# Patient Record
Sex: FEMALE | Race: WHITE | Employment: OTHER | URBAN - METROPOLITAN AREA
[De-identification: names, ages, dates, MRNs, and addresses within clinical notes are randomized per-mention and may not be internally consistent; named-entity substitution may affect disease eponyms.]

---

## 2018-10-15 ENCOUNTER — HOSPITAL ENCOUNTER (OUTPATIENT)
Age: 68
Setting detail: OBSERVATION
Discharge: HOME | End: 2018-10-17
Attending: INTERNAL MEDICINE
Payer: MEDICARE

## 2018-10-15 DIAGNOSIS — E11.65 TYPE 2 DIABETES MELLITUS WITH HYPERGLYCEMIA, WITHOUT LONG-TERM CURRENT USE OF INSULIN (HCC): ICD-10-CM

## 2018-10-15 DIAGNOSIS — R07.89 CHEST TIGHTNESS: Primary | ICD-10-CM

## 2018-10-15 DIAGNOSIS — I16.0 HYPERTENSIVE URGENCY: ICD-10-CM

## 2018-10-15 DIAGNOSIS — F31.81 BIPOLAR 2 DISORDER (HCC): ICD-10-CM

## 2018-10-15 DIAGNOSIS — R42 DIZZINESS: ICD-10-CM

## 2018-10-15 DIAGNOSIS — F31.9 BIPOLAR AFFECTIVE DISORDER, REMISSION STATUS UNSPECIFIED (HCC): ICD-10-CM

## 2018-10-15 DIAGNOSIS — H53.9 VISUAL DISTURBANCE: ICD-10-CM

## 2018-10-15 PROCEDURE — 99285 EMERGENCY DEPT VISIT HI MDM: CPT

## 2018-10-15 PROCEDURE — 96374 THER/PROPH/DIAG INJ IV PUSH: CPT

## 2018-10-15 RX ORDER — QUETIAPINE 150 MG/1
150 TABLET, FILM COATED, EXTENDED RELEASE ORAL NIGHTLY
COMMUNITY

## 2018-10-15 RX ORDER — LAMOTRIGINE 100 MG/1
100 TABLET ORAL 2 TIMES DAILY
COMMUNITY

## 2018-10-15 RX ORDER — METOPROLOL SUCCINATE 100 MG/1
200 TABLET, EXTENDED RELEASE ORAL DAILY
Status: ON HOLD | COMMUNITY
End: 2019-04-25

## 2018-10-15 RX ORDER — HYDRALAZINE HYDROCHLORIDE 20 MG/ML
10 INJECTION INTRAMUSCULAR; INTRAVENOUS ONCE
Status: COMPLETED | OUTPATIENT
Start: 2018-10-15 | End: 2018-10-16

## 2018-10-15 RX ORDER — CLONAZEPAM 2 MG/1
2 TABLET ORAL 2 TIMES DAILY
COMMUNITY

## 2018-10-15 RX ORDER — SODIUM CHLORIDE 0.9 % (FLUSH) 0.9 %
3 SYRINGE (ML) INJECTION EVERY 8 HOURS
Status: DISCONTINUED | OUTPATIENT
Start: 2018-10-15 | End: 2018-10-16 | Stop reason: ALTCHOICE

## 2018-10-15 ASSESSMENT — PAIN DESCRIPTION - LOCATION: LOCATION: HEAD

## 2018-10-15 ASSESSMENT — PAIN DESCRIPTION - PAIN TYPE: TYPE: ACUTE PAIN

## 2018-10-15 ASSESSMENT — PAIN SCALES - GENERAL: PAINLEVEL_OUTOF10: 2

## 2018-10-15 ASSESSMENT — PAIN DESCRIPTION - DESCRIPTORS: DESCRIPTORS: ACHING

## 2018-10-16 ENCOUNTER — APPOINTMENT (OUTPATIENT)
Dept: CT IMAGING | Age: 68
End: 2018-10-16
Payer: MEDICARE

## 2018-10-16 ENCOUNTER — APPOINTMENT (OUTPATIENT)
Dept: ULTRASOUND IMAGING | Age: 68
End: 2018-10-16
Payer: MEDICARE

## 2018-10-16 PROBLEM — R07.89 CHEST TIGHTNESS OR PRESSURE: Status: ACTIVE | Noted: 2018-10-16

## 2018-10-16 PROBLEM — R73.9 HYPERGLYCEMIA: Status: ACTIVE | Noted: 2018-10-16

## 2018-10-16 PROBLEM — H53.10 VISUAL DISTURBANCE, SUBJECTIVE: Status: ACTIVE | Noted: 2018-10-16

## 2018-10-16 PROBLEM — J02.9 ACUTE SORE THROAT: Status: ACTIVE | Noted: 2018-10-16

## 2018-10-16 PROBLEM — I16.0 HYPERTENSIVE URGENCY: Status: ACTIVE | Noted: 2018-10-16

## 2018-10-16 LAB
ALBUMIN SERPL-MCNC: 3.9 G/DL (ref 3.9–4.9)
ALP BLD-CCNC: 128 U/L (ref 40–130)
ALT SERPL-CCNC: 8 U/L (ref 0–33)
ANION GAP SERPL CALCULATED.3IONS-SCNC: 12 MEQ/L (ref 7–13)
ANION GAP SERPL CALCULATED.3IONS-SCNC: 15 MEQ/L (ref 7–13)
AST SERPL-CCNC: 10 U/L (ref 0–35)
BASOPHILS ABSOLUTE: 0 K/UL (ref 0–0.2)
BASOPHILS RELATIVE PERCENT: 0.6 %
BILIRUB SERPL-MCNC: 0.3 MG/DL (ref 0–1.2)
BILIRUBIN URINE: NEGATIVE
BLOOD, URINE: NEGATIVE
BUN BLDV-MCNC: 13 MG/DL (ref 8–23)
BUN BLDV-MCNC: 15 MG/DL (ref 8–23)
CALCIUM SERPL-MCNC: 8.7 MG/DL (ref 8.6–10.2)
CALCIUM SERPL-MCNC: 8.8 MG/DL (ref 8.6–10.2)
CHLORIDE BLD-SCNC: 101 MEQ/L (ref 98–107)
CHLORIDE BLD-SCNC: 104 MEQ/L (ref 98–107)
CLARITY: CLEAR
CO2: 25 MEQ/L (ref 22–29)
CO2: 26 MEQ/L (ref 22–29)
COLOR: YELLOW
CREAT SERPL-MCNC: 0.6 MG/DL (ref 0.5–0.9)
CREAT SERPL-MCNC: 0.67 MG/DL (ref 0.5–0.9)
EKG ATRIAL RATE: 65 BPM
EKG P AXIS: 53 DEGREES
EKG P-R INTERVAL: 170 MS
EKG Q-T INTERVAL: 466 MS
EKG QRS DURATION: 106 MS
EKG QTC CALCULATION (BAZETT): 484 MS
EKG R AXIS: 16 DEGREES
EKG T AXIS: 11 DEGREES
EKG VENTRICULAR RATE: 65 BPM
EOSINOPHILS ABSOLUTE: 0.1 K/UL (ref 0–0.7)
EOSINOPHILS RELATIVE PERCENT: 1.7 %
GFR AFRICAN AMERICAN: >60
GFR AFRICAN AMERICAN: >60
GFR NON-AFRICAN AMERICAN: >60
GFR NON-AFRICAN AMERICAN: >60
GLOBULIN: 3.7 G/DL (ref 2.3–3.5)
GLUCOSE BLD-MCNC: 137 MG/DL (ref 60–115)
GLUCOSE BLD-MCNC: 171 MG/DL (ref 74–109)
GLUCOSE BLD-MCNC: 216 MG/DL (ref 60–115)
GLUCOSE BLD-MCNC: 242 MG/DL (ref 74–109)
GLUCOSE BLD-MCNC: 270 MG/DL (ref 60–115)
GLUCOSE BLD-MCNC: 280 MG/DL (ref 60–115)
GLUCOSE URINE: NEGATIVE MG/DL
HBA1C MFR BLD: 7 % (ref 4.8–5.9)
HCT VFR BLD CALC: 34 % (ref 37–47)
HCT VFR BLD CALC: 34.2 % (ref 37–47)
HEMOGLOBIN: 11.3 G/DL (ref 12–16)
HEMOGLOBIN: 11.3 G/DL (ref 12–16)
KETONES, URINE: NEGATIVE MG/DL
LEUKOCYTE ESTERASE, URINE: NEGATIVE
LV EF: 60 %
LVEF MODALITY: NORMAL
LYMPHOCYTES ABSOLUTE: 1.6 K/UL (ref 1–4.8)
LYMPHOCYTES RELATIVE PERCENT: 27.4 %
MAGNESIUM: 1.9 MG/DL (ref 1.7–2.3)
MCH RBC QN AUTO: 30.2 PG (ref 27–31.3)
MCH RBC QN AUTO: 30.3 PG (ref 27–31.3)
MCHC RBC AUTO-ENTMCNC: 33 % (ref 33–37)
MCHC RBC AUTO-ENTMCNC: 33.1 % (ref 33–37)
MCV RBC AUTO: 91.1 FL (ref 82–100)
MCV RBC AUTO: 91.9 FL (ref 82–100)
MONOCYTES ABSOLUTE: 0.3 K/UL (ref 0.2–0.8)
MONOCYTES RELATIVE PERCENT: 5.4 %
NEUTROPHILS ABSOLUTE: 3.7 K/UL (ref 1.4–6.5)
NEUTROPHILS RELATIVE PERCENT: 64.9 %
NITRITE, URINE: NEGATIVE
PDW BLD-RTO: 17.5 % (ref 11.5–14.5)
PDW BLD-RTO: 17.6 % (ref 11.5–14.5)
PERFORMED ON: ABNORMAL
PH UA: 6 (ref 5–9)
PLATELET # BLD: 188 K/UL (ref 130–400)
PLATELET # BLD: 191 K/UL (ref 130–400)
POTASSIUM REFLEX MAGNESIUM: 3.6 MEQ/L (ref 3.5–5.1)
POTASSIUM SERPL-SCNC: 3.5 MEQ/L (ref 3.5–5.1)
PROTEIN UA: NEGATIVE MG/DL
RBC # BLD: 3.72 M/UL (ref 4.2–5.4)
RBC # BLD: 3.73 M/UL (ref 4.2–5.4)
S PYO AG THROAT QL: NEGATIVE
SODIUM BLD-SCNC: 141 MEQ/L (ref 132–144)
SODIUM BLD-SCNC: 142 MEQ/L (ref 132–144)
SPECIFIC GRAVITY UA: 1.01 (ref 1–1.03)
TOTAL PROTEIN: 7.6 G/DL (ref 6.4–8.1)
TROPONIN: <0.01 NG/ML (ref 0–0.01)
TSH SERPL DL<=0.05 MIU/L-ACNC: 5.29 UIU/ML (ref 0.27–4.2)
URINE REFLEX TO CULTURE: NORMAL
UROBILINOGEN, URINE: 0.2 E.U./DL
WBC # BLD: 5.8 K/UL (ref 4.8–10.8)
WBC # BLD: 6.1 K/UL (ref 4.8–10.8)

## 2018-10-16 PROCEDURE — 84443 ASSAY THYROID STIM HORMONE: CPT

## 2018-10-16 PROCEDURE — G0378 HOSPITAL OBSERVATION PER HR: HCPCS

## 2018-10-16 PROCEDURE — 2580000003 HC RX 258: Performed by: HOSPITALIST

## 2018-10-16 PROCEDURE — 6370000000 HC RX 637 (ALT 250 FOR IP): Performed by: INTERNAL MEDICINE

## 2018-10-16 PROCEDURE — 6370000000 HC RX 637 (ALT 250 FOR IP): Performed by: PHYSICIAN ASSISTANT

## 2018-10-16 PROCEDURE — 93880 EXTRACRANIAL BILAT STUDY: CPT | Performed by: INTERNAL MEDICINE

## 2018-10-16 PROCEDURE — 96375 TX/PRO/DX INJ NEW DRUG ADDON: CPT

## 2018-10-16 PROCEDURE — 93306 TTE W/DOPPLER COMPLETE: CPT

## 2018-10-16 PROCEDURE — 84484 ASSAY OF TROPONIN QUANT: CPT

## 2018-10-16 PROCEDURE — 6360000002 HC RX W HCPCS: Performed by: INTERNAL MEDICINE

## 2018-10-16 PROCEDURE — 85027 COMPLETE CBC AUTOMATED: CPT

## 2018-10-16 PROCEDURE — 6370000000 HC RX 637 (ALT 250 FOR IP): Performed by: HOSPITALIST

## 2018-10-16 PROCEDURE — 36415 COLL VENOUS BLD VENIPUNCTURE: CPT

## 2018-10-16 PROCEDURE — 85025 COMPLETE CBC W/AUTO DIFF WBC: CPT

## 2018-10-16 PROCEDURE — 70450 CT HEAD/BRAIN W/O DYE: CPT

## 2018-10-16 PROCEDURE — 2580000003 HC RX 258: Performed by: PHYSICIAN ASSISTANT

## 2018-10-16 PROCEDURE — 96374 THER/PROPH/DIAG INJ IV PUSH: CPT

## 2018-10-16 PROCEDURE — 6360000002 HC RX W HCPCS: Performed by: PHYSICIAN ASSISTANT

## 2018-10-16 PROCEDURE — 87880 STREP A ASSAY W/OPTIC: CPT

## 2018-10-16 PROCEDURE — 80053 COMPREHEN METABOLIC PANEL: CPT

## 2018-10-16 PROCEDURE — 99220 PR INITIAL OBSERVATION CARE/DAY 70 MINUTES: CPT | Performed by: INTERNAL MEDICINE

## 2018-10-16 PROCEDURE — 81003 URINALYSIS AUTO W/O SCOPE: CPT

## 2018-10-16 PROCEDURE — 96372 THER/PROPH/DIAG INJ SC/IM: CPT

## 2018-10-16 PROCEDURE — 6360000002 HC RX W HCPCS: Performed by: HOSPITALIST

## 2018-10-16 PROCEDURE — 93005 ELECTROCARDIOGRAM TRACING: CPT

## 2018-10-16 PROCEDURE — 83735 ASSAY OF MAGNESIUM: CPT

## 2018-10-16 PROCEDURE — 83036 HEMOGLOBIN GLYCOSYLATED A1C: CPT

## 2018-10-16 PROCEDURE — 93880 EXTRACRANIAL BILAT STUDY: CPT

## 2018-10-16 PROCEDURE — 96376 TX/PRO/DX INJ SAME DRUG ADON: CPT

## 2018-10-16 PROCEDURE — 87081 CULTURE SCREEN ONLY: CPT

## 2018-10-16 PROCEDURE — 2700000000 HC OXYGEN THERAPY PER DAY

## 2018-10-16 RX ORDER — FAMOTIDINE 20 MG/1
20 TABLET, FILM COATED ORAL 2 TIMES DAILY
Status: DISCONTINUED | OUTPATIENT
Start: 2018-10-16 | End: 2018-10-17 | Stop reason: HOSPADM

## 2018-10-16 RX ORDER — NITROGLYCERIN 0.4 MG/1
0.4 TABLET SUBLINGUAL EVERY 5 MIN PRN
Status: DISCONTINUED | OUTPATIENT
Start: 2018-10-16 | End: 2018-10-17 | Stop reason: HOSPADM

## 2018-10-16 RX ORDER — LISINOPRIL AND HYDROCHLOROTHIAZIDE 12.5; 1 MG/1; MG/1
2 TABLET ORAL DAILY
Status: DISCONTINUED | OUTPATIENT
Start: 2018-10-16 | End: 2018-10-17

## 2018-10-16 RX ORDER — LAMOTRIGINE 100 MG/1
100 TABLET ORAL 2 TIMES DAILY
Status: DISCONTINUED | OUTPATIENT
Start: 2018-10-16 | End: 2018-10-17 | Stop reason: HOSPADM

## 2018-10-16 RX ORDER — DEXTROSE MONOHYDRATE 25 G/50ML
12.5 INJECTION, SOLUTION INTRAVENOUS PRN
Status: DISCONTINUED | OUTPATIENT
Start: 2018-10-16 | End: 2018-10-17 | Stop reason: HOSPADM

## 2018-10-16 RX ORDER — ACETAMINOPHEN 325 MG/1
650 TABLET ORAL EVERY 4 HOURS PRN
Status: DISCONTINUED | OUTPATIENT
Start: 2018-10-16 | End: 2018-10-17 | Stop reason: HOSPADM

## 2018-10-16 RX ORDER — SODIUM CHLORIDE 0.9 % (FLUSH) 0.9 %
10 SYRINGE (ML) INJECTION EVERY 12 HOURS SCHEDULED
Status: DISCONTINUED | OUTPATIENT
Start: 2018-10-16 | End: 2018-10-17 | Stop reason: HOSPADM

## 2018-10-16 RX ORDER — QUETIAPINE FUMARATE 50 MG/1
150 TABLET, EXTENDED RELEASE ORAL NIGHTLY
Status: DISCONTINUED | OUTPATIENT
Start: 2018-10-16 | End: 2018-10-17 | Stop reason: HOSPADM

## 2018-10-16 RX ORDER — DEXTROSE MONOHYDRATE 50 MG/ML
100 INJECTION, SOLUTION INTRAVENOUS PRN
Status: DISCONTINUED | OUTPATIENT
Start: 2018-10-16 | End: 2018-10-17 | Stop reason: HOSPADM

## 2018-10-16 RX ORDER — OXYCODONE HYDROCHLORIDE AND ACETAMINOPHEN 5; 325 MG/1; MG/1
1 TABLET ORAL EVERY 6 HOURS PRN
Status: DISCONTINUED | OUTPATIENT
Start: 2018-10-16 | End: 2018-10-17 | Stop reason: HOSPADM

## 2018-10-16 RX ORDER — AMLODIPINE BESYLATE 10 MG/1
10 TABLET ORAL DAILY
Status: DISCONTINUED | OUTPATIENT
Start: 2018-10-16 | End: 2018-10-17

## 2018-10-16 RX ORDER — SODIUM CHLORIDE 0.9 % (FLUSH) 0.9 %
10 SYRINGE (ML) INJECTION PRN
Status: DISCONTINUED | OUTPATIENT
Start: 2018-10-16 | End: 2018-10-17 | Stop reason: HOSPADM

## 2018-10-16 RX ORDER — ONDANSETRON 2 MG/ML
4 INJECTION INTRAMUSCULAR; INTRAVENOUS EVERY 6 HOURS PRN
Status: DISCONTINUED | OUTPATIENT
Start: 2018-10-16 | End: 2018-10-17 | Stop reason: HOSPADM

## 2018-10-16 RX ORDER — METOPROLOL SUCCINATE 100 MG/1
200 TABLET, EXTENDED RELEASE ORAL DAILY
Status: DISCONTINUED | OUTPATIENT
Start: 2018-10-16 | End: 2018-10-17 | Stop reason: HOSPADM

## 2018-10-16 RX ORDER — ASPIRIN 81 MG/1
81 TABLET ORAL DAILY
Status: DISCONTINUED | OUTPATIENT
Start: 2018-10-16 | End: 2018-10-17 | Stop reason: HOSPADM

## 2018-10-16 RX ORDER — MORPHINE SULFATE 2 MG/ML
2 INJECTION, SOLUTION INTRAMUSCULAR; INTRAVENOUS EVERY 4 HOURS PRN
Status: DISCONTINUED | OUTPATIENT
Start: 2018-10-16 | End: 2018-10-17 | Stop reason: HOSPADM

## 2018-10-16 RX ORDER — NITROGLYCERIN 0.4 MG/1
0.4 TABLET SUBLINGUAL EVERY 5 MIN PRN
Status: DISCONTINUED | OUTPATIENT
Start: 2018-10-16 | End: 2018-10-16 | Stop reason: SDUPTHER

## 2018-10-16 RX ORDER — HYDRALAZINE HYDROCHLORIDE 20 MG/ML
10 INJECTION INTRAMUSCULAR; INTRAVENOUS EVERY 6 HOURS PRN
Status: DISCONTINUED | OUTPATIENT
Start: 2018-10-16 | End: 2018-10-17 | Stop reason: HOSPADM

## 2018-10-16 RX ORDER — CLONAZEPAM 1 MG/1
2 TABLET ORAL 3 TIMES DAILY PRN
Status: DISCONTINUED | OUTPATIENT
Start: 2018-10-16 | End: 2018-10-17 | Stop reason: HOSPADM

## 2018-10-16 RX ORDER — ASPIRIN 81 MG/1
324 TABLET, CHEWABLE ORAL ONCE
Status: COMPLETED | OUTPATIENT
Start: 2018-10-16 | End: 2018-10-16

## 2018-10-16 RX ORDER — NICOTINE POLACRILEX 4 MG
15 LOZENGE BUCCAL PRN
Status: DISCONTINUED | OUTPATIENT
Start: 2018-10-16 | End: 2018-10-17 | Stop reason: HOSPADM

## 2018-10-16 RX ORDER — ATORVASTATIN CALCIUM 40 MG/1
40 TABLET, FILM COATED ORAL NIGHTLY
Status: DISCONTINUED | OUTPATIENT
Start: 2018-10-16 | End: 2018-10-17 | Stop reason: HOSPADM

## 2018-10-16 RX ADMIN — ENOXAPARIN SODIUM 40 MG: 40 INJECTION SUBCUTANEOUS at 08:08

## 2018-10-16 RX ADMIN — METOPROLOL SUCCINATE 200 MG: 100 TABLET, EXTENDED RELEASE ORAL at 08:08

## 2018-10-16 RX ADMIN — Medication 10 ML: at 08:08

## 2018-10-16 RX ADMIN — QUETIAPINE FUMARATE 150 MG: 50 TABLET, EXTENDED RELEASE ORAL at 22:19

## 2018-10-16 RX ADMIN — INSULIN LISPRO 4 UNITS: 100 INJECTION, SOLUTION INTRAVENOUS; SUBCUTANEOUS at 08:09

## 2018-10-16 RX ADMIN — LISINOPRIL AND HYDROCHLOROTHIAZIDE 2 TABLET: 12.5; 1 TABLET ORAL at 08:08

## 2018-10-16 RX ADMIN — MORPHINE SULFATE 2 MG: 2 INJECTION, SOLUTION INTRAMUSCULAR; INTRAVENOUS at 08:08

## 2018-10-16 RX ADMIN — FAMOTIDINE 20 MG: 20 TABLET ORAL at 08:08

## 2018-10-16 RX ADMIN — INSULIN LISPRO 4 UNITS: 100 INJECTION, SOLUTION INTRAVENOUS; SUBCUTANEOUS at 17:48

## 2018-10-16 RX ADMIN — AMLODIPINE BESYLATE 10 MG: 10 TABLET ORAL at 08:08

## 2018-10-16 RX ADMIN — ASPIRIN 324 MG: 81 TABLET, CHEWABLE ORAL at 01:44

## 2018-10-16 RX ADMIN — HYDRALAZINE HYDROCHLORIDE 10 MG: 20 INJECTION INTRAMUSCULAR; INTRAVENOUS at 01:00

## 2018-10-16 RX ADMIN — Medication 10 ML: at 22:20

## 2018-10-16 RX ADMIN — ONDANSETRON HYDROCHLORIDE 4 MG: 2 SOLUTION INTRAMUSCULAR; INTRAVENOUS at 10:41

## 2018-10-16 RX ADMIN — FAMOTIDINE 20 MG: 20 TABLET ORAL at 22:20

## 2018-10-16 RX ADMIN — ATORVASTATIN CALCIUM 40 MG: 40 TABLET, FILM COATED ORAL at 22:20

## 2018-10-16 RX ADMIN — Medication 3 ML: at 01:02

## 2018-10-16 RX ADMIN — ASPIRIN 81 MG: 81 TABLET, COATED ORAL at 08:08

## 2018-10-16 RX ADMIN — HYDRALAZINE HYDROCHLORIDE 0.5 MG: 20 INJECTION INTRAMUSCULAR; INTRAVENOUS at 06:23

## 2018-10-16 RX ADMIN — LAMOTRIGINE 100 MG: 100 TABLET ORAL at 22:20

## 2018-10-16 RX ADMIN — ACETAMINOPHEN 650 MG: 325 TABLET ORAL at 05:15

## 2018-10-16 RX ADMIN — LAMOTRIGINE 100 MG: 100 TABLET ORAL at 08:08

## 2018-10-16 ASSESSMENT — PAIN DESCRIPTION - LOCATION
LOCATION: GENERALIZED
LOCATION: HEAD

## 2018-10-16 ASSESSMENT — ENCOUNTER SYMPTOMS
WHEEZING: 0
ANAL BLEEDING: 0
VOICE CHANGE: 0
COUGH: 0
ABDOMINAL DISTENTION: 0
GASTROINTESTINAL NEGATIVE: 1
VOMITING: 0
SHORTNESS OF BREATH: 0
EYE DISCHARGE: 0
NAUSEA: 0
ABDOMINAL PAIN: 0
CHEST TIGHTNESS: 1
PHOTOPHOBIA: 0
BLOOD IN STOOL: 0
STRIDOR: 0
APNEA: 0

## 2018-10-16 ASSESSMENT — PAIN SCALES - GENERAL
PAINLEVEL_OUTOF10: 3
PAINLEVEL_OUTOF10: 1
PAINLEVEL_OUTOF10: 8
PAINLEVEL_OUTOF10: 2
PAINLEVEL_OUTOF10: 5
PAINLEVEL_OUTOF10: 4

## 2018-10-16 ASSESSMENT — PAIN DESCRIPTION - PAIN TYPE
TYPE: ACUTE PAIN
TYPE: ACUTE PAIN

## 2018-10-16 ASSESSMENT — HEART SCORE: ECG: 0

## 2018-10-16 ASSESSMENT — PAIN DESCRIPTION - FREQUENCY: FREQUENCY: CONTINUOUS

## 2018-10-16 ASSESSMENT — PAIN DESCRIPTION - DESCRIPTORS: DESCRIPTORS: ACHING;PRESSURE

## 2018-10-16 NOTE — H&P
increased work of breathing, good air exchange, clear to auscultation bilaterally, no crackles or wheezing  CARDIOVASCULAR:  Normal apical impulse, regular rate and rhythm, normal S1 and S2, no S3 or S4, and no murmur noted  ABDOMEN: normal bowel sounds, soft, non-distended, non-tender, no masses palpated, no hepatosplenomegally  MUSCULOSKELETAL:  Right BKA  there is no redness, warmth, or swelling of the joints  tone is normal  NEUROLOGIC:  Awake, alert, oriented to name, place and time. Cranial nerves II-XII are grossly intact. SKIN:  no rashes and no lesions    DATA:     Diagnostic tests reviewed for today's visit:    Most recent labs and imaging results reviewed.      LABS:    Recent Results (from the past 24 hour(s))   Comprehensive Metabolic Panel    Collection Time: 10/16/18 12:00 AM   Result Value Ref Range    Sodium 141 132 - 144 mEq/L    Potassium 3.5 3.5 - 5.1 mEq/L    Chloride 101 98 - 107 mEq/L    CO2 25 22 - 29 mEq/L    Anion Gap 15 (H) 7 - 13 mEq/L    Glucose 171 (H) 74 - 109 mg/dL    BUN 15 8 - 23 mg/dL    CREATININE 0.60 0.50 - 0.90 mg/dL    GFR Non-African American >60.0 >60    GFR  >60.0 >60    Calcium 8.8 8.6 - 10.2 mg/dL    Total Protein 7.6 6.4 - 8.1 g/dL    Alb 3.9 3.9 - 4.9 g/dL    Total Bilirubin 0.3 0.0 - 1.2 mg/dL    Alkaline Phosphatase 128 40 - 130 U/L    ALT 8 0 - 33 U/L    AST 10 0 - 35 U/L    Globulin 3.7 (H) 2.3 - 3.5 g/dL   CBC Auto Differential    Collection Time: 10/16/18 12:00 AM   Result Value Ref Range    WBC 5.8 4.8 - 10.8 K/uL    RBC 3.73 (L) 4.20 - 5.40 M/uL    Hemoglobin 11.3 (L) 12.0 - 16.0 g/dL    Hematocrit 34.0 (L) 37.0 - 47.0 %    MCV 91.1 82.0 - 100.0 fL    MCH 30.2 27.0 - 31.3 pg    MCHC 33.1 33.0 - 37.0 %    RDW 17.5 (H) 11.5 - 14.5 %    Platelets 855 854 - 731 K/uL    Neutrophils % 64.9 %    Lymphocytes % 27.4 %    Monocytes % 5.4 %    Eosinophils % 1.7 %    Basophils % 0.6 %    Neutrophils # 3.7 1.4 - 6.5 K/uL    Lymphocytes # 1.6 1.0 - 4.8 K/uL

## 2018-10-16 NOTE — PROGRESS NOTES
Pt arrived from the ER via cart. Oriented to room call light tv and phone. Admission and assessment completed. Call light in reach. Fall precautions in place.

## 2018-10-17 ENCOUNTER — APPOINTMENT (OUTPATIENT)
Dept: ULTRASOUND IMAGING | Age: 68
End: 2018-10-17
Payer: MEDICARE

## 2018-10-17 VITALS
TEMPERATURE: 98.8 F | HEIGHT: 70 IN | OXYGEN SATURATION: 97 % | WEIGHT: 212 LBS | SYSTOLIC BLOOD PRESSURE: 120 MMHG | BODY MASS INDEX: 30.35 KG/M2 | RESPIRATION RATE: 18 BRPM | HEART RATE: 70 BPM | DIASTOLIC BLOOD PRESSURE: 67 MMHG

## 2018-10-17 LAB
ANION GAP SERPL CALCULATED.3IONS-SCNC: 14 MEQ/L (ref 7–13)
BUN BLDV-MCNC: 16 MG/DL (ref 8–23)
CALCIUM SERPL-MCNC: 8.6 MG/DL (ref 8.6–10.2)
CHLORIDE BLD-SCNC: 99 MEQ/L (ref 98–107)
CO2: 27 MEQ/L (ref 22–29)
CREAT SERPL-MCNC: 0.79 MG/DL (ref 0.5–0.9)
GFR AFRICAN AMERICAN: >60
GFR NON-AFRICAN AMERICAN: >60
GLUCOSE BLD-MCNC: 156 MG/DL (ref 60–115)
GLUCOSE BLD-MCNC: 193 MG/DL (ref 60–115)
GLUCOSE BLD-MCNC: 195 MG/DL (ref 74–109)
GLUCOSE BLD-MCNC: 345 MG/DL (ref 60–115)
HCT VFR BLD CALC: 34.8 % (ref 37–47)
HEMOGLOBIN: 11.4 G/DL (ref 12–16)
MAGNESIUM: 2.1 MG/DL (ref 1.7–2.3)
MCH RBC QN AUTO: 30 PG (ref 27–31.3)
MCHC RBC AUTO-ENTMCNC: 32.8 % (ref 33–37)
MCV RBC AUTO: 91.6 FL (ref 82–100)
PDW BLD-RTO: 17.5 % (ref 11.5–14.5)
PERFORMED ON: ABNORMAL
PLATELET # BLD: 202 K/UL (ref 130–400)
POTASSIUM REFLEX MAGNESIUM: 3.4 MEQ/L (ref 3.5–5.1)
RBC # BLD: 3.79 M/UL (ref 4.2–5.4)
S PYO THROAT QL CULT: NORMAL
SODIUM BLD-SCNC: 140 MEQ/L (ref 132–144)
WBC # BLD: 5.8 K/UL (ref 4.8–10.8)

## 2018-10-17 PROCEDURE — 6370000000 HC RX 637 (ALT 250 FOR IP): Performed by: PSYCHIATRY & NEUROLOGY

## 2018-10-17 PROCEDURE — 99225 PR SBSQ OBSERVATION CARE/DAY 25 MINUTES: CPT | Performed by: INTERNAL MEDICINE

## 2018-10-17 PROCEDURE — 83735 ASSAY OF MAGNESIUM: CPT

## 2018-10-17 PROCEDURE — 93975 VASCULAR STUDY: CPT | Performed by: INTERNAL MEDICINE

## 2018-10-17 PROCEDURE — 6370000000 HC RX 637 (ALT 250 FOR IP): Performed by: INTERNAL MEDICINE

## 2018-10-17 PROCEDURE — 36415 COLL VENOUS BLD VENIPUNCTURE: CPT

## 2018-10-17 PROCEDURE — 2580000003 HC RX 258: Performed by: HOSPITALIST

## 2018-10-17 PROCEDURE — 6360000002 HC RX W HCPCS: Performed by: HOSPITALIST

## 2018-10-17 PROCEDURE — 6370000000 HC RX 637 (ALT 250 FOR IP): Performed by: HOSPITALIST

## 2018-10-17 PROCEDURE — G0378 HOSPITAL OBSERVATION PER HR: HCPCS

## 2018-10-17 PROCEDURE — 85027 COMPLETE CBC AUTOMATED: CPT

## 2018-10-17 PROCEDURE — 96372 THER/PROPH/DIAG INJ SC/IM: CPT

## 2018-10-17 PROCEDURE — 93975 VASCULAR STUDY: CPT

## 2018-10-17 PROCEDURE — 80048 BASIC METABOLIC PNL TOTAL CA: CPT

## 2018-10-17 RX ORDER — BUTALBITAL, ASPIRIN, AND CAFFEINE 325; 50; 40 MG/1; MG/1; MG/1
1 CAPSULE ORAL EVERY 6 HOURS PRN
Status: DISCONTINUED | OUTPATIENT
Start: 2018-10-17 | End: 2018-10-17 | Stop reason: HOSPADM

## 2018-10-17 RX ORDER — LISINOPRIL 10 MG/1
10 TABLET ORAL DAILY
Status: DISCONTINUED | OUTPATIENT
Start: 2018-10-17 | End: 2018-10-17 | Stop reason: HOSPADM

## 2018-10-17 RX ORDER — LISINOPRIL 10 MG/1
10 TABLET ORAL DAILY
Qty: 30 TABLET | Refills: 3 | Status: ON HOLD | OUTPATIENT
Start: 2018-10-17 | End: 2022-07-15 | Stop reason: HOSPADM

## 2018-10-17 RX ORDER — ATORVASTATIN CALCIUM 40 MG/1
40 TABLET, FILM COATED ORAL NIGHTLY
Qty: 30 TABLET | Refills: 3 | Status: ON HOLD | OUTPATIENT
Start: 2018-10-17 | End: 2019-04-25

## 2018-10-17 RX ORDER — BUTALBITAL, ASPIRIN, AND CAFFEINE 325; 50; 40 MG/1; MG/1; MG/1
1 CAPSULE ORAL EVERY 6 HOURS PRN
Qty: 42 CAPSULE | Refills: 0 | Status: ON HOLD | OUTPATIENT
Start: 2018-10-17 | End: 2019-04-24

## 2018-10-17 RX ORDER — LISINOPRIL AND HYDROCHLOROTHIAZIDE 12.5; 1 MG/1; MG/1
1 TABLET ORAL DAILY
Status: DISCONTINUED | OUTPATIENT
Start: 2018-10-18 | End: 2018-10-17

## 2018-10-17 RX ORDER — TOPIRAMATE 25 MG/1
25 TABLET ORAL 2 TIMES DAILY
Qty: 60 TABLET | Refills: 1 | Status: ON HOLD | OUTPATIENT
Start: 2018-10-17 | End: 2022-07-15 | Stop reason: HOSPADM

## 2018-10-17 RX ORDER — TOPIRAMATE 25 MG/1
25 TABLET ORAL 2 TIMES DAILY
Status: DISCONTINUED | OUTPATIENT
Start: 2018-10-17 | End: 2018-10-17 | Stop reason: HOSPADM

## 2018-10-17 RX ORDER — ASPIRIN 81 MG/1
81 TABLET ORAL DAILY
Qty: 30 TABLET | Refills: 3 | Status: ON HOLD | OUTPATIENT
Start: 2018-10-18 | End: 2019-04-25

## 2018-10-17 RX ADMIN — FAMOTIDINE 20 MG: 20 TABLET ORAL at 10:25

## 2018-10-17 RX ADMIN — ENOXAPARIN SODIUM 40 MG: 40 INJECTION SUBCUTANEOUS at 10:25

## 2018-10-17 RX ADMIN — Medication 10 ML: at 10:25

## 2018-10-17 RX ADMIN — LAMOTRIGINE 100 MG: 100 TABLET ORAL at 10:25

## 2018-10-17 RX ADMIN — LISINOPRIL 10 MG: 10 TABLET ORAL at 17:14

## 2018-10-17 RX ADMIN — INSULIN LISPRO 8 UNITS: 100 INJECTION, SOLUTION INTRAVENOUS; SUBCUTANEOUS at 11:50

## 2018-10-17 RX ADMIN — TOPIRAMATE 25 MG: 25 TABLET, FILM COATED ORAL at 10:25

## 2018-10-17 RX ADMIN — INSULIN LISPRO 2 UNITS: 100 INJECTION, SOLUTION INTRAVENOUS; SUBCUTANEOUS at 17:15

## 2018-10-17 RX ADMIN — BUTALBITAL, ASPIRIN, AND CAFFEINE 1 CAPSULE: 50; 325; 40 CAPSULE ORAL at 10:25

## 2018-10-17 RX ADMIN — TOPIRAMATE 25 MG: 25 TABLET, FILM COATED ORAL at 18:02

## 2018-10-17 RX ADMIN — ASPIRIN 81 MG: 81 TABLET, COATED ORAL at 10:25

## 2018-10-17 ASSESSMENT — ENCOUNTER SYMPTOMS
GASTROINTESTINAL NEGATIVE: 1
STRIDOR: 0
CHEST TIGHTNESS: 0
WHEEZING: 0
SHORTNESS OF BREATH: 0
COUGH: 0
BLOOD IN STOOL: 0
EYES NEGATIVE: 1
NAUSEA: 0
RESPIRATORY NEGATIVE: 1

## 2018-10-17 ASSESSMENT — PAIN DESCRIPTION - LOCATION: LOCATION: HEAD

## 2018-10-17 ASSESSMENT — PAIN SCALES - GENERAL
PAINLEVEL_OUTOF10: 0
PAINLEVEL_OUTOF10: 3
PAINLEVEL_OUTOF10: 0
PAINLEVEL_OUTOF10: 0

## 2018-10-17 ASSESSMENT — PAIN DESCRIPTION - DESCRIPTORS: DESCRIPTORS: ACHING

## 2018-10-17 ASSESSMENT — PAIN DESCRIPTION - PAIN TYPE: TYPE: CHRONIC PAIN

## 2018-10-17 NOTE — CONSULTS
Inpatient consult to Neurology  Consult performed by: Dann Perera  Consult ordered by: Genevieve REED      Chronic daily migraine headaches,  Need to do as outpt  Fiorinal and topamax  botox as outpt    Len R.  Jenny Fletcher MD, Floyd Macias, American Board of Psychiatry & Neurology  Board Certified in Vascular Neurology  Board Certified in Neuromuscular Medicine  Certified in . [place] [address]

## 2018-10-17 NOTE — PROGRESS NOTES
Physician Progress Note    10/17/2018   1:21 PM    Name:  Kasandra Ontiveros  MRN:    [id number]      Day: 0     Admit Date: 10/15/2018 11:16 PM  PCP: Holli Escobar DO    Code Status:  Full Code    Subjective:      no new events. Continues to have visual floaters. No weakness or numbness. No chest pain. No palpitations. Physical Examination:      Vitals:  BP (!) 104/50   Pulse 78   Temp 98.6 °F (37 °C) (Oral)   Resp 18   Ht 5' 10\" (1.778 m)   Wt 212 lb (96.2 kg)   SpO2 97%   BMI 30.42 kg/m²   Temp (24hrs), Av.3 °F (36.8 °C), Min:98.2 °F (36.8 °C), Max:98.6 °F (37 °C)      General appearance: alert, cooperative and no distress  Mental Status: oriented to person, place and time and normal affect  Lungs: clear to auscultation bilaterally, normal effort  Heart: regular rate and rhythm, no murmur  Abdomen: soft, nontender, nondistended, bowel sounds present, no masses  Extremities: no edema, redness, tenderness in the calves  Skin: no gross lesions, rashes  Neuro: intact and symmetric.  AOX3    Data:     Labs:  Recent Labs      10/16/18   0627  10/17/18   0611   WBC  6.1  5.8   HGB  11.3*  11.4*   PLT  191  202     Recent Labs      10/16/18   0627  10/17/18   0611   NA  142  140   K  3.6  3.4*   CL  104  99   CO2  26  27   BUN  13  16   CREATININE  0.67  0.79   GLUCOSE  242*  195*     Recent Labs      10/16/18   0000   AST  10   ALT  8   BILITOT  0.3   ALKPHOS  128       Current Facility-Administered Medications   Medication Dose Route Frequency Provider Last Rate Last Dose    topiramate (TOPAMAX) tablet 25 mg  25 mg Oral BID Michi Estrella MD   25 mg at 10/17/18 1025    butalbital-aspirin-caffeine (FIORINAL) capsule 1 capsule  1 capsule Oral Q6H PRN Michi Estrella MD   1 capsule at 10/17/18 1025    lisinopril (PRINIVIL;ZESTRIL) tablet 10 mg  10 mg Oral Daily Efrain Shandon, DO        sodium chloride flush 0.9 % injection 10 mL  10 mL Intravenous 2 times per day PUNEET Barth CNP   10 mL at

## 2019-04-24 ENCOUNTER — APPOINTMENT (OUTPATIENT)
Dept: GENERAL RADIOLOGY | Age: 69
DRG: 897 | End: 2019-04-24
Payer: MEDICARE

## 2019-04-24 ENCOUNTER — APPOINTMENT (OUTPATIENT)
Dept: CT IMAGING | Age: 69
DRG: 897 | End: 2019-04-24
Payer: MEDICARE

## 2019-04-24 ENCOUNTER — HOSPITAL ENCOUNTER (INPATIENT)
Age: 69
LOS: 3 days | Discharge: HOME | DRG: 897 | End: 2019-04-27
Attending: EMERGENCY MEDICINE | Admitting: INTERNAL MEDICINE
Payer: MEDICARE

## 2019-04-24 DIAGNOSIS — S02.2XXA CLOSED FRACTURE OF NASAL BONE, INITIAL ENCOUNTER: ICD-10-CM

## 2019-04-24 DIAGNOSIS — S09.90XA INJURY OF HEAD, INITIAL ENCOUNTER: Primary | ICD-10-CM

## 2019-04-24 DIAGNOSIS — R55 SYNCOPE AND COLLAPSE: ICD-10-CM

## 2019-04-24 DIAGNOSIS — R94.31 LONG QT INTERVAL: ICD-10-CM

## 2019-04-24 LAB
ALBUMIN SERPL-MCNC: 4.8 G/DL (ref 3.5–4.6)
ALP BLD-CCNC: 131 U/L (ref 40–130)
ALT SERPL-CCNC: 15 U/L (ref 0–33)
ANION GAP SERPL CALCULATED.3IONS-SCNC: 21 MEQ/L (ref 9–15)
APTT: 29.3 SEC (ref 21.6–35.4)
AST SERPL-CCNC: 18 U/L (ref 0–35)
BASOPHILS ABSOLUTE: 0 K/UL (ref 0–0.2)
BASOPHILS RELATIVE PERCENT: 0.7 %
BILIRUB SERPL-MCNC: <0.2 MG/DL (ref 0.2–0.7)
BUN BLDV-MCNC: 11 MG/DL (ref 8–23)
CALCIUM SERPL-MCNC: 8.7 MG/DL (ref 8.5–9.9)
CHLORIDE BLD-SCNC: 106 MEQ/L (ref 95–107)
CO2: 17 MEQ/L (ref 20–31)
CREAT SERPL-MCNC: 0.66 MG/DL (ref 0.5–0.9)
EKG ATRIAL RATE: 69 BPM
EKG P AXIS: 66 DEGREES
EKG P-R INTERVAL: 164 MS
EKG Q-T INTERVAL: 458 MS
EKG QRS DURATION: 108 MS
EKG QTC CALCULATION (BAZETT): 490 MS
EKG R AXIS: 46 DEGREES
EKG T AXIS: 22 DEGREES
EKG VENTRICULAR RATE: 69 BPM
EOSINOPHILS ABSOLUTE: 0.1 K/UL (ref 0–0.7)
EOSINOPHILS RELATIVE PERCENT: 1.5 %
ETHANOL PERCENT: 0.17 G/DL
ETHANOL: 188 MG/DL (ref 0–0.08)
GFR AFRICAN AMERICAN: >60
GFR NON-AFRICAN AMERICAN: >60
GLOBULIN: 4.1 G/DL (ref 2.3–3.5)
GLUCOSE BLD-MCNC: 143 MG/DL (ref 70–99)
HCT VFR BLD CALC: 41.5 % (ref 37–47)
HEMOGLOBIN: 13.7 G/DL (ref 12–16)
INR BLD: 1
LACTIC ACID: 3.6 MMOL/L (ref 0.5–2.2)
LACTIC ACID: 4 MMOL/L (ref 0.5–2.2)
LYMPHOCYTES ABSOLUTE: 1.7 K/UL (ref 1–4.8)
LYMPHOCYTES RELATIVE PERCENT: 33 %
MCH RBC QN AUTO: 32.8 PG (ref 27–31.3)
MCHC RBC AUTO-ENTMCNC: 33.1 % (ref 33–37)
MCV RBC AUTO: 99 FL (ref 82–100)
MONOCYTES ABSOLUTE: 0.3 K/UL (ref 0.2–0.8)
MONOCYTES RELATIVE PERCENT: 6.5 %
NEUTROPHILS ABSOLUTE: 3.1 K/UL (ref 1.4–6.5)
NEUTROPHILS RELATIVE PERCENT: 58.3 %
PDW BLD-RTO: 17.5 % (ref 11.5–14.5)
PLATELET # BLD: 210 K/UL (ref 130–400)
POTASSIUM SERPL-SCNC: 3.5 MEQ/L (ref 3.4–4.9)
PROTHROMBIN TIME: 10.2 SEC (ref 9–11.5)
RBC # BLD: 4.2 M/UL (ref 4.2–5.4)
SODIUM BLD-SCNC: 144 MEQ/L (ref 135–144)
TOTAL CK: 162 U/L (ref 0–170)
TOTAL PROTEIN: 8.9 G/DL (ref 6.3–8)
TROPONIN: <0.01 NG/ML (ref 0–0.01)
WBC # BLD: 5.2 K/UL (ref 4.8–10.8)

## 2019-04-24 PROCEDURE — 0NSBXZZ REPOSITION NASAL BONE, EXTERNAL APPROACH: ICD-10-PCS | Performed by: OTOLARYNGOLOGY

## 2019-04-24 PROCEDURE — 2060000000 HC ICU INTERMEDIATE R&B

## 2019-04-24 PROCEDURE — 93005 ELECTROCARDIOGRAM TRACING: CPT

## 2019-04-24 PROCEDURE — 6360000002 HC RX W HCPCS: Performed by: NURSE PRACTITIONER

## 2019-04-24 PROCEDURE — 85610 PROTHROMBIN TIME: CPT

## 2019-04-24 PROCEDURE — 2580000003 HC RX 258: Performed by: NURSE PRACTITIONER

## 2019-04-24 PROCEDURE — 82550 ASSAY OF CK (CPK): CPT

## 2019-04-24 PROCEDURE — 6360000002 HC RX W HCPCS: Performed by: INTERNAL MEDICINE

## 2019-04-24 PROCEDURE — 80053 COMPREHEN METABOLIC PANEL: CPT

## 2019-04-24 PROCEDURE — 90471 IMMUNIZATION ADMIN: CPT | Performed by: EMERGENCY MEDICINE

## 2019-04-24 PROCEDURE — G0480 DRUG TEST DEF 1-7 CLASSES: HCPCS

## 2019-04-24 PROCEDURE — 90715 TDAP VACCINE 7 YRS/> IM: CPT | Performed by: EMERGENCY MEDICINE

## 2019-04-24 PROCEDURE — 71045 X-RAY EXAM CHEST 1 VIEW: CPT

## 2019-04-24 PROCEDURE — 36415 COLL VENOUS BLD VENIPUNCTURE: CPT

## 2019-04-24 PROCEDURE — 85730 THROMBOPLASTIN TIME PARTIAL: CPT

## 2019-04-24 PROCEDURE — 6360000002 HC RX W HCPCS: Performed by: EMERGENCY MEDICINE

## 2019-04-24 PROCEDURE — 83605 ASSAY OF LACTIC ACID: CPT

## 2019-04-24 PROCEDURE — 70486 CT MAXILLOFACIAL W/O DYE: CPT

## 2019-04-24 PROCEDURE — 84484 ASSAY OF TROPONIN QUANT: CPT

## 2019-04-24 PROCEDURE — 99285 EMERGENCY DEPT VISIT HI MDM: CPT

## 2019-04-24 PROCEDURE — 73060 X-RAY EXAM OF HUMERUS: CPT

## 2019-04-24 PROCEDURE — 96365 THER/PROPH/DIAG IV INF INIT: CPT

## 2019-04-24 PROCEDURE — 70450 CT HEAD/BRAIN W/O DYE: CPT

## 2019-04-24 PROCEDURE — 73030 X-RAY EXAM OF SHOULDER: CPT

## 2019-04-24 PROCEDURE — 6370000000 HC RX 637 (ALT 250 FOR IP): Performed by: EMERGENCY MEDICINE

## 2019-04-24 PROCEDURE — 72125 CT NECK SPINE W/O DYE: CPT

## 2019-04-24 PROCEDURE — 85025 COMPLETE CBC W/AUTO DIFF WBC: CPT

## 2019-04-24 PROCEDURE — 2580000003 HC RX 258: Performed by: EMERGENCY MEDICINE

## 2019-04-24 PROCEDURE — 96375 TX/PRO/DX INJ NEW DRUG ADDON: CPT

## 2019-04-24 RX ORDER — POTASSIUM CHLORIDE 1.5 G/1.77G
40 POWDER, FOR SOLUTION ORAL PRN
Status: DISCONTINUED | OUTPATIENT
Start: 2019-04-24 | End: 2019-04-27 | Stop reason: HOSPADM

## 2019-04-24 RX ORDER — ACETAMINOPHEN 325 MG/1
650 TABLET ORAL EVERY 4 HOURS PRN
Status: DISCONTINUED | OUTPATIENT
Start: 2019-04-24 | End: 2019-04-27 | Stop reason: HOSPADM

## 2019-04-24 RX ORDER — DEXTROSE MONOHYDRATE 25 G/50ML
12.5 INJECTION, SOLUTION INTRAVENOUS PRN
Status: DISCONTINUED | OUTPATIENT
Start: 2019-04-24 | End: 2019-04-27 | Stop reason: HOSPADM

## 2019-04-24 RX ORDER — FOLIC ACID 1 MG/1
1 TABLET ORAL DAILY
Status: DISCONTINUED | OUTPATIENT
Start: 2019-04-24 | End: 2019-04-27 | Stop reason: HOSPADM

## 2019-04-24 RX ORDER — NICOTINE POLACRILEX 4 MG
15 LOZENGE BUCCAL PRN
Status: DISCONTINUED | OUTPATIENT
Start: 2019-04-24 | End: 2019-04-27 | Stop reason: HOSPADM

## 2019-04-24 RX ORDER — LORAZEPAM 1 MG/1
1 TABLET ORAL
Status: DISCONTINUED | OUTPATIENT
Start: 2019-04-24 | End: 2019-04-27 | Stop reason: HOSPADM

## 2019-04-24 RX ORDER — MULTIVITAMIN WITH FOLIC ACID 400 MCG
1 TABLET ORAL DAILY
Status: DISCONTINUED | OUTPATIENT
Start: 2019-04-24 | End: 2019-04-27 | Stop reason: HOSPADM

## 2019-04-24 RX ORDER — MAGNESIUM SULFATE IN WATER 40 MG/ML
2 INJECTION, SOLUTION INTRAVENOUS ONCE
Status: COMPLETED | OUTPATIENT
Start: 2019-04-24 | End: 2019-04-24

## 2019-04-24 RX ORDER — LORAZEPAM 2 MG/ML
4 INJECTION INTRAMUSCULAR
Status: DISCONTINUED | OUTPATIENT
Start: 2019-04-24 | End: 2019-04-27 | Stop reason: HOSPADM

## 2019-04-24 RX ORDER — 0.9 % SODIUM CHLORIDE 0.9 %
1000 INTRAVENOUS SOLUTION INTRAVENOUS ONCE
Status: COMPLETED | OUTPATIENT
Start: 2019-04-24 | End: 2019-04-24

## 2019-04-24 RX ORDER — MORPHINE SULFATE 2 MG/ML
4 INJECTION, SOLUTION INTRAMUSCULAR; INTRAVENOUS
Status: DISCONTINUED | OUTPATIENT
Start: 2019-04-24 | End: 2019-04-24

## 2019-04-24 RX ORDER — SODIUM CHLORIDE 0.9 % (FLUSH) 0.9 %
10 SYRINGE (ML) INJECTION PRN
Status: DISCONTINUED | OUTPATIENT
Start: 2019-04-24 | End: 2019-04-27 | Stop reason: HOSPADM

## 2019-04-24 RX ORDER — LORAZEPAM 1 MG/1
4 TABLET ORAL
Status: DISCONTINUED | OUTPATIENT
Start: 2019-04-24 | End: 2019-04-27 | Stop reason: HOSPADM

## 2019-04-24 RX ORDER — LORAZEPAM 2 MG/ML
3 INJECTION INTRAMUSCULAR
Status: DISCONTINUED | OUTPATIENT
Start: 2019-04-24 | End: 2019-04-27 | Stop reason: HOSPADM

## 2019-04-24 RX ORDER — MORPHINE SULFATE 2 MG/ML
2 INJECTION, SOLUTION INTRAMUSCULAR; INTRAVENOUS EVERY 4 HOURS PRN
Status: DISCONTINUED | OUTPATIENT
Start: 2019-04-24 | End: 2019-04-27 | Stop reason: HOSPADM

## 2019-04-24 RX ORDER — MAGNESIUM SULFATE 1 G/100ML
1 INJECTION INTRAVENOUS PRN
Status: DISCONTINUED | OUTPATIENT
Start: 2019-04-24 | End: 2019-04-27 | Stop reason: HOSPADM

## 2019-04-24 RX ORDER — THIAMINE HYDROCHLORIDE 100 MG/ML
100 INJECTION, SOLUTION INTRAMUSCULAR; INTRAVENOUS DAILY
Status: DISCONTINUED | OUTPATIENT
Start: 2019-04-24 | End: 2019-04-27 | Stop reason: HOSPADM

## 2019-04-24 RX ORDER — LORAZEPAM 1 MG/1
2 TABLET ORAL
Status: DISCONTINUED | OUTPATIENT
Start: 2019-04-24 | End: 2019-04-27 | Stop reason: HOSPADM

## 2019-04-24 RX ORDER — LORAZEPAM 2 MG/ML
1 INJECTION INTRAMUSCULAR
Status: DISCONTINUED | OUTPATIENT
Start: 2019-04-24 | End: 2019-04-27 | Stop reason: HOSPADM

## 2019-04-24 RX ORDER — SODIUM CHLORIDE 0.9 % (FLUSH) 0.9 %
10 SYRINGE (ML) INJECTION EVERY 12 HOURS SCHEDULED
Status: DISCONTINUED | OUTPATIENT
Start: 2019-04-24 | End: 2019-04-27 | Stop reason: HOSPADM

## 2019-04-24 RX ORDER — SODIUM CHLORIDE 9 MG/ML
INJECTION, SOLUTION INTRAVENOUS CONTINUOUS
Status: DISCONTINUED | OUTPATIENT
Start: 2019-04-24 | End: 2019-04-27 | Stop reason: HOSPADM

## 2019-04-24 RX ORDER — ONDANSETRON 2 MG/ML
4 INJECTION INTRAMUSCULAR; INTRAVENOUS ONCE
Status: COMPLETED | OUTPATIENT
Start: 2019-04-24 | End: 2019-04-24

## 2019-04-24 RX ORDER — LORAZEPAM 2 MG/ML
2 INJECTION INTRAMUSCULAR
Status: DISCONTINUED | OUTPATIENT
Start: 2019-04-24 | End: 2019-04-27 | Stop reason: HOSPADM

## 2019-04-24 RX ORDER — POTASSIUM CHLORIDE 20 MEQ/1
40 TABLET, EXTENDED RELEASE ORAL PRN
Status: DISCONTINUED | OUTPATIENT
Start: 2019-04-24 | End: 2019-04-27 | Stop reason: HOSPADM

## 2019-04-24 RX ORDER — ONDANSETRON 2 MG/ML
4 INJECTION INTRAMUSCULAR; INTRAVENOUS EVERY 6 HOURS PRN
Status: DISCONTINUED | OUTPATIENT
Start: 2019-04-24 | End: 2019-04-27 | Stop reason: HOSPADM

## 2019-04-24 RX ORDER — PROMETHAZINE HYDROCHLORIDE 25 MG/ML
25 INJECTION, SOLUTION INTRAMUSCULAR; INTRAVENOUS EVERY 6 HOURS PRN
Status: DISCONTINUED | OUTPATIENT
Start: 2019-04-24 | End: 2019-04-27 | Stop reason: HOSPADM

## 2019-04-24 RX ORDER — DEXTROSE MONOHYDRATE 50 MG/ML
100 INJECTION, SOLUTION INTRAVENOUS PRN
Status: DISCONTINUED | OUTPATIENT
Start: 2019-04-24 | End: 2019-04-27 | Stop reason: HOSPADM

## 2019-04-24 RX ORDER — LORAZEPAM 1 MG/1
3 TABLET ORAL
Status: DISCONTINUED | OUTPATIENT
Start: 2019-04-24 | End: 2019-04-27 | Stop reason: HOSPADM

## 2019-04-24 RX ORDER — POTASSIUM CHLORIDE 7.45 MG/ML
10 INJECTION INTRAVENOUS PRN
Status: DISCONTINUED | OUTPATIENT
Start: 2019-04-24 | End: 2019-04-27 | Stop reason: HOSPADM

## 2019-04-24 RX ORDER — DIAPER,BRIEF,INFANT-TODD,DISP
EACH MISCELLANEOUS ONCE
Status: COMPLETED | OUTPATIENT
Start: 2019-04-24 | End: 2019-04-24

## 2019-04-24 RX ADMIN — ONDANSETRON 4 MG: 2 INJECTION INTRAMUSCULAR; INTRAVENOUS at 20:44

## 2019-04-24 RX ADMIN — TETANUS TOXOID, REDUCED DIPHTHERIA TOXOID AND ACELLULAR PERTUSSIS VACCINE, ADSORBED 0.5 ML: 5; 2.5; 8; 8; 2.5 SUSPENSION INTRAMUSCULAR at 16:22

## 2019-04-24 RX ADMIN — SODIUM CHLORIDE 1000 ML: 9 INJECTION, SOLUTION INTRAVENOUS at 16:19

## 2019-04-24 RX ADMIN — BACITRACIN ZINC: 500 OINTMENT TOPICAL at 20:34

## 2019-04-24 RX ADMIN — MORPHINE SULFATE 4 MG: 2 INJECTION, SOLUTION INTRAMUSCULAR; INTRAVENOUS at 16:19

## 2019-04-24 RX ADMIN — MAGNESIUM SULFATE HEPTAHYDRATE 2 G: 40 INJECTION, SOLUTION INTRAVENOUS at 16:20

## 2019-04-24 RX ADMIN — ONDANSETRON 4 MG: 2 INJECTION INTRAMUSCULAR; INTRAVENOUS at 16:20

## 2019-04-24 RX ADMIN — SODIUM CHLORIDE: 9 INJECTION, SOLUTION INTRAVENOUS at 20:50

## 2019-04-24 ASSESSMENT — ENCOUNTER SYMPTOMS
TROUBLE SWALLOWING: 0
WHEEZING: 0
EYES NEGATIVE: 1
NAUSEA: 0
ALLERGIC/IMMUNOLOGIC NEGATIVE: 1
VOMITING: 0
ABDOMINAL PAIN: 0
RHINORRHEA: 0
SHORTNESS OF BREATH: 0

## 2019-04-24 ASSESSMENT — PAIN DESCRIPTION - PAIN TYPE: TYPE: ACUTE PAIN

## 2019-04-24 ASSESSMENT — PAIN SCALES - GENERAL
PAINLEVEL_OUTOF10: 7

## 2019-04-24 ASSESSMENT — PAIN DESCRIPTION - LOCATION: LOCATION: GENERALIZED

## 2019-04-24 ASSESSMENT — PAIN DESCRIPTION - FREQUENCY: FREQUENCY: CONTINUOUS

## 2019-04-24 ASSESSMENT — PAIN DESCRIPTION - DESCRIPTORS: DESCRIPTORS: PATIENT UNABLE TO DESCRIBE

## 2019-04-24 ASSESSMENT — PAIN DESCRIPTION - ONSET: ONSET: SUDDEN

## 2019-04-24 NOTE — ED PROVIDER NOTES
[address] [facility] ED  eMERGENCY dEPARTMENT eNCOUnter      Pt Name: Holly Contreras  MRN: [id number]  [place] 1950  Date of evaluation: 4/24/2019  Provider: Nicole Quinn MD    [address] [place] [address]       Chief Complaint   Patient presents with    Fall         HISTORY OF PRESENT ILLNESS   (Location/Symptom, Timing/Onset,Context/Setting, Quality, Duration, Modifying Factors, Severity)  Note limiting factors. Holly Contreras is a 70 y.o. female who presents to the emergency department with complaint of fall or syncope. Patient describes that the last thing she remembers was approximately noon. Patient as she was about her apartment without difficulty. Patient admits and extension of his waking up on the ground. Patient is uncertain of time labs I's emergency department approximately 3:00 Noon. Patient complains of significant facial injury. Patient complains of extremity pain. Patient is uncertain why she collapsed or passed out. Patient denies chest pain, nausea vomiting. Patient admits again facial pain and neck pain. Patient denies history of syncope. Family at bedside notes the patient does have significant history of alcohol consumption. Patient downtime may have been for as long as 3 hours. HPI    NursingNotes were reviewed. REVIEW OF SYSTEMS    (2-9 systems for level 4, 10 or more for level 5)     Review of Systems   Constitutional: Negative for activity change, chills and fever. Review of systems as noted prior to syncopal episode. HENT: Negative for congestion, ear pain, rhinorrhea and trouble swallowing. Eyes: Negative. Respiratory: Negative for shortness of breath and wheezing. Cardiovascular: Negative for chest pain and leg swelling. Gastrointestinal: Negative for abdominal pain, nausea and vomiting. Endocrine: Negative. Genitourinary: Negative for dysuria, frequency and hematuria. Musculoskeletal: Negative for gait problem and neck pain.         She currently complains of significant neck pain. Patient admits history of right below-knee amputation status post trauma. Skin: Negative. Allergic/Immunologic: Negative. Neurological: Negative for seizures, syncope, speech difficulty and light-headedness. Patient admits syncope with prolonged downtime. She admits to feeling significantly weak at this time. Hematological: Negative. Psychiatric/Behavioral: Negative. Except as noted above the remainder of the review of systems was reviewed and negative. PAST MEDICAL HISTORY     Past Medical History:   Diagnosis Date    Anxiety     Bipolar affective disorder ([facility]     DM (diabetes mellitus) ([facility]     Hypertension     Migraine     PTSD (post-traumatic stress disorder)     TIA (transient ischemic attack)     x 3         SURGICALHISTORY       Past Surgical History:   Procedure Laterality Date    APPENDECTOMY      CHOLECYSTECTOMY      LEG AMPUTATION BELOW KNEE Bilateral          CURRENT MEDICATIONS       Previous Medications    ASPIRIN 81 MG EC TABLET    Take 1 tablet by mouth daily    ATORVASTATIN (LIPITOR) 40 MG TABLET    Take 1 tablet by mouth nightly    BUTALBITAL-ASPIRIN-CAFFEINE (FIORINAL) -40 MG CAPSULE    Take 1 capsule by mouth every 6 hours as needed for Headaches for up to 30 days. Alireza Pérez CLONAZEPAM (KLONOPIN) 2 MG TABLET    Take 2 mg by mouth 3 times daily as needed. Alireza Pérez     LAMOTRIGINE (LAMICTAL) 100 MG TABLET    Take 100 mg by mouth 2 times daily    LISINOPRIL (PRINIVIL;ZESTRIL) 10 MG TABLET    Take 1 tablet by mouth daily    METOPROLOL SUCCINATE (TOPROL XL) 100 MG EXTENDED RELEASE TABLET    Take 200 mg by mouth daily    QUETIAPINE (SEROQUEL XR) 150 MG TB24 EXTENDED RELEASE TABLET    Take 150 mg by mouth nightly    TOPIRAMATE (TOPAMAX) 25 MG TABLET    Take 1 tablet by mouth 2 times daily       ALLERGIES     Vancomycin    FAMILY HISTORY       Family History   Problem Relation Age of Onset    Cancer Mother the face. HENT:   Head: Normocephalic. Right Ear: External ear normal.   Left Ear: External ear normal.   Eyes: Pupils are equal, round, and reactive to light. Conjunctivae and EOM are normal. Right eye exhibits no discharge. Left eye exhibits no discharge. Neck: Trachea normal, normal range of motion and full passive range of motion without pain. Neck supple. No thyromegaly present. Cardiovascular: Normal rate, regular rhythm, normal heart sounds, intact distal pulses and normal pulses. Exam reveals no gallop and no friction rub. Pulmonary/Chest: Effort normal and breath sounds normal. No respiratory distress. She has no wheezes. She exhibits no tenderness. Abdominal: Soft. Normal appearance and bowel sounds are normal. She exhibits no distension and no mass. There is no tenderness. Musculoskeletal: Normal range of motion. She exhibits tenderness. She exhibits no edema. Arms:  Neurological: She is alert and oriented to person, place, and time. No cranial nerve deficit or sensory deficit. She exhibits normal muscle tone. Skin: Skin is warm, dry and intact. No rash noted. She is not diaphoretic. No erythema. No pallor. Psychiatric: She has a normal mood and affect. Her speech is normal and behavior is normal. Judgment and thought content normal. Cognition and memory are normal.   Nursing note and vitals reviewed. DIAGNOSTIC RESULTS     EKG: All EKG's are interpreted by the Emergency Department Physician who either signs or Co-signsthis chart in the absence of a cardiologist.    EKG demonstrate sinus rhythm. There is PVC. There is evidence of prolonged QT interval.  Many worrisome for other arrhythmia. This is an abnormal overall EKG.     RADIOLOGY:   Non-plain filmimages such as CT, Ultrasound and MRI are read by the radiologist. Plain radiographic images are visualized and preliminarily interpreted by the emergency physician with the below findings:    Imaging of the head, facial bones, neck is noted by radiologist demonstrates evidence of nasal fracture. X-rays of the chest, shoulder, humerus are read as unremarkable rate by radiologist.    Interpretation per the Radiologist below, if available at the time ofthis note:    XR CHEST PORTABLE   Final Result      No acute intrathoracic process. Cardiomegaly. XR SHOULDER LEFT (MIN 2 VIEWS)   Final Result      XR HUMERUS LEFT (MIN 2 VIEWS)   Final Result      CT Head WO Contrast   Final Result   NO ACUTE TRAUMATIC BRAIN INJURY. All CT scans at this facility use dose modulation, iterative reconstruction, and/or weight based dosing when appropriate to reduce radiation dose to as low as reasonably achievable. CT CERVICAL SPINE WO CONTRAST   Final Result   NO ACUTE FRACTURE OR DISLOCATION. MODERATE DEGENERATIVE CHANGES IN THE LOWER CERVICAL SPINE. All CT scans at this facility use dose modulation, iterative reconstruction, and/or weight based dosing when appropriate to reduce radiation dose to as low as reasonably achievable. CT FACIAL BONES WO CONTRAST   Final Result   NASAL FRACTURE. NO ADDITIONAL FRACTURES. INTACT ORBITS. All CT scans at this facility use dose modulation, iterative reconstruction, and/or weight based dosing when appropriate to reduce radiation dose to as low as reasonably achievable.             ED BEDSIDE ULTRASOUND:   Performed by ED Physician - none    LABS:  Labs Reviewed   COMPREHENSIVE METABOLIC PANEL - Abnormal; Notable for the following components:       Result Value    CO2 17 (*)     Anion Gap 21 (*)     Glucose 143 (*)     Total Protein 8.9 (*)     Alb 4.8 (*)     Alkaline Phosphatase 131 (*)     Globulin 4.1 (*)     All other components within normal limits   CBC WITH AUTO DIFFERENTIAL - Abnormal; Notable for the following components:    MCH 32.8 (*)     RDW 17.5 (*)     All other components within normal limits   LACTIC ACID, PLASMA - Abnormal; Notable for the following

## 2019-04-24 NOTE — H&P
Hospital Medicine History & Physical      PCP: Rubén White DO    Date of Admission: 4/24/2019    Date of Service: Pt seen/examined on 4/24/19 and Admitted to Inpatient with expected LOS greater than two midnights due to medical therapy. Chief Complaint:  Syncope       History Of Present Illness:      70 y.o. female who presented to [facility] ED with complaint of syncope. Patient has a history of anxiety, bipolar, DM2, TIA. Patient was found by family on the ground. Patient states she cannot recall falling or passing out. She remembers calling her PCP this morning due to continued pain in neck and shoulder that is chronic. SHe recalls having \"2 glasses of cheap wine from a box\" she states she drinks instead of taking pain medication because she cannot tolerate them. It is believes that she was down from approximately 3 hours. She has significant facial pain with obvious bleeding and bruising. She has facial and left shoulder pain that is reproducible with palpation. She denies feeling dizzy or lightheaded prior to fall and denies cp/sob/n/v/d/fever/chills/rash. Past Medical History:          Diagnosis Date    Anxiety     [facility] ([facility]     DM (diabetes mellitus) ([facility]     Hypertension     Migraine     PTSD (post-traumatic stress disorder)     TIA (transient ischemic attack)     x 3       Past Surgical History:          Procedure Laterality Date    APPENDECTOMY      CHOLECYSTECTOMY      LEG AMPUTATION BELOW KNEE Bilateral        Medications Prior to Admission:      Prior to Admission medications    Medication Sig Start Date End Date Taking? Authorizing Provider   butalbital-aspirin-caffeine [facility]) -40 MG capsule Take 1 capsule by mouth every 6 hours as needed for Headaches for up to 30 days. . 10/17/18 11/16/18  Kailash Luo MD   topiramate (TOPAMAX) 25 MG tablet Take 1 tablet by mouth 2 times daily 10/17/18   Kailash Luo MD   aspirin 81 MG EC tablet Take 1 Right BKA  Skin: multiple areas of bruising and bleeding to face, left upper arm, and bilateral hands. Neurologic:  Neurovascularly intact without any focal sensory/motor deficits. Cranial nerves: II-XII intact, grossly non-focal.  Psychiatric:  Alert and oriented, thought content appropriate, normal insight  Capillary Refill: Brisk,< 3 seconds   Peripheral Pulses: +2 palpable, equal bilaterally       Labs:     Recent Labs     04/24/19  1545   WBC 5.2   HGB 13.7   HCT 41.5        Recent Labs     04/24/19  1545      K 3.5      CO2 17*   BUN 11   CREATININE 0.66   CALCIUM 8.7     Recent Labs     04/24/19  1545   AST 18   ALT 15   BILITOT <0.2   ALKPHOS 131*     Recent Labs     04/24/19  1545   INR 1.0     Recent Labs     04/24/19  1545   CKTOTAL 162   TROPONINI <0.010       Urinalysis:      Lab Results   Component Value Date    NITRU Negative 10/16/2018    BLOODU Negative 10/16/2018    SPECGRAV 1.009 10/16/2018    GLUCOSEU Negative 10/16/2018       Radiology:     CXR: I have reviewed the CXR with the following interpretation: pending  EKG:  I have reviewed the EKG with the following interpretation: pending     XR CHEST PORTABLE   Final Result      No acute intrathoracic process. Cardiomegaly. XR SHOULDER LEFT (MIN 2 VIEWS)   Final Result      XR HUMERUS LEFT (MIN 2 VIEWS)   Final Result      CT Head WO Contrast   Final Result   NO ACUTE TRAUMATIC BRAIN INJURY. All CT scans at this facility use dose modulation, iterative reconstruction, and/or weight based dosing when appropriate to reduce radiation dose to as low as reasonably achievable. CT CERVICAL SPINE WO CONTRAST   Final Result   NO ACUTE FRACTURE OR DISLOCATION. MODERATE DEGENERATIVE CHANGES IN THE LOWER CERVICAL SPINE. All CT scans at this facility use dose modulation, iterative reconstruction, and/or weight based dosing when appropriate to reduce radiation dose to as low as reasonably achievable.       CT FACIAL BONES WO CONTRAST   Final Result   NASAL FRACTURE. NO ADDITIONAL FRACTURES. INTACT ORBITS. All CT scans at this facility use dose modulation, iterative reconstruction, and/or weight based dosing when appropriate to reduce radiation dose to as low as reasonably achievable. ASSESSMENT:    Active Hospital Problems    Diagnosis Date Noted    Syncope and collapse [R55] 04/24/2019       PLAN:    1. Syncope and collapse- cardiology and neurology consulted, telemetry, 2D echo,cycle troponin, monitor labs, repeat EKG in am, received magnesium in ED for any possible arrhyhtmia with prolonged QT on EKG  2. Nasal fracture s/p fall- ENT consulted,pain medication as needed  3. Alcohol abuse- daily drinker, last drink was this morning prior to fall, place on CIWA and medicate s needed per protocol   4. HTN- monitor on telemetry and restart home cardiac medications     Diet: regular  Code Status: full    Dispo - inpatient        PUNEET Spangler - CNP    Thank you Gaston Costa DO for the opportunity to be involved in this patient's care. If you have any questions or concerns please feel free to contact me.

## 2019-04-24 NOTE — ED NOTES
Patient's family member called me outside of the room to advise me that patient has been drinking a lot at home. States she gets her groceries delivered but recently had 3 boxes of wine delivered and there was only half of a box left. Dr Lashaun Best advised and lab contacted to draw ethanol.        Prince Tse RN  04/24/19 [id number]

## 2019-04-24 NOTE — ED NOTES
Attempt to call report to [place] on [phone] and she will call me back.       Prince Tse RN  04/24/19 [id number]

## 2019-04-24 NOTE — ED NOTES
Pt is alert and oriented x3. Joking around with this rn and friend at bedside. States the morphine is helping with her pain. Pt has 0 complaints at this time.       Curtis Yost RN  04/24/19 [id number]

## 2019-04-24 NOTE — ED NOTES
Patient's abrasions cleaned up with peroxide and warm water. Pt tolerated well. Patient's lips also cleansed with peroxide and a sponge swab. Pt tolerated well. Pt laughing and joking. Calm and cooperative. 0 complaints at this time states the morphine helped her pain.        Mimi Celestin RN  04/24/19 [id number]

## 2019-04-25 ENCOUNTER — ANESTHESIA EVENT (OUTPATIENT)
Dept: OPERATING ROOM | Age: 69
DRG: 897 | End: 2019-04-25
Payer: MEDICARE

## 2019-04-25 ENCOUNTER — ANESTHESIA (OUTPATIENT)
Dept: OPERATING ROOM | Age: 69
DRG: 897 | End: 2019-04-25
Payer: MEDICARE

## 2019-04-25 VITALS — OXYGEN SATURATION: 96 % | SYSTOLIC BLOOD PRESSURE: 159 MMHG | DIASTOLIC BLOOD PRESSURE: 75 MMHG

## 2019-04-25 LAB
ANION GAP SERPL CALCULATED.3IONS-SCNC: 16 MEQ/L (ref 9–15)
BUN BLDV-MCNC: 10 MG/DL (ref 8–23)
CALCIUM SERPL-MCNC: 7.7 MG/DL (ref 8.5–9.9)
CHLORIDE BLD-SCNC: 110 MEQ/L (ref 95–107)
CO2: 17 MEQ/L (ref 20–31)
CREAT SERPL-MCNC: 0.37 MG/DL (ref 0.5–0.9)
GFR AFRICAN AMERICAN: >60
GFR NON-AFRICAN AMERICAN: >60
GLUCOSE BLD-MCNC: 128 MG/DL (ref 70–99)
GLUCOSE BLD-MCNC: 139 MG/DL (ref 60–115)
GLUCOSE BLD-MCNC: 165 MG/DL (ref 60–115)
GLUCOSE BLD-MCNC: 206 MG/DL (ref 60–115)
HBA1C MFR BLD: 5.7 % (ref 4.8–5.9)
HCT VFR BLD CALC: 33.9 % (ref 37–47)
HEMOGLOBIN: 11.4 G/DL (ref 12–16)
LACTIC ACID: 1.5 MMOL/L (ref 0.5–2.2)
MCH RBC QN AUTO: 32.9 PG (ref 27–31.3)
MCHC RBC AUTO-ENTMCNC: 33.7 % (ref 33–37)
MCV RBC AUTO: 97.8 FL (ref 82–100)
PDW BLD-RTO: 17.8 % (ref 11.5–14.5)
PERFORMED ON: ABNORMAL
PLATELET # BLD: 171 K/UL (ref 130–400)
POTASSIUM REFLEX MAGNESIUM: 3.9 MEQ/L (ref 3.4–4.9)
RBC # BLD: 3.46 M/UL (ref 4.2–5.4)
SODIUM BLD-SCNC: 143 MEQ/L (ref 135–144)
WBC # BLD: 6.4 K/UL (ref 4.8–10.8)

## 2019-04-25 PROCEDURE — 6370000000 HC RX 637 (ALT 250 FOR IP): Performed by: OTOLARYNGOLOGY

## 2019-04-25 PROCEDURE — 6370000000 HC RX 637 (ALT 250 FOR IP): Performed by: NURSE PRACTITIONER

## 2019-04-25 PROCEDURE — 6360000002 HC RX W HCPCS: Performed by: FAMILY MEDICINE

## 2019-04-25 PROCEDURE — APPNB45 APP NON BILLABLE 31-45 MINUTES: Performed by: NURSE PRACTITIONER

## 2019-04-25 PROCEDURE — 99222 1ST HOSP IP/OBS MODERATE 55: CPT | Performed by: INTERNAL MEDICINE

## 2019-04-25 PROCEDURE — 7100000001 HC PACU RECOVERY - ADDTL 15 MIN: Performed by: OTOLARYNGOLOGY

## 2019-04-25 PROCEDURE — 6360000002 HC RX W HCPCS: Performed by: NURSE PRACTITIONER

## 2019-04-25 PROCEDURE — 2580000003 HC RX 258: Performed by: OTOLARYNGOLOGY

## 2019-04-25 PROCEDURE — 2580000003 HC RX 258: Performed by: NURSE ANESTHETIST, CERTIFIED REGISTERED

## 2019-04-25 PROCEDURE — 3700000000 HC ANESTHESIA ATTENDED CARE: Performed by: OTOLARYNGOLOGY

## 2019-04-25 PROCEDURE — 6370000000 HC RX 637 (ALT 250 FOR IP): Performed by: INTERNAL MEDICINE

## 2019-04-25 PROCEDURE — 36415 COLL VENOUS BLD VENIPUNCTURE: CPT

## 2019-04-25 PROCEDURE — 80048 BASIC METABOLIC PNL TOTAL CA: CPT

## 2019-04-25 PROCEDURE — 6360000002 HC RX W HCPCS: Performed by: OTOLARYNGOLOGY

## 2019-04-25 PROCEDURE — 83605 ASSAY OF LACTIC ACID: CPT

## 2019-04-25 PROCEDURE — 83036 HEMOGLOBIN GLYCOSYLATED A1C: CPT

## 2019-04-25 PROCEDURE — 2060000000 HC ICU INTERMEDIATE R&B

## 2019-04-25 PROCEDURE — 7100000000 HC PACU RECOVERY - FIRST 15 MIN: Performed by: OTOLARYNGOLOGY

## 2019-04-25 PROCEDURE — 2580000003 HC RX 258: Performed by: NURSE PRACTITIONER

## 2019-04-25 PROCEDURE — 6360000002 HC RX W HCPCS: Performed by: ANESTHESIOLOGY

## 2019-04-25 PROCEDURE — 6370000000 HC RX 637 (ALT 250 FOR IP): Performed by: NURSE ANESTHETIST, CERTIFIED REGISTERED

## 2019-04-25 PROCEDURE — 6360000002 HC RX W HCPCS: Performed by: NURSE ANESTHETIST, CERTIFIED REGISTERED

## 2019-04-25 PROCEDURE — 2709999900 HC NON-CHARGEABLE SUPPLY: Performed by: OTOLARYNGOLOGY

## 2019-04-25 PROCEDURE — 2500000003 HC RX 250 WO HCPCS: Performed by: ANESTHESIOLOGY

## 2019-04-25 PROCEDURE — 3600000002 HC SURGERY LEVEL 2 BASE: Performed by: OTOLARYNGOLOGY

## 2019-04-25 PROCEDURE — 85027 COMPLETE CBC AUTOMATED: CPT

## 2019-04-25 PROCEDURE — 6360000002 HC RX W HCPCS: Performed by: INTERNAL MEDICINE

## 2019-04-25 PROCEDURE — 3600000012 HC SURGERY LEVEL 2 ADDTL 15MIN: Performed by: OTOLARYNGOLOGY

## 2019-04-25 PROCEDURE — 3700000001 HC ADD 15 MINUTES (ANESTHESIA): Performed by: OTOLARYNGOLOGY

## 2019-04-25 RX ORDER — ONDANSETRON 2 MG/ML
INJECTION INTRAMUSCULAR; INTRAVENOUS PRN
Status: DISCONTINUED | OUTPATIENT
Start: 2019-04-25 | End: 2019-04-25 | Stop reason: SDUPTHER

## 2019-04-25 RX ORDER — BUPROPION HYDROCHLORIDE 75 MG/1
100 TABLET ORAL 2 TIMES DAILY
COMMUNITY
Start: 2013-08-29

## 2019-04-25 RX ORDER — CHLORHEXIDINE GLUCONATE 4 G/100ML
SOLUTION TOPICAL PRN
Status: DISCONTINUED | OUTPATIENT
Start: 2019-04-25 | End: 2019-04-25 | Stop reason: ALTCHOICE

## 2019-04-25 RX ORDER — DIAPER,BRIEF,INFANT-TODD,DISP
EACH MISCELLANEOUS ONCE
Status: DISCONTINUED | OUTPATIENT
Start: 2019-04-25 | End: 2019-04-27 | Stop reason: HOSPADM

## 2019-04-25 RX ORDER — FENTANYL CITRATE 50 UG/ML
25 INJECTION, SOLUTION INTRAMUSCULAR; INTRAVENOUS EVERY 5 MIN PRN
Status: DISCONTINUED | OUTPATIENT
Start: 2019-04-25 | End: 2019-04-25 | Stop reason: HOSPADM

## 2019-04-25 RX ORDER — QUETIAPINE FUMARATE 50 MG/1
150 TABLET, EXTENDED RELEASE ORAL NIGHTLY
Status: DISCONTINUED | OUTPATIENT
Start: 2019-04-25 | End: 2019-04-27 | Stop reason: HOSPADM

## 2019-04-25 RX ORDER — OXYMETAZOLINE HYDROCHLORIDE 0.05 G/100ML
SPRAY NASAL PRN
Status: DISCONTINUED | OUTPATIENT
Start: 2019-04-25 | End: 2019-04-25

## 2019-04-25 RX ORDER — SUCCINYLCHOLINE/SOD CL,ISO/PF 100 MG/5ML
SYRINGE (ML) INTRAVENOUS PRN
Status: DISCONTINUED | OUTPATIENT
Start: 2019-04-25 | End: 2019-04-25 | Stop reason: SDUPTHER

## 2019-04-25 RX ORDER — QUETIAPINE 150 MG/1
150 TABLET, FILM COATED, EXTENDED RELEASE ORAL NIGHTLY
Status: ON HOLD | COMMUNITY
Start: 2018-10-22 | End: 2019-04-25

## 2019-04-25 RX ORDER — WOUND DRESSING ADHESIVE - LIQUID
LIQUID MISCELLANEOUS PRN
Status: DISCONTINUED | OUTPATIENT
Start: 2019-04-25 | End: 2019-04-25 | Stop reason: ALTCHOICE

## 2019-04-25 RX ORDER — SODIUM CHLORIDE 9 MG/ML
INJECTION, SOLUTION INTRAVENOUS CONTINUOUS PRN
Status: DISCONTINUED | OUTPATIENT
Start: 2019-04-25 | End: 2019-04-25 | Stop reason: SDUPTHER

## 2019-04-25 RX ORDER — CLONAZEPAM 2 MG/1
2 TABLET ORAL 2 TIMES DAILY
Status: ON HOLD | COMMUNITY
Start: 2015-03-07 | End: 2019-04-25

## 2019-04-25 RX ORDER — FLUTICASONE PROPIONATE 50 MCG
50 SPRAY, SUSPENSION (ML) NASAL PRN
Status: ON HOLD | COMMUNITY
Start: 2018-02-09 | End: 2022-07-15 | Stop reason: HOSPADM

## 2019-04-25 RX ORDER — METOPROLOL SUCCINATE 50 MG/1
50 TABLET, EXTENDED RELEASE ORAL DAILY
Status: DISCONTINUED | OUTPATIENT
Start: 2019-04-25 | End: 2019-04-27 | Stop reason: HOSPADM

## 2019-04-25 RX ORDER — TRISODIUM CITRATE DIHYDRATE AND CITRIC ACID MONOHYDRATE 500; 334 MG/5ML; MG/5ML
30 SOLUTION ORAL ONCE
Status: DISCONTINUED | OUTPATIENT
Start: 2019-04-25 | End: 2019-04-25

## 2019-04-25 RX ORDER — LIDOCAINE HYDROCHLORIDE 20 MG/ML
INJECTION, SOLUTION INTRAVENOUS PRN
Status: DISCONTINUED | OUTPATIENT
Start: 2019-04-25 | End: 2019-04-25 | Stop reason: SDUPTHER

## 2019-04-25 RX ORDER — LAMOTRIGINE 200 MG/1
100 TABLET ORAL 2 TIMES DAILY
Status: ON HOLD | COMMUNITY
Start: 2013-08-29 | End: 2019-04-25

## 2019-04-25 RX ORDER — ONDANSETRON 2 MG/ML
4 INJECTION INTRAMUSCULAR; INTRAVENOUS ONCE
Status: DISCONTINUED | OUTPATIENT
Start: 2019-04-25 | End: 2019-04-27 | Stop reason: HOSPADM

## 2019-04-25 RX ORDER — LISINOPRIL 10 MG/1
10 TABLET ORAL DAILY
Status: DISCONTINUED | OUTPATIENT
Start: 2019-04-25 | End: 2019-04-25

## 2019-04-25 RX ORDER — CEFAZOLIN SODIUM 1 G/3ML
2 INJECTION, POWDER, FOR SOLUTION INTRAMUSCULAR; INTRAVENOUS
Status: COMPLETED | OUTPATIENT
Start: 2019-04-25 | End: 2019-04-25

## 2019-04-25 RX ORDER — LISINOPRIL 2.5 MG/1
2.5 TABLET ORAL DAILY
Status: DISCONTINUED | OUTPATIENT
Start: 2019-04-25 | End: 2019-04-27 | Stop reason: HOSPADM

## 2019-04-25 RX ORDER — LAMOTRIGINE 100 MG/1
100 TABLET ORAL 2 TIMES DAILY
Status: DISCONTINUED | OUTPATIENT
Start: 2019-04-25 | End: 2019-04-27 | Stop reason: HOSPADM

## 2019-04-25 RX ORDER — DIPHENHYDRAMINE HYDROCHLORIDE 50 MG/ML
12.5 INJECTION INTRAMUSCULAR; INTRAVENOUS
Status: DISCONTINUED | OUTPATIENT
Start: 2019-04-25 | End: 2019-04-25 | Stop reason: HOSPADM

## 2019-04-25 RX ORDER — CLONAZEPAM 1 MG/1
2 TABLET ORAL 2 TIMES DAILY
Status: DISCONTINUED | OUTPATIENT
Start: 2019-04-25 | End: 2019-04-27 | Stop reason: HOSPADM

## 2019-04-25 RX ORDER — METOPROLOL SUCCINATE 100 MG/1
200 TABLET, EXTENDED RELEASE ORAL DAILY
Status: DISCONTINUED | OUTPATIENT
Start: 2019-04-25 | End: 2019-04-25

## 2019-04-25 RX ORDER — PROPOFOL 10 MG/ML
INJECTION, EMULSION INTRAVENOUS PRN
Status: DISCONTINUED | OUTPATIENT
Start: 2019-04-25 | End: 2019-04-25 | Stop reason: SDUPTHER

## 2019-04-25 RX ORDER — ONDANSETRON 2 MG/ML
4 INJECTION INTRAMUSCULAR; INTRAVENOUS
Status: DISCONTINUED | OUTPATIENT
Start: 2019-04-25 | End: 2019-04-25 | Stop reason: HOSPADM

## 2019-04-25 RX ORDER — TOPIRAMATE 25 MG/1
25 TABLET ORAL 2 TIMES DAILY
Status: DISCONTINUED | OUTPATIENT
Start: 2019-04-25 | End: 2019-04-27 | Stop reason: HOSPADM

## 2019-04-25 RX ORDER — RANITIDINE 150 MG/1
150 TABLET ORAL PRN
Status: ON HOLD | COMMUNITY
Start: 2019-04-09 | End: 2022-07-09

## 2019-04-25 RX ORDER — MAGNESIUM SULFATE IN WATER 40 MG/ML
2 INJECTION, SOLUTION INTRAVENOUS ONCE
Status: DISCONTINUED | OUTPATIENT
Start: 2019-04-25 | End: 2019-04-27 | Stop reason: HOSPADM

## 2019-04-25 RX ORDER — SERTRALINE HYDROCHLORIDE 100 MG/1
100 TABLET, FILM COATED ORAL 2 TIMES DAILY
COMMUNITY
Start: 2018-10-22

## 2019-04-25 RX ORDER — OXYMETAZOLINE HYDROCHLORIDE 0.05 G/100ML
SPRAY NASAL PRN
Status: DISCONTINUED | OUTPATIENT
Start: 2019-04-25 | End: 2019-04-25 | Stop reason: SDUPTHER

## 2019-04-25 RX ORDER — ALENDRONATE SODIUM 70 MG/1
70 TABLET ORAL WEEKLY
COMMUNITY
Start: 2018-11-20

## 2019-04-25 RX ORDER — MAGNESIUM HYDROXIDE 1200 MG/15ML
LIQUID ORAL CONTINUOUS PRN
Status: COMPLETED | OUTPATIENT
Start: 2019-04-25 | End: 2019-04-25

## 2019-04-25 RX ORDER — TOPIRAMATE 25 MG/1
25 TABLET ORAL 2 TIMES DAILY
Status: ON HOLD | COMMUNITY
Start: 2018-12-10 | End: 2019-04-25

## 2019-04-25 RX ORDER — LISINOPRIL 20 MG/1
10 TABLET ORAL NIGHTLY
Status: ON HOLD | COMMUNITY
Start: 2013-10-16 | End: 2019-04-25

## 2019-04-25 RX ORDER — FENTANYL CITRATE 50 UG/ML
INJECTION, SOLUTION INTRAMUSCULAR; INTRAVENOUS PRN
Status: DISCONTINUED | OUTPATIENT
Start: 2019-04-25 | End: 2019-04-25 | Stop reason: SDUPTHER

## 2019-04-25 RX ORDER — METOPROLOL SUCCINATE 200 MG/1
200 TABLET, EXTENDED RELEASE ORAL DAILY
Status: ON HOLD | COMMUNITY
Start: 2013-12-12 | End: 2019-04-26 | Stop reason: HOSPADM

## 2019-04-25 RX ADMIN — LAMOTRIGINE 100 MG: 100 TABLET ORAL at 22:06

## 2019-04-25 RX ADMIN — ONDANSETRON 4 MG: 2 INJECTION INTRAMUSCULAR; INTRAVENOUS at 14:41

## 2019-04-25 RX ADMIN — ONDANSETRON 4 MG: 2 INJECTION INTRAMUSCULAR; INTRAVENOUS at 22:49

## 2019-04-25 RX ADMIN — FAMOTIDINE 20 MG: 10 INJECTION INTRAVENOUS at 14:00

## 2019-04-25 RX ADMIN — QUETIAPINE FUMARATE 150 MG: 50 TABLET, EXTENDED RELEASE ORAL at 22:05

## 2019-04-25 RX ADMIN — SODIUM CHLORIDE 1000 ML: 9 INJECTION, SOLUTION INTRAVENOUS at 15:34

## 2019-04-25 RX ADMIN — Medication 10 ML: at 22:10

## 2019-04-25 RX ADMIN — MORPHINE SULFATE 2 MG: 2 INJECTION, SOLUTION INTRAMUSCULAR; INTRAVENOUS at 01:19

## 2019-04-25 RX ADMIN — SODIUM CHLORIDE: 9 INJECTION, SOLUTION INTRAVENOUS at 14:03

## 2019-04-25 RX ADMIN — FENTANYL CITRATE 25 MCG: 50 INJECTION, SOLUTION INTRAMUSCULAR; INTRAVENOUS at 15:02

## 2019-04-25 RX ADMIN — TOPIRAMATE 25 MG: 25 TABLET, FILM COATED ORAL at 22:05

## 2019-04-25 RX ADMIN — HYDROMORPHONE HYDROCHLORIDE 0.5 MG: 1 INJECTION, SOLUTION INTRAMUSCULAR; INTRAVENOUS; SUBCUTANEOUS at 22:16

## 2019-04-25 RX ADMIN — Medication 1 SPRAY: at 14:33

## 2019-04-25 RX ADMIN — TOPIRAMATE 25 MG: 25 TABLET, FILM COATED ORAL at 11:42

## 2019-04-25 RX ADMIN — PROPOFOL 100 MG: 10 INJECTION, EMULSION INTRAVENOUS at 14:22

## 2019-04-25 RX ADMIN — FENTANYL CITRATE 50 MCG: 50 INJECTION, SOLUTION INTRAMUSCULAR; INTRAVENOUS at 14:22

## 2019-04-25 RX ADMIN — ONDANSETRON 4 MG: 2 INJECTION INTRAMUSCULAR; INTRAVENOUS at 06:45

## 2019-04-25 RX ADMIN — LAMOTRIGINE 100 MG: 100 TABLET ORAL at 11:42

## 2019-04-25 RX ADMIN — CLONAZEPAM 2 MG: 1 TABLET ORAL at 22:05

## 2019-04-25 RX ADMIN — Medication 100 MG: at 14:22

## 2019-04-25 RX ADMIN — FENTANYL CITRATE 50 MCG: 50 INJECTION, SOLUTION INTRAMUSCULAR; INTRAVENOUS at 14:39

## 2019-04-25 RX ADMIN — METOPROLOL SUCCINATE 50 MG: 50 TABLET, EXTENDED RELEASE ORAL at 11:42

## 2019-04-25 RX ADMIN — FENTANYL CITRATE 25 MCG: 50 INJECTION, SOLUTION INTRAMUSCULAR; INTRAVENOUS at 15:10

## 2019-04-25 RX ADMIN — LISINOPRIL 2.5 MG: 2.5 TABLET ORAL at 22:06

## 2019-04-25 RX ADMIN — CLONAZEPAM 2 MG: 1 TABLET ORAL at 11:52

## 2019-04-25 RX ADMIN — Medication 10 ML: at 22:09

## 2019-04-25 RX ADMIN — CEFAZOLIN 2 G: 330 INJECTION, POWDER, FOR SOLUTION INTRAMUSCULAR; INTRAVENOUS at 14:16

## 2019-04-25 RX ADMIN — HYDROMORPHONE HYDROCHLORIDE 0.5 MG: 1 INJECTION, SOLUTION INTRAMUSCULAR; INTRAVENOUS; SUBCUTANEOUS at 15:30

## 2019-04-25 RX ADMIN — LIDOCAINE HYDROCHLORIDE 60 MG: 20 INJECTION, SOLUTION INTRAVENOUS at 14:22

## 2019-04-25 ASSESSMENT — ENCOUNTER SYMPTOMS
CHOKING: 0
SINUS PAIN: 1
SHORTNESS OF BREATH: 0
EYES NEGATIVE: 1
COUGH: 0
STRIDOR: 0
COLOR CHANGE: 0
NAUSEA: 1
FACIAL SWELLING: 1
GASTROINTESTINAL NEGATIVE: 1
CHEST TIGHTNESS: 0
APNEA: 0
BACK PAIN: 0
ABDOMINAL PAIN: 0
ALLERGIC/IMMUNOLOGIC NEGATIVE: 1
WHEEZING: 0
ABDOMINAL DISTENTION: 0
SINUS PRESSURE: 1

## 2019-04-25 ASSESSMENT — PAIN SCALES - GENERAL
PAINLEVEL_OUTOF10: 7
PAINLEVEL_OUTOF10: 8
PAINLEVEL_OUTOF10: 9
PAINLEVEL_OUTOF10: 7
PAINLEVEL_OUTOF10: 9
PAINLEVEL_OUTOF10: 7

## 2019-04-25 ASSESSMENT — PULMONARY FUNCTION TESTS
PIF_VALUE: 15
PIF_VALUE: 3
PIF_VALUE: 17
PIF_VALUE: 2
PIF_VALUE: 4
PIF_VALUE: 19
PIF_VALUE: 4
PIF_VALUE: 1
PIF_VALUE: 2
PIF_VALUE: 19
PIF_VALUE: 19
PIF_VALUE: 1
PIF_VALUE: 2
PIF_VALUE: 20
PIF_VALUE: 2
PIF_VALUE: 3
PIF_VALUE: 2
PIF_VALUE: 15
PIF_VALUE: 2
PIF_VALUE: 15
PIF_VALUE: 20
PIF_VALUE: 1
PIF_VALUE: 2
PIF_VALUE: 17
PIF_VALUE: 2
PIF_VALUE: 14
PIF_VALUE: 20
PIF_VALUE: 15
PIF_VALUE: 3
PIF_VALUE: 3
PIF_VALUE: 1
PIF_VALUE: 17

## 2019-04-25 ASSESSMENT — PAIN DESCRIPTION - PROGRESSION: CLINICAL_PROGRESSION: GRADUALLY IMPROVING

## 2019-04-25 ASSESSMENT — PAIN DESCRIPTION - PAIN TYPE: TYPE: ACUTE PAIN;SURGICAL PAIN

## 2019-04-25 NOTE — CONSULTS
CNP        Or    potassium chloride (KLOR-CON) packet 40 mEq  40 mEq Oral PRN Jesus Yong, APRN - CNP        Or    potassium chloride 10 mEq/100 mL IVPB (Peripheral Line)  10 mEq Intravenous PRN Jesus Yong, APRN - CNP        magnesium sulfate 1 g in dextrose 5% 100 mL IVPB  1 g Intravenous PRN Jesus Yong, APRN - CNP        insulin lispro (HUMALOG) injection vial 0-12 Units  0-12 Units Subcutaneous TID WC Jesus Yong, APRN - CNP        insulin lispro (HUMALOG) injection vial 0-6 Units  0-6 Units Subcutaneous Nightly Jesus Yong, APRN - CNP        glucose (GLUTOSE) 40 % oral gel 15 g  15 g Oral PRN Jesus Yong, APRN - CNP        dextrose 50 % solution 12.5 g  12.5 g Intravenous PRN Jesus Yong, APRN - CNP        glucagon (rDNA) injection 1 mg  1 mg Intramuscular PRN Jesus Yong, APRN - CNP        dextrose 5 % solution  100 mL/hr Intravenous PRN Jesus Yong, APRN - CNP        sodium chloride flush 0.9 % injection 10 mL  10 mL Intravenous 2 times per day Jesus Yong, APRN - CNP        sodium chloride flush 0.9 % injection 10 mL  10 mL Intravenous PRN Jesus Yong, APRN - CNP        thiamine (B-1) injection 100 mg  100 mg Intramuscular Daily Jesus Yong, APRN - CNP        folic acid (FOLVITE) tablet 1 mg  1 mg Oral Daily Jesus Yong, APRN - CNP        multivitamin 1 tablet  1 tablet Oral Daily Jesus Yong, APRN - CNP        LORazepam (ATIVAN) tablet 1 mg  1 mg Oral Q1H PRN Jesus Yong, APRN - CNP        Or    LORazepam (ATIVAN) injection 1 mg  1 mg Intravenous Q1H PRN Jesus Yong, APRN - CNP        Or    LORazepam (ATIVAN) tablet 2 mg  2 mg Oral Q1H PRN Jesus Yong, APRN - CNP        Or    LORazepam (ATIVAN) injection 2 mg  2 mg Intravenous Q1H PRN Jesus Yong, APRN - CNP        Or    LORazepam (ATIVAN) tablet 3 mg  3 mg Oral Q1H PRN Jesus Yong, APRN - CNP        Or    LORazepam (ATIVAN) injection 3 mg  3 mg Intravenous Q1H PRN Florentino Ramesh, APRN - CNP        Or    LORazepam (ATIVAN) tablet 4 mg  4 mg Oral Q1H PRN Florentino Morrisy, APRN - CNP        Or    LORazepam (ATIVAN) injection 4 mg  4 mg Intravenous Q1H PRN Florentino Ramesh, APRN - CNP        morphine (PF) injection 2 mg  2 mg Intravenous Q4H PRN Royal Ness MD   2 mg at 04/25/19 0119    promethazine (PHENERGAN) injection 25 mg  25 mg Intramuscular Q6H PRN Tamiko Ramirez APRN - CNP        acetaminophen (TYLENOL) tablet 650 mg  650 mg Oral Q4H PRN Tamiko Ramirez, APRN - CNP           PMHx:  Past Medical History:   Diagnosis Date    Anxiety     Bipolar affective disorder ([facility]     DM (diabetes mellitus) ([facility]     Hypertension     Migraine     PTSD (post-traumatic stress disorder)     TIA (transient ischemic attack)     x 3       PSHx:  Past Surgical History:   Procedure Laterality Date    APPENDECTOMY      CHOLECYSTECTOMY      LEG AMPUTATION BELOW KNEE Bilateral        Social Hx:  Social History     Socioeconomic History    Marital status: Single     Spouse name: None    Number of children: None    Years of education: None    Highest education level: None   Occupational History    None   Social Needs    Financial resource strain: None    Food insecurity:     Worry: None     Inability: None    Transportation needs:     Medical: None     Non-medical: None   Tobacco Use    Smoking status: Never Smoker    Smokeless tobacco: Never Used   Substance and Sexual Activity    Alcohol use: No    Drug use: No    Sexual activity: None   Lifestyle    Physical activity:     Days per week: None     Minutes per session: None    Stress: None   Relationships    Social connections:     Talks on phone: None     Gets together: None     Attends Sikh service: None     Active member of club or organization: None     Attends meetings of clubs or organizations: None     Relationship status: None    Intimate partner violence:     Fear of current or ex partner: None Emotionally abused: None     Physically abused: None     Forced sexual activity: None   Other Topics Concern    None   Social History Narrative    None       Family Hx:  Family History   Problem Relation Age of Onset    Cancer Mother         breast    Heart Attack Father        Review of Systems:   Review of Systems   Constitutional: Negative for appetite change, chills, diaphoresis, fatigue and fever. HENT: Positive for facial swelling, sinus pressure and sinus pain. Eyes: Negative. Respiratory: Negative for apnea, cough, chest tightness, shortness of breath, wheezing and stridor. Cardiovascular: Negative for chest pain, palpitations and leg swelling. Gastrointestinal: Negative. Endocrine: Negative. Genitourinary: Negative. Musculoskeletal: Negative. Allergic/Immunologic: Negative. Neurological: Positive for syncope. Hematological: Negative. Psychiatric/Behavioral: Negative. Physical Examination:    BP (!) 154/63   Pulse 73   Temp 99.4 °F (37.4 °C) (Oral)   Resp 16   Ht 5' 8\" (1.727 m)   Wt 193 lb 3.2 oz (87.6 kg)   SpO2 97%   BMI 29.38 kg/m²    Physical Exam   Constitutional: She is oriented to person, place, and time. She appears well-developed and well-nourished. HENT:   Head: Normocephalic. Eyes:   Chin eccymotic and nose   Neck: No JVD present. No tracheal deviation present. No thyromegaly present. Cardiovascular: Normal rate, regular rhythm, normal heart sounds and intact distal pulses. Exam reveals no gallop and no friction rub. No murmur heard. Pulmonary/Chest: Effort normal and breath sounds normal. No respiratory distress. She has no wheezes. She has no rales. She exhibits no tenderness. Abdominal: Soft. Bowel sounds are normal.   Musculoskeletal: Normal range of motion. She exhibits no edema or tenderness. Lymphadenopathy:     She has no cervical adenopathy. Neurological: She is alert and oriented to person, place, and time.    Skin: Skin is warm and dry. Psychiatric: She has a normal mood and affect. LABS:  CBC:  Lab Results   Component Value Date    WBC 6.4 04/25/2019    RBC 3.46 04/25/2019    HGB 11.4 04/25/2019    HCT 33.9 04/25/2019    MCV 97.8 04/25/2019    MCH 32.9 04/25/2019    MCHC 33.7 04/25/2019    RDW 17.8 04/25/2019     04/25/2019     CBC with Differential:   Lab Results   Component Value Date    WBC 6.4 04/25/2019    RBC 3.46 04/25/2019    HGB 11.4 04/25/2019    HCT 33.9 04/25/2019     04/25/2019    MCV 97.8 04/25/2019    MCH 32.9 04/25/2019    MCHC 33.7 04/25/2019    RDW 17.8 04/25/2019    LYMPHOPCT 33.0 04/24/2019    MONOPCT 6.5 04/24/2019    BASOPCT 0.7 04/24/2019    MONOSABS 0.3 04/24/2019    LYMPHSABS 1.7 04/24/2019    EOSABS 0.1 04/24/2019    BASOSABS 0.0 04/24/2019     CMP:    Lab Results   Component Value Date     04/25/2019    K 3.9 04/25/2019     04/25/2019    CO2 17 04/25/2019    BUN 10 04/25/2019    CREATININE 0.37 04/25/2019    GFRAA >60.0 04/25/2019    LABGLOM >60.0 04/25/2019    GLUCOSE 128 04/25/2019    PROT 8.9 04/24/2019    LABALBU 4.8 04/24/2019    CALCIUM 7.7 04/25/2019    BILITOT <0.2 04/24/2019    ALKPHOS 131 04/24/2019    AST 18 04/24/2019    ALT 15 04/24/2019     BMP:    Lab Results   Component Value Date     04/25/2019    K 3.9 04/25/2019     04/25/2019    CO2 17 04/25/2019    BUN 10 04/25/2019    LABALBU 4.8 04/24/2019    CREATININE 0.37 04/25/2019    CALCIUM 7.7 04/25/2019    GFRAA >60.0 04/25/2019    LABGLOM >60.0 04/25/2019    GLUCOSE 128 04/25/2019     Magnesium:    Lab Results   Component Value Date    MG 2.1 10/17/2018     Troponin:    Lab Results   Component Value Date    TROPONINI <0.010 04/24/2019       EKG: normal sinus rhythm, frequent PVC's noted      Assessment:    Active Hospital Problems    Diagnosis Date Noted    Syncope and collapse [R55] 04/24/2019      syncope  Alcohol intoxication  Hypertension-stable  EF normal    Plan:  1.  Intermediate risk for developing perioperative cardiovascular event. Okay to proceed with procedure  2. Patient had an echo done in October ejection fraction 60%  3.  Discharge home planned to decrease Toprol-XL to 50 mg daily            Electronically signed by PUNEET Ross CNP on 4/25/2019 at 8:05 AM

## 2019-04-25 NOTE — PROGRESS NOTES
mL Oral Daily PRN Ryan Rhymes, APRN - CNP        [facility] 4 mg  4 mg Intravenous Q6H PRN Ryan Rhymes, APRN - CNP   4 mg at 04/25/19 0645    enoxaparin (LOVENOX) injection 40 mg  40 mg Subcutaneous Daily Ryan Rhymes, APRN - CNP   Stopped at 04/24/19 2028    0.9 % sodium chloride infusion   Intravenous Continuous Ryan Rhymes, APRN -  mL/hr at 04/24/19 2050      potassium chloride (KLOR-CON M) extended release tablet 40 mEq  40 mEq Oral PRN Ryan Rhymes, APRN - CNP        Or    potassium chloride (KLOR-CON) packet 40 mEq  40 mEq Oral PRN Ryan Rhymes, APRN - CNP        Or    potassium chloride 10 mEq/100 mL IVPB (Peripheral Line)  10 mEq Intravenous PRN Ryan Rhymes, APRN - CNP        magnesium sulfate 1 g in dextrose 5% 100 mL IVPB  1 g Intravenous PRN Ryan Rhymes, APRN - CNP        insulin lispro (HUMALOG) injection vial 0-12 Units  0-12 Units Subcutaneous TID WC Ryan Rhymes, APRN - CNP        insulin lispro (HUMALOG) injection vial 0-6 Units  0-6 Units Subcutaneous Nightly Ryan Rhymes, APRN - CNP        glucose (GLUTOSE) 40 % oral gel 15 g  15 g Oral PRN Ryan Rhymes, APRN - CNP        dextrose 50 % solution 12.5 g  12.5 g Intravenous PRN Ryan Rhymes, APRN - CNP        glucagon (rDNA) injection 1 mg  1 mg Intramuscular PRN Ryan Rhymes, APRN - CNP        dextrose 5 % solution  100 mL/hr Intravenous PRN Ryan Rhymes, APRN - CNP        sodium chloride flush 0.9 % injection 10 mL  10 mL Intravenous 2 times per day Ryan Rhymes, APRN - CNP        sodium chloride flush 0.9 % injection 10 mL  10 mL Intravenous PRN Ryan Rhymes, APRN - CNP        thiamine (B-1) injection 100 mg  100 mg Intramuscular Daily Ryan Rhymes, APRN - CNP        folic acid (FOLVITE) tablet 1 mg  1 mg Oral Daily Ryan Rhymes, APRN - CNP   Stopped at 04/25/19 1044    multivitamin 1 tablet  1 tablet Oral Daily Ryan Castellanos, APRN - CNP Stopped at 04/25/19 1048    LORazepam (ATIVAN) tablet 1 mg  1 mg Oral Q1H PRN Villa Beards, APRN - CNP        Or    LORazepam (ATIVAN) injection 1 mg  1 mg Intravenous Q1H PRN Villa Beards, APRN - CNP        Or    LORazepam (ATIVAN) tablet 2 mg  2 mg Oral Q1H PRN Villa Beards, APRN - CNP        Or    LORazepam (ATIVAN) injection 2 mg  2 mg Intravenous Q1H PRN Villa Beards, APRN - CNP        Or    LORazepam (ATIVAN) tablet 3 mg  3 mg Oral Q1H PRN Villa Beards, APRN - CNP        Or    LORazepam (ATIVAN) injection 3 mg  3 mg Intravenous Q1H PRN Villa Beards, APRN - CNP        Or    LORazepam (ATIVAN) tablet 4 mg  4 mg Oral Q1H PRN Villa Beards, APRN - CNP        Or    LORazepam (ATIVAN) injection 4 mg  4 mg Intravenous Q1H PRN Villa Beards, APRN - CNP        morphine (PF) injection 2 mg  2 mg Intravenous Q4H PRN Marget Gosselin, MD   2 mg at 04/25/19 0119    promethazine (PHENERGAN) injection 25 mg  25 mg Intramuscular Q6H PRN Hildred Rolls, APRN - CNP        acetaminophen (TYLENOL) tablet 650 mg  650 mg Oral Q4H PRN Hildred Rolls, APRN - CNP         Allergies   Allergen Reactions    Iv Dye [Iodides]     Vancomycin     Morphine Rash     Active Problems:    Syncope and collapse  Resolved Problems:    * No resolved hospital problems. *    Blood pressure (!) 192/82, pulse 75, temperature 98.1 °F (36.7 °C), temperature source Temporal, resp. rate 18, height 5' 8\" (1.727 m), weight 193 lb 3.2 oz (87.6 kg), SpO2 96 %. Subjective:  Symptoms:  She reports anxiety. (Facial pain, no cp, sob.). Objective:  General Appearance:  Comfortable and in no acute distress. Vital signs: (most recent): Blood pressure (!) 192/82, pulse 75, temperature 98.1 °F (36.7 °C), temperature source Temporal, resp. rate 18, height 5' 8\" (1.727 m), weight 193 lb 3.2 oz (87.6 kg), SpO2 96 %.   Vital signs are normal.    HEENT: (Facial bruising on chin/nasal bridge)    Lungs:  Normal effort and normal

## 2019-04-25 NOTE — CONSULTS
Consults     This 70year old white female had a LOC early today sustaining a fall in her bed room She was taken to the ER subsequent radiologic evaluation of the facial bone showed nasal bone fracture with severe diclocation of the septum to the Left.  Concomitantly also sustained abrasio in her chin extending to the lower lip with minimal ecchymosis and swelling  Radiologic film reviewed and showed the deviation of the nasal septum to the left with displaced nasal bone fractures      Diagnosis: Nasal bone fractures with severe DNS with abrasions of the mentum extending to the lower lip    Plan Will try to schedule for CREF of nasal bone fracture with severe DNS tomorrow    Kriss Reaves MD    ENT/ [facility]

## 2019-04-25 NOTE — PROGRESS NOTES
[facility] 70 [facility]     Current Facility-Administered Medications   Medication Dose Route Frequency Provider Last Rate Last Dose    sodium chloride flush 0.9 % injection 10 mL  10 mL Intravenous 2 times per day Geo Rim, APRN - CNP        sodium chloride flush 0.9 % injection 10 mL  10 mL Intravenous PRN [facility] Rim, APRN - CNP        magnesium hydroxide (MILK OF MAGNESIA) 400 MG/5ML suspension 30 mL  30 mL Oral Daily PRN [facility] Rim, APRN - CNP        ondansetron [facility] injection 4 mg  4 mg Intravenous Q6H PRN Geo Rim, APRN - CNP   4 mg at 04/24/19 2044    enoxaparin (LOVENOX) injection 40 mg  40 mg Subcutaneous Daily Geo Rim, APRN - CNP   Stopped at 04/24/19 2028    0.9 % sodium chloride infusion   Intravenous Continuous Geo Rim, APRN -  mL/hr at 04/24/19 2050      potassium chloride (KLOR-CON M) extended release tablet 40 mEq  40 mEq Oral PRN [facility] Rim, APRN - CNP        Or    potassium chloride (KLOR-CON) packet 40 mEq  40 mEq Oral PRN [facility] Rim, APRN - CNP        Or    potassium chloride 10 mEq/100 mL IVPB (Peripheral Line)  10 mEq Intravenous PRN [facility] Rim, APRN - CNP        magnesium sulfate 1 g in dextrose 5% 100 mL IVPB  1 g Intravenous PRN [facility] Rim, APRN - CNP        insulin lispro (HUMALOG) injection vial 0-12 Units  0-12 Units Subcutaneous TID WC [facility] Rim, APRN - CNP        insulin lispro (HUMALOG) injection vial 0-6 Units  0-6 Units Subcutaneous Nightly [facility] Rim, APRN - CNP        glucose (GLUTOSE) 40 % oral gel 15 g  15 g Oral PRN [facility] Rim, APRN - CNP        dextrose 50 % solution 12.5 g  12.5 g Intravenous PRN [facility] Rim, APRN - CNP        glucagon (rDNA) injection 1 mg  1 mg Intramuscular PRN [facility] Rim, APRN - CNP        dextrose 5 % solution  100 mL/hr Intravenous PRN [facility] Rim, APRN - CNP        sodium chloride flush 0.9 % injection 10 mL  10 mL Intravenous 2 times per day Mile Navaisrrael APRETHAN - CNP        sodium chloride flush 0.9 % injection 10 mL  10 mL Intravenous PRN Mile Navaisrrael APRN - CNP        thiamine (B-1) injection 100 mg  100 mg Intramuscular Daily Mile Navaad, APRN - CNP        folic acid (FOLVITE) tablet 1 mg  1 mg Oral Daily Ashantinell Brandyad, APRN - CNP        multivitamin 1 tablet  1 tablet Oral Daily Mile Navaad, APRN - CNP        LORazepam (ATIVAN) tablet 1 mg  1 mg Oral Q1H PRN Mile Distad, APRN - CNP        Or    LORazepam (ATIVAN) injection 1 mg  1 mg Intravenous Q1H PRN Mile Distad, APRN - CNP        Or    LORazepam (ATIVAN) tablet 2 mg  2 mg Oral Q1H PRN Mile Distad, APRN - CNP        Or    LORazepam (ATIVAN) injection 2 mg  2 mg Intravenous Q1H PRN Ashantimarguerite Navaad, APRN - CNP        Or    LORazepam (ATIVAN) tablet 3 mg  3 mg Oral Q1H PRN Ashantimarguerite Brandyad, APRN - CNP        Or    LORazepam (ATIVAN) injection 3 mg  3 mg Intravenous Q1H PRN Mile Navaad, APRN - CNP        Or    LORazepam (ATIVAN) tablet 4 mg  4 mg Oral Q1H PRN Mile Navaad, APRN - CNP        Or    LORazepam (ATIVAN) injection 4 mg  4 mg Intravenous Q1H PRN Ashantimarguerite Delisa, APRN - CNP        morphine (PF) injection 2 mg  2 mg Intravenous Q4H PRN Boo Ramirez MD   2 mg at 04/25/19 0119    promethazine (PHENERGAN) injection 25 mg  25 mg Intramuscular Q6H PRN Francine Posadas APRN - CNP        acetaminophen (TYLENOL) tablet 650 mg  650 mg Oral Q4H PRN Francine Posadas APRN - CNP         Allergies   Allergen Reactions    Vancomycin      Active Problems:    Syncope and collapse  Resolved Problems:    * No resolved hospital problems. *    Blood pressure (!) 148/60, pulse 75, temperature 98.5 °F (36.9 °C), temperature source Oral, resp. rate 16, height 5' 8\" (1.727 m), weight 193 lb 3.2 oz (87.6 kg), SpO2 97 %. Subjective:  Symptoms:  Stable. No shortness of breath, cough or chest pain. Diet:  She reports  nausea.     Activity

## 2019-04-25 NOTE — BRIEF OP NOTE
Brief Postoperative Note  ______________________________________________________________    Patient: Cristi Garcia  YOB: 1950  MRN: [id number]  Date of Procedure: 4/25/2019    Pre-Op Diagnosis: INPATIENT    Post-Op Diagnosis: Same       Procedure(s):  CLOSED REDUCTON EXTERNAL FIXATION OF NASAL BONE FRACTURE WITH SEVERE DNS (DEVIATED NASAL SEPTUM)  ROOM 190    Anesthesia: General    Surgeon(s):  Isiah Blanc MD    Assistant: Anjelica Lewis    Estimated Blood Loss (mL): less than 50     Complications: Bleeding 20 cc    Specimens:   * No specimens in log *    Implants:  * No implants in log *      Drains: * No LDAs found *    Findings: severe DNS; external nasal deformity with nasal bone fractures    Isiah Blanc MD  Date: 4/25/2019  Time: 2:54 PM

## 2019-04-25 NOTE — ANESTHESIA PRE PROCEDURE
Department of Anesthesiology  Preprocedure Note       Name:  Kobe Bacon   Age:  70 y.o.  :  1950                                          MRN:  [id number]         Date:  2019      Surgeon: Terry Orellana):  Charity Maloney MD    Procedure: CLOSED REDUCTON EXTERNAL FIXATION OF NASAL BONE FRACTURE WITH SEVERE DNS (DEVIATED NASAL SEPTUM)  ROOM 190 (N/A )    Medications prior to admission:   Prior to Admission medications    Medication Sig Start Date End Date Taking? Authorizing Provider   topiramate (TOPAMAX) 25 MG tablet Take 1 tablet by mouth 2 times daily 10/17/18  Yes Maryam Goss MD   lisinopril (PRINIVIL;ZESTRIL) 10 MG tablet Take 1 tablet by mouth daily 10/17/18  Yes Brody Etienne DO   metoprolol succinate (TOPROL XL) 100 MG extended release tablet Take 200 mg by mouth daily   Yes Historical Provider, MD   clonazePAM (KLONOPIN) 2 MG tablet Take 2 mg by mouth 2 times daily.     Yes Historical Provider, MD   lamoTRIgine (LAMICTAL) 100 MG tablet Take 100 mg by mouth 2 times daily   Yes Historical Provider, MD   QUEtiapine (SEROQUEL XR) 150 MG TB24 extended release tablet Take 150 mg by mouth nightly   Yes Historical Provider, MD   aspirin 81 MG EC tablet Take 1 tablet by mouth daily 10/18/18   Brody Etienne DO   atorvastatin (LIPITOR) 40 MG tablet Take 1 tablet by mouth nightly 10/17/18   Brody Etienne DO       Current medications:    Current Facility-Administered Medications   Medication Dose Route Frequency Provider Last Rate Last Dose    clonazePAM (KLONOPIN) tablet 2 mg  2 mg Oral BID Chary Beal MD   2 mg at 19 1152    lamoTRIgine (LAMICTAL) tablet 100 mg  100 mg Oral BID Chary Beal MD   100 mg at 19 1142    QUEtiapine (SEROQUEL XR) extended release tablet 150 mg  150 mg Oral Nightly Chary Beal MD        topiramate (TOPAMAX) tablet 25 mg  25 mg Oral BID Chary Beal MD   25 mg at 19 1142    lisinopril (PRINIVIL;ZESTRIL) tablet 2.5 mg  2.5 mg Oral Daily Lucio Pallas, APRN - CNP        metoprolol succinate (TOPROL XL) extended release tablet 50 mg  50 mg Oral Daily Roman Pallas, APRN - CNP   50 mg at 04/25/19 1142    sodium chloride flush 0.9 % injection 10 mL  10 mL Intravenous 2 times per day Gwendloyn Penta, APRN - CNP        sodium chloride flush 0.9 % injection 10 mL  10 mL Intravenous PRN Gwendloyn Penta, APRN - CNP        magnesium hydroxide (MILK OF MAGNESIA) 400 MG/5ML suspension 30 mL  30 mL Oral Daily PRN Gwendloyn Penta, APRN - CNP        ondansetron (ZOFRAN) injection 4 mg  4 mg Intravenous Q6H PRN Gwendloyn Penta, APRN - CNP   4 mg at 04/25/19 0645    enoxaparin (LOVENOX) injection 40 mg  40 mg Subcutaneous Daily Gwendloyn Penta, APRN - CNP   Stopped at 04/24/19 2028    0.9 % sodium chloride infusion   Intravenous Continuous Gwendloyn Penta, APRN -  mL/hr at 04/24/19 2050      potassium chloride (KLOR-CON M) extended release tablet 40 mEq  40 mEq Oral PRN Gwendloyn Penta, APRN - CNP        Or    potassium chloride (KLOR-CON) packet 40 mEq  40 mEq Oral PRN Gwendloyn Penta, APRN - CNP        Or    potassium chloride 10 mEq/100 mL IVPB (Peripheral Line)  10 mEq Intravenous PRN Gwendloyn Penta, APRN - CNP        magnesium sulfate 1 g in dextrose 5% 100 mL IVPB  1 g Intravenous PRN Gwendloyn Penta, APRN - CNP        insulin lispro (HUMALOG) injection vial 0-12 Units  0-12 Units Subcutaneous TID WC Gwendloyn Penta, APRN - CNP        insulin lispro (HUMALOG) injection vial 0-6 Units  0-6 Units Subcutaneous Nightly Gwendloyn Penta, APRN - CNP        glucose (GLUTOSE) 40 % oral gel 15 g  15 g Oral PRN Gwendloyn Penta, APRN - CNP        dextrose 50 % solution 12.5 g  12.5 g Intravenous PRN Gwendloyn Penta, APRN - CNP        glucagon (rDNA) injection 1 mg  1 mg Intramuscular PRN Gwendloyn Penta, APRN - CNP        dextrose 5 % solution  100 mL/hr Intravenous PRN Gwendloyn Penta, APRN - CNP        sodium chloride flush 0.9 % injection 10 mL  10 mL Intravenous 2 times per day PUNEET Joseph CNP        sodium chloride flush 0.9 % injection 10 mL  10 mL Intravenous PRN PUNEET Joseph CNP        thiamine (B-1) injection 100 mg  100 mg Intramuscular Daily PUNEET Joseph CNP        folic acid (FOLVITE) tablet 1 mg  1 mg Oral Daily PUNEET Joseph CNP   Stopped at 04/25/19 1044    multivitamin 1 tablet  1 tablet Oral Daily PUNEET Joseph CNP   Stopped at 04/25/19 1048    LORazepam (ATIVAN) tablet 1 mg  1 mg Oral Q1H PRN PUNEET Joseph CNP        Or    LORazepam (ATIVAN) injection 1 mg  1 mg Intravenous Q1H PRN PUNEET Joseph CNP        Or    LORazepam (ATIVAN) tablet 2 mg  2 mg Oral Q1H PRN PUNEET Joseph CNP        Or    LORazepam (ATIVAN) injection 2 mg  2 mg Intravenous Q1H PRN PUNEET Joseph CNP        Or    LORazepam (ATIVAN) tablet 3 mg  3 mg Oral Q1H PRN PUNEET Joseph CNP        Or    LORazepam (ATIVAN) injection 3 mg  3 mg Intravenous Q1H PRN PUNEET Joseph CNP        Or    LORazepam (ATIVAN) tablet 4 mg  4 mg Oral Q1H PRN PUNEET Joseph CNP        Or    LORazepam (ATIVAN) injection 4 mg  4 mg Intravenous Q1H PRN PUNEET Joseph CNP        morphine (PF) injection 2 mg  2 mg Intravenous Q4H PRN Da Damon MD   2 mg at 04/25/19 0119    promethazine (PHENERGAN) injection 25 mg  25 mg Intramuscular Q6H PRN PUNEET Aquino CNP        acetaminophen (TYLENOL) tablet 650 mg  650 mg Oral Q4H PRN PUNEET Aquino CNP           Allergies:     Allergies   Allergen Reactions    Iv Dye [Iodides]     Vancomycin        Problem List:    Patient Active Problem List   Diagnosis Code    Hypertensive urgency I16.0    Hyperglycemia R73.9    Chest tightness or pressure R07.89    Visual disturbance, subjective H53.10    DM (diabetes mellitus) ([facility] E11.9    Bipolar affective disorder ([facility] F31.9    Acute sore throat J02.9    Syncope and collapse R55       Past Medical History:        Diagnosis Date    Anxiety     Bipolar affective disorder ([facility]     [facility]     Hypertension     Migraine     PTSD (post-traumatic stress disorder)     TIA (transient ischemic attack)     x 3       Past Surgical History:        Procedure Laterality Date    APPENDECTOMY      CHOLECYSTECTOMY      LEG AMPUTATION BELOW KNEE Bilateral        Social History:    Social History     Tobacco Use    Smoking status: Never Smoker    Smokeless tobacco: Never Used   Substance Use Topics    Alcohol use: No                                Counseling given: Not Answered      Vital Signs (Current):   Vitals:    04/24/19 2015 04/24/19 2345 04/25/19 0739 04/25/19 1252   BP: (!) 134/53 (!) 148/60 (!) 154/63 (!) 192/82   Pulse: 70 75 73 75   Resp: 16 16 16 18   Temp: 97.7 °F (36.5 °C) 98.5 °F (36.9 °C) 99.4 °F (37.4 °C) 98.1 °F (36.7 °C)   TempSrc: Oral Oral Oral Temporal   SpO2: 95% 97% 97% 96%   Weight: 193 lb 3.2 oz (87.6 kg)      Height: 5' 8\" (1.727 m)                                                 BP Readings from Last 3 Encounters:   04/25/19 (!) 192/82   10/17/18 120/67       NPO Status:                                                                                 BMI:   Wt Readings from Last 3 Encounters:   04/24/19 193 lb 3.2 oz (87.6 kg)   10/17/18 212 lb (96.2 kg)     Body mass index is 29.38 kg/m².     CBC:   Lab Results   Component Value Date    WBC 6.4 04/25/2019    RBC 3.46 04/25/2019    HGB 11.4 04/25/2019    HCT 33.9 04/25/2019    MCV 97.8 04/25/2019    RDW 17.8 04/25/2019     04/25/2019       CMP:   Lab Results   Component Value Date     04/25/2019    K 3.9 04/25/2019     04/25/2019    CO2 17 04/25/2019    BUN 10 04/25/2019    CREATININE 0.37 04/25/2019    GFRAA >60.0 04/25/2019    LABGLOM >60.0 04/25/2019    GLUCOSE 128 04/25/2019    PROT 8.9 04/24/2019    CALCIUM 7.7 04/25/2019 BILITOT <0.2 04/24/2019    ALKPHOS 131 04/24/2019    AST 18 04/24/2019    ALT 15 04/24/2019       POC Tests: No results for input(s): POCGLU, POCNA, POCK, POCCL, POCBUN, POCHEMO, POCHCT in the last 72 hours. Coags:   Lab Results   Component Value Date    PROTIME 10.2 04/24/2019    INR 1.0 04/24/2019    APTT 29.3 04/24/2019       HCG (If Applicable): No results found for: PREGTESTUR, PREGSERUM, HCG, HCGQUANT     ABGs: No results found for: PHART, PO2ART, LPL3HEV, SOC4YYU, BEART, I8BMYVGL     Type & Screen (If Applicable):  No results found for: LABABO, LABRH    Anesthesia Evaluation    Airway: Mallampati: II  TM distance: >3 FB   Neck ROM: full  Mouth opening: > = 3 FB Dental:      Comment: Bruised lower face. mulitple upper teeth and upper front teeth are chipped and have very poor dentition with caries. None loose per pt. Pulmonary:normal exam                               Cardiovascular:  Exercise tolerance: good (>4 METS),   (+) hypertension: moderate,                ROS comment: Hld. Chest tightness     Neuro/Psych:   (+) TIA, headaches:, psychiatric history: stable with treatmentdepression/anxiety              ROS comment: Syncope. tia x3  Pt fell unwitnessed with elevated ETOH level. Denies overt alcoholism GI/Hepatic/Renal:             Endo/Other:    (+) DiabetesType II DM, well controlled, , .                 Abdominal:           Vascular:                                        Anesthesia Plan      general     ASA 2     (GETA. RSI. Pt currently c/o acid reflux. Will give pepcid and Bicitra. )  Induction: intravenous. Anesthetic plan and risks discussed with patient. Use of blood products discussed with patient whom. Plan discussed with CRNA.                   Mian Nicole MD   4/25/2019

## 2019-04-26 LAB
ANION GAP SERPL CALCULATED.3IONS-SCNC: 11 MEQ/L (ref 9–15)
BUN BLDV-MCNC: 9 MG/DL (ref 8–23)
CALCIUM SERPL-MCNC: 7.9 MG/DL (ref 8.5–9.9)
CHLORIDE BLD-SCNC: 107 MEQ/L (ref 95–107)
CO2: 23 MEQ/L (ref 20–31)
CREAT SERPL-MCNC: 0.57 MG/DL (ref 0.5–0.9)
FOLATE: 16.7 NG/ML (ref 7.3–26.1)
GFR AFRICAN AMERICAN: >60
GFR NON-AFRICAN AMERICAN: >60
GLUCOSE BLD-MCNC: 122 MG/DL (ref 60–115)
GLUCOSE BLD-MCNC: 123 MG/DL (ref 70–99)
GLUCOSE BLD-MCNC: 125 MG/DL (ref 60–115)
GLUCOSE BLD-MCNC: 169 MG/DL (ref 60–115)
HCT VFR BLD CALC: 32.8 % (ref 37–47)
HEMOGLOBIN: 10.8 G/DL (ref 12–16)
MCH RBC QN AUTO: 32.6 PG (ref 27–31.3)
MCHC RBC AUTO-ENTMCNC: 33 % (ref 33–37)
MCV RBC AUTO: 98.6 FL (ref 82–100)
PDW BLD-RTO: 17.1 % (ref 11.5–14.5)
PERFORMED ON: ABNORMAL
PLATELET # BLD: 137 K/UL (ref 130–400)
POTASSIUM REFLEX MAGNESIUM: 3.8 MEQ/L (ref 3.4–4.9)
RBC # BLD: 3.33 M/UL (ref 4.2–5.4)
SODIUM BLD-SCNC: 141 MEQ/L (ref 135–144)
TSH SERPL DL<=0.05 MIU/L-ACNC: 0.98 UIU/ML (ref 0.44–3.86)
VITAMIN B-12: 268 PG/ML (ref 232–1245)
VITAMIN D 25-HYDROXY: <5 NG/ML (ref 30–100)
WBC # BLD: 4.3 K/UL (ref 4.8–10.8)

## 2019-04-26 PROCEDURE — 6360000002 HC RX W HCPCS: Performed by: FAMILY MEDICINE

## 2019-04-26 PROCEDURE — 36415 COLL VENOUS BLD VENIPUNCTURE: CPT

## 2019-04-26 PROCEDURE — 6370000000 HC RX 637 (ALT 250 FOR IP): Performed by: OTOLARYNGOLOGY

## 2019-04-26 PROCEDURE — 93010 ELECTROCARDIOGRAM REPORT: CPT | Performed by: INTERNAL MEDICINE

## 2019-04-26 PROCEDURE — 2060000000 HC ICU INTERMEDIATE R&B

## 2019-04-26 PROCEDURE — 6360000002 HC RX W HCPCS: Performed by: NURSE PRACTITIONER

## 2019-04-26 PROCEDURE — 6370000000 HC RX 637 (ALT 250 FOR IP): Performed by: INTERNAL MEDICINE

## 2019-04-26 PROCEDURE — 84443 ASSAY THYROID STIM HORMONE: CPT

## 2019-04-26 PROCEDURE — 2580000003 HC RX 258: Performed by: NURSE PRACTITIONER

## 2019-04-26 PROCEDURE — 82746 ASSAY OF FOLIC ACID SERUM: CPT

## 2019-04-26 PROCEDURE — 6370000000 HC RX 637 (ALT 250 FOR IP): Performed by: NURSE PRACTITIONER

## 2019-04-26 PROCEDURE — 85027 COMPLETE CBC AUTOMATED: CPT

## 2019-04-26 PROCEDURE — 82306 VITAMIN D 25 HYDROXY: CPT

## 2019-04-26 PROCEDURE — 82607 VITAMIN B-12: CPT

## 2019-04-26 PROCEDURE — 80048 BASIC METABOLIC PNL TOTAL CA: CPT

## 2019-04-26 PROCEDURE — APPNB15 APP NON BILLABLE TIME 0-15 MINS: Performed by: NURSE PRACTITIONER

## 2019-04-26 PROCEDURE — G0108 DIAB MANAGE TRN  PER INDIV: HCPCS

## 2019-04-26 RX ORDER — METOPROLOL SUCCINATE 50 MG/1
50 TABLET, EXTENDED RELEASE ORAL DAILY
Qty: 30 TABLET | Refills: 3 | Status: ON HOLD | OUTPATIENT
Start: 2019-04-26 | End: 2022-07-15 | Stop reason: HOSPADM

## 2019-04-26 RX ADMIN — Medication 10 ML: at 22:27

## 2019-04-26 RX ADMIN — ONDANSETRON 4 MG: 2 INJECTION INTRAMUSCULAR; INTRAVENOUS at 21:30

## 2019-04-26 RX ADMIN — CLONAZEPAM 2 MG: 1 TABLET ORAL at 10:04

## 2019-04-26 RX ADMIN — LAMOTRIGINE 100 MG: 100 TABLET ORAL at 10:05

## 2019-04-26 RX ADMIN — LISINOPRIL 2.5 MG: 2.5 TABLET ORAL at 10:04

## 2019-04-26 RX ADMIN — THERA TABS 1 TABLET: TAB at 10:04

## 2019-04-26 RX ADMIN — Medication 10 ML: at 22:26

## 2019-04-26 RX ADMIN — QUETIAPINE FUMARATE 150 MG: 50 TABLET, EXTENDED RELEASE ORAL at 21:35

## 2019-04-26 RX ADMIN — ONDANSETRON 4 MG: 2 INJECTION INTRAMUSCULAR; INTRAVENOUS at 10:14

## 2019-04-26 RX ADMIN — Medication 10 ML: at 21:33

## 2019-04-26 RX ADMIN — Medication 10 ML: at 10:06

## 2019-04-26 RX ADMIN — FOLIC ACID 1 MG: 1 TABLET ORAL at 10:04

## 2019-04-26 RX ADMIN — METOPROLOL SUCCINATE 50 MG: 50 TABLET, EXTENDED RELEASE ORAL at 10:07

## 2019-04-26 RX ADMIN — HYDROMORPHONE HYDROCHLORIDE 0.5 MG: 1 INJECTION, SOLUTION INTRAMUSCULAR; INTRAVENOUS; SUBCUTANEOUS at 10:05

## 2019-04-26 RX ADMIN — TOPIRAMATE 25 MG: 25 TABLET, FILM COATED ORAL at 21:34

## 2019-04-26 RX ADMIN — THIAMINE HYDROCHLORIDE 100 MG: 100 INJECTION, SOLUTION INTRAMUSCULAR; INTRAVENOUS at 10:04

## 2019-04-26 RX ADMIN — ENOXAPARIN SODIUM 40 MG: 40 INJECTION SUBCUTANEOUS at 10:05

## 2019-04-26 RX ADMIN — HYDROMORPHONE HYDROCHLORIDE 0.5 MG: 1 INJECTION, SOLUTION INTRAMUSCULAR; INTRAVENOUS; SUBCUTANEOUS at 22:25

## 2019-04-26 RX ADMIN — TOPIRAMATE 25 MG: 25 TABLET, FILM COATED ORAL at 10:04

## 2019-04-26 RX ADMIN — CLONAZEPAM 2 MG: 1 TABLET ORAL at 21:34

## 2019-04-26 RX ADMIN — LAMOTRIGINE 100 MG: 100 TABLET ORAL at 21:33

## 2019-04-26 ASSESSMENT — PAIN SCALES - GENERAL
PAINLEVEL_OUTOF10: 6
PAINLEVEL_OUTOF10: 3
PAINLEVEL_OUTOF10: 2
PAINLEVEL_OUTOF10: 7

## 2019-04-26 NOTE — PROGRESS NOTES
Progress Note  Patient: Esvin Overton  Unit/Bed:  L454/Q585-67  YOB: 1950  MRN: [id number]  Acct: [de-identified]   Admitting Diagnosis: Syncope and collapse [R55]  Admit Date:  4/24/2019  Hospital Day: 2    Chief Complaint:  syncope    Subjective  326/19  70-year-old female was admitted yesterday for syncope after alcohol intoxication. She did have a nasal surgery she feels much better now I did discuss with her were plan to discharge her from the cardiac standpoint we'll follow closely as an outpatient at that time we'll get a Lexiscan nuclear stress test.  She had an echo done last year her ejection fractions normal  Telemetry is normal sinus rhythm occasional PVC  Vital signs stable    70-year-old female came into the emergency department after having a syncopal event. She was at home she had a box of wine delivered to her house she drinks several glasses had passed out she states that it had been at least 3 hours her alcohol level was 0.16 and she thinks that was 4 hours after she was drinking she had a facial fracture/nasal fracture that needs to be repaired to the kindly asked cardiology to get involved with her case. She does have some chest tenderness but is very atypical to reproducible with palpation. She denies having any heart history in the past.  Past medical history does include hypertension. No fever, chills, nausea, vomiting no PND, orthopnea, claudications      Review of Systems:   Review of Systems   Constitutional: Positive for fatigue. HENT: Positive for nosebleeds. Neurological: Positive for dizziness and light-headedness. Physical Examination:    /61   Pulse 69   Temp 98.4 °F (36.9 °C) (Oral)   Resp 16   Ht 5' 8\" (1.727 m)   Wt 193 lb 3.2 oz (87.6 kg)   SpO2 97%   BMI 29.38 kg/m²    Physical Exam   Constitutional: She is oriented to person, place, and time. She appears well-developed and well-nourished.    HENT:   Nasal surgery done   Eyes: Pupils are equal, round, and reactive to light. Neck: No JVD present. No tracheal deviation present. No thyromegaly present. Cardiovascular: Normal rate, regular rhythm, normal heart sounds and intact distal pulses. Exam reveals no gallop and no friction rub. No murmur heard. Pulmonary/Chest: Effort normal and breath sounds normal. No stridor. No respiratory distress. She has no wheezes. She has no rales. She exhibits no tenderness. Abdominal: Soft. Bowel sounds are normal.   Musculoskeletal: Normal range of motion. She exhibits no edema or tenderness. Lymphadenopathy:     She has no cervical adenopathy. Neurological: She is alert and oriented to person, place, and time. Skin: Skin is warm and dry. Psychiatric: She has a normal mood and affect.        LABS:  CBC:   Lab Results   Component Value Date    WBC 4.3 04/26/2019    RBC 3.33 04/26/2019    HGB 10.8 04/26/2019    HCT 32.8 04/26/2019    MCV 98.6 04/26/2019    MCH 32.6 04/26/2019    MCHC 33.0 04/26/2019    RDW 17.1 04/26/2019     04/26/2019     CBC with Differential:   Lab Results   Component Value Date    WBC 4.3 04/26/2019    RBC 3.33 04/26/2019    HGB 10.8 04/26/2019    HCT 32.8 04/26/2019     04/26/2019    MCV 98.6 04/26/2019    MCH 32.6 04/26/2019    MCHC 33.0 04/26/2019    RDW 17.1 04/26/2019    LYMPHOPCT 33.0 04/24/2019    MONOPCT 6.5 04/24/2019    BASOPCT 0.7 04/24/2019    MONOSABS 0.3 04/24/2019    LYMPHSABS 1.7 04/24/2019    EOSABS 0.1 04/24/2019    BASOSABS 0.0 04/24/2019     CMP:    Lab Results   Component Value Date     04/26/2019    K 3.8 04/26/2019     04/26/2019    CO2 23 04/26/2019    BUN 9 04/26/2019    CREATININE 0.57 04/26/2019    GFRAA >60.0 04/26/2019    LABGLOM >60.0 04/26/2019    GLUCOSE 123 04/26/2019    PROT 8.9 04/24/2019    LABALBU 4.8 04/24/2019    CALCIUM 7.9 04/26/2019    BILITOT <0.2 04/24/2019    ALKPHOS 131 04/24/2019    AST 18 04/24/2019    ALT 15 04/24/2019     BMP:    Lab Results   Component Value Date     04/26/2019    K 3.8 04/26/2019     04/26/2019    CO2 23 04/26/2019    BUN 9 04/26/2019    LABALBU 4.8 04/24/2019    CREATININE 0.57 04/26/2019    CALCIUM 7.9 04/26/2019    GFRAA >60.0 04/26/2019    LABGLOM >60.0 04/26/2019    GLUCOSE 123 04/26/2019     Magnesium:    Lab Results   Component Value Date    MG 2.1 10/17/2018     Troponin:    Lab Results   Component Value Date    TROPONINI <0.010 04/24/2019       Tele NSR occ PVC      Assessment:    Active Hospital Problems    Diagnosis Date Noted    Head injury [S09.90XA]     Syncope and collapse [R55] 04/24/2019   syncope  Alcohol intoxication  Per tension/stable  Ejection fraction normal        Plan:  1. Okay to discharge from cardiology standpoint  2.  Will follow as outpatient  Electronically signed by PUNEET Burrell CNP on 4/26/2019 at 9:16 AM

## 2019-04-26 NOTE — OP NOTE
[address]                      [address], [address] [facility]                                OPERATIVE REPORT    PATIENT NAME: Freddie Bullock                      :        1950  MED REC NO:   [id number]                            ROOM:       W190  ACCOUNT NO:   [de-identified]                           ADMIT DATE: 2019  PROVIDER:     Citlali Lorenzo MD    DATE OF PROCEDURE:  2019    PREOPERATIVE DIAGNOSES:  Nasal bone fracture with external nasal  deformity and severe deviated nasal septum. POSTOPERATIVE DIAGNOSES:  Nasal bone fracture with external nasal  deformity and severe deviated nasal septum. OPERATION PERFORMED:  Closed reduction and external fixation of a nasal  bone fracture, external nasal deformity and deviated nasal septum. SURGEON:  Citlali Lorenzo MD    SCRUB NURSE:  Aurora Krishnamurthy. ANESTHESIOLOGIST:  Dr. Carmen Macario. ANESTHESIA:  General.    CLINICAL INDICATIONS:  This is a 57-year-old white female who apparently  sustained nasal injury including external nasal portion and the septum  after a fall. Apparently, she had quite a drink some amount of wine and  she became inebriated and lost consciousness and fell on her face  yesterday. She was taken to the emergency room and subsequently had  radiologic evaluation in the facial bone and nasal bone which showed  severe dislocation of the septum to the left side and also sustained  some abrasion on her chin with developing hematoma. It was therefore  decided that this patient undergo the aforementioned surgical  evaluation. OPERATIVE PROCEDURE:  The patient was placed in supine position and  general endotracheal intubation anesthesia was satisfactorily inducted. The patient was prepped and draped in usual sterile fashion. Initially,  an Asch forceps was placed into both nasal chamber and the septum was  held in place and this was reduced by a lateral to and fro motion. We  were able to reduce the dislocation. Concomitantly, we held the nasal  complex with the left thumb and index finger and _____ motion, the nasal  bones that were comminuted fracture was put in place. The nasal access  was in midline. Subsequently, a [place] nasal splint was inserted and  also a Taylor intranasal splint was inserted into both nasal chambers. A  nasal sling was put in place. The patient tolerated the procedure well  and she was wheeled to the postanesthesia care unit in satisfactory  condition.         Camilla Almanzar MD    D: 04/25/2019 15:32:55       T: 04/25/2019 21:22:11     [id number]  Job#: [id number]     Doc#: [id number]    CC:  Isiah Blanc MD

## 2019-04-26 NOTE — PROGRESS NOTES
Karis Pinto,  Seen for evaluation and education on Other: insufficient knowledge    Lab Results   Component Value Date    LABA1C 5.7 04/25/2019     No results found for: EAG    Discussed with Karis Pinto, their current diabetes self-care routines prior to this admission. Patient is not aware of previous diagnosis of DM . States she just had her physical and everything was ok with Dr Imelda Tiwari. Reviewed elevated BG since admission and current A1C. Discussed probable diagnosis of preDM and reviewed how to prevent any progression to DM. Briefly discussed role of diet and physical activity as treatment for preDM and prevention of progression. Patient states that she recently gained 40 lbs due to not having the proper fitting prosthetic. Now she does so she can be more active. Provided patient with card and contact information for out-patient Diabetes education services and encouraged follow up with a PCP/endocrinologist.    Patient verbalizes understanding of education.           Doreen Acevedo RN

## 2019-04-26 NOTE — CONSULTS
[facility] CONSULT NOTE    Patient: Valeriano Niecy  Unit/Bed: Y535/H210-41  YOB: 1950  MRN: [id number]  Acct: [de-identified]   Admitting Diagnosis: Syncope and collapse [R55]  Admit Date:  4/24/2019  Hospital Day: 1      Chief Complaint: syncope    History of Present Illness: 70year old female with high blood alcohol level and syncope, with facial injury. Consult for syncope and QT prolongation. No angina per se but with some chest tenderness and chronic shoulder pain. No prior cardiac history. History of DM, TIA.       PMHx:  Past Medical History:   Diagnosis Date    Anxiety     Bipolar affective disorder ([facility]     DM (diabetes mellitus) ([facility]     Hypertension     Migraine     PTSD (post-traumatic stress disorder)     TIA (transient ischemic attack)     x 3       PSHx:  Past Surgical History:   Procedure Laterality Date    APPENDECTOMY      CHOLECYSTECTOMY      LEG AMPUTATION BELOW KNEE Bilateral        Social Hx:  Social History     Socioeconomic History    Marital status: Single     Spouse name: None    Number of children: None    Years of education: None    Highest education level: None   Occupational History    None   Social Needs    Financial resource strain: None    Food insecurity:     Worry: None     Inability: None    Transportation needs:     Medical: None     Non-medical: None   Tobacco Use    Smoking status: Never Smoker    Smokeless tobacco: Never Used   Substance and Sexual Activity    Alcohol use: No    Drug use: No    Sexual activity: None   Lifestyle    Physical activity:     Days per week: None     Minutes per session: None    Stress: None   Relationships    Social connections:     Talks on phone: None     Gets together: None     Attends Zoroastrian service: None     Active member of club or organization: None     Attends meetings of clubs or organizations: None     Relationship status: None    Intimate partner violence:     Fear of current or ex partner: None     Emotionally abused: None     Physically abused: None     Forced sexual activity: None   Other Topics Concern    None   Social History Narrative    None       Family Hx:  Family History   Problem Relation Age of Onset    Cancer Mother         breast    Heart Attack Father        Review of Systems:   Review of Systems   Constitutional: Negative for activity change and appetite change. HENT: Negative for congestion. Respiratory: Negative for apnea, choking and chest tightness. Cardiovascular: Negative for chest pain. Gastrointestinal: Negative for abdominal distention and abdominal pain. Endocrine: Negative for cold intolerance and heat intolerance. Genitourinary: Negative for dysuria and enuresis. Musculoskeletal: Negative for arthralgias and back pain. Skin: Negative for color change. Neurological: Positive for syncope and light-headedness. Negative for dizziness and seizures. Psychiatric/Behavioral: Negative for agitation, behavioral problems and confusion. Allergies:   Allergies   Allergen Reactions    Iv Dye [Iodides]     Vancomycin     Morphine Rash       Current Medications:    Current Facility-Administered Medications:     clonazePAM (KLONOPIN) tablet 2 mg, 2 mg, Oral, BID, Asia Carlson MD, 2 mg at 04/25/19 2205    lamoTRIgine (LAMICTAL) tablet 100 mg, 100 mg, Oral, BID, Asia Carlson MD, 100 mg at 04/25/19 2206    QUEtiapine (SEROQUEL XR) extended release tablet 150 mg, 150 mg, Oral, Nightly, Doug Cabello MD, 150 mg at 04/25/19 2205    topiramate (TOPAMAX) tablet 25 mg, 25 mg, Oral, BID, Doug Cabello MD, 25 mg at 04/25/19 2205    lisinopril (PRINIVIL;ZESTRIL) tablet 2.5 mg, 2.5 mg, Oral, Daily, Asia Carlson MD, 2.5 mg at 04/25/19 2206    metoprolol succinate (TOPROL XL) extended release tablet 50 mg, 50 mg, Oral, Daily, PUNEET Frank - CNP, 50 mg at 04/25/19 1142    dextrose 5 % infusion, , , ,     ceFAZolin (ANCEF) 2 g in dextrose 5 % 100 mL IVPB, 2 g, Intravenous, On Call to OR, Jarocho Martinez MD    bacitracin zinc ointment, , Topical, Once, Jarocho Martinez MD    famotidine (PEPCID) injection 20 mg, 20 mg, Intravenous, Once, Jarocho Martinez MD    magnesium sulfate 2 g in 50 mL IVPB premix, 2 g, Intravenous, Once, Jarocho Martinez MD    ondansetron [facility]) injection 4 mg, 4 mg, Intravenous, Once, Jarocho Martinez MD    Tetanus-Diphth-Acell Pertussis (BOOSTRIX) injection 0.5 mL, 0.5 mL, Intramuscular, Once, Jarocho Martinez MD    HYDROmorphone (DILAUDID) injection 0.5 mg, 0.5 mg, Intravenous, Q4H PRN, Shayne Cameron MD, 0.5 mg at 04/25/19 2216    sodium chloride flush 0.9 % injection 10 mL, 10 mL, Intravenous, 2 times per day, Lutricia Cuong, APRN - CNP, 10 mL at 04/25/19 2210    sodium chloride flush 0.9 % injection 10 mL, 10 mL, Intravenous, PRN, Lutricia Ucong, APRN - CNP    magnesium hydroxide (MILK OF MAGNESIA) 400 MG/5ML suspension 30 mL, 30 mL, Oral, Daily PRN, Lutricia Cuong, APRN - CNP    ondansetron (ZOFRAN) injection 4 mg, 4 mg, Intravenous, Q6H PRN, Lutricia Cuong, APRN - CNP, 4 mg at 04/25/19 0645    enoxaparin (LOVENOX) injection 40 mg, 40 mg, Subcutaneous, Daily, Lutricia Cuong, APRN - CNP, Stopped at 04/24/19 2028    0.9 % sodium chloride infusion, , Intravenous, Continuous, Jarocho Martinez MD, Last Rate: 100 mL/hr at 04/25/19 1534, 1,000 mL at 04/25/19 1534    potassium chloride (KLOR-CON M) extended release tablet 40 mEq, 40 mEq, Oral, PRN **OR** potassium chloride (KLOR-CON) packet 40 mEq, 40 mEq, Oral, PRN **OR** potassium chloride 10 mEq/100 mL IVPB (Peripheral Line), 10 mEq, Intravenous, PRN, PUNEET Denton CNP    magnesium sulfate 1 g in dextrose 5% 100 mL IVPB, 1 g, Intravenous, PRN, PUNEET Denton CNP    insulin lispro (HUMALOG) injection vial 0-12 Units, 0-12 Units, Subcutaneous, TID WC, Jarocho Martinez MD, 4 Units at 04/25/19 2200    insulin lispro (HUMALOG) injection vial 0-6 Units, 0-6 Units, Subcutaneous, Nightly, Asia Carlson MD    glucose (GLUTOSE) 40 % oral gel 15 g, 15 g, Oral, PRN, Asia Carlson MD    dextrose 50 % solution 12.5 g, 12.5 g, Intravenous, PRN, PUNEET Mckeon - CNP    glucagon (rDNA) injection 1 mg, 1 mg, Intramuscular, PRN, Asia Carlson MD    dextrose 5 % solution, 100 mL/hr, Intravenous, PRN, PUNEET Mckeon - CNP    sodium chloride flush 0.9 % injection 10 mL, 10 mL, Intravenous, 2 times per day, PUNEET Mckeon - CNP, 10 mL at 04/25/19 2209    sodium chloride flush 0.9 % injection 10 mL, 10 mL, Intravenous, PRN, PUNEET Mckeon - CNP    thiamine (B-1) injection 100 mg, 100 mg, Intramuscular, Daily, PUNEET Mckeon - CNP    folic acid (FOLVITE) tablet 1 mg, 1 mg, Oral, Daily, Asia Carlson MD, Stopped at 04/25/19 1044    multivitamin 1 tablet, 1 tablet, Oral, Daily, PUNEET Mckeon CNP, Stopped at 04/25/19 1048    LORazepam (ATIVAN) tablet 1 mg, 1 mg, Oral, Q1H PRN **OR** LORazepam (ATIVAN) injection 1 mg, 1 mg, Intravenous, Q1H PRN **OR** LORazepam (ATIVAN) tablet 2 mg, 2 mg, Oral, Q1H PRN **OR** LORazepam (ATIVAN) injection 2 mg, 2 mg, Intravenous, Q1H PRN **OR** LORazepam (ATIVAN) tablet 3 mg, 3 mg, Oral, Q1H PRN **OR** LORazepam (ATIVAN) injection 3 mg, 3 mg, Intravenous, Q1H PRN **OR** LORazepam (ATIVAN) tablet 4 mg, 4 mg, Oral, Q1H PRN **OR** LORazepam (ATIVAN) injection 4 mg, 4 mg, Intravenous, Q1H PRN, Asia Carlson MD    morphine (PF) injection 2 mg, 2 mg, Intravenous, Q4H PRN, Doug Cabello MD, 2 mg at 04/25/19 0119    promethazine (PHENERGAN) injection 25 mg, 25 mg, Intramuscular, Q6H PRN, Shonda Vaughn APRN - CNP    acetaminophen (TYLENOL) tablet 650 mg, 650 mg, Oral, Q4H PRN, Asia Carlson MD    Physical Examination:    BP (!) 164/64   Pulse 69   Temp 98.2 °F (36.8 °C) (Oral)   Resp 16   Ht 5' 8\" (1.727 m)   Wt 193 lb 3.2 oz (87.6 kg)   SpO2 98%   BMI 29.38 kg/m²    Physical Exam   Constitutional: She is oriented to person, place, and time. She appears well-developed and well-nourished. HENT:   Head: Normocephalic and atraumatic. Eyes: Pupils are equal, round, and reactive to light. Neck: Normal range of motion. Neck supple. Cardiovascular: Normal rate and regular rhythm. Exam reveals no gallop and no friction rub. No murmur heard. Pulmonary/Chest: Effort normal and breath sounds normal. No respiratory distress. She has no wheezes. She has no rales. She exhibits no tenderness. Abdominal: Soft. Bowel sounds are normal. She exhibits no distension. There is no tenderness. Musculoskeletal: Normal range of motion. She exhibits no edema. Neurological: She is alert and oriented to person, place, and time. No cranial nerve deficit. Coordination normal.   Skin: Skin is warm and dry. Psychiatric: She has a normal mood and affect.        LABS:  CBC:   Lab Results   Component Value Date    WBC 6.4 04/25/2019    RBC 3.46 04/25/2019    HGB 11.4 04/25/2019    HCT 33.9 04/25/2019    MCV 97.8 04/25/2019    MCH 32.9 04/25/2019    MCHC 33.7 04/25/2019    RDW 17.8 04/25/2019     04/25/2019     CBC with Differential:   Lab Results   Component Value Date    WBC 6.4 04/25/2019    RBC 3.46 04/25/2019    HGB 11.4 04/25/2019    HCT 33.9 04/25/2019     04/25/2019    MCV 97.8 04/25/2019    MCH 32.9 04/25/2019    MCHC 33.7 04/25/2019    RDW 17.8 04/25/2019    LYMPHOPCT 33.0 04/24/2019    MONOPCT 6.5 04/24/2019    BASOPCT 0.7 04/24/2019    MONOSABS 0.3 04/24/2019    LYMPHSABS 1.7 04/24/2019    EOSABS 0.1 04/24/2019    BASOSABS 0.0 04/24/2019     CMP:    Lab Results   Component Value Date     04/25/2019    K 3.9 04/25/2019     04/25/2019    CO2 17 04/25/2019    BUN 10 04/25/2019    CREATININE 0.37 04/25/2019    GFRAA >60.0 04/25/2019    LABGLOM >60.0 04/25/2019    GLUCOSE 128 04/25/2019    PROT 8.9 04/24/2019    LABALBU 4.8 04/24/2019    CALCIUM 7.7 04/25/2019    BILITOT <0.2 04/24/2019    ALKPHOS 131 04/24/2019    AST 18 04/24/2019    ALT 15 04/24/2019     BMP:    Lab Results   Component Value Date     04/25/2019    K 3.9 04/25/2019     04/25/2019    CO2 17 04/25/2019    BUN 10 04/25/2019    LABALBU 4.8 04/24/2019    CREATININE 0.37 04/25/2019    CALCIUM 7.7 04/25/2019    GFRAA >60.0 04/25/2019    LABGLOM >60.0 04/25/2019    GLUCOSE 128 04/25/2019     Magnesium:    Lab Results   Component Value Date    MG 2.1 10/17/2018     Troponin:    Lab Results   Component Value Date    TROPONINI <0.010 04/24/2019       EKG: EKG: normal sinus rhythm, occasional PVC noted, unifocal.      ASSESSMENT/PLAN:         Active Hospital Problems    Diagnosis Date Noted    Syncope and collapse [R55] 04/24/2019     Syncope: The QTC visually is not particularly prolonged and could be related to medications and/or alcohol/electrolytes. Do not think likely it is cause of syncope. Chest pain is atypical.  Medical management no changes. Electronically signed by Nikos Cervantes.  Ankita Duggan MD [facility] Director of Cardiology Services and CardiacCatheterization Laboratory  [facility], [place]   on 4/25/2019 at 10:27 PM

## 2019-04-26 NOTE — PROGRESS NOTES
Radha Fontanez is a 70 y.o. female patient. Patient is sleepy, pain controlled.     Current Facility-Administered Medications   Medication Dose Route Frequency Provider Last Rate Last Dose    clonazePAM (KLONOPIN) tablet 2 mg  2 mg Oral BID Ken Best MD   2 mg at 04/26/19 1004    lamoTRIgine (LAMICTAL) tablet 100 mg  100 mg Oral BID Ken Best MD   100 mg at 04/26/19 1005    QUEtiapine (SEROQUEL XR) extended release tablet 150 mg  150 mg Oral Nightly Km George MD   150 mg at 04/25/19 2205    topiramate (TOPAMAX) tablet 25 mg  25 mg Oral BID Km George MD   25 mg at 04/26/19 1004    lisinopril (PRINIVIL;ZESTRIL) tablet 2.5 mg  2.5 mg Oral Daily Ken Best MD   2.5 mg at 04/26/19 1004    metoprolol succinate (TOPROL XL) extended release tablet 50 mg  50 mg Oral Daily PUNEET Schroeder CNP   50 mg at 04/26/19 1007    bacitracin zinc ointment   Topical Once Ken Best MD        famotidine (PEPCID) injection 20 mg  20 mg Intravenous Once Ken Best MD        magnesium sulfate 2 g in 50 mL IVPB premix  2 g Intravenous Once Ken Best MD        ondansetron [facility]) injection 4 mg  4 mg Intravenous Once Ken Best MD        Tetanus-Diphth-Acell Pertussis (BOOSTRIX) injection 0.5 mL  0.5 mL Intramuscular Once Ken Best MD        HYDROmorphone (DILAUDID) injection 0.5 mg  0.5 mg Intravenous Q4H PRN Rose Mary Valentine MD   0.5 mg at 04/26/19 1005    sodium chloride flush 0.9 % injection 10 mL  10 mL Intravenous 2 times per day PUNEET Ibrahim CNP   10 mL at 04/25/19 2210    sodium chloride flush 0.9 % injection 10 mL  10 mL Intravenous PRN PUNEET Ibrahim CNP        magnesium hydroxide (MILK OF MAGNESIA) 400 MG/5ML suspension 30 mL  30 mL Oral Daily PRN Godoy Petrin, APRN - CNP        ondansetron (ZOFRAN) injection 4 mg  4 mg Intravenous Q6H PRN PUNEET Ibrahim - CNP   4 mg at 04/26/19 1014    enoxaparin (LOVENOX) injection 40 mg  40 mg Subcutaneous Daily Lyn Ceballos APRN - CNP   40 mg at 04/26/19 1005    0.9 % sodium chloride infusion   Intravenous Continuous Buzz Randhawa  mL/hr at 04/25/19 1534 1,000 mL at 04/25/19 1534    potassium chloride (KLOR-CON M) extended release tablet 40 mEq  40 mEq Oral PRN Florencioene Sodanilo, APRN - CNP        Or    potassium chloride (KLOR-CON) packet 40 mEq  40 mEq Oral PRN Florencioene Lin, APRN - CNP        Or    potassium chloride 10 mEq/100 mL IVPB (Peripheral Line)  10 mEq Intravenous PRN Lyn Ceballos, APRN - CNP        magnesium sulfate 1 g in dextrose 5% 100 mL IVPB  1 g Intravenous PRN PUNEET Agustin - CNP        insulin lispro (HUMALOG) injection vial 0-12 Units  0-12 Units Subcutaneous TID WC Buzz Randhawa MD   4 Units at 04/25/19 1719    insulin lispro (HUMALOG) injection vial 0-6 Units  0-6 Units Subcutaneous Nightly Buzz Randhawa MD        glucose (GLUTOSE) 40 % oral gel 15 g  15 g Oral PRN Buzz Randhawa MD        dextrose 50 % solution 12.5 g  12.5 g Intravenous PRN PUNEET Agustin - CNP        glucagon (rDNA) injection 1 mg  1 mg Intramuscular PRN Buzz Randhawa MD        dextrose 5 % solution  100 mL/hr Intravenous PRN PUNEET Agustin - CNP        sodium chloride flush 0.9 % injection 10 mL  10 mL Intravenous 2 times per day PUNEET Agustin - CNP   10 mL at 04/26/19 1006    sodium chloride flush 0.9 % injection 10 mL  10 mL Intravenous PRN PUNEET Agustin - CNP        thiamine (B-1) injection 100 mg  100 mg Intramuscular Daily PUNEET Agustin - CNP   100 mg at 74/65/11 1043    folic acid (FOLVITE) tablet 1 mg  1 mg Oral Daily Buzz Randhawa MD   1 mg at 04/26/19 1004    multivitamin 1 tablet  1 tablet Oral Daily Lyn Ceballos APRN - CNP   1 tablet at 04/26/19 1004    LORazepam (ATIVAN) tablet 1 mg  1 mg Oral Q1H PRN Buzz Randhawa MD        Or    LORazepam (ATIVAN) injection 1 mg  1 mg

## 2019-04-27 ENCOUNTER — APPOINTMENT (OUTPATIENT)
Dept: ULTRASOUND IMAGING | Age: 69
DRG: 897 | End: 2019-04-27
Payer: MEDICARE

## 2019-04-27 VITALS
DIASTOLIC BLOOD PRESSURE: 65 MMHG | OXYGEN SATURATION: 99 % | BODY MASS INDEX: 29.64 KG/M2 | TEMPERATURE: 98.6 F | HEIGHT: 68 IN | RESPIRATION RATE: 16 BRPM | SYSTOLIC BLOOD PRESSURE: 134 MMHG | WEIGHT: 195.6 LBS | HEART RATE: 67 BPM

## 2019-04-27 LAB
ANION GAP SERPL CALCULATED.3IONS-SCNC: 13 MEQ/L (ref 9–15)
BUN BLDV-MCNC: 8 MG/DL (ref 8–23)
CALCIUM SERPL-MCNC: 7.8 MG/DL (ref 8.5–9.9)
CHLORIDE BLD-SCNC: 111 MEQ/L (ref 95–107)
CO2: 21 MEQ/L (ref 20–31)
CREAT SERPL-MCNC: 0.56 MG/DL (ref 0.5–0.9)
GFR AFRICAN AMERICAN: >60
GFR NON-AFRICAN AMERICAN: >60
GLUCOSE BLD-MCNC: 106 MG/DL (ref 70–99)
GLUCOSE BLD-MCNC: 129 MG/DL (ref 60–115)
GLUCOSE BLD-MCNC: 166 MG/DL (ref 60–115)
GLUCOSE BLD-MCNC: 97 MG/DL (ref 60–115)
HCT VFR BLD CALC: 30.5 % (ref 37–47)
HEMOGLOBIN: 10 G/DL (ref 12–16)
MAGNESIUM: 1.9 MG/DL (ref 1.7–2.4)
MCH RBC QN AUTO: 32 PG (ref 27–31.3)
MCHC RBC AUTO-ENTMCNC: 32.9 % (ref 33–37)
MCV RBC AUTO: 97.3 FL (ref 82–100)
PDW BLD-RTO: 17.3 % (ref 11.5–14.5)
PERFORMED ON: ABNORMAL
PERFORMED ON: ABNORMAL
PERFORMED ON: NORMAL
PLATELET # BLD: 125 K/UL (ref 130–400)
POTASSIUM REFLEX MAGNESIUM: 3.2 MEQ/L (ref 3.4–4.9)
RBC # BLD: 3.13 M/UL (ref 4.2–5.4)
SODIUM BLD-SCNC: 145 MEQ/L (ref 135–144)
WBC # BLD: 3.5 K/UL (ref 4.8–10.8)

## 2019-04-27 PROCEDURE — 93880 EXTRACRANIAL BILAT STUDY: CPT

## 2019-04-27 PROCEDURE — 80048 BASIC METABOLIC PNL TOTAL CA: CPT

## 2019-04-27 PROCEDURE — 6370000000 HC RX 637 (ALT 250 FOR IP): Performed by: NURSE PRACTITIONER

## 2019-04-27 PROCEDURE — 6370000000 HC RX 637 (ALT 250 FOR IP): Performed by: OTOLARYNGOLOGY

## 2019-04-27 PROCEDURE — 6370000000 HC RX 637 (ALT 250 FOR IP): Performed by: INTERNAL MEDICINE

## 2019-04-27 PROCEDURE — 6360000002 HC RX W HCPCS: Performed by: NURSE PRACTITIONER

## 2019-04-27 PROCEDURE — 6360000002 HC RX W HCPCS: Performed by: FAMILY MEDICINE

## 2019-04-27 PROCEDURE — 36415 COLL VENOUS BLD VENIPUNCTURE: CPT

## 2019-04-27 PROCEDURE — 2580000003 HC RX 258: Performed by: NURSE PRACTITIONER

## 2019-04-27 PROCEDURE — 83735 ASSAY OF MAGNESIUM: CPT

## 2019-04-27 PROCEDURE — 85027 COMPLETE CBC AUTOMATED: CPT

## 2019-04-27 RX ORDER — OXYCODONE HYDROCHLORIDE AND ACETAMINOPHEN 5; 325 MG/1; MG/1
1 TABLET ORAL EVERY 4 HOURS PRN
Qty: 30 TABLET | Refills: 0 | Status: SHIPPED | OUTPATIENT
Start: 2019-04-27 | End: 2019-05-02

## 2019-04-27 RX ORDER — POTASSIUM CHLORIDE 20 MEQ/1
40 TABLET, EXTENDED RELEASE ORAL ONCE
Status: COMPLETED | OUTPATIENT
Start: 2019-04-27 | End: 2019-04-27

## 2019-04-27 RX ORDER — ONDANSETRON 4 MG/1
4 TABLET, FILM COATED ORAL 3 TIMES DAILY PRN
Qty: 30 TABLET | Refills: 0 | Status: ON HOLD | OUTPATIENT
Start: 2019-04-27 | End: 2022-07-15 | Stop reason: HOSPADM

## 2019-04-27 RX ORDER — DIAPER,BRIEF,INFANT-TODD,DISP
EACH MISCELLANEOUS
Qty: 30 G | Refills: 1 | Status: SHIPPED | OUTPATIENT
Start: 2019-04-27 | End: 2019-05-07

## 2019-04-27 RX ADMIN — METOPROLOL SUCCINATE 50 MG: 50 TABLET, EXTENDED RELEASE ORAL at 10:13

## 2019-04-27 RX ADMIN — HYDROMORPHONE HYDROCHLORIDE 0.5 MG: 1 INJECTION, SOLUTION INTRAMUSCULAR; INTRAVENOUS; SUBCUTANEOUS at 11:53

## 2019-04-27 RX ADMIN — Medication 10 ML: at 10:15

## 2019-04-27 RX ADMIN — CLONAZEPAM 2 MG: 1 TABLET ORAL at 10:13

## 2019-04-27 RX ADMIN — ONDANSETRON 4 MG: 2 INJECTION INTRAMUSCULAR; INTRAVENOUS at 11:34

## 2019-04-27 RX ADMIN — FOLIC ACID 1 MG: 1 TABLET ORAL at 10:16

## 2019-04-27 RX ADMIN — TOPIRAMATE 25 MG: 25 TABLET, FILM COATED ORAL at 10:14

## 2019-04-27 RX ADMIN — POTASSIUM CHLORIDE 40 MEQ: 20 TABLET, EXTENDED RELEASE ORAL at 11:34

## 2019-04-27 RX ADMIN — LAMOTRIGINE 100 MG: 100 TABLET ORAL at 10:13

## 2019-04-27 RX ADMIN — ENOXAPARIN SODIUM 40 MG: 40 INJECTION SUBCUTANEOUS at 10:15

## 2019-04-27 RX ADMIN — THERA TABS 1 TABLET: TAB at 10:14

## 2019-04-27 ASSESSMENT — PAIN SCALES - GENERAL
PAINLEVEL_OUTOF10: 0
PAINLEVEL_OUTOF10: 6
PAINLEVEL_OUTOF10: 0

## 2019-04-27 NOTE — DISCHARGE SUMMARY
Physician Discharge Summary     Patient ID:  Esvin Oevrton  [id number]  70 y.o.  1950    Admit date: 4/24/2019    Discharge date and time: No discharge date for patient encounter. Admitting Physician: No admitting provider for patient encounter. Discharge Physician: Daniela Cabrales MD    Admission Diagnoses: Syncope and collapse [R55]    Discharge Diagnoses: Syncope, acute nasal fracture s/p surgical repair, alcohol intoxication    Admission Condition: fair    Discharged Condition: good    Hospital Course:  1. Syncope and collapse         - Likely related to alcohol consumption as her ethanol level is still high; but given unwitnessed and prolonged QTc will consult cardiology and neurology  4/25 - cont on tele. No further workup necessary. 4/27 - appreciate neurology evaluation, plan for outpatient podiatry eval     2. Facial trauma with nasal fracture         - ENT consulted  4/25 - to OR today  4/26 - tolerated well, cont pain control. outpt f/u 4/30 with Dr. Mitch Navarrete. 4/27 - dc with percocet x 5 days     3.  Alcohol abuse        - Appears to be in denial; will try to readdress tomorrow when her ethanol level is lower; CIWA if needed  4/25 - cont ciwa, dc planning         Consults: ENT, neurology    Significant Diagnostic Studies:   Lab Results   Component Value Date    WBC 3.5 (L) 04/27/2019    HGB 10.0 (L) 04/27/2019    HCT 30.5 (L) 04/27/2019    MCV 97.3 04/27/2019     (L) 04/27/2019     Lab Results   Component Value Date     04/27/2019    K 3.2 04/27/2019     04/27/2019    CO2 21 04/27/2019    BUN 8 04/27/2019    CREATININE 0.56 04/27/2019    GLUCOSE 106 04/27/2019    CALCIUM 7.8 04/27/2019          Treatments: as above    Discharge Exam:  /65   Pulse 67   Temp 98.6 °F (37 °C) (Oral)   Resp 16   Ht 5' 8\" (1.727 m)   Wt 195 lb 9.6 oz (88.7 kg)   SpO2 99%   BMI 29.74 kg/m²   General appearance: alert, appears stated age and cooperative  Head: Facial bruising of chin, bandaging of nose  Lungs: clear to auscultation bilaterally  Heart: regular rate and rhythm, S1, S2 normal, no murmur, click, rub or gallop  Abdomen: soft, non-tender; bowel sounds normal; no masses,  no organomegaly  Extremities: extremities normal, atraumatic, no cyanosis or edema  Skin: Skin color, texture, turgor normal. No rashes or lesions    Disposition: home    In process/preliminary results:  Outstanding Order Results     No orders found from 3/26/2019 to 4/25/2019. Patient Instructions:   Current Discharge Medication List      START taking these medications    Details   bacitracin zinc 500 UNIT/GM ointment Apply topically 2 times daily. Qty: 30 g, Refills: 1      oxyCODONE-acetaminophen (PERCOCET) 5-325 MG per tablet Take 1 tablet by mouth every 4 hours as needed for Pain for up to 5 days. Intended supply: 5 days.  Take lowest dose possible to manage pain  Qty: 30 tablet, Refills: 0    Comments: Reduce doses taken as pain becomes manageable  Associated Diagnoses: Closed fracture of nasal bone, initial encounter         CONTINUE these medications which have CHANGED    Details   metoprolol succinate (TOPROL XL) 50 MG extended release tablet Take 1 tablet by mouth daily  Qty: 30 tablet, Refills: 3         CONTINUE these medications which have NOT CHANGED    Details   alendronate (FOSAMAX) 70 MG tablet Take 70 mg by mouth once a week      buPROPion (WELLBUTRIN) 75 MG tablet Take 100 mg by mouth 2 times daily      fluticasone (FLONASE) 50 MCG/ACT nasal spray 50 sprays by Each Nare route as needed      ranitidine (ZANTAC) 150 MG tablet Take 150 mg by mouth as needed      sertraline (ZOLOFT) 100 MG tablet Take 100 mg by mouth 2 times daily      topiramate (TOPAMAX) 25 MG tablet Take 1 tablet by mouth 2 times daily  Qty: 60 tablet, Refills: 1      lisinopril (PRINIVIL;ZESTRIL) 10 MG tablet Take 1 tablet by mouth daily  Qty: 30 tablet, Refills: 3      clonazePAM (KLONOPIN) 2 MG tablet Take 2 mg by mouth 2 times daily. lamoTRIgine (LAMICTAL) 100 MG tablet Take 100 mg by mouth 2 times daily      QUEtiapine (SEROQUEL XR) 150 MG TB24 extended release tablet Take 150 mg by mouth nightly         STOP taking these medications       Cyanocobalamin 1000 MCG/ML KIT Comments:   Reason for Stopping:         butalbital-aspirin-caffeine (FIORINAL) -40 MG capsule Comments:   Reason for Stopping:         aspirin 81 MG EC tablet Comments:   Reason for Stopping:         atorvastatin (LIPITOR) 40 MG tablet Comments:   Reason for Stopping:             Activity: activity as tolerated  Diet: regular diet  Wound Care: as directed    Follow-up with Dr. Cece Zepeda in 1 week. Dr. Jhonathan Coreas 4/30.     DC time 35 minutes    Signed:  Bijan Hernandez MD  4/27/2019  1:49 PM

## 2019-04-27 NOTE — CONSULTS
complains of significant neck pain.     Patient admits history of right below-knee amputation status post trauma. Skin: Negative. Allergic/Immunologic: Negative. Neurological: Negative for seizures, syncope, speech difficulty and light-headedness. Patient admits syncope with prolonged downtime.     She admits to feeling significantly weak at this time. Hematological: Negative. Psychiatric/Behavioral: Negative.          Except as noted above the remainder of the review of systems was reviewed and negative.         PAST MEDICAL HISTORY      Past Medical History   Past Medical History:   Diagnosis Date    Anxiety      Bipolar affective disorder ([facility]      DM (diabetes mellitus) ([facility]      Hypertension      Migraine      PTSD (post-traumatic stress disorder)      TIA (transient ischemic attack)       x 3               SURGICALHISTORY        Past Surgical History         Past Surgical History:   Procedure Laterality Date    APPENDECTOMY        CHOLECYSTECTOMY        LEG AMPUTATION BELOW KNEE Bilateral                 CURRENT MEDICATIONS             Previous Medications     ASPIRIN 81 MG EC TABLET    Take 1 tablet by mouth daily     ATORVASTATIN (LIPITOR) 40 MG TABLET    Take 1 tablet by mouth nightly     BUTALBITAL-ASPIRIN-CAFFEINE (FIORINAL) -40 MG CAPSULE    Take 1 capsule by mouth every 6 hours as needed for Headaches for up to 30 days. .     CLONAZEPAM (KLONOPIN) 2 MG TABLET    Take 2 mg by mouth 3 times daily as needed. .     LAMOTRIGINE (LAMICTAL) 100 MG TABLET    Take 100 mg by mouth 2 times daily     LISINOPRIL (PRINIVIL;ZESTRIL) 10 MG TABLET    Take 1 tablet by mouth daily     METOPROLOL SUCCINATE (TOPROL XL) 100 MG EXTENDED RELEASE TABLET    Take 200 mg by mouth daily     QUETIAPINE (SEROQUEL XR) 150 MG TB24 EXTENDED RELEASE TABLET    Take 150 mg by mouth nightly     TOPIRAMATE (TOPAMAX) 25 MG TABLET    Take 1 tablet by mouth 2 times daily         ALLERGIES Vancomycin     FAMILY HISTORY        Family History         Family History   Problem Relation Age of Onset    Cancer Mother           breast    Heart Attack Father                 SOCIAL HISTORY        Social History   Social History            Socioeconomic History    Marital status: Single       Spouse name: None    Number of children: None    Years of education: None    Highest education level: None   Occupational History    None   Social Needs    Financial resource strain: None    Food insecurity:       Worry: None       Inability: None    Transportation needs:       Medical: None       Non-medical: None   Tobacco Use    Smoking status: Never Smoker    Smokeless tobacco: Never Used   Substance and Sexual Activity    Alcohol use: No    Drug use: No    Sexual activity: None   Lifestyle    Physical activity:       Days per week: None       Minutes per session: None    Stress: None   Relationships    Social connections:       Talks on phone: None       Gets together: None       Attends Jew service: None       Active member of club or organization: None       Attends meetings of clubs or organizations: None       Relationship status: None    Intimate partner violence:       Fear of current or ex partner: None       Emotionally abused: None       Physically abused: None       Forced sexual activity         Fall      Results    Imaging  Xr Humerus Left (min 2 Views)    Result Date: 4/24/2019  EXAMINATION: XR HUMERUS LEFT (MIN 2 VIEWS) CLINICAL HISTORY:  trauma COMPARISONS: None available. FINDINGS: Frontal and lateral views of the left humerus were obtained. The uterus is intact. Visible portions of the shoulder and elbow are unremarkable. Soft tissues are unremarkable. CONCLUSION: NO HUMERUS FRACTURE.     Ct Head Wo Contrast    Result Date: 4/24/2019  EXAMINATION:  CT HEAD WO CONTRAST CLINICAL HISTORY:   trauma COMPARISONS:  October 16, 2018 TECHNIQUE:  Spiral axial images of brain were obtained without contrast enhancement. Multiplanar two-dimensional reformatting was performed at the CT console. FINDINGS:  There is no edema. There is no hemorrhage/hematoma. There is no underlying pathology. There is no sign of acute traumatic brain injury. The skull is intact and unremarkable. There is no pneumocephaly. Orbits, nasal sinuses and temporal bones are unremarkable. NO ACUTE TRAUMATIC BRAIN INJURY. All CT scans at this facility use dose modulation, iterative reconstruction, and/or weight based dosing when appropriate to reduce radiation dose to as low as reasonably achievable. Ct Facial Bones Wo Contrast    Result Date: 4/24/2019  EXAMINATION:  CT FACIAL BONES WO CONTRAST CLINICAL HISTORY:   Chief complaint Pt brought by [employer] for c/o fall at home. Pt was found by a friend laying on her bedroom floor. Pt states the last thing she remembered was being in her bathroom around noon today. ?+ loc. ? Upon arrival pt c/o neck pain. ? C-Collar placed on patient spine precautions taken. ?Pt states her neck feels better after the c-collar was placed on her. ?Patient's left arm is bruised and has a large laceration on her left arm with bleeding controlled at time of triage. ? Bruising noted to face bilateral hands and bleeding to mouth and lip. ? Bruising noted under chin. ?Pt tearful during triage COMPARISONS:  NONE AVAILABLE TECHNIQUE:  Spiral axial images of the maxillofacial region were obtained without contrast enhancement. Multiplanar two-dimensional reformatting was performed. FINDINGS:  There is a displaced fracture of the nose. The nose deviates toward the patient's left. There is soft tissue swelling. There is no orbital emphysema. There is no fluid within paranasal sinuses. There is no soft tissue emphysema. There is facial swelling. There is no pneumocephalus within the field-of-view. Included portions of the skull are intact.  There is no acute abnormality of the portions of the brain included within the field-of-view. The mandible is intact. NASAL FRACTURE. NO ADDITIONAL FRACTURES. INTACT ORBITS. All CT scans at this facility use dose modulation, iterative reconstruction, and/or weight based dosing when appropriate to reduce radiation dose to as low as reasonably achievable. Ct Cervical Spine Wo Contrast    Result Date: 4/24/2019  Study: CT cervical spine without contrast Reason for exam: Trauma. Loss of consciousness. Neck pain. Findings: Unenhanced scans were obtained. Spiral acquisition with multiplanar reconstructions are displayed. There is scoliosis convex right in the lower cervical spine. This could be positional or related to muscular spasm. The vertebral bodies are normal in height. Degenerative changes are seen in the lower cervical spine with disc space narrowing and hypertrophic spurring of the endplates at B2-B1, I0-G6, and C6-C7. Posterior elements and facet joints maintained. No acute fracture or dislocation. Atherosclerotic calcification of the carotid vasculature bilaterally noted. Prevertebral soft tissues unremarkable. NO ACUTE FRACTURE OR DISLOCATION. MODERATE DEGENERATIVE CHANGES IN THE LOWER CERVICAL SPINE. All CT scans at this facility use dose modulation, iterative reconstruction, and/or weight based dosing when appropriate to reduce radiation dose to as low as reasonably achievable. Xr Shoulder Left (min 2 Views)    Result Date: 4/24/2019  EXAMINATION: XR SHOULDER LEFT (MIN 2 VIEWS) CLINICAL HISTORY:  trauma . Injury. Pain. COMPARISONS: None available. FINDINGS: 2 views left shoulder were obtained. Bone density is low. There is no sign of a fracture. There is no dislocation. Nearby ribs are intact. There is no pneumothorax. Soft tissues are unremarkable. CONCLUSION: NO FRACTURE OR DISLOCATION. Xr Chest Portable    Result Date: 4/24/2019  Portable chest radiograph History: Trauma. Patient found on floor. Technique: AP portable view of the chest obtained. Comparison: None available Findings: Heart size is mildly enlarged. Atherosclerotic calcification of the thoracic aorta. No pneumothorax, pleural effusion, or focal consolidation. Osseous structures of the thorax appear intact. No acute intrathoracic process. Cardiomegaly. Labs    CBC:   Recent Labs     04/24/19  1545 04/25/19  0611 04/26/19  0627   WBC 5.2 6.4 4.3*   HGB 13.7 11.4* 10.8*    171 137     BMP:    Recent Labs     04/24/19  1545 04/25/19  0611 04/26/19  0627    143 141   K 3.5 3.9 3.8    110* 107   CO2 17* 17* 23   BUN 11 10 9   CREATININE 0.66 0.37* 0.57   GLUCOSE 143* 128* 123*     TSH: No results for input(s): TSH in the last 72 hours. Folic Acid: No results for input(s): FOLATE in the last 72 hours. B12:  No results found for: MECMABRE60  Vit. D: No components found for: VITAMIND  Lipids: No results for input(s): CHOL, TRIG, HDL, LDLCALC in the last 72 hours. Ammonia: No results for input(s): AMMONIA in the last 72 hours. LFT:   Recent Labs     04/24/19  1545   AST 18   ALT 15   BILITOT <0.2   ALKPHOS 131*        Urine: No results for input(s): COLORU, PHUR, LABCAST, WBCUA, RBCUA, MUCUS, YEAST, BACTERIA, CLARITYU, SPECGRAV, LEUKOCYTESUR, UROBILINOGEN, BILIRUBINUR, BLOODU in the last 72 hours. Invalid input(s): NITRATE, GLUCOSEUKETONESUAMORPHOUS       BP (!) 109/54   Pulse 65   Temp 98.8 °F (37.1 °C) (Oral)   Resp 16   Ht 5' 8\" (1.727 m)   Wt 193 lb 3.2 oz (87.6 kg)   SpO2 94%   BMI 29.38 kg/m²    Systemic Exam    All the peripheral pulsations are normal    No bruit are heard over the eyeballs, head, neck, abdominal,aorta,iliacs or the femorals. Heart is regular with normal heart sounds and no murmurs. There is no hepatosplenomegaly. Neurological Examination    On Neurological examination, Valeriano Niecy is well oriented to day,date, month and the year.     Speech, comprehension and expression is intact     Higher cortical functions are normal for

## 2019-04-27 NOTE — FLOWSHEET NOTE
2110- Dr. Mirian Lei at bedside to assess pt   0001- Pt resting in bed with eyes closed. No signs of distress or discomfort noted. 0200- Pt continues to be sleeping in bed with no signs of distress or discomfort noted. 0400- Pt denies complaints of pain or discomfort.    0500- Continues resting with eyes closed and no signs or distress or discomfort

## 2019-04-27 NOTE — PROGRESS NOTES
Pt has a friend Enrigue Schlatter that called and spoke to me. She was inquiring about discharge, and information about the pt's interaction with social work. Pt had been previously texting and calling Enrigue Schlatter, and was very upset that she would go behind her back and call the nurse. Enrigue Schlatter and pt's daughter is concerned about the patient's drinking and returning home. Per patient, she does not want Enrigue Schlatter getting information. The patient said someone had given her the HIPPA code but it was not her.

## 2019-04-27 NOTE — DISCHARGE INSTR - COC
Continuity of Care Form    Patient Name: Napoleon Varma   :  1950  MRN:  [id number]    [facility] date:  2019  Discharge date:  ***    Code Status Order: Prior   Advance Directives:   [facility] Documentation     Date/Time Healthcare Directive Type of Healthcare Directive Copy in [address] Agent's Name Healthcare Agent's Phone Number    19  No, patient does not have an advance directive for healthcare treatment -- -- -- -- --          Admitting Physician:  No admitting provider for patient encounter.   PCP: Marta Acevedo DO    Discharging Nurse: [facility] Unit/Room#: B099/R046-71  Discharging Unit Phone Number: ***    Emergency Contact:   Extended Emergency Contact Information  Primary Emergency Contact: Laila Patel   [address] Phone: [phone]  Relation: Other    Past Surgical History:  Past Surgical History:   Procedure Laterality Date    APPENDECTOMY      CHOLECYSTECTOMY      LEG AMPUTATION BELOW KNEE Bilateral     NASAL FRACTURE SURGERY N/A 2019    CLOSED REDUCTON EXTERNAL FIXATION OF NASAL BONE FRACTURE WITH SEVERE DNS (DEVIATED NASAL SEPTUM)  ROOM 190 performed by Kristel Rainey MD at [facility]       Immunization History:   Immunization History   Administered Date(s) Administered    Tdap (Boostrix, Adacel) 2019       Active Problems:  Patient Active Problem List   Diagnosis Code    Hypertensive urgency I16.0    Hyperglycemia R73.9    Chest tightness or pressure R07.89    Visual disturbance, subjective H53.10    DM (diabetes mellitus) ([facility]) E11.9    Bipolar affective disorder ([facility]) F31.9    Acute sore throat J02.9    Syncope and collapse R55    Head injury S09.90XA       Isolation/Infection:   Isolation          No Isolation            Nurse Assessment:  Last Vital Signs: /65   Pulse 67   Temp 98.6 °F (37 °C) (Oral)   Resp 16   Ht 5' 8\" (1.727 m)   Wt 195 lb 9.6 oz (88.7 kg)   SpO2 99%   BMI 29.74 kg/m²     Last documented pain score (0-10 scale): Pain Level: 6  Last Weight:   Wt Readings from Last 1 Encounters:   19 195 lb 9.6 oz (88.7 kg)     Mental Status:  {IP PT MENTAL STATUS:23091}    IV Access:  { GOLDIE IV ACCESS:455454646}    Nursing Mobility/ADLs:  Walking   {CHP DME JSDL:139185926}  Transfer  {P DME LPVC:351606801}  Bathing  {CHP DME QCBY:212116930}  Dressing  {CHP DME ECZA:641705878}  Toileting  {CHP DME ROBERT:224244364}  Feeding  {CHP DME QLSS:200489274}  Med Admin  {CHP DME GYQJ:333885393}  Med Delivery   { GOLDIE MED Delivery:815669697}    Wound Care Documentation and Therapy:        Elimination:  Continence:   · Bowel: {YES / CX:03829}  · Bladder: {YES / FI:67184}  Urinary Catheter: {Urinary Catheter:493467894}   Colostomy/Ileostomy/Ileal Conduit: {YES / NW:05853}       Date of Last BM: ***    Intake/Output Summary (Last 24 hours) at 2019 1542  Last data filed at 2019 7859  Gross per 24 hour   Intake 600 ml   Output --   Net 600 ml     I/O last 3 completed shifts:   In: 600 [P.O.:600]  Out: -     Safety Concerns:     [address] [employer] Safety Concerns:981107110}    Impairments/Disabilities:      [address] [employer] Impairments/Disabilities:278363334}    Nutrition Therapy:  Current Nutrition Therapy:   [address] [employer] Diet List:498839795}    Routes of Feeding: {P DME Other Feedings:873201962}  Liquids: {Slp liquid thickness:96239}  Daily Fluid Restriction: {CHP DME Yes amt example:874792173}  Last Modified Barium Swallow with Video (Video Swallowing Test): {Done Not Done CXEF:628760603}    Treatments at the Time of Hospital Discharge:   Respiratory Treatments: ***  Oxygen Therapy:  {Therapy; copd oxygen:94179}  Ventilator:    { CC Vent IZFQ:139474400}    Rehab Therapies: {THERAPEUTIC INTERVENTION:2355997473}  Weight Bearing Status/Restrictions: [address] Weight Bearin}  Other Medical Equipment (for information only, NOT a DME order):  {EQUIPMENT:545809839}  Other Treatments: ***    Patient's personal belongings (please select all that are sent with patient):  {CHP DME Belongings:119876265}    RN SIGNATURE:  {Esignature:035663160}    CASE MANAGEMENT/SOCIAL WORK SECTION    Inpatient Status Date: ***    Readmission Risk Assessment Score:  Readmission Risk              Risk of Unplanned Readmission:        20           Discharging to Facility/ Agency   · Name:   · Address:  · Phone:  · Fax:    Dialysis Facility (if applicable)   · Name:  · Address:  · Dialysis Schedule:  · Phone:  · Fax:    / signature: {Esignature:465456509}    PHYSICIAN SECTION    Prognosis: {Prognosis:8321126209}    Condition at Discharge: [facility] Patient Condition:622416498}    Rehab Potential (if transferring to Rehab): {Prognosis:6710692392}    Recommended Labs or Other Treatments After Discharge: ***    Physician Certification: I certify the above information and transfer of Valeriano Pemberton  is necessary for the continuing treatment of the diagnosis listed and that she requires {Admit to Appropriate Level of Care:91187} for {GREATER/LESS:058721885} 30 days.      Update Admission H&P: [facility]}    PHYSICIAN SIGNATURE:  {Esignature:213844348}

## 2019-05-07 ENCOUNTER — HOSPITAL ENCOUNTER (EMERGENCY)
Age: 69
Discharge: HOME | End: 2019-05-07
Payer: MEDICARE

## 2019-05-07 VITALS
HEIGHT: 68 IN | RESPIRATION RATE: 16 BRPM | TEMPERATURE: 98.3 F | SYSTOLIC BLOOD PRESSURE: 182 MMHG | DIASTOLIC BLOOD PRESSURE: 69 MMHG | BODY MASS INDEX: 29.55 KG/M2 | WEIGHT: 195 LBS | OXYGEN SATURATION: 99 % | HEART RATE: 58 BPM

## 2019-05-07 DIAGNOSIS — M79.10 MUSCULAR PAIN: Primary | ICD-10-CM

## 2019-05-07 PROCEDURE — 99283 EMERGENCY DEPT VISIT LOW MDM: CPT

## 2019-05-07 PROCEDURE — 6370000000 HC RX 637 (ALT 250 FOR IP): Performed by: PHYSICIAN ASSISTANT

## 2019-05-07 RX ORDER — OXYCODONE HYDROCHLORIDE AND ACETAMINOPHEN 5; 325 MG/1; MG/1
1 TABLET ORAL EVERY 6 HOURS PRN
Qty: 12 TABLET | Refills: 0 | Status: SHIPPED | OUTPATIENT
Start: 2019-05-07 | End: 2019-05-10

## 2019-05-07 RX ORDER — OXYCODONE HYDROCHLORIDE AND ACETAMINOPHEN 5; 325 MG/1; MG/1
1 TABLET ORAL ONCE
Status: COMPLETED | OUTPATIENT
Start: 2019-05-07 | End: 2019-05-07

## 2019-05-07 RX ADMIN — OXYCODONE HYDROCHLORIDE AND ACETAMINOPHEN 1 TABLET: 5; 325 TABLET ORAL at 14:38

## 2019-05-07 ASSESSMENT — ENCOUNTER SYMPTOMS
DIARRHEA: 0
COLOR CHANGE: 0
SORE THROAT: 0
BACK PAIN: 0
CONSTIPATION: 0
NAUSEA: 0
VOMITING: 0
ABDOMINAL PAIN: 0
ABDOMINAL DISTENTION: 0
EYE DISCHARGE: 0
RHINORRHEA: 0
SHORTNESS OF BREATH: 0

## 2019-05-07 ASSESSMENT — PAIN SCALES - GENERAL
PAINLEVEL_OUTOF10: 8
PAINLEVEL_OUTOF10: 7

## 2019-05-07 ASSESSMENT — PAIN DESCRIPTION - LOCATION: LOCATION: BACK;SHOULDER;CHEST

## 2019-05-07 ASSESSMENT — PAIN DESCRIPTION - PAIN TYPE: TYPE: ACUTE PAIN

## 2019-05-07 NOTE — ED TRIAGE NOTES
Pt arrived to ER with c/o muscle pain in her back, chest, shoulders, and ribs. Pt states the pain is severe with movement. Pt was sent home on percocet which has helped but since she ran out Saturday the pain is too bad. Pt A&OX4, skin p/w/d. resp even and unlabored.

## 2019-05-07 NOTE — ED NOTES
Pt has right BKA. Pt assisted into prosthetic by family.  Pt assisted to [employer] by Norm [employer] and assisted to vehicle     Ant Amaro, ALEXEY  05/07/19 2871

## 2019-05-07 NOTE — ED PROVIDER NOTES
[address] [facility] ED  eMERGENCY dEPARTMENT eNCOUnter      Pt Name: Mariano Jeffries  MRN: [id number]  [place] 1950  Date of evaluation: 5/7/2019  Provider: Martha Pierce PA-C    CHIEF COMPLAINT       Chief Complaint   Patient presents with    Muscle Pain     in chest, shoulders, back with movement and dreathing. Pt out of percocet. HISTORY OF PRESENT ILLNESS   (Location/Symptom, Timing/Onset,Context/Setting, Quality, Duration, Modifying Factors, Severity)  Note limiting factors. Mariano Jeffries is a 70 y.o. female who presents to the emergency department with complaint of chest wall pain shoulder pain and back pain which patient states been ongoing since her fall which occurred 4/24/2019. She states she was discharged from the hospital on the 27th of last month. He states she was given a prescription for Percocet and she was using this which is controlling her pain well she states she ran out of her Percocet day before yesterday and since that time she's had increasing pain. She denies any new or acute injury. She denies any cough or shortness of breath. She states she has appointment for follow-up with her regular family physician on 5/9/2019. She states she had contacted her family doctor and they advised her to come to the emergency department to be seen if she is having continued pain. Patient states she did have a follow-up with Dr. Brianda Hui, one week ago but states that she was having pain and did not keep that appointment. She states that appointment was scheduled for today, she states she did not keep her appointment today and was hoping that Dr. Brianda Hui could see her while she was in the emergency department today. Patient states that her current pain at this time is a 8 out of 10. HPI    NursingNotes were reviewed.     REVIEW OF SYSTEMS    (2-9 systems for level 4, 10 or more for level 5)     Review of Systems   Constitutional: Negative for activity change and appetite buPROPion (WELLBUTRIN) 75 MG tablet Take 100 mg by mouth 2 times dailyHistorical Med      fluticasone (FLONASE) 50 MCG/ACT nasal spray 50 sprays by Each Nare route as neededHistorical Med      ranitidine (ZANTAC) 150 MG tablet Take 150 mg by mouth as neededHistorical Med      sertraline (ZOLOFT) 100 MG tablet Take 100 mg by mouth 2 times dailyHistorical Med      topiramate (TOPAMAX) 25 MG tablet Take 1 tablet by mouth 2 times daily, Disp-60 tablet, R-1Print      lisinopril (PRINIVIL;ZESTRIL) 10 MG tablet Take 1 tablet by mouth daily, Disp-30 tablet, R-3Normal      clonazePAM (KLONOPIN) 2 MG tablet Take 2 mg by mouth 2 times daily. Historical Med      lamoTRIgine (LAMICTAL) 100 MG tablet Take 100 mg by mouth 2 times dailyHistorical Med      QUEtiapine (SEROQUEL XR) 150 MG TB24 extended release tablet Take 150 mg by mouth nightlyHistorical Med      bacitracin zinc 500 UNIT/GM ointment Apply topically 2 times daily. , Disp-30 g, R-1, Normal      alendronate (FOSAMAX) 70 MG tablet Take 70 mg by mouth once a weekHistorical Med             ALLERGIES     Iv dye [iodides]; Vancomycin; and Morphine    FAMILY HISTORY       Family History   Problem Relation Age of Onset    Cancer Mother         breast    Heart Attack Father           SOCIAL HISTORY       Social History     Socioeconomic History    Marital status: Single     Spouse name: None    Number of children: None    Years of education: None    Highest education level: None   Occupational History    None   Social Needs    Financial resource strain: None    Food insecurity:     Worry: None     Inability: None    Transportation needs:     Medical: None     Non-medical: None   Tobacco Use    Smoking status: Never Smoker    Smokeless tobacco: Never Used   Substance and Sexual Activity    Alcohol use: No    Drug use: No    Sexual activity: None   Lifestyle    Physical activity:     Days per week: None     Minutes per session: None    Stress: None Relationships    Social connections:     Talks on phone: None     Gets together: None     Attends [employer]an service: None     Active member of club or organization: None     Attends meetings of clubs or organizations: None     Relationship status: None    Intimate partner violence:     Fear of current or ex partner: None     Emotionally abused: None     Physically abused: None     Forced sexual activity: None   Other Topics Concern    None   Social History Narrative    None       SCREENINGS      @FLOW([id number])@      PHYSICAL EXAM    (up to 7 for level 4, 8 or more for level 5)     ED Triage Vitals [05/07/19 1357]   BP Temp Temp Source Pulse Resp SpO2 Height Weight   (!) 182/69 98.3 °F (36.8 °C) Oral 58 16 99 % 5' 8\" (1.727 m) 195 lb (88.5 kg)       Physical Exam   Constitutional: She is oriented to person, place, and time. She appears well-developed and well-nourished. No distress. HENT:   Head: Normocephalic. Eyes: Pupils are equal, round, and reactive to light. EOM are normal.   Neck: Normal range of motion. Neck supple. No JVD present. No tracheal deviation present. Cardiovascular: Normal rate. Pulmonary/Chest: Effort normal and breath sounds normal. No respiratory distress. She has no wheezes. She has no rales. She exhibits no tenderness. Patient combines of pain to anterior posterior chest wall with movement and deep inspiration or by palpation crepitus no deformity       Abdominal: Soft. Bowel sounds are normal. She exhibits no distension and no mass. There is no tenderness. There is no guarding. Musculoskeletal: Normal range of motion. She exhibits no edema or deformity. Neurological: She is alert and oriented to person, place, and time. Coordination normal.   Skin: Skin is warm. Psychiatric: She has a normal mood and affect.        DIAGNOSTIC RESULTS     EKG: All EKG's are interpreted by the Emergency Department Physician who either signs or Co-signsthis chart in the absence of She also states she had contacted Dr. Sekou Crawford office and is scheduled a follow-up appointment with them for 4 PM today. I did discharge the patient advising them of the risks of not completing their evaluation here in emergency department today including that of disability potential death. I did give her a prescription for 2 days worth of Percocet for pain control until such time she does have a scheduled follow-up with her family doctor Dr. Adi Sandoval. She was advised if she has any worsening conditions or return to the ER. CRITICAL CARE TIME   Total Critical Care time was 0 minutes, excluding separately reportableprocedures. There was a high probability of clinicallysignificant/life threatening deterioration in the patient's condition which required my urgent intervention. CONSULTS:  None    PROCEDURES:  Unless otherwise noted below, none     Procedures    FINAL IMPRESSION      1. Muscular pain          DISPOSITION/PLAN   DISPOSITION Decision To Discharge 05/07/2019 02:55:57 PM      PATIENT REFERRED TO:  No follow-up provider specified. DISCHARGE MEDICATIONS:  Discharge Medication List as of 5/7/2019  2:57 PM      START taking these medications    Details   oxyCODONE-acetaminophen (PERCOCET) 5-325 MG per tablet Take 1 tablet by mouth every 6 hours as needed for Pain for up to 3 days. Intended supply: 3 days.  Take lowest dose possible to manage pain, Disp-12 tablet, R-0Print                (Please note that portions of this note were completed with a voice recognition program.  Efforts were made to edit the dictations but occasionally words are mis-transcribed.)    Segun Shah PA-C (electronically signed)  Attending Emergency Physician         Segun Shah PA-C  05/07/19 [facility]  05/07/19 8656  Attending Supervising Physicians Attestation Statement  I was present with the physician assistant during the history and exam. I discussed the findings and plans with the physician assistant and agree as documented in his note     Electronically signed by Jeb Snow DO on 5/7/19 at 4:04 PM         Jeb Snow DO  05/07/19 4443

## 2019-05-07 NOTE — ED NOTES
Bed: 11  Expected date:   Expected time:   Means of arrival:   Comments:     Tomás Mcguire RN  05/07/19 8604

## 2021-09-08 ENCOUNTER — APPOINTMENT (OUTPATIENT)
Dept: MRI IMAGING | Age: 71
End: 2021-09-08
Payer: MEDICARE

## 2021-09-08 ENCOUNTER — HOSPITAL ENCOUNTER (EMERGENCY)
Age: 71
Discharge: ANOTHER HOSPITAL | End: 2021-09-08
Attending: EMERGENCY MEDICINE
Payer: MEDICARE

## 2021-09-08 VITALS
RESPIRATION RATE: 20 BRPM | BODY MASS INDEX: 32.28 KG/M2 | WEIGHT: 213 LBS | SYSTOLIC BLOOD PRESSURE: 96 MMHG | HEIGHT: 68 IN | DIASTOLIC BLOOD PRESSURE: 47 MMHG | TEMPERATURE: 98 F | HEART RATE: 58 BPM | OXYGEN SATURATION: 94 %

## 2021-09-08 DIAGNOSIS — N30.00 ACUTE CYSTITIS WITHOUT HEMATURIA: ICD-10-CM

## 2021-09-08 DIAGNOSIS — M86.9 OSTEOMYELITIS OF OTHER SITE, UNSPECIFIED TYPE (HCC): ICD-10-CM

## 2021-09-08 DIAGNOSIS — G06.2 EPIDURAL ABSCESS: Primary | ICD-10-CM

## 2021-09-08 LAB
ALBUMIN SERPL-MCNC: 3.7 G/DL (ref 3.5–4.6)
ALP BLD-CCNC: 144 U/L (ref 40–130)
ALT SERPL-CCNC: 6 U/L (ref 0–33)
ANION GAP SERPL CALCULATED.3IONS-SCNC: 11 MEQ/L (ref 9–15)
AST SERPL-CCNC: 8 U/L (ref 0–35)
BACTERIA: ABNORMAL /HPF
BASOPHILS ABSOLUTE: 0 K/UL (ref 0–0.2)
BASOPHILS RELATIVE PERCENT: 0.5 %
BILIRUB SERPL-MCNC: <0.2 MG/DL (ref 0.2–0.7)
BILIRUBIN URINE: NEGATIVE
BLOOD, URINE: NEGATIVE
BUN BLDV-MCNC: 15 MG/DL (ref 8–23)
C-REACTIVE PROTEIN: 13.3 MG/L (ref 0–5)
CALCIUM SERPL-MCNC: 8.2 MG/DL (ref 8.5–9.9)
CHLORIDE BLD-SCNC: 107 MEQ/L (ref 95–107)
CLARITY: CLEAR
CO2: 21 MEQ/L (ref 20–31)
COLOR: YELLOW
CREAT SERPL-MCNC: 0.75 MG/DL (ref 0.5–0.9)
EOSINOPHILS ABSOLUTE: 0.1 K/UL (ref 0–0.7)
EOSINOPHILS RELATIVE PERCENT: 0.8 %
EPITHELIAL CELLS, UA: ABNORMAL /HPF (ref 0–5)
GFR AFRICAN AMERICAN: >60
GFR NON-AFRICAN AMERICAN: >60
GLOBULIN: 3 G/DL (ref 2.3–3.5)
GLUCOSE BLD-MCNC: 119 MG/DL (ref 70–99)
GLUCOSE URINE: NEGATIVE MG/DL
HCT VFR BLD CALC: 33.6 % (ref 37–47)
HEMOGLOBIN: 10.9 G/DL (ref 12–16)
HYALINE CASTS: ABNORMAL /HPF (ref 0–5)
KETONES, URINE: NEGATIVE MG/DL
LACTIC ACID: 0.9 MMOL/L (ref 0.5–2.2)
LEUKOCYTE ESTERASE, URINE: ABNORMAL
LYMPHOCYTES ABSOLUTE: 1.5 K/UL (ref 1–4.8)
LYMPHOCYTES RELATIVE PERCENT: 24.8 %
MCH RBC QN AUTO: 29.5 PG (ref 27–31.3)
MCHC RBC AUTO-ENTMCNC: 32.5 % (ref 33–37)
MCV RBC AUTO: 90.7 FL (ref 82–100)
MONOCYTES ABSOLUTE: 0.5 K/UL (ref 0.2–0.8)
MONOCYTES RELATIVE PERCENT: 8.1 %
NEUTROPHILS ABSOLUTE: 4 K/UL (ref 1.4–6.5)
NEUTROPHILS RELATIVE PERCENT: 65.8 %
NITRITE, URINE: POSITIVE
PDW BLD-RTO: 14.7 % (ref 11.5–14.5)
PH UA: 5.5 (ref 5–9)
PLATELET # BLD: 158 K/UL (ref 130–400)
POTASSIUM SERPL-SCNC: 3.6 MEQ/L (ref 3.4–4.9)
PROTEIN UA: ABNORMAL MG/DL
RBC # BLD: 3.71 M/UL (ref 4.2–5.4)
RBC UA: ABNORMAL /HPF (ref 0–5)
SARS-COV-2, NAAT: NOT DETECTED
SEDIMENTATION RATE, ERYTHROCYTE: 53 MM (ref 0–30)
SODIUM BLD-SCNC: 139 MEQ/L (ref 135–144)
SPECIFIC GRAVITY UA: 1.02 (ref 1–1.03)
TOTAL PROTEIN: 6.7 G/DL (ref 6.3–8)
URINE REFLEX TO CULTURE: YES
UROBILINOGEN, URINE: 0.2 E.U./DL
WBC # BLD: 6.1 K/UL (ref 4.8–10.8)
WBC UA: ABNORMAL /HPF (ref 0–5)

## 2021-09-08 PROCEDURE — 6360000004 HC RX CONTRAST MEDICATION: Performed by: PHYSICIAN ASSISTANT

## 2021-09-08 PROCEDURE — 85652 RBC SED RATE AUTOMATED: CPT

## 2021-09-08 PROCEDURE — 87086 URINE CULTURE/COLONY COUNT: CPT

## 2021-09-08 PROCEDURE — 87077 CULTURE AEROBIC IDENTIFY: CPT

## 2021-09-08 PROCEDURE — 2500000003 HC RX 250 WO HCPCS: Performed by: PHYSICIAN ASSISTANT

## 2021-09-08 PROCEDURE — 2580000003 HC RX 258: Performed by: STUDENT IN AN ORGANIZED HEALTH CARE EDUCATION/TRAINING PROGRAM

## 2021-09-08 PROCEDURE — 72158 MRI LUMBAR SPINE W/O & W/DYE: CPT

## 2021-09-08 PROCEDURE — 83605 ASSAY OF LACTIC ACID: CPT

## 2021-09-08 PROCEDURE — 6360000002 HC RX W HCPCS: Performed by: STUDENT IN AN ORGANIZED HEALTH CARE EDUCATION/TRAINING PROGRAM

## 2021-09-08 PROCEDURE — 85025 COMPLETE CBC W/AUTO DIFF WBC: CPT

## 2021-09-08 PROCEDURE — A9577 INJ MULTIHANCE: HCPCS | Performed by: PHYSICIAN ASSISTANT

## 2021-09-08 PROCEDURE — 80053 COMPREHEN METABOLIC PANEL: CPT

## 2021-09-08 PROCEDURE — 96375 TX/PRO/DX INJ NEW DRUG ADDON: CPT

## 2021-09-08 PROCEDURE — 86140 C-REACTIVE PROTEIN: CPT

## 2021-09-08 PROCEDURE — 96365 THER/PROPH/DIAG IV INF INIT: CPT

## 2021-09-08 PROCEDURE — 87635 SARS-COV-2 COVID-19 AMP PRB: CPT

## 2021-09-08 PROCEDURE — 6360000002 HC RX W HCPCS: Performed by: PHYSICIAN ASSISTANT

## 2021-09-08 PROCEDURE — 87186 SC STD MICRODIL/AGAR DIL: CPT

## 2021-09-08 PROCEDURE — 93005 ELECTROCARDIOGRAM TRACING: CPT | Performed by: STUDENT IN AN ORGANIZED HEALTH CARE EDUCATION/TRAINING PROGRAM

## 2021-09-08 PROCEDURE — 87040 BLOOD CULTURE FOR BACTERIA: CPT

## 2021-09-08 PROCEDURE — 36415 COLL VENOUS BLD VENIPUNCTURE: CPT

## 2021-09-08 PROCEDURE — 99285 EMERGENCY DEPT VISIT HI MDM: CPT

## 2021-09-08 PROCEDURE — 72157 MRI CHEST SPINE W/O & W/DYE: CPT

## 2021-09-08 PROCEDURE — 81001 URINALYSIS AUTO W/SCOPE: CPT

## 2021-09-08 PROCEDURE — 96367 TX/PROPH/DG ADDL SEQ IV INF: CPT

## 2021-09-08 RX ORDER — ONDANSETRON 2 MG/ML
4 INJECTION INTRAMUSCULAR; INTRAVENOUS ONCE
Status: COMPLETED | OUTPATIENT
Start: 2021-09-08 | End: 2021-09-08

## 2021-09-08 RX ORDER — CIPROFLOXACIN 2 MG/ML
400 INJECTION, SOLUTION INTRAVENOUS ONCE
Status: COMPLETED | OUTPATIENT
Start: 2021-09-08 | End: 2021-09-08

## 2021-09-08 RX ORDER — SODIUM CHLORIDE 0.9 % (FLUSH) 0.9 %
10 SYRINGE (ML) INJECTION PRN
Status: DISCONTINUED | OUTPATIENT
Start: 2021-09-08 | End: 2021-09-09 | Stop reason: HOSPADM

## 2021-09-08 RX ORDER — DIPHENHYDRAMINE HYDROCHLORIDE 50 MG/ML
25 INJECTION INTRAMUSCULAR; INTRAVENOUS ONCE
Status: COMPLETED | OUTPATIENT
Start: 2021-09-08 | End: 2021-09-08

## 2021-09-08 RX ORDER — METHYLPREDNISOLONE SODIUM SUCCINATE 125 MG/2ML
125 INJECTION, POWDER, LYOPHILIZED, FOR SOLUTION INTRAMUSCULAR; INTRAVENOUS ONCE
Status: COMPLETED | OUTPATIENT
Start: 2021-09-08 | End: 2021-09-08

## 2021-09-08 RX ADMIN — HYDROMORPHONE HYDROCHLORIDE 1 MG: 1 INJECTION, SOLUTION INTRAMUSCULAR; INTRAVENOUS; SUBCUTANEOUS at 19:03

## 2021-09-08 RX ADMIN — CIPROFLOXACIN 400 MG: 2 INJECTION, SOLUTION INTRAVENOUS at 21:58

## 2021-09-08 RX ADMIN — CEFTRIAXONE SODIUM 1000 MG: 1 INJECTION, POWDER, FOR SOLUTION INTRAMUSCULAR; INTRAVENOUS at 20:50

## 2021-09-08 RX ADMIN — METHYLPREDNISOLONE SODIUM SUCCINATE 125 MG: 125 INJECTION, POWDER, FOR SOLUTION INTRAMUSCULAR; INTRAVENOUS at 15:54

## 2021-09-08 RX ADMIN — DIPHENHYDRAMINE HYDROCHLORIDE 25 MG: 50 INJECTION, SOLUTION INTRAMUSCULAR; INTRAVENOUS at 15:52

## 2021-09-08 RX ADMIN — PIPERACILLIN AND TAZOBACTAM 3375 MG: 3; .375 INJECTION, POWDER, LYOPHILIZED, FOR SOLUTION INTRAVENOUS at 21:20

## 2021-09-08 RX ADMIN — FAMOTIDINE 20 MG: 10 INJECTION, SOLUTION INTRAVENOUS at 15:50

## 2021-09-08 RX ADMIN — GADOBENATE DIMEGLUMINE 20 ML: 529 INJECTION, SOLUTION INTRAVENOUS at 17:29

## 2021-09-08 RX ADMIN — ONDANSETRON 4 MG: 2 INJECTION INTRAMUSCULAR; INTRAVENOUS at 19:01

## 2021-09-08 ASSESSMENT — ENCOUNTER SYMPTOMS
VOMITING: 0
ABDOMINAL DISTENTION: 0
ABDOMINAL PAIN: 0
NAUSEA: 0
SORE THROAT: 0
COLOR CHANGE: 0
DIARRHEA: 0
EYE DISCHARGE: 0
RHINORRHEA: 0
CONSTIPATION: 0
SHORTNESS OF BREATH: 0
BACK PAIN: 1

## 2021-09-08 ASSESSMENT — PAIN DESCRIPTION - LOCATION: LOCATION: BACK;LEG

## 2021-09-08 ASSESSMENT — PAIN DESCRIPTION - ORIENTATION: ORIENTATION: LOWER;LEFT;RIGHT

## 2021-09-08 ASSESSMENT — PAIN SCALES - GENERAL
PAINLEVEL_OUTOF10: 7
PAINLEVEL_OUTOF10: 6

## 2021-09-08 ASSESSMENT — PAIN DESCRIPTION - PAIN TYPE: TYPE: ACUTE PAIN

## 2021-09-08 ASSESSMENT — PAIN DESCRIPTION - DESCRIPTORS: DESCRIPTORS: STABBING

## 2021-09-08 NOTE — ED TRIAGE NOTES
Pt states numbness last night in BLE, pt states losing control of bowel and bladder, has resolved, last episode at 0200am.   Pt is A&Ox4, skin intact, afebrile, breaths are equal and unlabored.

## 2021-09-08 NOTE — ED NOTES
Pt voided in [address]. Specimen obtained and sent at this time.      Eva Koch RN  09/08/21 1327

## 2021-09-08 NOTE — ED PROVIDER NOTES
[address] [facility] ED  eMERGENCY dEPARTMENT eNCOUnter      Pt Name: Sheryle Caras  MRN: [id number]  [place] 1950  Date of evaluation: 9/8/2021  Provider: Jess Alberto Dr       Chief Complaint   Patient presents with    Back Pain     Numbness BLE last night, resolved          HISTORY OF PRESENT ILLNESS   (Location/Symptom, Timing/Onset,Context/Setting, Quality, Duration, Modifying Factors, Severity)  Note limiting factors. Sheryle Caras is a 66 y.o. female who presents to the emergency department with a complaint of numbness tingling, inability to use lower extremities which patient states occurred approximately 10 PM last evening, and then had return of function, that had a secondary occurrence at 2 AM in the morning, and patient states did not return until 6 AM.  She states during that period of time while she was in bed, she became incontinent of urine and bowel. Patient states that she had appointment with her pain management specialist today for injections in her cervical spine, when she advised them of this complaint, they advised her to come to the emergency department to be evaluated. Patient has use of lower extremities, she is not incontinent of urine or bowel. And she has no current numbness or tingling. She complains of some mild low back pain which she rates as a 6 out of 10, she has chronic low back issues, with multiple surgeries while she was having in [place], she states no recent surgeries. No recent injury or illness. She denies any recent fevers. Past medical history significant for anxiety, bipolar, diabetes, hypertension, PTSD, TIA. HPI    NursingNotes were reviewed. REVIEW OF SYSTEMS    (2-9 systems for level 4, 10 or more for level 5)     Review of Systems   Constitutional: Negative for activity change and appetite change. HENT: Negative for congestion, ear discharge, ear pain, nosebleeds, rhinorrhea and sore throat.     Eyes: Negative for discharge. Respiratory: Negative for shortness of breath. Cardiovascular: Negative for chest pain, palpitations and leg swelling. Gastrointestinal: Negative for abdominal distention, abdominal pain, constipation, diarrhea, nausea and vomiting. Genitourinary: Negative for difficulty urinating and dysuria. Musculoskeletal: Positive for back pain. Negative for arthralgias. Skin: Negative for color change. Neurological: Positive for weakness and numbness. Negative for dizziness, syncope and headaches. Bilateral lower extremity numbness, and weakness. Psychiatric/Behavioral: Negative for agitation and confusion. Except as noted above the remainder of the review of systems was reviewed and negative. PAST MEDICAL HISTORY     Past Medical History:   Diagnosis Date    Anxiety     Bipolar affective disorder ([facility]     DM (diabetes mellitus) ([facility]     Hypertension     Migraine     PTSD (post-traumatic stress disorder)     TIA (transient ischemic attack)     x 3         SURGICALHISTORY       Past Surgical History:   Procedure Laterality Date    APPENDECTOMY      CHOLECYSTECTOMY      LEG AMPUTATION BELOW KNEE Bilateral     NASAL FRACTURE SURGERY N/A 4/25/2019    CLOSED REDUCTON EXTERNAL FIXATION OF NASAL BONE FRACTURE WITH SEVERE DNS (DEVIATED NASAL SEPTUM)  [address] performed by Efrain Pires MD at [facility]       Previous Medications    ALENDRONATE (FOSAMAX) 70 MG TABLET    Take 70 mg by mouth once a week    BUPROPION (WELLBUTRIN) 75 MG TABLET    Take 100 mg by mouth 2 times daily    CLONAZEPAM (KLONOPIN) 2 MG TABLET    Take 2 mg by mouth 2 times daily.      FLUTICASONE (FLONASE) 50 MCG/ACT NASAL SPRAY    50 sprays by Each Nare route as needed    LAMOTRIGINE (LAMICTAL) 100 MG TABLET    Take 100 mg by mouth 2 times daily    LISINOPRIL (PRINIVIL;ZESTRIL) 10 MG TABLET    Take 1 tablet by mouth daily    METOPROLOL SUCCINATE (TOPROL XL) 50 MG EXTENDED RELEASE TABLET    Take 1 tablet by mouth daily    ONDANSETRON (ZOFRAN) 4 MG TABLET    Take 1 tablet by mouth 3 times daily as needed for Nausea or Vomiting    QUETIAPINE (SEROQUEL XR) 150 MG TB24 EXTENDED RELEASE TABLET    Take 150 mg by mouth nightly    RANITIDINE (ZANTAC) 150 MG TABLET    Take 150 mg by mouth as needed    SERTRALINE (ZOLOFT) 100 MG TABLET    Take 100 mg by mouth 2 times daily    TOPIRAMATE (TOPAMAX) 25 MG TABLET    Take 1 tablet by mouth 2 times daily       ALLERGIES     Iv dye [iodides], Vancomycin, and Morphine    FAMILY HISTORY       Family History   Problem Relation Age of Onset    Cancer Mother         breast    Heart Attack Father           SOCIAL HISTORY       Social History     Socioeconomic History    Marital status: Single     Spouse name: None    Number of children: None    Years of education: None    Highest education level: None   Occupational History    None   Tobacco Use    Smoking status: Never Smoker    Smokeless tobacco: Never Used   Substance and Sexual Activity    Alcohol use: No    Drug use: No    Sexual activity: None   Other Topics Concern    None   Social History Narrative    None     Social Determinants of Health     Financial Resource Strain:     Difficulty of Paying Living Expenses:    Food Insecurity:     Worried About Running Out of Food in the Last Year:     Ran Out of Food in the Last Year:    Transportation Needs:     Lack of Transportation (Medical):      Lack of Transportation (Non-Medical):    Physical Activity:     Days of Exercise per Week:     Minutes of Exercise per Session:    Stress:     Feeling of Stress :    Social Connections:     Frequency of Communication with Friends and Family:     Frequency of Social Gatherings with Friends and Family:     Attends Adventism Services:     Active Member of Clubs or Organizations:     Attends Club or Organization Meetings:     Marital Status:    Intimate Partner Violence:     Fear of Current or Ex-Partner:     Emotionally Abused:     Physically Abused:     Sexually Abused:        SCREENINGS    Cristina Coma Scale  Eye Opening: Spontaneous  Best Verbal Response: Oriented  Best Motor Response: Obeys commands  Cristina Coma Scale Score: 15 @FLOW([id number])@      PHYSICAL EXAM    (up to 7 for level 4, 8 or more for level 5)     ED Triage Vitals [09/08/21 1356]   BP Temp Temp Source Pulse Resp SpO2 Height Weight   133/71 98 °F (36.7 °C) Temporal 55 16 97 % 5' 8\" (1.727 m) 213 lb (96.6 kg)       Physical Exam  Vitals and nursing note reviewed. Constitutional:       General: She is not in acute distress. Appearance: She is well-developed. She is not ill-appearing, toxic-appearing or diaphoretic. HENT:      Head: Normocephalic. Nose: No congestion. Mouth/Throat:      Mouth: Mucous membranes are moist.      Pharynx: No oropharyngeal exudate or posterior oropharyngeal erythema. Eyes:      Extraocular Movements: Extraocular movements intact. Conjunctiva/sclera: Conjunctivae normal.      Pupils: Pupils are equal, round, and reactive to light. Neck:      Vascular: No JVD. Trachea: No tracheal deviation. Cardiovascular:      Rate and Rhythm: Normal rate. Pulses: Normal pulses. Heart sounds: Normal heart sounds. No murmur heard. No friction rub. No gallop. Pulmonary:      Effort: Pulmonary effort is normal. No tachypnea, accessory muscle usage, respiratory distress or retractions. Breath sounds: No stridor. No wheezing, rhonchi or rales. Chest:      Chest wall: No tenderness. Abdominal:      General: Abdomen is flat. Bowel sounds are normal. There is no distension or abdominal bruit. Palpations: Abdomen is soft. There is no shifting dullness, fluid wave, hepatomegaly, splenomegaly, mass or pulsatile mass. Tenderness: There is no abdominal tenderness. There is no right CVA tenderness, left CVA tenderness, guarding or rebound.  Negative signs include Escobar's sign, Rovsing's sign and McBurney's sign. Comments: Abdomen soft nondistended nontender no guarding mass rebound, no CVA tenderness. Genitourinary:     Rectum: Normal anal tone. Musculoskeletal:         General: No deformity. Cervical back: Normal range of motion and neck supple. No rigidity. Comments: Patient is moving bilateral lower extremities well, she is a right-sided BKA. She is able to raise her stump on the right side, as well as her left lower extremity, she has good sensation on the entire length of both lower extremities, as well as good sensation to the abdomen, and thoracic region. Skin:     General: Skin is warm and dry. Capillary Refill: Capillary refill takes less than 2 seconds. Coloration: Skin is not jaundiced. Neurological:      General: No focal deficit present. Mental Status: She is alert and oriented to person, place, and time. Mental status is at baseline. Cranial Nerves: No cranial nerve deficit. Sensory: No sensory deficit. Motor: No weakness. Coordination: Coordination normal.      Comments: Positive sensation over bilateral lower extremities, abdomen, thoracic region. No appreciable weakness noted to bilateral lower extremities. Right-sided BKA. Psychiatric:         Mood and Affect: Mood normal.         DIAGNOSTIC RESULTS     EKG: All EKG's are interpreted by the Emergency Department Physician who either signs or Co-signsthis chart in the absence of a cardiologist.    EKG shows NSR with HR 61, normal axis, normal intervals, no ST changes.         RADIOLOGY:   Non-plain filmimages such as CT, Ultrasound and MRI are read by the radiologist. Plain radiographic images are visualized and preliminarily interpreted by the emergency physician with the below findings:    MRI of the L-spine shows circumferential extra-axial enhancing fluid collection from L1 through the sacral spine concerning for epidural abscess with maximum thickness of 4 mild milliliters anteriorly. Heterogeneous marrow signal from L2-S1 with abnormal enhancement at L3 concerning for discitis/osteomyelitis. MRI of the T-spine shows chronic anterior wedge deformity of T12 with 40% anterior height loss. 4 mm right articular foraminal disc bulge at T8 and 9 causing mild stenosis of the lateral recess and neural foramen at this level. There is mild posterior disc osteophyte at T11-12 causing mild central canal stenosis without significant foraminal stenosis. No abnormal spinal or epidural fluid collections or abnormal enhancement.     Interpretation per the Radiologist below, if available at the time ofthis note:    MRI [address]    (Results Pending)   MRI [address]    (Results Pending)         ED BEDSIDE ULTRASOUND:   Performed by ED Physician - none    LABS:  Labs Reviewed   CBC WITH AUTO DIFFERENTIAL - Abnormal; Notable for the following components:       Result Value    RBC 3.71 (*)     Hemoglobin 10.9 (*)     Hematocrit 33.6 (*)     MCHC 32.5 (*)     RDW 14.7 (*)     All other components within normal limits   COMPREHENSIVE METABOLIC PANEL - Abnormal; Notable for the following components:    Glucose 119 (*)     Calcium 8.2 (*)     Alkaline Phosphatase 144 (*)     All other components within normal limits   SEDIMENTATION RATE - Abnormal; Notable for the following components:    Sed Rate 53 (*)     All other components within normal limits   C-REACTIVE PROTEIN - Abnormal; Notable for the following components:    CRP 13.3 (*)     All other components within normal limits   URINE RT REFLEX TO CULTURE - Abnormal; Notable for the following components:    Protein, UA TRACE (*)     Nitrite, Urine POSITIVE (*)     Leukocyte Esterase, Urine SMALL (*)     All other components within normal limits   MICROSCOPIC URINALYSIS - Abnormal; Notable for the following components:    Bacteria, UA MANY (*)     WBC, UA 10-20 (*)     All other components within normal limits   COVID-19, RAPID   CULTURE, BLOOD 1   CULTURE, BLOOD 2   CULTURE, URINE   LACTIC ACID, PLASMA       All other labs were within normal range or not returned as of this dictation. EMERGENCY DEPARTMENT COURSE and DIFFERENTIAL DIAGNOSIS/MDM:   Vitals:    Vitals:    09/08/21 1930 09/08/21 2000 09/08/21 2030 09/08/21 2100   BP: (!) 148/122 (!) 166/77 (!) 154/64 (!) 164/72   Pulse: 65 60 57 56   Resp: 16 14 14 16   Temp:       TempSrc:       SpO2: 95% 94% 95% 98%   Weight:       Height:           MDM     Patient is a 69-year-old female who presents to the ED with intermittent episodes of bilateral lower extremity numbness, urinary and bowel incontinence, low back pain. She is afebrile and hemodynamically stable. Patient has no focal neurologic deficit on initial examination. She is moving lower extremities without difficulty sensation is intact. CBC is remarkable for anemia with a hemoglobin of 10.9. Sed rate of 53. CRP of 13.3. Blood cultures pending. Rapid Covid is negative. UA is positive for UTI. Patient medicated with Pepcid Benadryl and Solu-Medrol as she has an allergy to IV contrast. MRI of the L-spine shows circumferential extra-axial enhancing fluid collection from L1 through the sacral spine concerning for epidural abscess with maximum thickness of 4 mild milliliters anteriorly. Heterogeneous marrow signal from L2-S1 with abnormal enhancement at L3 concerning for discitis/osteomyelitis. MRI of the T-spine shows chronic anterior wedge deformity of T12 with 40% anterior height loss. 4 mm right articular foraminal disc bulge at T8 and 9 causing mild stenosis of the lateral recess and neural foramen at this level. There is mild posterior disc osteophyte at T11-12 causing mild central canal stenosis without significant foraminal stenosis. No abnormal spinal or epidural fluid collections or abnormal enhancement.  Pt with allergy to vancomycin, spoke with pharmacy who recommends zosyn. Spoke with Dr. Aroldo Killian of neurosurgery here at [facility] he recommends transfer. Patient to be referred to [facility] for further evaluation. Accepting physician is Dr. Celena Morales. Patient is stable for transfer. CRITICAL CARE TIME   Total Critical Care time was 0 minutes, excluding separately reportableprocedures. There was a high probability of clinicallysignificant/life threatening deterioration in the patient's condition which required my urgent intervention. CONSULTS:  IP CONSULT TO NEUROSURGERY    PROCEDURES:  Unless otherwise noted below, none     Procedures    FINAL IMPRESSION      1. Epidural abscess    2. Osteomyelitis of other site, unspecified type ([facility] [facility]    3. Acute cystitis without hematuria          DISPOSITION/PLAN   DISPOSITION Decision To Transfer 09/08/2021 06:34:46 PM      PATIENT REFERRED TO:  No follow-up provider specified.     DISCHARGE MEDICATIONS:  New Prescriptions    No medications on file          (Please note that portions of this note were completed with a voice recognition program.  Efforts were made to edit the dictations but occasionally words are mis-transcribed.)    Darin Baez PA-C (electronically signed)           Darin Baez PA-C  09/08/21 9396

## 2021-09-08 NOTE — ED NOTES
DINA Paul in with patient     Caitlyn Keen RN  09/08/21 7157 Head wound cleansed with wound wash and patted dry with sterile gauze. Pt taken to room 5 for comfort and good lighting.

## 2021-09-08 NOTE — ED NOTES
Returned. States her pain is still about 6/10 at this time. No further incontinence noted. Aware we are waiting on results. Call light within reach.      Eh Munguia RN  09/08/21 6131

## 2021-09-08 NOTE — ED NOTES
Med orders are for premedication for MRI IV dye.  Will hold until just prior to [address] [place], RN  09/08/21 8017

## 2021-09-09 LAB
EKG ATRIAL RATE: 61 BPM
EKG P AXIS: 29 DEGREES
EKG P-R INTERVAL: 138 MS
EKG Q-T INTERVAL: 446 MS
EKG QRS DURATION: 110 MS
EKG QTC CALCULATION (BAZETT): 448 MS
EKG R AXIS: 38 DEGREES
EKG T AXIS: 21 DEGREES
EKG VENTRICULAR RATE: 61 BPM

## 2021-09-09 PROCEDURE — 93010 ELECTROCARDIOGRAM REPORT: CPT | Performed by: INTERNAL MEDICINE

## 2021-09-11 LAB
ORGANISM: ABNORMAL
URINE CULTURE, ROUTINE: ABNORMAL

## 2021-09-14 LAB
BLOOD CULTURE, ROUTINE: NORMAL
CULTURE, BLOOD 2: NORMAL

## 2022-07-09 ENCOUNTER — APPOINTMENT (OUTPATIENT)
Dept: ULTRASOUND IMAGING | Age: 72
DRG: 683 | End: 2022-07-09
Payer: MEDICARE

## 2022-07-09 ENCOUNTER — HOSPITAL ENCOUNTER (INPATIENT)
Age: 72
LOS: 2 days | Discharge: NURSING, REHAB OR LONG-TERM CARE | DRG: 683 | End: 2022-07-11
Attending: EMERGENCY MEDICINE | Admitting: INTERNAL MEDICINE
Payer: MEDICARE

## 2022-07-09 ENCOUNTER — APPOINTMENT (OUTPATIENT)
Dept: GENERAL RADIOLOGY | Age: 72
DRG: 683 | End: 2022-07-09
Payer: MEDICARE

## 2022-07-09 ENCOUNTER — APPOINTMENT (OUTPATIENT)
Dept: CT IMAGING | Age: 72
DRG: 683 | End: 2022-07-09
Payer: MEDICARE

## 2022-07-09 DIAGNOSIS — N17.9 ACUTE RENAL INJURY (HCC): Primary | ICD-10-CM

## 2022-07-09 DIAGNOSIS — E86.0 DEHYDRATION: ICD-10-CM

## 2022-07-09 LAB
ALBUMIN SERPL-MCNC: 4 G/DL (ref 3.5–4.6)
ALBUMIN SERPL-MCNC: 4.1 G/DL (ref 3.5–4.6)
ALP BLD-CCNC: 112 U/L (ref 40–130)
ALT SERPL-CCNC: 78 U/L (ref 0–33)
ANION GAP SERPL CALCULATED.3IONS-SCNC: 14 MEQ/L (ref 9–15)
ANION GAP SERPL CALCULATED.3IONS-SCNC: 17 MEQ/L (ref 9–15)
AST SERPL-CCNC: 90 U/L (ref 0–35)
BACTERIA: ABNORMAL /HPF
BASOPHILS ABSOLUTE: 0 K/UL (ref 0–0.2)
BASOPHILS RELATIVE PERCENT: 1 %
BILIRUB SERPL-MCNC: 0.3 MG/DL (ref 0.2–0.7)
BILIRUBIN URINE: NEGATIVE
BLOOD, URINE: NEGATIVE
BUN BLDV-MCNC: 51 MG/DL (ref 8–23)
BUN BLDV-MCNC: 55 MG/DL (ref 8–23)
C-REACTIVE PROTEIN, HIGH SENSITIVITY: 17.7 MG/L (ref 0–5)
CALCIUM SERPL-MCNC: 8.5 MG/DL (ref 8.5–9.9)
CALCIUM SERPL-MCNC: 8.8 MG/DL (ref 8.5–9.9)
CHLORIDE BLD-SCNC: 104 MEQ/L (ref 95–107)
CHLORIDE BLD-SCNC: 107 MEQ/L (ref 95–107)
CLARITY: ABNORMAL
CO2: 16 MEQ/L (ref 20–31)
CO2: 19 MEQ/L (ref 20–31)
COLOR: YELLOW
CREAT SERPL-MCNC: 2.77 MG/DL (ref 0.5–0.9)
CREAT SERPL-MCNC: 3.26 MG/DL (ref 0.5–0.9)
CREATININE URINE: 108.6 MG/DL
EOSINOPHILS ABSOLUTE: 0 K/UL (ref 0–0.7)
EOSINOPHILS RELATIVE PERCENT: 0.6 %
EPITHELIAL CELLS, UA: ABNORMAL /HPF (ref 0–5)
GFR AFRICAN AMERICAN: 16.8
GFR AFRICAN AMERICAN: 20.3
GFR NON-AFRICAN AMERICAN: 13.9
GFR NON-AFRICAN AMERICAN: 16.8
GLOBULIN: 3.3 G/DL (ref 2.3–3.5)
GLUCOSE BLD-MCNC: 100 MG/DL (ref 70–99)
GLUCOSE BLD-MCNC: 120 MG/DL (ref 70–99)
GLUCOSE BLD-MCNC: 129 MG/DL (ref 70–99)
GLUCOSE BLD-MCNC: 83 MG/DL (ref 70–99)
GLUCOSE BLD-MCNC: 86 MG/DL (ref 70–99)
GLUCOSE BLD-MCNC: 91 MG/DL (ref 70–99)
GLUCOSE URINE: NEGATIVE MG/DL
HBA1C MFR BLD: 6.1 % (ref 4.8–5.9)
HCT VFR BLD CALC: 36.3 % (ref 37–47)
HEMOGLOBIN: 12 G/DL (ref 12–16)
HYALINE CASTS: ABNORMAL /HPF (ref 0–5)
KETONES, URINE: ABNORMAL MG/DL
LACTIC ACID: 1.6 MMOL/L (ref 0.5–2.2)
LEUKOCYTE ESTERASE, URINE: ABNORMAL
LYMPHOCYTES ABSOLUTE: 1 K/UL (ref 1–4.8)
LYMPHOCYTES RELATIVE PERCENT: 15 %
MCH RBC QN AUTO: 30.8 PG (ref 27–31.3)
MCHC RBC AUTO-ENTMCNC: 33.1 % (ref 33–37)
MCV RBC AUTO: 93.1 FL (ref 82–100)
MONOCYTES ABSOLUTE: 0.5 K/UL (ref 0.2–0.8)
MONOCYTES RELATIVE PERCENT: 6.5 %
NEUTROPHILS ABSOLUTE: 5.3 K/UL (ref 1.4–6.5)
NEUTROPHILS RELATIVE PERCENT: 79 %
NITRITE, URINE: NEGATIVE
PDW BLD-RTO: 15.8 % (ref 11.5–14.5)
PERFORMED ON: ABNORMAL
PERFORMED ON: NORMAL
PH UA: 5 (ref 5–9)
PHOSPHORUS: 5.7 MG/DL (ref 2.3–4.8)
PLATELET # BLD: 124 K/UL (ref 130–400)
PLATELET SLIDE REVIEW: ADEQUATE
POTASSIUM SERPL-SCNC: 4.4 MEQ/L (ref 3.4–4.9)
POTASSIUM SERPL-SCNC: 5.1 MEQ/L (ref 3.4–4.9)
PROCALCITONIN: 0.23 NG/ML (ref 0–0.15)
PROTEIN UA: ABNORMAL MG/DL
RBC # BLD: 3.89 M/UL (ref 4.2–5.4)
RBC # BLD: NORMAL 10*6/UL
RBC UA: ABNORMAL /HPF (ref 0–2)
SMUDGE CELLS: 2.8
SODIUM BLD-SCNC: 137 MEQ/L (ref 135–144)
SODIUM BLD-SCNC: 140 MEQ/L (ref 135–144)
SPECIFIC GRAVITY UA: 1.01 (ref 1–1.03)
TOTAL PROTEIN: 7.4 G/DL (ref 6.3–8)
URINE REFLEX TO CULTURE: YES
UROBILINOGEN, URINE: 0.2 E.U./DL
WBC # BLD: 6.7 K/UL (ref 4.8–10.8)
WBC UA: ABNORMAL /HPF (ref 0–5)

## 2022-07-09 PROCEDURE — 84540 ASSAY OF URINE/UREA-N: CPT

## 2022-07-09 PROCEDURE — 82570 ASSAY OF URINE CREATININE: CPT

## 2022-07-09 PROCEDURE — 72131 CT LUMBAR SPINE W/O DYE: CPT

## 2022-07-09 PROCEDURE — 84145 PROCALCITONIN (PCT): CPT

## 2022-07-09 PROCEDURE — 87186 SC STD MICRODIL/AGAR DIL: CPT

## 2022-07-09 PROCEDURE — 72125 CT NECK SPINE W/O DYE: CPT

## 2022-07-09 PROCEDURE — 2580000003 HC RX 258: Performed by: INTERNAL MEDICINE

## 2022-07-09 PROCEDURE — 6360000002 HC RX W HCPCS: Performed by: EMERGENCY MEDICINE

## 2022-07-09 PROCEDURE — 80053 COMPREHEN METABOLIC PANEL: CPT

## 2022-07-09 PROCEDURE — 96374 THER/PROPH/DIAG INJ IV PUSH: CPT

## 2022-07-09 PROCEDURE — 6370000000 HC RX 637 (ALT 250 FOR IP): Performed by: INTERNAL MEDICINE

## 2022-07-09 PROCEDURE — 96375 TX/PRO/DX INJ NEW DRUG ADDON: CPT

## 2022-07-09 PROCEDURE — 6360000002 HC RX W HCPCS: Performed by: INTERNAL MEDICINE

## 2022-07-09 PROCEDURE — 83036 HEMOGLOBIN GLYCOSYLATED A1C: CPT

## 2022-07-09 PROCEDURE — 83605 ASSAY OF LACTIC ACID: CPT

## 2022-07-09 PROCEDURE — 6370000000 HC RX 637 (ALT 250 FOR IP)

## 2022-07-09 PROCEDURE — 73130 X-RAY EXAM OF HAND: CPT

## 2022-07-09 PROCEDURE — 70450 CT HEAD/BRAIN W/O DYE: CPT

## 2022-07-09 PROCEDURE — 99285 EMERGENCY DEPT VISIT HI MDM: CPT

## 2022-07-09 PROCEDURE — 82306 VITAMIN D 25 HYDROXY: CPT

## 2022-07-09 PROCEDURE — 87077 CULTURE AEROBIC IDENTIFY: CPT

## 2022-07-09 PROCEDURE — 96361 HYDRATE IV INFUSION ADD-ON: CPT

## 2022-07-09 PROCEDURE — 36415 COLL VENOUS BLD VENIPUNCTURE: CPT

## 2022-07-09 PROCEDURE — 87086 URINE CULTURE/COLONY COUNT: CPT

## 2022-07-09 PROCEDURE — 1210000000 HC MED SURG R&B

## 2022-07-09 PROCEDURE — 86141 C-REACTIVE PROTEIN HS: CPT

## 2022-07-09 PROCEDURE — 93005 ELECTROCARDIOGRAM TRACING: CPT | Performed by: INTERNAL MEDICINE

## 2022-07-09 PROCEDURE — 81001 URINALYSIS AUTO W/SCOPE: CPT

## 2022-07-09 PROCEDURE — 85025 COMPLETE CBC W/AUTO DIFF WBC: CPT

## 2022-07-09 PROCEDURE — 83970 ASSAY OF PARATHORMONE: CPT

## 2022-07-09 PROCEDURE — 2580000003 HC RX 258: Performed by: EMERGENCY MEDICINE

## 2022-07-09 PROCEDURE — 76775 US EXAM ABDO BACK WALL LIM: CPT

## 2022-07-09 RX ORDER — CLONAZEPAM 1 MG/1
2 TABLET ORAL EVERY 12 HOURS PRN
Status: DISCONTINUED | OUTPATIENT
Start: 2022-07-09 | End: 2022-07-11 | Stop reason: HOSPADM

## 2022-07-09 RX ORDER — INSULIN LISPRO 100 [IU]/ML
0-3 INJECTION, SOLUTION INTRAVENOUS; SUBCUTANEOUS NIGHTLY
Status: DISCONTINUED | OUTPATIENT
Start: 2022-07-09 | End: 2022-07-11 | Stop reason: HOSPADM

## 2022-07-09 RX ORDER — FENTANYL CITRATE 50 UG/ML
50 INJECTION, SOLUTION INTRAMUSCULAR; INTRAVENOUS ONCE
Status: COMPLETED | OUTPATIENT
Start: 2022-07-09 | End: 2022-07-09

## 2022-07-09 RX ORDER — SODIUM CHLORIDE 9 MG/ML
INJECTION, SOLUTION INTRAVENOUS CONTINUOUS
Status: DISCONTINUED | OUTPATIENT
Start: 2022-07-09 | End: 2022-07-11

## 2022-07-09 RX ORDER — HYDROCODONE BITARTRATE AND ACETAMINOPHEN 5; 325 MG/1; MG/1
TABLET ORAL
Status: COMPLETED
Start: 2022-07-09 | End: 2022-07-09

## 2022-07-09 RX ORDER — QUETIAPINE FUMARATE 50 MG/1
150 TABLET, EXTENDED RELEASE ORAL NIGHTLY
Status: DISCONTINUED | OUTPATIENT
Start: 2022-07-09 | End: 2022-07-11 | Stop reason: HOSPADM

## 2022-07-09 RX ORDER — ACETAMINOPHEN 325 MG/1
650 TABLET ORAL EVERY 6 HOURS PRN
Status: DISCONTINUED | OUTPATIENT
Start: 2022-07-09 | End: 2022-07-11 | Stop reason: HOSPADM

## 2022-07-09 RX ORDER — DEXTROSE MONOHYDRATE 50 MG/ML
100 INJECTION, SOLUTION INTRAVENOUS PRN
Status: DISCONTINUED | OUTPATIENT
Start: 2022-07-09 | End: 2022-07-11 | Stop reason: HOSPADM

## 2022-07-09 RX ORDER — POLYETHYLENE GLYCOL 3350 17 G/17G
17 POWDER, FOR SOLUTION ORAL DAILY PRN
Status: DISCONTINUED | OUTPATIENT
Start: 2022-07-09 | End: 2022-07-11 | Stop reason: HOSPADM

## 2022-07-09 RX ORDER — 0.9 % SODIUM CHLORIDE 0.9 %
1000 INTRAVENOUS SOLUTION INTRAVENOUS ONCE
Status: COMPLETED | OUTPATIENT
Start: 2022-07-09 | End: 2022-07-09

## 2022-07-09 RX ORDER — 0.9 % SODIUM CHLORIDE 0.9 %
500 INTRAVENOUS SOLUTION INTRAVENOUS ONCE
Status: COMPLETED | OUTPATIENT
Start: 2022-07-09 | End: 2022-07-09

## 2022-07-09 RX ORDER — ONDANSETRON 2 MG/ML
4 INJECTION INTRAMUSCULAR; INTRAVENOUS EVERY 6 HOURS PRN
Status: DISCONTINUED | OUTPATIENT
Start: 2022-07-09 | End: 2022-07-11 | Stop reason: HOSPADM

## 2022-07-09 RX ORDER — ACETAMINOPHEN 650 MG/1
650 SUPPOSITORY RECTAL EVERY 6 HOURS PRN
Status: DISCONTINUED | OUTPATIENT
Start: 2022-07-09 | End: 2022-07-11 | Stop reason: HOSPADM

## 2022-07-09 RX ORDER — ONDANSETRON 2 MG/ML
4 INJECTION INTRAMUSCULAR; INTRAVENOUS ONCE
Status: COMPLETED | OUTPATIENT
Start: 2022-07-09 | End: 2022-07-09

## 2022-07-09 RX ORDER — ENOXAPARIN SODIUM 100 MG/ML
30 INJECTION SUBCUTANEOUS DAILY
Status: DISCONTINUED | OUTPATIENT
Start: 2022-07-09 | End: 2022-07-11

## 2022-07-09 RX ORDER — BUPROPION HYDROCHLORIDE 75 MG/1
75 TABLET ORAL 2 TIMES DAILY
Status: DISCONTINUED | OUTPATIENT
Start: 2022-07-09 | End: 2022-07-11 | Stop reason: HOSPADM

## 2022-07-09 RX ORDER — SERTRALINE HYDROCHLORIDE 100 MG/1
100 TABLET, FILM COATED ORAL 2 TIMES DAILY
Status: DISCONTINUED | OUTPATIENT
Start: 2022-07-09 | End: 2022-07-11 | Stop reason: HOSPADM

## 2022-07-09 RX ORDER — LAMOTRIGINE 25 MG/1
100 TABLET ORAL 2 TIMES DAILY
Status: DISCONTINUED | OUTPATIENT
Start: 2022-07-09 | End: 2022-07-11 | Stop reason: HOSPADM

## 2022-07-09 RX ORDER — CLONAZEPAM 1 MG/1
2 TABLET ORAL 2 TIMES DAILY
Status: DISCONTINUED | OUTPATIENT
Start: 2022-07-09 | End: 2022-07-09

## 2022-07-09 RX ORDER — IBUPROFEN 800 MG/1
TABLET ORAL
Status: COMPLETED
Start: 2022-07-09 | End: 2022-07-09

## 2022-07-09 RX ORDER — INSULIN LISPRO 100 [IU]/ML
0-6 INJECTION, SOLUTION INTRAVENOUS; SUBCUTANEOUS
Status: DISCONTINUED | OUTPATIENT
Start: 2022-07-09 | End: 2022-07-11 | Stop reason: HOSPADM

## 2022-07-09 RX ORDER — ONDANSETRON 4 MG/1
4 TABLET, ORALLY DISINTEGRATING ORAL EVERY 8 HOURS PRN
Status: DISCONTINUED | OUTPATIENT
Start: 2022-07-09 | End: 2022-07-11 | Stop reason: HOSPADM

## 2022-07-09 RX ORDER — HYDRALAZINE HYDROCHLORIDE 20 MG/ML
10 INJECTION INTRAMUSCULAR; INTRAVENOUS EVERY 4 HOURS PRN
Status: DISCONTINUED | OUTPATIENT
Start: 2022-07-09 | End: 2022-07-11 | Stop reason: HOSPADM

## 2022-07-09 RX ADMIN — SODIUM CHLORIDE 500 ML: 9 INJECTION, SOLUTION INTRAVENOUS at 16:03

## 2022-07-09 RX ADMIN — ONDANSETRON 4 MG: 2 INJECTION INTRAMUSCULAR; INTRAVENOUS at 02:49

## 2022-07-09 RX ADMIN — SODIUM CHLORIDE 1000 ML: 9 INJECTION, SOLUTION INTRAVENOUS at 03:29

## 2022-07-09 RX ADMIN — SERTRALINE 100 MG: 100 TABLET, FILM COATED ORAL at 21:06

## 2022-07-09 RX ADMIN — SODIUM CHLORIDE: 9 INJECTION, SOLUTION INTRAVENOUS at 15:20

## 2022-07-09 RX ADMIN — QUETIAPINE FUMARATE 150 MG: 50 TABLET, EXTENDED RELEASE ORAL at 22:37

## 2022-07-09 RX ADMIN — HYDROCODONE BITARTRATE AND ACETAMINOPHEN: 5; 325 TABLET ORAL at 02:49

## 2022-07-09 RX ADMIN — IBUPROFEN: 800 TABLET ORAL at 02:49

## 2022-07-09 RX ADMIN — FENTANYL CITRATE 50 MCG: 50 INJECTION, SOLUTION INTRAMUSCULAR; INTRAVENOUS at 03:55

## 2022-07-09 RX ADMIN — HYDROMORPHONE HYDROCHLORIDE 0.5 MG: 1 INJECTION, SOLUTION INTRAMUSCULAR; INTRAVENOUS; SUBCUTANEOUS at 15:16

## 2022-07-09 RX ADMIN — LAMOTRIGINE 100 MG: 25 TABLET ORAL at 21:06

## 2022-07-09 RX ADMIN — CLONAZEPAM 2 MG: 1 TABLET ORAL at 21:14

## 2022-07-09 RX ADMIN — ENOXAPARIN SODIUM 30 MG: 100 INJECTION SUBCUTANEOUS at 08:42

## 2022-07-09 RX ADMIN — BUPROPION HYDROCHLORIDE 75 MG: 75 TABLET, FILM COATED ORAL at 21:06

## 2022-07-09 RX ADMIN — CEFTRIAXONE SODIUM 1000 MG: 1 INJECTION, POWDER, FOR SOLUTION INTRAMUSCULAR; INTRAVENOUS at 08:46

## 2022-07-09 ASSESSMENT — PAIN DESCRIPTION - ORIENTATION
ORIENTATION: LEFT
ORIENTATION: LEFT

## 2022-07-09 ASSESSMENT — PAIN DESCRIPTION - DESCRIPTORS
DESCRIPTORS: ACHING;SORE;DISCOMFORT
DESCRIPTORS: ACHING
DESCRIPTORS: ACHING;SHARP

## 2022-07-09 ASSESSMENT — LIFESTYLE VARIABLES
HOW MANY STANDARD DRINKS CONTAINING ALCOHOL DO YOU HAVE ON A TYPICAL DAY: 1 OR 2
HOW OFTEN DO YOU HAVE A DRINK CONTAINING ALCOHOL: MONTHLY OR LESS
HOW OFTEN DO YOU HAVE A DRINK CONTAINING ALCOHOL: NEVER

## 2022-07-09 ASSESSMENT — ENCOUNTER SYMPTOMS
SHORTNESS OF BREATH: 0
ABDOMINAL PAIN: 0
WHEEZING: 0
CHEST TIGHTNESS: 0
ABDOMINAL DISTENTION: 0
VOMITING: 0
EYE DISCHARGE: 0
PHOTOPHOBIA: 0
SORE THROAT: 0
COUGH: 0

## 2022-07-09 ASSESSMENT — PAIN SCALES - GENERAL
PAINLEVEL_OUTOF10: 7
PAINLEVEL_OUTOF10: 10
PAINLEVEL_OUTOF10: 7
PAINLEVEL_OUTOF10: 8
PAINLEVEL_OUTOF10: 0
PAINLEVEL_OUTOF10: 10

## 2022-07-09 ASSESSMENT — PAIN DESCRIPTION - LOCATION
LOCATION: BACK;HAND
LOCATION: BACK
LOCATION: BACK;HAND

## 2022-07-09 ASSESSMENT — PAIN DESCRIPTION - PAIN TYPE
TYPE: ACUTE PAIN
TYPE: ACUTE PAIN

## 2022-07-09 ASSESSMENT — PAIN - FUNCTIONAL ASSESSMENT
PAIN_FUNCTIONAL_ASSESSMENT: 0-10
PAIN_FUNCTIONAL_ASSESSMENT: ACTIVITIES ARE NOT PREVENTED
PAIN_FUNCTIONAL_ASSESSMENT: PREVENTS OR INTERFERES WITH ALL ACTIVE AND SOME PASSIVE ACTIVITIES
PAIN_FUNCTIONAL_ASSESSMENT: PREVENTS OR INTERFERES SOME ACTIVE ACTIVITIES AND ADLS

## 2022-07-09 ASSESSMENT — PAIN DESCRIPTION - FREQUENCY: FREQUENCY: CONTINUOUS

## 2022-07-09 NOTE — ED NOTES
Medicated for pain. No acute distress observed. Respirations are even and non-labored. Still unable to urinate.      Theresa Laws, ALEXEY  07/09/22 [facility], RN  07/09/22 9376

## 2022-07-09 NOTE — ED NOTES
Patient is in room on cart, alert and oriented, talking with visitor in room. No distress observed. Patient is unable to urinate at this time.        Tatyana Crandall RN  07/09/22 5607

## 2022-07-09 NOTE — ED PROVIDER NOTES
[facility] ED  eMERGENCY dEPARTMENT eNCOUnter      Pt Name: Jennifer Rider  MRN: [id number]  [place] 1950  Date of evaluation: 7/9/2022  Provider: Judit Alford MD    CHIEF COMPLAINT       Chief Complaint   Patient presents with    Fall     felll x 3 times today,     Back Pain     back pain, neck pain, bilateral leg pain after falling 3 times today         HISTORY OF PRESENT ILLNESS   (Location/Symptom, Timing/Onset,Context/Setting, Quality, Duration, Modifying Factors, Severity)  Note limiting factors. Jennifer Rider is a 66 y.o. female who presents to the emergency department for evaluation of weakness and frequent falls. Patient lives by herself and states that she fell multiple times today because her legs were giving out on her. She did hit her mid back at one point where she has had prior surgery. Complaining of moderate pain in that location but no numbness or paresthesias. The weakness has been progressive over the past few days but more noticeable today. No related chest pain or shortness of breath. She did not hit her head neck with the fall and complaining of mild neck pain. HPI    NursingNotes were reviewed. REVIEW OF SYSTEMS    (2-9 systems for level 4, 10 or more for level 5)     Review of Systems   Constitutional: Positive for fatigue. Negative for chills and diaphoresis. HENT: Negative for congestion, ear pain, mouth sores and sore throat. Eyes: Negative for photophobia and discharge. Respiratory: Negative for cough, chest tightness, shortness of breath and wheezing. Cardiovascular: Negative for chest pain and palpitations. Gastrointestinal: Negative for abdominal distention, abdominal pain and vomiting. Endocrine: Negative for cold intolerance. Genitourinary: Negative for difficulty urinating. Musculoskeletal: Positive for gait problem. Negative for arthralgias, joint swelling and neck pain. Skin: Negative for pallor and rash. Allergic/Immunologic: Negative for immunocompromised state. Neurological: Positive for weakness. Negative for dizziness and syncope. Hematological: Negative for adenopathy. Psychiatric/Behavioral: Negative for agitation, hallucinations and sleep disturbance. The patient is not nervous/anxious. All other systems reviewed and are negative. Except as noted above the remainder of the review of systems was reviewed and negative. PAST MEDICAL HISTORY     Past Medical History:   Diagnosis Date    Anxiety     Bipolar affective disorder ([facility] [address]     DM (diabetes mellitus) ([facility] [address]     Hypertension     Migraine     PTSD (post-traumatic stress disorder)     TIA (transient ischemic attack)     x 3         SURGICALHISTORY       Past Surgical History:   Procedure Laterality Date    APPENDECTOMY      CHOLECYSTECTOMY      LEG AMPUTATION BELOW KNEE Bilateral     NASAL FRACTURE SURGERY N/A 4/25/2019    CLOSED REDUCTON EXTERNAL FIXATION OF NASAL BONE FRACTURE WITH SEVERE DNS (DEVIATED NASAL SEPTUM)  ROOM 190 performed by Jenna Disla MD at [facility]       Previous Medications    ALENDRONATE (FOSAMAX) 70 MG TABLET    Take 70 mg by mouth once a week    BUPROPION (WELLBUTRIN) 75 MG TABLET    Take 100 mg by mouth 2 times daily    CLONAZEPAM (KLONOPIN) 2 MG TABLET    Take 2 mg by mouth 2 times daily.      FLUTICASONE (FLONASE) 50 MCG/ACT NASAL SPRAY    50 sprays by Each Nare route as needed    LAMOTRIGINE (LAMICTAL) 100 MG TABLET    Take 100 mg by mouth 2 times daily    LISINOPRIL (PRINIVIL;ZESTRIL) 10 MG TABLET    Take 1 tablet by mouth daily    METOPROLOL SUCCINATE (TOPROL XL) 50 MG EXTENDED RELEASE TABLET    Take 1 tablet by mouth daily    ONDANSETRON (ZOFRAN) 4 MG TABLET    Take 1 tablet by mouth 3 times daily as needed for Nausea or Vomiting    QUETIAPINE (SEROQUEL XR) 150 MG TB24 EXTENDED RELEASE TABLET    Take 150 mg by mouth nightly    RANITIDINE (ZANTAC) 150 MG TABLET    Take 150 mg by mouth as needed    SERTRALINE (ZOLOFT) 100 MG TABLET    Take 100 mg by mouth 2 times daily    TOPIRAMATE (TOPAMAX) 25 MG TABLET    Take 1 tablet by mouth 2 times daily       ALLERGIES     Iv dye [iodides], Vancomycin, and Morphine    FAMILY HISTORY       Family History   Problem Relation Age of Onset    Cancer Mother         breast    Heart Attack Father           SOCIAL HISTORY       Social History     Socioeconomic History    Marital status: Single     Spouse name: None    Number of children: None    Years of education: None    Highest education level: None   Occupational History    None   Tobacco Use    Smoking status: Never Smoker    Smokeless tobacco: Never Used   Substance and Sexual Activity    Alcohol use: No    Drug use: No    Sexual activity: None   Other Topics Concern    None   Social History Narrative    None     Social Determinants of Health     Financial Resource Strain:     Difficulty of Paying Living Expenses: Not on file   Food Insecurity:     Worried About Running Out of Food in the Last Year: Not on file    Chandrika of Food in the Last Year: Not on file   Transportation Needs:     Lack of Transportation (Medical): Not on file    Lack of Transportation (Non-Medical):  Not on file   Physical Activity:     Days of Exercise per Week: Not on file    Minutes of Exercise per Session: Not on file   Stress:     Feeling of Stress : Not on file   Social Connections:     Frequency of Communication with Friends and Family: Not on file    Frequency of Social Gatherings with Friends and Family: Not on file    Attends Tenriism Services: Not on file    Active Member of Clubs or Organizations: Not on file    Attends Club or Organization Meetings: Not on file    Marital Status: Not on file   Intimate Partner Violence:     Fear of Current or Ex-Partner: Not on file    Emotionally Abused: Not on file    Physically Abused: Not on file    Sexually Abused: Not on file   Housing Stability:     Unable to Pay for Housing in the Last Year: Not on file    Number of Places Lived in the Last Year: Not on file    Unstable Housing in the Last Year: Not on file       SCREENINGS    [place] Coma Scale  Eye Opening: Spontaneous  Best Verbal Response: Oriented  Best Motor Response: Obeys commands  [place] Coma Scale Score: 15 @FLOW([id number])@      PHYSICAL EXAM    (up to 7 for level 4, 8 or more for level 5)     ED Triage Vitals [07/09/22 0051]   BP Temp Temp Source Heart Rate Resp SpO2 Height Weight   (!) 76/47 98.3 °F (36.8 °C) Oral 59 20 94 % 5' 8\" (1.727 m) 199 lb (90.3 kg)       Physical Exam  Vitals and nursing note reviewed. Constitutional:       Appearance: She is well-developed. HENT:      Head: Normocephalic. Right Ear: Tympanic membrane normal.      Left Ear: Tympanic membrane normal.      Nose: Nose normal.      Mouth/Throat:      Mouth: Mucous membranes are moist.   Eyes:      Conjunctiva/sclera: Conjunctivae normal.      Pupils: Pupils are equal, round, and reactive to light. Cardiovascular:      Rate and Rhythm: Regular rhythm. Tachycardia present. Heart sounds: Normal heart sounds. Pulmonary:      Effort: Pulmonary effort is normal.      Breath sounds: Normal breath sounds. Abdominal:      General: Bowel sounds are normal.      Palpations: Abdomen is soft. Tenderness: There is no abdominal tenderness. There is no guarding. Musculoskeletal:         General: No swelling, tenderness or deformity. Normal range of motion. Cervical back: Normal range of motion and neck supple. Right lower leg: No edema. Left lower leg: No edema. Skin:     General: Skin is warm and dry. Capillary Refill: Capillary refill takes less than 2 seconds. Neurological:      General: No focal deficit present. Mental Status: She is alert and oriented to person, place, and time. Motor: Weakness present.    Psychiatric:         Mood and Affect: Mood normal. DIAGNOSTIC RESULTS     EKG: All EKG's are interpreted by the Emergency Department Physician who either signs or Co-signsthis chart in the absence of a cardiologist.      RADIOLOGY:   Alonzo Crock such as CT, Ultrasound and MRI are read by the radiologist. Plain radiographic images are visualized and preliminarily interpreted by the emergency physician with the below findings:      Interpretation per the Radiologist below, if available at the time ofthis note:    [address]    (Results Pending)   CT CERVICAL SPINE WO CONTRAST    (Results Pending)   XR HAND LEFT (MIN 3 VIEWS)    (Results Pending)   CT LUMBAR SPINE WO CONTRAST    (Results Pending)         ED BEDSIDE ULTRASOUND:   Performed by ED Physician - none    LABS:  Labs Reviewed   COMPREHENSIVE METABOLIC PANEL - Abnormal; Notable for the following components:       Result Value    Potassium 5.1 (*)     CO2 16 (*)     Anion Gap 17 (*)     Glucose 129 (*)     BUN 55 (*)     CREATININE 3.26 (*)     GFR Non- 13.9 (*)     GFR  16.8 (*)     ALT 78 (*)     AST 90 (*)     All other components within normal limits   CBC WITH AUTO DIFFERENTIAL - Abnormal; Notable for the following components:    RBC 3.89 (*)     Hematocrit 36.3 (*)     RDW 15.8 (*)     Platelets 505 (*)     All other components within normal limits   URINALYSIS WITH REFLEX TO CULTURE       All other labs were within normal range or not returned as of this dictation. EMERGENCY DEPARTMENT COURSE and DIFFERENTIAL DIAGNOSIS/MDM:   Vitals:    Vitals:    07/09/22 0230 07/09/22 0300 07/09/22 0330 07/09/22 0355   BP: (!) 88/40 (!) 96/51 (!) 110/46    Pulse: 58 59 61 57   Resp: 20 18 17 18   Temp:       TempSrc:       SpO2: 95% 96% 98% 97%   Weight:       Height:            MDM patient has evidence of acute renal injury and dehydration. Blood pressure improved with IV rehydration.   She will need admitted for further hydration and physical therapy assessment. There is evidence of a T12 endplate compression of indeterminate age. Patient's had a fracture in this region before with associated cement. Neurosurgical consultation may be necessary. Today it is not clinically significant. CONSULTS:  None    PROCEDURES:  Unless otherwise noted below, none     Procedures    FINAL IMPRESSION      1. Acute renal injury ([facility] [address]    2. Dehydration          DISPOSITION/PLAN   DISPOSITION Decision To Admit 07/09/2022 05:35:10 AM      PATIENT REFERRED TO:  No follow-up provider specified.     DISCHARGE MEDICATIONS:  New Prescriptions    No medications on file          (Please note that portions of this note were completed with a voice recognition program.  Efforts were made to edit the dictations but occasionally words are mis-transcribed.)    Danis Major MD (electronically signed)  Attending Emergency Physician          Danis Major MD  07/09/22 1239       Danis Major MD  07/09/22 9308

## 2022-07-09 NOTE — CONSULTS
Spiritual Care Services     Summary of Visit:   visited patient for a spiritual care consult. Patient appeared calm and talkative during the visit. Patient expressed some concern because she did not fully know what was going on with her health and what the source was of the health issues she is facing.  provided active listening to help patient vocalize and her feelings of anxiousness. Spiritual Assessment/Intervention/Outcomes:    Encounter Summary  Encounter Overview/Reason : Initial Encounter  Service Provided For[de-identified] Patient  Referral/Consult From[de-identified] Nurse  Support System: [email or web address] Messenger members,Friends/neighbors  Begin Time: 1500  End Time : 5041  Total Time Calculated: 30 min        Spiritual/Emotional needs  Type: Spiritual Support                    Values / Beliefs  Do You Have Any Ethnic, Cultural, Sacramental, or Spiritual Jainism Needs You Would Like Us To Be Aware of While You Are in the Hospital : Yes  Cultural Requests During Hospitalization: [address] [employer]:    Provide visits and support    Spiritual Care Services   Electronically signed by Emerita Riley on 7/9/22 at 4:01 PM EDT     To reach a  for emotional and spiritual support, place an [facility] hospitals consult request.   If a  is needed immediately, dial 0 and ask to page the on-call .

## 2022-07-09 NOTE — PROGRESS NOTES
NEPHRO CONSULT CALLED TO DR Emerita Simmons VIA ANSWERING SERVICE Electronically signed by Doris Maurer on 7/9/2022 at 4:32 PM  [address] Electronically signed by Doris Maurer on 7/9/2022 at 4:37 PM

## 2022-07-09 NOTE — H&P
Hospital Medicine  History and Physical    Patient:  Erick Juarez  MRN: [id number]    CHIEF COMPLAINT:    Chief Complaint   Patient presents with    Fall     felll x 3 times today,     Back Pain     back pain, neck pain, bilateral leg pain after falling 3 times today       History Obtained From:  patient, electronic medical record  Primary Care Physician: Nya Ly DO    HISTORY OF PRESENT ILLNESS:   The patient is a 66 y.o. female past medical history significant for type 2 diabetes bipolar disorder hypertension PTSD and TIAs in the past who was brought to the hospital with frequent falls at home. She said that she tried to get up from her wheelchair to the shower this morning slipped and fell shortly thereafter she had to use the toilet which she missed the commode fell and hit her back. Sometime later again she experienced a third fall at home so she came to the hospital for evaluation. She reports that she has had urinary frequency and dysuria. She is an amputee on the right lower extremity and she has been ambulating mostly with wheelchair at home.     On presentation to the ED she is afebrile she was mildly hypotensive which improved with IV fluids base metabolic panel significant for an elevated BUN/creatinine 55 and 3.26 with a UA that showed  white cells with many bacteria  Creatinine is around 1      Past Medical History:      Diagnosis Date    Anxiety     Bipolar affective disorder ([address]     DM (diabetes mellitus) ([address]     Hypertension     Migraine     PTSD (post-traumatic stress disorder)     TIA (transient ischemic attack)     x 3       Past Surgical History:      Procedure Laterality Date    APPENDECTOMY      CHOLECYSTECTOMY      LEG AMPUTATION BELOW KNEE Bilateral     NASAL FRACTURE SURGERY N/A 4/25/2019    CLOSED REDUCTON EXTERNAL FIXATION OF NASAL BONE FRACTURE WITH SEVERE DNS (DEVIATED NASAL SEPTUM)  ROOM 190 performed by Gurdeep Elliott MD at [facility] Prior to Admission:    Prior to Admission medications    Medication Sig Start Date End Date Taking? Authorizing Provider   ondansetron (ZOFRAN) 4 MG tablet Take 1 tablet by mouth 3 times daily as needed for Nausea or Vomiting 4/27/19   Don Baltazar MD   metoprolol succinate (TOPROL XL) 50 MG extended release tablet Take 1 tablet by mouth daily 4/26/19   PUNEET Boateng CNP   alendronate (FOSAMAX) 70 MG tablet Take 70 mg by mouth once a week 11/20/18   Historical Provider, MD   buPROPion (WELLBUTRIN) 75 MG tablet Take 100 mg by mouth 2 times daily 8/29/13   Historical Provider, MD   fluticasone (FLONASE) 50 MCG/ACT nasal spray 50 sprays by Each Nare route as needed 2/9/18   Historical Provider, MD   ranitidine (ZANTAC) 150 MG tablet Take 150 mg by mouth as needed 4/9/19   Historical Provider, MD   sertraline (ZOLOFT) 100 MG tablet Take 100 mg by mouth 2 times daily 10/22/18   Historical Provider, MD   topiramate (TOPAMAX) 25 MG tablet Take 1 tablet by mouth 2 times daily 10/17/18   Leny Saini MD   lisinopril (PRINIVIL;ZESTRIL) 10 MG tablet Take 1 tablet by mouth daily 10/17/18   Micki Almodovar DO   clonazePAM (KLONOPIN) 2 MG tablet Take 2 mg by mouth 2 times daily. Historical Provider, MD   lamoTRIgine (LAMICTAL) 100 MG tablet Take 100 mg by mouth 2 times daily    Historical Provider, MD   QUEtiapine (SEROQUEL XR) 150 MG TB24 extended release tablet Take 150 mg by mouth nightly    Historical Provider, MD       Allergies: Iv dye [iodides], Vancomycin, and Morphine    Social History:   TOBACCO:   reports that she has never smoked. She has never used smokeless tobacco.  ETOH:   reports no history of alcohol use. OCCUPATION:  none    Family History:       Problem Relation Age of Onset    Cancer Mother         breast    Heart Attack Father        REVIEW OF SYSTEMS:  Ten systems reviewed and negative except for as above.      Physical Exam:    Vitals: BP (!) 110/49   Pulse 67   Temp 98.3 °F (36.8 °C) (Oral)   Resp 18   Ht 5' 8\" (1.727 m)   Wt 199 lb (90.3 kg)   SpO2 97%   BMI 30.26 kg/m²   Constitutional: alert, appears stated age and cooperative  Skin: Skin color, texture, turgor normal. No rashes or lesions  Eyes:Eye: Normal external eye, conjunctiva, MATILDE. ENT: Head: Normocephalic, no lesions, without obvious abnormality. Neck: no adenopathy, no carotid bruit, no JVD, supple, symmetrical, trachea midline and thyroid not enlarged, symmetric, no tenderness/mass/nodules  Respiratory: clear to auscultation bilaterally  Cardiovascular: regular rate and rhythm, S1, S2 normal, no murmur, click, rub or gallop  Gastrointestinal: soft, non-tender; bowel sounds normal; no masses,  no organomegaly  Genitourinary: Deferred  Musculoskeletal:extremities normal, atraumatic, right lower extremity amputation prosthesis  Neurologic: Mental status AAOx3 No facial asymmetry or droop. Normal muscle strength b/l. Psychiatric: Appropriate mood and affect. Good insight and judgement  Hematologic: No obvious bruising or bleeding    Recent Labs     07/09/22  0115   WBC 6.7   HGB 12.0   *     Recent Labs     07/09/22  0115      K 5.1*      CO2 16*   BUN 55*   CREATININE 3.26*   GLUCOSE 129*   AST 90*   ALT 78*   BILITOT 0.3   ALKPHOS 112     URINALYSIS:  Recent Labs     07/09/22  0115   COLORU Yellow   PHUR 5.0   WBCUA *   RBCUA None seen   BACTERIA MANY*   CLARITYU CLOUDY*   SPECGRAV 1.015   LEUKOCYTESUR MODERATE*   UROBILINOGEN 0.2   BILIRUBINUR Negative   BLOODU Negative   GLUCOSEU Negative     -----------------------------------------------------------------   No results found. EKG: Ordered, pending    Assessment and Plan     1. acute cystitis: IV ceftriaxone follow urine cultures. Renal ultrasound to rule out obstruction hydronephrosis  2. NAYELY: Check urine urea urine creatinine, hold nephrotoxic agents  3.  Frequent falls: Likely secondary to above consult PT OT Case management for potential need to SNF placement. 4. Hypertension resume home regimen once home med list is reconciled  5. Type 2 diabetes: Hold oral agents sliding scale coverage  6. Bipolar disorder: Resume home regimen once med list is reconciled  7.  DVT prophylaxis Lovenox    Patient Active Problem List   Diagnosis Code    Hypertensive urgency I16.0    Hyperglycemia R73.9    Chest tightness or pressure R07.89    Visual disturbance, subjective H53.10    DM (diabetes mellitus) ([facility] [address] E11.9    Bipolar affective disorder (HCC) F31.9    Acute sore throat J02.9    Syncope and collapse R55    Head injury S09.90XA    Dehydration E86.0       Gregorio Kirby DO,  Admitting Hospitalist    Emergency Contact:

## 2022-07-09 NOTE — ED TRIAGE NOTES
Patient presents with multiple complaints after falling several times today. Patient c/o neck pain, back pain, bilateral leg pain, and bilateral hand pain. Patient reports head injury, but denies LOC.  No obvious deformities or injuries noted on arrival.

## 2022-07-09 NOTE — ED NOTES
Patient has refused need for urination x4 in total.  Patient informed that may have to do straight cath for urine. Patient states she will try. Placed on bedpan and given.      Rui Humphrey RN  07/09/22 9471

## 2022-07-09 NOTE — PROGRESS NOTES
1600 - Patient BP 96/43, symptomatic of being slightly dizzy while laying in bed, Complaining of blurred vision, worst in the left then the right. Dr. Naun Mehta aware and ordered bolus of fluid.     1740 - /54

## 2022-07-09 NOTE — ED NOTES
Report to [address] nurse. Telemetry monitor applied. Dr. Estephania Sims at bedside for patient assessment.      Juli Cohen RN  07/09/22 9594

## 2022-07-09 NOTE — PROGRESS NOTES
Hospitalist Progress Note      PCP: Wilkins Lesches, DO    Date of Admission: 7/9/2022    Chief Complaint:  No acute events, afebrile, stable HD, on RA, complains of generalized pain since falling at home yesterday especially in her left hand    Medications:  Reviewed    Infusion Medications    dextrose       Scheduled Medications    enoxaparin  30 mg SubCUTAneous Daily    cefTRIAXone (ROCEPHIN) IV  1,000 mg IntraVENous Q24H    insulin lispro  0-6 Units SubCUTAneous TID WC    insulin lispro  0-3 Units SubCUTAneous Nightly     PRN Meds: ondansetron **OR** ondansetron, polyethylene glycol, acetaminophen **OR** acetaminophen, hydrALAZINE, glucose, dextrose bolus **OR** dextrose bolus, glucagon (rDNA), dextrose    No intake or output data in the 24 hours ending 07/09/22 1354    Exam:    BP (!) 110/49   Pulse 56   Temp 98.3 °F (36.8 °C) (Oral)   Resp 18   Ht 5' 8\" (1.727 m)   Wt 199 lb (90.3 kg)   SpO2 97%   BMI 30.26 kg/m²     General appearance: appears stated age and cooperative. Respiratory:  clear to auscultation bilaterally   Cardiovascular: Regular rate and rhythm, S1/S2  Abdomen: Soft, active bowel sounds. Musculoskeletal:  s/p right BKA      Labs:   Recent Labs     07/09/22  0115   WBC 6.7   HGB 12.0   HCT 36.3*   *     Recent Labs     07/09/22  0115      K 5.1*      CO2 16*   BUN 55*   CREATININE 3.26*   CALCIUM 8.8     Recent Labs     07/09/22  0115   AST 90*   ALT 78*   BILITOT 0.3   ALKPHOS 112     No results for input(s): INR in the last 72 hours. No results for input(s): Analisa Shanks in the last 72 hours.     Urinalysis:      Lab Results   Component Value Date/Time    NITRU Negative 07/09/2022 01:15 AM    WBCUA  07/09/2022 01:15 AM    BACTERIA MANY 07/09/2022 01:15 AM    RBCUA None seen 07/09/2022 01:15 AM    BLOODU Negative 07/09/2022 01:15 AM    SPECGRAV 1.015 07/09/2022 01:15 AM    GLUCOSEU Negative 07/09/2022 01:15 AM       Radiology:  CT HEAD WO CONTRAST Final Result   FINAL IMPRESSION:      NO ACUTE INTRACRANIAL PROCESS, FRACTURE, OR EVIDENCE OF CERVICAL SPINE INJURY IDENTIFIED. CT CERVICAL SPINE WO CONTRAST   Final Result   FINAL IMPRESSION:      NO ACUTE INTRACRANIAL PROCESS, FRACTURE, OR EVIDENCE OF CERVICAL SPINE INJURY IDENTIFIED. CT LUMBAR SPINE WO CONTRAST   Final Result      NO ACUTE FRACTURE OR SIGNIFICANT POSTTRAUMATIC COMPLICATION IDENTIFIED. CHRONIC AND DEGENERATIVE CHANGES, AS NOTED. XR HAND LEFT (MIN 3 VIEWS)   Final Result      NO SUSPECTED DISPLACED FRACTURE OR SIGNIFICANT POSTTRAUMATIC COMPLICATION IDENTIFIED. US RETROPERITONEAL LIMITED    (Results Pending)           Assessment/Plan:    65 y/o female with history of HTN, TIA, DM2, obesity s/p gastric bypass, anemia, chronic diarrhea, bipolar, PTSD, s/p right BKA, chronic neck pain, migraine, anxiety with benzodiazepine dependence who presented with low BP, weakness, dizziness and lethargy in the recent couple of weeks.     NAYELY with hyperkalemia and AG metabolic acidosis  - in the setting of hypotension, diarrhea, Naproxen and Lisinopril  - Cr was 0.89 on 5/10  - continue IV hydration  - hold Lisinopril and Naproxen  - renal u/s, PTH  - nephrology consulted  - monitor renal function closely    Hypotension   - improved with IVFs  - hold antihypertensives  - monitor BP    UTI  - on IV Rocephin  - follow urine culture    Left hand pain  - xr showed a mild deformity of the medial base of the left fourth proximal phalanx probably chronic rather than a nondisplaced fracture per report  - Ortho consult for further evaluation    Elevated LFTs  - s/p remote cholecystectomy  - follow trend    DM2  - diet controlled  - most recent HbA1c in 5/2022 was 6.6  - ISS    Thrombocytopenia   - mild    Bipolar, PTSD  - continue home meds    Anxiety with benzodiazepine dependence  - OARRS reviwed, resumed Klonopin       Electronically signed by [employer] Marea Homans, MD on 7/9/2022 at 1:54 PM

## 2022-07-10 LAB
ALBUMIN SERPL-MCNC: 3.2 G/DL (ref 3.5–4.6)
ALP BLD-CCNC: 91 U/L (ref 40–130)
ALT SERPL-CCNC: 38 U/L (ref 0–33)
ANION GAP SERPL CALCULATED.3IONS-SCNC: 13 MEQ/L (ref 9–15)
AST SERPL-CCNC: 22 U/L (ref 0–35)
BASOPHILS ABSOLUTE: 0 K/UL (ref 0–0.2)
BASOPHILS RELATIVE PERCENT: 0.8 %
BILIRUB SERPL-MCNC: <0.2 MG/DL (ref 0.2–0.7)
BILIRUBIN DIRECT: <0.2 MG/DL (ref 0–0.4)
BILIRUBIN, INDIRECT: ABNORMAL MG/DL (ref 0–0.6)
BUN BLDV-MCNC: 42 MG/DL (ref 8–23)
CALCIUM SERPL-MCNC: 7.9 MG/DL (ref 8.5–9.9)
CHLORIDE BLD-SCNC: 113 MEQ/L (ref 95–107)
CO2: 15 MEQ/L (ref 20–31)
CREAT SERPL-MCNC: 1.48 MG/DL (ref 0.5–0.9)
CREATININE URINE: 64.5 MG/DL
EOSINOPHILS ABSOLUTE: 0 K/UL (ref 0–0.7)
EOSINOPHILS RELATIVE PERCENT: 1.1 %
GFR AFRICAN AMERICAN: 41.8
GFR NON-AFRICAN AMERICAN: 34.6
GLUCOSE BLD-MCNC: 152 MG/DL (ref 70–99)
GLUCOSE BLD-MCNC: 84 MG/DL (ref 70–99)
GLUCOSE BLD-MCNC: 84 MG/DL (ref 70–99)
GLUCOSE BLD-MCNC: 85 MG/DL (ref 70–99)
GLUCOSE BLD-MCNC: 87 MG/DL (ref 70–99)
HCT VFR BLD CALC: 32.5 % (ref 37–47)
HEMOGLOBIN: 10.5 G/DL (ref 12–16)
LYMPHOCYTES ABSOLUTE: 1.2 K/UL (ref 1–4.8)
LYMPHOCYTES RELATIVE PERCENT: 30.4 %
MCH RBC QN AUTO: 30.6 PG (ref 27–31.3)
MCHC RBC AUTO-ENTMCNC: 32.3 % (ref 33–37)
MCV RBC AUTO: 94.5 FL (ref 82–100)
MICROALBUMIN UR-MCNC: <1.2 MG/DL
MICROALBUMIN/CREAT UR-RTO: NORMAL MG/G (ref 0–30)
MONOCYTES ABSOLUTE: 0.3 K/UL (ref 0.2–0.8)
MONOCYTES RELATIVE PERCENT: 6.6 %
NEUTROPHILS ABSOLUTE: 2.3 K/UL (ref 1.4–6.5)
NEUTROPHILS RELATIVE PERCENT: 61.1 %
ORGANISM: ABNORMAL
PDW BLD-RTO: 15.9 % (ref 11.5–14.5)
PERFORMED ON: ABNORMAL
PERFORMED ON: NORMAL
PHOSPHORUS: 3.5 MG/DL (ref 2.3–4.8)
PLATELET # BLD: 102 K/UL (ref 130–400)
POTASSIUM SERPL-SCNC: 4.7 MEQ/L (ref 3.4–4.9)
RBC # BLD: 3.44 M/UL (ref 4.2–5.4)
SODIUM BLD-SCNC: 141 MEQ/L (ref 135–144)
TOTAL CK: 87 U/L (ref 0–170)
TOTAL PROTEIN: 6 G/DL (ref 6.3–8)
URINE CULTURE, ROUTINE: ABNORMAL
URINE CULTURE, ROUTINE: ABNORMAL
VITAMIN D 25-HYDROXY: 14.1 NG/ML
WBC # BLD: 3.8 K/UL (ref 4.8–10.8)

## 2022-07-10 PROCEDURE — 97163 PT EVAL HIGH COMPLEX 45 MIN: CPT

## 2022-07-10 PROCEDURE — 85025 COMPLETE CBC W/AUTO DIFF WBC: CPT

## 2022-07-10 PROCEDURE — 82043 UR ALBUMIN QUANTITATIVE: CPT

## 2022-07-10 PROCEDURE — 80048 BASIC METABOLIC PNL TOTAL CA: CPT

## 2022-07-10 PROCEDURE — 6360000002 HC RX W HCPCS: Performed by: INTERNAL MEDICINE

## 2022-07-10 PROCEDURE — 6370000000 HC RX 637 (ALT 250 FOR IP): Performed by: INTERNAL MEDICINE

## 2022-07-10 PROCEDURE — 99221 1ST HOSP IP/OBS SF/LOW 40: CPT | Performed by: PSYCHIATRY & NEUROLOGY

## 2022-07-10 PROCEDURE — 2580000003 HC RX 258: Performed by: INTERNAL MEDICINE

## 2022-07-10 PROCEDURE — 97166 OT EVAL MOD COMPLEX 45 MIN: CPT

## 2022-07-10 PROCEDURE — 1210000000 HC MED SURG R&B

## 2022-07-10 PROCEDURE — 36415 COLL VENOUS BLD VENIPUNCTURE: CPT

## 2022-07-10 PROCEDURE — 84100 ASSAY OF PHOSPHORUS: CPT

## 2022-07-10 PROCEDURE — 82550 ASSAY OF CK (CPK): CPT

## 2022-07-10 PROCEDURE — 82570 ASSAY OF URINE CREATININE: CPT

## 2022-07-10 PROCEDURE — 80076 HEPATIC FUNCTION PANEL: CPT

## 2022-07-10 RX ORDER — 0.9 % SODIUM CHLORIDE 0.9 %
1000 INTRAVENOUS SOLUTION INTRAVENOUS ONCE
Status: COMPLETED | OUTPATIENT
Start: 2022-07-10 | End: 2022-07-10

## 2022-07-10 RX ORDER — ERGOCALCIFEROL 1.25 MG/1
50000 CAPSULE ORAL WEEKLY
Status: DISCONTINUED | OUTPATIENT
Start: 2022-07-10 | End: 2022-07-11 | Stop reason: HOSPADM

## 2022-07-10 RX ORDER — SODIUM CHLORIDE 9 MG/ML
INJECTION, SOLUTION INTRAVENOUS CONTINUOUS
Status: DISCONTINUED | OUTPATIENT
Start: 2022-07-10 | End: 2022-07-10

## 2022-07-10 RX ORDER — 0.9 % SODIUM CHLORIDE 0.9 %
500 INTRAVENOUS SOLUTION INTRAVENOUS ONCE
Status: COMPLETED | OUTPATIENT
Start: 2022-07-10 | End: 2022-07-10

## 2022-07-10 RX ADMIN — SODIUM CHLORIDE: 9 INJECTION, SOLUTION INTRAVENOUS at 08:30

## 2022-07-10 RX ADMIN — ACETAMINOPHEN 650 MG: 325 TABLET ORAL at 04:38

## 2022-07-10 RX ADMIN — BUPROPION HYDROCHLORIDE 75 MG: 75 TABLET, FILM COATED ORAL at 22:08

## 2022-07-10 RX ADMIN — SODIUM CHLORIDE 500 ML: 9 INJECTION, SOLUTION INTRAVENOUS at 02:12

## 2022-07-10 RX ADMIN — SERTRALINE 100 MG: 100 TABLET, FILM COATED ORAL at 22:08

## 2022-07-10 RX ADMIN — PIPERACILLIN AND TAZOBACTAM 3375 MG: 3; .375 INJECTION, POWDER, LYOPHILIZED, FOR SOLUTION INTRAVENOUS at 10:07

## 2022-07-10 RX ADMIN — ENOXAPARIN SODIUM 30 MG: 100 INJECTION SUBCUTANEOUS at 08:35

## 2022-07-10 RX ADMIN — SODIUM CHLORIDE 1000 ML: 9 INJECTION, SOLUTION INTRAVENOUS at 10:00

## 2022-07-10 RX ADMIN — LAMOTRIGINE 100 MG: 25 TABLET ORAL at 22:08

## 2022-07-10 RX ADMIN — PIPERACILLIN AND TAZOBACTAM 3375 MG: 3; .375 INJECTION, POWDER, LYOPHILIZED, FOR SOLUTION INTRAVENOUS at 17:11

## 2022-07-10 RX ADMIN — CEFTRIAXONE SODIUM 1000 MG: 1 INJECTION, POWDER, FOR SOLUTION INTRAMUSCULAR; INTRAVENOUS at 08:32

## 2022-07-10 RX ADMIN — ACETAMINOPHEN 650 MG: 325 TABLET ORAL at 15:55

## 2022-07-10 RX ADMIN — INSULIN LISPRO 1 UNITS: 100 INJECTION, SOLUTION INTRAVENOUS; SUBCUTANEOUS at 23:24

## 2022-07-10 RX ADMIN — ACETAMINOPHEN 650 MG: 325 TABLET ORAL at 22:07

## 2022-07-10 RX ADMIN — QUETIAPINE FUMARATE 150 MG: 50 TABLET, EXTENDED RELEASE ORAL at 22:42

## 2022-07-10 RX ADMIN — ERGOCALCIFEROL 50000 UNITS: 1.25 CAPSULE ORAL at 13:36

## 2022-07-10 ASSESSMENT — PAIN SCALES - GENERAL
PAINLEVEL_OUTOF10: 0
PAINLEVEL_OUTOF10: 0
PAINLEVEL_OUTOF10: 7
PAINLEVEL_OUTOF10: 6
PAINLEVEL_OUTOF10: 7

## 2022-07-10 ASSESSMENT — PAIN DESCRIPTION - LOCATION
LOCATION: HEAD;BACK
LOCATION: BACK

## 2022-07-10 NOTE — PROGRESS NOTES
Hospitalist Progress Note      PCP: Carl Treviño DO    Date of Admission: 7/9/2022    Chief Complaint:  Patient was experiencing lightheadedness and blurry vision today, SBP in the 90-100s overnight, orthostatics were positive per nursing staff, saline bolus was administered, feels better now    Medications:  Reviewed    Infusion Medications    sodium chloride Stopped (07/10/22 0833)    dextrose      sodium chloride 100 mL/hr at 07/10/22 0830     Scheduled Medications    piperacillin-tazobactam  3,375 mg IntraVENous q8h    enoxaparin  30 mg SubCUTAneous Daily    insulin lispro  0-6 Units SubCUTAneous TID WC    insulin lispro  0-3 Units SubCUTAneous Nightly    buPROPion  75 mg Oral BID    lamoTRIgine  100 mg Oral BID    QUEtiapine  150 mg Oral Nightly    sertraline  100 mg Oral BID     PRN Meds: ondansetron **OR** ondansetron, polyethylene glycol, acetaminophen **OR** acetaminophen, hydrALAZINE, glucose, dextrose bolus **OR** dextrose bolus, glucagon (rDNA), dextrose, clonazePAM      Intake/Output Summary (Last 24 hours) at 7/10/2022 1251  Last data filed at 7/10/2022 0404  Gross per 24 hour   Intake 1440.43 ml   Output --   Net 1440.43 ml       Exam:    BP (!) 111/51   Pulse 62   Temp 97.9 °F (36.6 °C) (Oral)   Resp 18   Ht 5' 8\" (1.727 m)   Wt 199 lb (90.3 kg)   SpO2 99%   BMI 30.26 kg/m²     General appearance: appears stated age and cooperative. Respiratory:  clear to auscultation bilaterally   Cardiovascular: Regular rate and rhythm, S1/S2  Abdomen: Soft, active bowel sounds.   Musculoskeletal:  s/p right BKA      Labs:   Recent Labs     07/09/22  0115 07/10/22  0550   WBC 6.7 3.8*   HGB 12.0 10.5*   HCT 36.3* 32.5*   * 102*     Recent Labs     07/09/22  0115 07/09/22  1417 07/10/22  0550    140 141   K 5.1* 4.4 4.7    107 113*   CO2 16* 19* 15*   BUN 55* 51* 42*   CREATININE 3.26* 2.77* 1.48*   CALCIUM 8.8 8.5 7.9*   PHOS  --  5.7* 3.5     Recent Labs     07/09/22  0115

## 2022-07-10 NOTE — FLOWSHEET NOTE
This RN took over pt care from [facility]. Pt in room resting comfortably. Scheduled Seroquel given. Post Bolus /42 MAP 62. Lung clear. Abd sounds active. Scratch noted on RLE amputation site. Pt denies pain or further needs at the moment. Call light within reach. Bed in lowest position.      0200: BP 94/55 MAP 68 HR 60-Dr. John notified verbally- Bolus ordered    0432: Pt request PRN Tylenol for / back pain

## 2022-07-10 NOTE — CONSULTS
[address] [address]                      [address] [place], [address] [facility]                                  CONSULTATION    PATIENT NAME: Tika Wright                      :        1950  MED REC NO:   [id number]                            ROOM:       Z955  ACCOUNT NO:   [de-identified]                           ADMIT DATE: 2022  PROVIDER:     Chloe Esposito DO    CONSULT DATE:  07/10/2022    RENAL CONSULTATION    HISTORY OF PRESENT ILLNESS:  This is a 65-year-old  female  admitted to the hospital after having fallen three times at home and  unable to stand. Her friend who lives in her apartment building got her  up in a chair and called the squad who brought her to the hospital.  The  patient has been hypotensive since been seen in emergency room and on  the floor. She does have a history of diabetes mellitus type 2 with no  history of underlying kidney disease. She has hypertension, bipolar  disorder and PTSD from an injury of her right leg causing her to have a  right below-the-knee amputation many years ago. At the time of her  admission to the hospital, the patient having had previous normal renal  function, now has a serum creatinine of 3.26, GFR of 14 that is slightly  improved since admission to a creatinine of 2.7 and GFR of 17 with  hydration. Her initial hyperkalemia has improved. She denies any  nonsteroidal anti-inflammatory medication use. She denies any new  antibiotic or new therapies of recent initiation. PAST MEDICAL HISTORY:  Diabetes, hypertension, migraines, PTSD, and  bipolar disorder. PAST SURGICAL HISTORY:  Right below-the-knee amputation,  cholecystectomy, appendectomy. FAMILY HISTORY:  Noncontributory. HABITS:  No smoking or alcohol use. MEDICATIONS:  At time of her admission; Toprol, Fosamax, Wellbutrin,  Flonase, Zoloft, Topamax, Zestril, Klonopin, Lamictal, Seroquel.     ALLERGIES TO MEDICATIONS:  VANCOMYCIN, MORPHINE, IVP DYE. REVIEW OF SYSTEMS:  Noncontributory. PHYSICAL EXAMINATION:  VITAL SIGNS:  Height 5 feet 8 inches, 199 inches pounds. Blood pressure  is 100/50, heart rate 60, respirations 20, afebrile. HEENT:  Normocephalic. Pupils equal and reactive to light. Extraocular  muscles aer intact. NECK:  Supple. No JVD, bruits or adenopathy. CHEST:  Lungs are clear. CARDIOVASCULAR:  Heart is regular without murmurs, gallops, clicks or  rubs. ABDOMEN:  Soft. No guarding or rigidity. Bowel sounds are present. EXTREMITIES:  Show the patient have right below-the-knee amputation. Left leg shows no edema. Skin is warm and dry. No cyanosis. IMPRESSION:  1. NAYELY with prior normal renal function six months ago related to  hypotension and dehydration, rule out rhabdomyolysis due to having been  lying on the floor for nine hours straight. 2.  Diabetes mellitus type 2 20 years ago started, no retinopathy,  suspected UTI. 3.  Remote BKA. 4.  Hypertension. 5.  Persistent hypotension, asymptomatic, alert in no distress. PLAN:  Start saline. Check urine for myoglobin and albumin. No  hydronephrosis on ultrasound. Check CPK.  _____ for 9 hours. Follow  BMPs.         Sara Body, DO    D: 07/10/2022 8:39:47       T: 07/10/2022 8:43:08     [email or web address]  Job#: [id number]     Doc#: [id number]    CC:

## 2022-07-10 NOTE — CARE COORDINATION
[facility] [place] Case Management Initial Discharge Assessment    Met with Patient to discuss discharge plan. PCP: Erick Lew DO                                Date of Last Visit: IN MAY 2022    [facility] Patient: No        [facility] Notified: no    If no PCP, list provided? N/A    Discharge Planning    Living Arrangements: independently at home    Who do you live with? ALONE     Who helps you with your care:  self    If lives at home:     Do you have any barriers navigating in your home? no    Patient can perform ADL? Yes    Current Services (outpatient and in home) :  None    Dialysis: No    Is transportation available to get to your appointments? Yes FRIEND     DME Equipment:  yes - [facility], [employer]     Respiratory equipment: None    Respiratory provider:  no     Pharmacy:  yes - [facility]     Consult with Medication Assistance Program?  No      Patient agreeable to Lillianairis Babin? Declined    Patient agreeable to SNF/Rehab? Yes, Company WOULD LIKE REHAB IF INDICATED [facility] VS [facility], VS OP PT     Other discharge needs identified? N/A    Does Patient Have a High-Risk for Readmission Diagnosis (CHF, PN, MI, COPD)? No      Initial Discharge Plan? MET W/ PT TO DISCUSS DISCHARGE PLAN AND ASSESS NEEDS. PT IS FROM HOME ALONE WITH HER PET CAT. PT HAS DME. PT FEELS THAT SHE WOULD BENEFIT FROM CONTINUED THERAPY AT DISCHARGE D/T INCREASED LEG WEAKNESS. FOC PROVIDED. PT WOULD LIKE REHAB VS OP PT IF INDICATED. CM TO FOLLOW HOSPITAL COURSE. PT IS INDEP AT BASELINE. PT WOULD ALSO BENEFIT FROM OBTAINING LIFE ALERT. (Note: please see concurrent daily documentation for any updates after initial note).       Readmission Risk              Risk of Unplanned Readmission:  19         Electronically signed by Horacio Smith RN on 7/10/2022 at 12:07 PM

## 2022-07-10 NOTE — PROGRESS NOTES
[address] [facility] Dose Adjustment Per Protocol:  Zosyn Extended Interval Interchange      Kenyon Landa is a 66 y.o. female. The following ordered dose of Zosyn has been changed to optimize its pharmacodynamic profile per [facility] pharmacy policy approved by [facility]. Recent Labs     07/09/22  0115 07/09/22  1417 07/10/22  0550   CREATININE 3.26* 2.77*  --    WBC 6.7  --  3.8*     . Height: 5' 8\" (172.7 cm), Weight: 199 lb (90.3 kg), Body mass index is 30.26 kg/m². Estimated Creatinine Clearance: 22 mL/min (A) (based on SCr of 2.77 mg/dL (H)). Medication Ordered:   Traditional Dosing   [] Zosyn 2.25 gm q6-8 hr   [x] Zosyn 3.375 gm q6-8 hr   [] Zosyn 4.5 gm q6-8 hr   [] Zosyn 2.25 gm q6-8 hr   [] Zosyn 3.375 gm q6-8 hr   [] Zosyn 4.5 gm q6-8 hr     New Dose      Piperacillin/Tazobactam - Extended Infusion Dosing (4-hour infusion) - Preferred Dosing Strategy       Renal Function (CrCl mL/min)  ? 20  < 20, HD, PD  CRRT       Maintenance dosing for all indications except as outlined below  [x] 3375mg q8h  [] 3375mg q12h  [] 3375mg q8h    Febrile neutropenia, Cystic fibrosis, BMI > 40*, local Pseudomonas susceptibility < 80% (refer to page 3 for indications)     [] 4500mg q8h  [] 4500 q12h  [] 4500mg q8h    *Consider 3375mg q8h for indication of Urinary tract infections in BMI > 40. Adjust for decreased renal function.            Thank Julio To, [facility]  7/10/2022 9:25 AM

## 2022-07-10 NOTE — CONSULTS
Subjective:      Patient ID: Carola Adkins is a 66 y.o. female who presents today for fall. HPI 66 right-handed female who fell out of her wheelchair and landed on her gluteal area. We were consulted for dizziness which appears to be orthostatic dizziness with very low blood pressures. Patient does have diabetes but not on insulin. She is amputee in the right leg and mostly nonambulatory except when she is able to walk with a brace and walker. She is very dizzy upon sitting up. She denies any nausea vomiting. She does have migraine headaches which are treated with botulinum toxin injection by Dr. Jeff Kaye  Patient denies any numbness in her upper extremities. Review of Systems   Musculoskeletal: Positive for arthralgias and gait problem. Neurological: Positive for dizziness.        Past Medical History:   Diagnosis Date    Anxiety     [facility]     DM (diabetes mellitus) [facility] [facility]     Hypertension     Migraine     PTSD (post-traumatic stress disorder)     TIA (transient ischemic attack)     x 3     Past Surgical History:   Procedure Laterality Date    APPENDECTOMY      CHOLECYSTECTOMY      LEG AMPUTATION BELOW KNEE Bilateral     NASAL FRACTURE SURGERY N/A 4/25/2019    CLOSED REDUCTON EXTERNAL FIXATION OF NASAL BONE FRACTURE WITH SEVERE DNS (DEVIATED NASAL SEPTUM)  ROOM 190 [facility]     Socioeconomic History    Marital status: Single     Spouse name: Not on file    Number of children: Not on file    Years of education: Not on file    Highest education level: Not on file   Occupational History    Not on file   Tobacco Use    Smoking status: Never Smoker    Smokeless tobacco: Never Used   Substance and Sexual Activity    Alcohol use: No    Drug use: No    Sexual activity: Not on file   Other Topics Concern    Not on file   Social History Narrative    Not on file     Social Determinants of Health     Financial Resource Strain:     Difficulty of Paying Living Expenses: Not on file   Food Insecurity:     Worried About Running Out of Food in the Last Year: Not on file    Chandrika of Food in the Last Year: Not on file   Transportation Needs:     Lack of Transportation (Medical): Not on file    Lack of Transportation (Non-Medical):  Not on file   Physical Activity:     Days of Exercise per Week: Not on file    Minutes of Exercise per Session: Not on file   Stress:     Feeling of Stress : Not on file   Social Connections:     Frequency of Communication with Friends and Family: Not on file    Frequency of Social Gatherings with Friends and Family: Not on file    Attends Gnosticist Services: Not on file    Active Member of [employer] or Organizations: Not on file    Attends Club or Organization Meetings: Not on file    Marital Status: Not on file   Intimate Partner Violence:     Fear of Current or Ex-Partner: Not on file    Emotionally Abused: Not on file    Physically Abused: Not on file    Sexually Abused: Not on file   Housing Stability:     Unable to Pay for Housing in the Last Year: Not on file    Number of [place] in the Last Year: Not on file    Unstable Housing in the Last Year: Not on file     Family History   Problem Relation Age of Onset    Cancer Mother         breast    Heart Attack Father      Allergies   Allergen Reactions    Iv Dye [Iodides]     Vancomycin     Morphine Rash     Current Facility-Administered Medications   Medication Dose Route Frequency Provider Last Rate Last Admin    0.9 % sodium chloride infusion   IntraVENous Continuous Thresa DO Raghavendra   Held at 07/10/22 0833    piperacillin-tazobactam (ZOSYN) 3,375 mg in dextrose 5 % 50 mL IVPB (mini-bag)  3,375 mg IntraVENous q8h Ivon Varma MD 12.5 mL/hr at 07/10/22 1007 3,375 mg at 07/10/22 1007    enoxaparin Sodium (LOVENOX) injection 30 mg  30 mg SubCUTAneous Daily Deejay John DO   30 mg at 07/10/22 0835    ondansetron (ZOFRAN-ODT) disintegrating tablet 4 mg  4 mg Oral Q8H PRN Franca Mccabe Sedar, DO        Or    ondansetron [facility]) injection 4 mg  4 mg IntraVENous Q6H PRN Franca Mccabe Sedar, DO        polyethylene glycol (GLYCOLAX) packet 17 g  17 g Oral Daily PRN Franca Mccabe Sedar, DO        acetaminophen (TYLENOL) tablet 650 mg  650 mg Oral Q6H PRN Franca Mccabe Sedar, DO   650 mg at 07/10/22 9065    Or    acetaminophen (TYLENOL) suppository 650 mg  650 mg Rectal Q6H PRN Franca Mccabe Sedar, DO        hydrALAZINE (APRESOLINE) injection 10 mg  10 mg IntraVENous Q4H PRN Franca Mccabe Sedar, DO        glucose chewable tablet 16 g  4 tablet Oral PRN Franca Mccabe Sedar, DO        dextrose bolus 10% 125 mL  125 mL IntraVENous PRN Franca Mccabe Sedar, DO        Or    dextrose bolus 10% 250 mL  250 mL IntraVENous PRN Franca Mccabe Sedar, DO        glucagon (rDNA) injection 1 mg  1 mg IntraMUSCular PRN Franca Mccabe Sedar, DO        dextrose 5 % solution  100 mL/hr IntraVENous PRN Franca Mccabe Sedar, DO        insulin lispro (HUMALOG) injection vial 0-6 Units  0-6 Units SubCUTAneous TID  Deejay D Sedar, DO        insulin lispro (HUMALOG) injection vial 0-3 Units  0-3 Units SubCUTAneous Nightly Franca Mccabe Sedar, DO        buPROPion [facility]) tablet 75 mg  75 mg Oral BID Karen Lopez MD   75 mg at 07/09/22 2106    lamoTRIgine (LAMICTAL) tablet 100 mg  100 mg Oral BID Karen Lopez MD   100 mg at 07/09/22 2106    QUEtiapine (SEROQUEL XR) extended release tablet 150 mg  150 mg Oral Nightly Karen Lopez MD   150 mg at 07/09/22 2237    sertraline (ZOLOFT) tablet 100 mg  100 mg Oral BID Karen Lpoez MD   100 mg at 07/09/22 2106    0.9 % sodium chloride infusion   IntraVENous Continuous Karen Lopez  mL/hr at 07/10/22 0830 New Bag at 07/10/22 0830    clonazePAM (KLONOPIN) tablet 2 mg  2 mg Oral Q12H PRN Karen Lopez MD   2 mg at 07/09/22 2114        Objective:   BP (!) 111/51   Pulse 62   Temp 97.9 °F (36.6 °C) (Oral)   Resp 18   Ht 5' 8\" (1.727 m)   Wt 199 lb (90.3 kg) SpO2 99%   BMI 30.26 kg/m²     Physical Exam  Neurological:      Comments: Patient is in distress she sits up or stands. Otherwise in bed she is not in distress she does not have any postural dizziness. No nystagmus is notable. Strength is 4+ to 5 in the upper extremity with MPT in the lower EXTR with areflexia. Gait is deferred         XR HAND LEFT (MIN 3 VIEWS)    Result Date: 7/9/2022  XR HAND LEFT (MIN 3 VIEWS) : 7/9/2022 CLINICAL HISTORY: Pain after fall. COMPARISON: None available. TECHNIQUE: PA, lateral, and oblique radiographs of the left hand were obtained. FINDINGS: Mild deformity of the medial base of the left fourth proximal phalanx is probably chronic rather than a nondisplaced fracture. There is no other fracture, dislocation, radiodense foreign bodies, worrisome bone destruction, or pathologic calcifications identified. The visualized joint spaces are intact, with mild to moderate osteoarthritic changes predominantly at the first carpometacarpal and fifth DIP joints. NO SUSPECTED DISPLACED FRACTURE OR SIGNIFICANT POSTTRAUMATIC COMPLICATION IDENTIFIED. CT HEAD WO CONTRAST    Result Date: 7/9/2022  CT HEAD WO CONTRAST, CT CERVICAL SPINE WO CONTRAST: 7/9/2022 CLINICAL HISTORY: Pain after falling. COMPARISON: Head and cervical spine CTs 4/24/2019. TECHNIQUE: ROUTINE All CT scans at this facility use dose modulation, iterative reconstruction, and/or weight based dosing when appropriate to reduce radiation dose to as low as reasonably achievable. HEAD CT FINDINGS: There is no intracranial hemorrhage, mass effect, midline shift, extra-axial collection, hydrocephalus, evidence of a recent ischemic infarct or skull fracture identified. Mild generalized cerebral volume loss and mild to moderate white matter changes are again noted. The mastoid air cells and visualized paranasal sinuses are essentially clear.  CERVICAL SPINE CT FINDINGS: The spine is visualized from the craniovertebral junction nearly through the T2 level. There is no fracture, dislocation, or acute paraspinous soft tissue abnormalities identified. Moderate degenerative changes of the lower levels are again noted. FINAL IMPRESSION: NO ACUTE INTRACRANIAL PROCESS, FRACTURE, OR EVIDENCE OF CERVICAL SPINE INJURY IDENTIFIED. CT CERVICAL SPINE WO CONTRAST    Result Date: 7/9/2022  CT HEAD WO CONTRAST, CT CERVICAL SPINE WO CONTRAST: 7/9/2022 CLINICAL HISTORY: Pain after falling. COMPARISON: Head and cervical spine CTs 4/24/2019. TECHNIQUE: ROUTINE All CT scans at this facility use dose modulation, iterative reconstruction, and/or weight based dosing when appropriate to reduce radiation dose to as low as reasonably achievable. HEAD CT FINDINGS: There is no intracranial hemorrhage, mass effect, midline shift, extra-axial collection, hydrocephalus, evidence of a recent ischemic infarct or skull fracture identified. Mild generalized cerebral volume loss and mild to moderate white matter changes are again noted. The mastoid air cells and visualized paranasal sinuses are essentially clear. CERVICAL SPINE CT FINDINGS: The spine is visualized from the craniovertebral junction nearly through the T2 level. There is no fracture, dislocation, or acute paraspinous soft tissue abnormalities identified. Moderate degenerative changes of the lower levels are again noted. FINAL IMPRESSION: NO ACUTE INTRACRANIAL PROCESS, FRACTURE, OR EVIDENCE OF CERVICAL SPINE INJURY IDENTIFIED. CT LUMBAR SPINE WO CONTRAST    Result Date: 7/9/2022  CT LUMBAR SPINE WO CONTRAST : 7/9/2022 CLINICAL HISTORY:Pain after falling. COMPARISON: Lumbar spine MRI 9/8/2021. TECHNIQUE: ROUTINE. All CT scans at this facility use dose modulation, iterative reconstruction, and/or weight based dosing when appropriate to reduce radiation dose to as low as reasonably achievable. FINDINGS: The spine is visualized from the T7-8 through the S3 levels.  There is no acute fracture, dislocation, evidence of instability, acute paraspinous soft tissue abnormalities, or significant change from 9/8/2021 identified. Osteopenia, mild to moderate Rotary dextroscoliosis, moderately extensive degenerative changes, and mild chronic compression fractures of T12-L2 with previous vertebroplasties. NO ACUTE FRACTURE OR SIGNIFICANT POSTTRAUMATIC COMPLICATION IDENTIFIED. CHRONIC AND DEGENERATIVE CHANGES, AS NOTED. US RETROPERITONEAL LIMITED    Result Date: 7/10/2022  US RETROPERITONEAL LIMITED : 7/9/2022 CLINICAL HISTORY: NAYELY. COMPARISON: Renal and renal artery ultrasound 10/17/2018, lumbar spine MRI 9/8/2021 and lumbar spine CT 7/9/2022. TECHNIQUE:  Transabdominal ultrasound of the kidneys was performed. FINDINGS: The study is mild to moderately limited by the patient's body habitus. Both kidneys appear normal in size, position and morphology without significantly increased echogenicity. 3 approximately 1 cm simple cysts are noted within the left kidney. There is no hydronephrosis, abnormal perinephric collections, visualized nephrolithiasis, or solid renal masses. The right kidney measures approximately 10.0 x 4.7 x 4.3 cm. The left kidney measures approximately 11.1 x 4.8 x 4.6 cm. The average renal parenchymal thickness is approximately 1 cm. ESSENTIALLY NEGATIVE LIMITED RENAL ULTRASOUND.        Lab Results   Component Value Date/Time    WBC 3.8 07/10/2022 05:50 AM    RBC 3.44 07/10/2022 05:50 AM    HGB 10.5 07/10/2022 05:50 AM    HCT 32.5 07/10/2022 05:50 AM    MCV 94.5 07/10/2022 05:50 AM    MCH 30.6 07/10/2022 05:50 AM    MCHC 32.3 07/10/2022 05:50 AM    RDW 15.9 07/10/2022 05:50 AM     07/10/2022 05:50 AM     Lab Results   Component Value Date/Time     07/10/2022 05:50 AM    K 4.7 07/10/2022 05:50 AM    K 3.2 04/27/2019 06:39 AM     07/10/2022 05:50 AM    CO2 15 07/10/2022 05:50 AM    BUN 42 07/10/2022 05:50 AM    CREATININE 1.48 07/10/2022 05:50 AM    GFRAA 41.8 07/10/2022 05:50 AM    LABGLOM 34.6 07/10/2022 05:50 AM    GLUCOSE 84 07/10/2022 05:50 AM    PROT 6.0 07/10/2022 05:50 AM    LABALBU 3.2 07/10/2022 05:50 AM    CALCIUM 7.9 07/10/2022 05:50 AM    BILITOT <0.2 07/10/2022 05:50 AM    ALKPHOS 91 07/10/2022 05:50 AM    AST 22 07/10/2022 05:50 AM    ALT 38 07/10/2022 05:50 AM     Lab Results   Component Value Date/Time    PROTIME 10.2 04/24/2019 03:45 PM    INR 1.0 04/24/2019 03:45 PM     Lab Results   Component Value Date/Time    TSH 0.983 04/26/2019 10:29 PM    QOQHBUJV70 268 04/26/2019 10:29 PM    FOLATE 16.7 04/26/2019 10:29 PM     No results found for: TRIG, HDL, LDLCALC, LDLDIRECT, LABVLDL  Lab Results   Component Value Date/Time    ETOH 188 04/24/2019 05:00 PM     No results found for: LITHIUM, DILFRTOT, VALPROATE    Assessment:   Dizziness consistent with low blood pressures. She is orthostatic. This findings are not vestibular in nature. Autonomic dysfunction is a consideration given her low blood pressures. She is receiving a bolus and if this does not help then this may be autonomic and may require midodrine. Clinically I do not appreciate any other findings. She fell on her gluteal area lumbar spine he does not have anything significant. We will follow with you. Len Kaur MD, [address], American Board of Psychiatry & Neurology  Board Certified in Vascular Neurology  Board Certified in Neuromuscular Medicine  Certified in [address] ;l        Plan:

## 2022-07-10 NOTE — PROGRESS NOTES
Physical Therapy Med Surg Initial Assessment  Facility/Department: [facility]  Room: \[facility]\""W973-59       NAME: Kristen Fink  : 1950 (1 y.o.)  MRN: [id number]  CODE STATUS: Full Code    Date of Service: 7/10/2022    Patient Diagnosis(es): Dehydration [E86.0]  Acute renal injury [facility]) [N17.9]   Chief Complaint   Patient presents with    Fall     felll x 3 times today,     Back Pain     back pain, neck pain, bilateral leg pain after falling 3 times today     Patient Active Problem List    Diagnosis Date Noted    Dehydration 2022    Head injury     Syncope and collapse 2019    Hypertensive urgency 10/16/2018    Hyperglycemia 10/16/2018    Chest tightness or pressure 10/16/2018    Visual disturbance, subjective 10/16/2018    Acute sore throat 10/16/2018    DM [facility]diabetes mellitus) [facility] [address]     Bipolar affective disorder ([facility] [address]         Past Medical History:   Diagnosis Date    Anxiety     Bipolar affective disorder ([facility] [address]     DM (diabetes mellitus) ([facility] [address]     Hypertension     Migraine     PTSD (post-traumatic stress disorder)     TIA (transient ischemic attack)     x 3     Past Surgical History:   Procedure Laterality Date    APPENDECTOMY      CHOLECYSTECTOMY      LEG AMPUTATION BELOW KNEE Bilateral     NASAL FRACTURE SURGERY N/A 2019    CLOSED REDUCTON EXTERNAL FIXATION OF NASAL BONE FRACTURE WITH SEVERE DNS (DEVIATED NASAL SEPTUM)  ROOM 190 performed by Polly G[facility]mez MD at [facility]       Patient assessed for rehabilitation services?: Yes  Family / Caregiver Present: No    Restrictions:  Restrictions/Precautions: Fall Risk (high fall risk)  Position Activity Restriction  Other position/activity restrictions: R prostetic     SUBJECTIVE:   Pain  Pain: 7/10 back- pain since recent fall at home    Prior Level of Function:  Social/Functional History  Lives With: Alone  Type of Home: Apartment  Home Layout: One level  Home Access: Level entry  Bathroom Shower/Tub: Tub/Shower unit,Shower chair with back  Bathroom Toilet: Standard  Bathroom Equipment: Grab bars in shower,Shower chair  Bathroom Accessibility: Accessible  Home Equipment: [place] Help From: Friend(s)  ADL Assistance: Independent  Homemaking Assistance: Independent (friend brought groceries, or has them delivered)  Homemaking Responsibilities: Yes  Ambulation Assistance: Non-ambulatory (w/c dependent, prior to COVID was ambulating)  Transfer Assistance: Independent  Active : No (does not have a car)  Occupation: Retired  IADL Comments: previously independent with IADLs, pt has assistance with groceries  Additional Comments: pt motiviated for increased independence; pt stating increased blurry vision at home and increased dizziness since fall    OBJECTIVE:   Vision  Vision: Impaired (pt reported that she was having blurry vision and was scheduled for appointment on the day she fell)  Vision Exceptions: Wears glasses for reading  Hearing: Within functional limits    Cognition:  Overall Orientation Status: Within Normal Limits  Orientation Level: Oriented X4  Follows Commands: Within Functional Limits  Overall Cognitive Status: WFL    Observation/Palpation  Posture: Good  Observation: IV R elbow, small bruise on back and L hand, R AKA with prostetic in room    ROM:  RLE AROM: WFL  RLE General AROM: R BKA  LLE AROM : WFL    Strength:  Strength RLE  Comment: grossly 3+/5  Strength LLE  Comment: grossly 3+/5    Neuro:  Balance  Sitting - Static: Good  Sitting - Dynamic: Good;- (increased sway during reaching for sock)  Standing - Static:  (unsafe to assess)  Standing - Dynamic:  (unsafe to assess)    Sensation: Intact    Bed mobility  Rolling to Left: Stand by assistance  Supine to Sit: Stand by assistance  Sit to Supine: Stand by assistance  Bed Mobility Comments: HOB elevated; increased time and effort due to increased back pain    Transfers  Comment: unsafe to assess due to pt c/o dizziness in sitting    Ambulation  Comments: non-amb since 2020 at baseline    Stairs/Curb  Stairs?: No    Activity Tolerance  Activity Tolerance: Patient limited by pain; Patient limited by fatigue;Patient limited by endurance  Activity Tolerance Comments: pt limited by dizziness, weakness, and balance deficits    Patient Education  Education Given To: Patient  Education Provided: Role of Therapy;Plan of Care (general safety)  Education Method: Verbal  Education Outcome: Continued education needed       ASSESSMENT:   Body Structures, Functions, Activity Limitations Requiring Skilled Therapeutic Intervention: Decreased functional mobility ; Decreased ROM; Decreased strength;Decreased endurance;Decreased balance; Increased pain;Decreased posture  Decision Making: High Complexity  History: high  Exam: high  Clinical Presentation: high    Therapy Prognosis: Good    DISCHARGE RECOMMENDATIONS:  Discharge Recommendations: Continue to assess pending progress    Assessment: Pt is 66 y.o. female to hospital due to 3 falls at home. Pt has h/o R BKA. Pt exhibits decreased strength, balance, and activity tolerance with increased c/o dizziness with OOB activity. Continued PT is required for safe d/c home alone. Requires PT Follow-Up: Yes      PLAN OF CARE:  Plan  Plan: 1 time a day 3-6 times a week  Current Treatment Recommendations: Strengthening,ROM,Balance training,Functional mobility training,Transfer training,Wheelchair mobility training,Gait training,Neuromuscular re-education,Manual Therapy - Soft Tissue Mobilization,Pain management,Home exercise program,Safety education & training,Patient/Caregiver education & training,Equipment evaluation, education, & procurement,Modalities,Therapeutic activities    Safety Devices  Type of Devices: Bed alarm in place,Call light within reach,Left in bed    Goals:  Long Term Goals  Long term goal 1: Pt will demonstrate bed mobility indep.   Long term goal 2: Pt will demonstrate transfers mod indep  Long term goal 3: Pt will demonstrate amb 10ft in parallel bars mod A  Long term goal 4: Pt will demonstrate w/c mobility >/= 150ft indep    AMPAC (6 CLICK) BASIC MOBILITY  AM-PAC Inpatient Mobility Raw Score : 9     Therapy Time:   Individual   Time In 0933   Time Out 0950   Minutes [id number] [place], [place], 07/10/22 at 10:18 AM         Definitions for assistance levels  Independent = pt does not require any physical supervision or assistance from another person for activity completion. Device may be needed.   Stand by assistance = pt requires verbal cues or instructions from another person, close to but not touching, to perform the activity  Minimal assistance= pt performs 75% or more of the activity; assistance is required to complete the activity  Moderate assistance= pt performs 50% of the activity; assistance is required to complete the activity  Maximal assistance = pt performs 25% of the activity; assistance is required to complete the activity  Dependent = pt requires total physical assistance to accomplish the task

## 2022-07-10 NOTE — CONSULTS
Consult Note  Patient: Ellie Agarwal  Unit/Bed: R175/A253-51  YOB: 1950  MRN: [id number]  Acct: [de-identified]   Admitting Diagnosis: Dehydration [E86.0]  Acute renal injury [facility]) [N17.9]  Date:  7/9/2022  Hospital Day: 1    Complaint:  Left hand pain    History of Present Illness:  70-year-old female multiple medical issues admitted for renal failure hypotension UTI was complaining of some left-sided hand pain without injury. Had some x-rays yesterday apparently showed questionable old versus new fracture Ortho consulted for evaluation.     PMHx:  Past Medical History:   Diagnosis Date    Anxiety     [facility]     DM [facility]diabetes mellitus) ([facility]     Hypertension     Migraine     PTSD (post-traumatic stress disorder)     TIA (transient ischemic attack)     x 3       PSHx:  Past Surgical History:   Procedure Laterality Date    APPENDECTOMY      CHOLECYSTECTOMY      LEG AMPUTATION BELOW KNEE Bilateral     NASAL FRACTURE SURGERY N/A 4/25/2019    CLOSED REDUCTON EXTERNAL FIXATION OF NASAL BONE FRACTURE WITH SEVERE DNS (DEVIATED NASAL SEPTUM)  ROOM 190 performed by Abbey Ojeda MD at [place] [address] Hx:  Social History     Socioeconomic History    Marital status: Single     Spouse name: None    Number of children: None    Years of education: None    Highest education level: None   Occupational History    None   Tobacco Use    Smoking status: Never Smoker    Smokeless tobacco: Never Used   Substance and Sexual Activity    Alcohol use: No    Drug use: No    Sexual activity: None   Other Topics Concern    None   Social History Narrative    None     Social Determinants of Health     Financial Resource Strain:     Difficulty of Paying Living Expenses: Not on file   Food Insecurity:     Worried About Running Out of Food in the Last Year: Not on file    Chandrika of Food in the Last Year: Not on file   Transportation Needs:     Lack of Transportation (Medical): Not on file    Lack of Transportation (Non-Medical): Not on file   Physical Activity:     Days of Exercise per Week: Not on file    Minutes of Exercise per Session: Not on file   Stress:     Feeling of Stress : Not on file   Social Connections:     Frequency of Communication with Friends and Family: Not on file    Frequency of Social Gatherings with Friends and Family: Not on file    Attends Muslim Services: Not on file    Active Member of [employer] or Organizations: Not on file    Attends Club or Organization Meetings: Not on file    Marital Status: Not on file   Intimate Partner Violence:     Fear of Current or Ex-Partner: Not on file    Emotionally Abused: Not on file    Physically Abused: Not on file    Sexually Abused: Not on file   Housing Stability:     Unable to Pay for Housing in the Last Year: Not on file    Number of [place] in the Last Year: Not on file    Unstable Housing in the Last Year: Not on file       Family Hx:  Family History   Problem Relation Age of Onset    Cancer Mother         breast    Heart Attack Father        Review of Systems:   Review of Systems  See history and physical which is reviewed    Physical Examination:    BP (!) 104/43   Pulse 61   Temp 97.7 °F (36.5 °C)   Resp 20   Ht 5' 8\" (1.727 m)   Wt 199 lb (90.3 kg)   SpO2 98%   BMI 30.26 kg/m²    Physical Exam  Left hand has arthritic changes in the wrist and hand.   Full range of motion neurologically intact minimal swelling brisk capillary refill no obvious deformity mild tenderness the base of the second third and fourth metacarpal phalangeal joint good  strength Tinel and Phalen sign is negative  LABS:  CBC:   Lab Results   Component Value Date/Time    WBC 3.8 07/10/2022 05:50 AM    RBC 3.44 07/10/2022 05:50 AM    HGB 10.5 07/10/2022 05:50 AM    HCT 32.5 07/10/2022 05:50 AM    MCV 94.5 07/10/2022 05:50 AM    MCH 30.6 07/10/2022 05:50 AM    MCHC 32.3 07/10/2022 05:50 AM    RDW 15.9 07/10/2022 05:50 AM     07/10/2022 05:50 AM     CBC with Differential:    Lab Results   Component Value Date/Time    WBC 3.8 07/10/2022 05:50 AM    RBC 3.44 07/10/2022 05:50 AM    HGB 10.5 07/10/2022 05:50 AM    HCT 32.5 07/10/2022 05:50 AM     07/10/2022 05:50 AM    MCV 94.5 07/10/2022 05:50 AM    MCH 30.6 07/10/2022 05:50 AM    MCHC 32.3 07/10/2022 05:50 AM    RDW 15.9 07/10/2022 05:50 AM    LYMPHOPCT 15.0 07/09/2022 01:15 AM    MONOPCT 6.5 07/09/2022 01:15 AM    BASOPCT 1.0 07/09/2022 01:15 AM    MONOSABS 0.5 07/09/2022 01:15 AM    LYMPHSABS 1.0 07/09/2022 01:15 AM    EOSABS 0.0 07/09/2022 01:15 AM    BASOSABS 0.0 07/09/2022 01:15 AM     CMP:    Lab Results   Component Value Date/Time     07/09/2022 02:17 PM    K 4.4 07/09/2022 02:17 PM    K 3.2 04/27/2019 06:39 AM     07/09/2022 02:17 PM    CO2 19 07/09/2022 02:17 PM    BUN 51 07/09/2022 02:17 PM    CREATININE 2.77 07/09/2022 02:17 PM    GFRAA 20.3 07/09/2022 02:17 PM    LABGLOM 16.8 07/09/2022 02:17 PM    GLUCOSE 120 07/09/2022 02:17 PM    PROT 7.4 07/09/2022 01:15 AM    LABALBU 4.0 07/09/2022 02:17 PM    CALCIUM 8.5 07/09/2022 02:17 PM    BILITOT 0.3 07/09/2022 01:15 AM    ALKPHOS 112 07/09/2022 01:15 AM    AST 90 07/09/2022 01:15 AM    ALT 78 07/09/2022 01:15 AM     BMP:    Lab Results   Component Value Date/Time     07/09/2022 02:17 PM    K 4.4 07/09/2022 02:17 PM    K 3.2 04/27/2019 06:39 AM     07/09/2022 02:17 PM    CO2 19 07/09/2022 02:17 PM    BUN 51 07/09/2022 02:17 PM    LABALBU 4.0 07/09/2022 02:17 PM    CREATININE 2.77 07/09/2022 02:17 PM    CALCIUM 8.5 07/09/2022 02:17 PM    GFRAA 20.3 07/09/2022 02:17 PM    LABGLOM 16.8 07/09/2022 02:17 PM    GLUCOSE 120 07/09/2022 02:17 PM     Magnesium:  Lab Results   Component Value Date/Time    MG 1.9 04/27/2019 06:39 AM     Troponin:    Lab Results   Component Value Date/Time    TROPONINI <0.010 04/24/2019 10:43 PM           Assessment:    Active Hospital Problems    Diagnosis Date Noted    Dehydration [E86.0] 07/09/2022     Priority: Medium     X-rays are reviewed which show likely old fracture base of the metacarpal diffuse arthritic changes no acute abnormality    Plan:  1. Chronic appearing fracture base of the fourth proximal phalanx at the metacarpal phalangeal joint  2. Nonsurgical management symptomatic with ice gentle range of motion  3.  Follow-up with Ortho as outpatient as needed            Electronically signed by Lyn Hahn MD on 7/10/2022 at 7:40 AM no

## 2022-07-10 NOTE — CONSULTS
Renal consult    NAYELY related to hypotension  R/o rhabdo  DM type-2 20 years no retinopathy  Suspected UTI  Right BKA remote   Hypertension   Present hypotension asymptomatic alert no distress    Plan  Start saline  Check urine myoglobin/and albumin  No hydro on ultrasound  Check CPK (laid ground 9 hours)  Bmp daily

## 2022-07-10 NOTE — PROGRESS NOTES
Patient assessed and charted on by this RN. BP 90/39. Dr. Oleg Boyle notified. Patient A&Ox4. Still states lightheaded and some blurred vision. Fall precautions are in place. Call light within reach. Will continue to monitor. 1230 - 111/51 BP after bolus, patient states not dizzy at all. Still has some blurriness in vision but much improved.

## 2022-07-10 NOTE — PROGRESS NOTES
[facility] OCCUPATIONAL THERAPY EVALUATION - ACUTE     NAME: Kristen Fink  : 1950 (66 y.o.)  MRN: [id number]  CODE STATUS: Full Code  Room: G527/K185-47    Date of Service: 7/10/2022    Patient Diagnosis(es): Dehydration [E86.0]  Acute renal injury [facility]) [N17.9]   Patient Active Problem List    Diagnosis Date Noted    Dehydration 2022    Head injury     Syncope and collapse 2019    Hypertensive urgency 10/16/2018    Hyperglycemia 10/16/2018    Chest tightness or pressure 10/16/2018    Visual disturbance, subjective 10/16/2018    Acute sore throat 10/16/2018    [facility]     Bipolar affective disorder ([facility]         Past Medical History:   Diagnosis Date    Anxiety     [facility]ipolar affective disorder [facility]     DM (diabetes mellitus) ([facility]     Hypertension     Migraine     PTSD (post-traumatic stress disorder)     TIA (transient ischemic attack)     x 3     Past Surgical History:   Procedure Laterality Date    APPENDECTOMY      CHOLECYSTECTOMY      LEG AMPUTATION BELOW KNEE Bilateral     NASAL FRACTURE SURGERY N/A 2019    CLOSED REDUCTON EXTERNAL FIXATION OF NASAL BONE FRACTURE WITH SEVERE DNS (DEVIATED NASAL SEPTUM)  ROOM 190 performed by Polly G[facility]mez MD at [facility]        Restrictions  Restrictions/Precautions: Fall Risk (high fall risk)         Other position/activity restrictions: R prostetic    Safety Devices: Safety Devices  Type of Devices:  All fall risk precautions in place     Patient's date of birth confirmed: Yes    General:  Chart Reviewed: Yes  Patient assessed for rehabilitation services?: Yes  Family / Caregiver Present: No    Subjective  Subjective: \"I really would like to get set up with therapy\"       Pain at start of treatment: Yes: 10    Pain at end of treatment: Yes: 5/10    Location: back  Nursing notified: Declined  RN: Armida Gonzalez   Intervention: Other: repositioned, rest     Prior Level of Function:  Social/Functional History  Lives With: Alone  Type of Home: Apartment  Home Layout: One level  Home Access: Level entry  Bathroom Shower/Tub: Tub/Shower unit,Shower chair with back  Bathroom Toilet: Standard  Bathroom Equipment: Grab bars in shower,Shower chair  Bathroom Accessibility: Accessible  Home Equipment: Reacher,Wheelchair-manual  Has the patient had two or more falls in the past year or any fall with injury in the past year?: Yes (3 recent)  Receives Help From: Friend(s)  ADL Assistance: Independent  Homemaking Assistance: Independent (friend brought groceries, or has them delivered)  Homemaking Responsibilities: Yes  Ambulation Assistance: Non-ambulatory (w/c dependent, prior to COVID was ambulating)  Transfer Assistance: Independent  Active : No (does not have a car)  Occupation: Retired  IADL Comments: previously independent with IADLs, pt has assistance with groceries  Additional Comments: pt motiviated for increased independence; pt stating increased blurry vision at home and increased dizziness since fall    OBJECTIVE:     Orientation Status:  Orientation  Overall Orientation Status: Within Normal Limits  Orientation Level: Oriented X4    Observation:  Observation/Palpation  Posture: Good  Observation: IV R elbow, small bruise on back and L hand, R AKA with prostetic in room    Cognition Status:  Cognition  Overall Cognitive Status: WFL    Perception Status:  Perception  Overall Perceptual Status: WFL    Vision and Hearing Status:  Vision  Vision Exceptions: Wears glasses for reading  Hearing  Hearing: Within functional limits        GROSS ASSESSMENT AROM/PROM:  AROM: Within functional limits  PROM: Within functional limits    ROM:   LUE AROM (degrees)  LUE AROM : WFL  Left Hand AROM (degrees)  Left Hand AROM: WFL  RUE AROM (degrees)  RUE AROM : WFL  Right Hand AROM (degrees)  Right Hand AROM: WFL    UE STRENGTH:  Strength: Generally decreased, functional    UE COORDINATION:  Coordination: Generally decreased, functional    UE TONE:  Tone: Normal    UE SENSATION:  Sensation: Intact    Hand Dominance:  Hand Dominance  Hand Dominance: Left    ADL Status:  ADL  Feeding: Setup  Grooming: Setup  UE Bathing: Minimal assistance  UE Bathing Skilled Clinical Factors: dizzy/ lightheaded and back pain limited sitting balance and endurance  LE Bathing: Moderate assistance  LE Bathing Skilled Clinical Factors: assist to wash feet d/t back pain  UE Dressing: Minimal assistance  UE Dressing Skilled Clinical Factors: dizzy/ lightheaded and back pain limited sitting balance and endurance  LE Dressing: Moderate assistance  LE Dressing Skilled Clinical Factors: assist d/t pain  Toileting: Unable to assess(comment)  Toileting Skilled Clinical Factors: declined need  Toilet Transfers  Toilet Transfer: Unable to assess  Toilet Transfers Comments: NT d/t pain and dizziness  Tub Transfers  Tub Transfers: Not tested    Functional Mobility:  Patient ambulated NT due to dizziness and pain      Bed Mobility  Bed mobility  Rolling to Left: Stand by assistance  Supine to Sit: Stand by assistance  Sit to Supine: Stand by assistance  Scooting:  Moderate assistance    Seated and Standing Balance:  Balance  Sitting: Impaired  Sitting - Static: Fair (occasional) (limited d/t pain and dizziness)  Sitting - Dynamic: Fair (occasional) (limited d/t pain and dizziness)  Standing:  (NT d/t pain and dizziness)    Functional Endurance:  Activity Tolerance  Activity Tolerance: Patient limited by pain    D/C Recommendations:  OT D/C RECOMMENDATIONS  REQUIRES OT FOLLOW-UP: Yes  Type: Acute Rehab    Equipment Recommendations:  OT Equipment Recommendations  Other: continue to assess- may benefit from reacher and sock aid    OT Education:   Patient Education  Education Given To: Patient  Education Provided: Role of Therapy;Plan of Care  Education Method: Verbal  Barriers to Learning: None  Education Outcome: Verbalized understanding    OT Follow Up:   OT D/C RECOMMENDATIONS  REQUIRES OT FOLLOW-UP: Yes  Type: Acute Rehab       Assessment/Discharge Disposition:  Assessment: Pt is a 67 y/o female, who lives alone and relies on w/c for ambulation. Pt has R BKA with prostesis and was previously independent for transfers, ADLs, and IADLs with assist for groceries. Pt has had recent falls at home with increased dizziness. She also reported that her wheelchair breaks have not been working well. Pt reported motivation for therapy and increasing strength and independence.   She may benefit from skilled OT intervention for increased independence and safety with ADLs, IADLs, and functional transfers to decrease the burden of care upon d/c.  Performance deficits / Impairments: Decreased functional mobility ,Decreased balance,Decreased coordination,Decreased ADL status,Decreased posture,Decreased endurance,Decreased high-level IADLs,Decreased strength  Prognosis: Good  Discharge Recommendations: IP Rehab  Decision Making: Medium Complexity  History: multi comorbidities  Exam: 8 deficits  Assistance / Modification: MOD A    [facility] Self care Score   How much help for putting on and taking off regular lower body clothing?: A Little  How much help for Bathing?: A Little  How much help for Toileting?: A Little  How much help for putting on and taking off regular upper body clothing?: None  How much help for taking care of personal grooming?: None  How much help for eating meals?: None  AM-[facility] Inpatient Daily Activity Raw Score: 21  AM-PAC Inpatient ADL T-Scale Score : 44.27  ADL Inpatient CMS 0-100% Score: 32.79    Therapy key for assistance levels -   Independent/Mod I = Pt. is able to perform task with no assistance but may require a device   Stand by assistance = Pt. does not perform task at an independent level but does not need physical assistance, requires verbal cues  Minimal, Moderate, Maximal Assistance = Pt. requires physical assistance (25%, 50%, 75% assist from helper) for task but is able to actively participate in task   Dependent = Pt. requires total assistance with task and is not able to actively participate with task completion     Plan:  Plan  Times per Week: 1-3x/week  Times per Day: Daily  Plan Weeks: duration of acute stay  Current Treatment Recommendations: Strengthening,Patient/Caregiver education & training,Balance training,Functional mobility training,Endurance training,Wheelchair mobility training,Equipment evaluation, education, & procurement,Self-Care / ADL,Home management training    Goals:   Patient will:    - Improve functional endurance to tolerate/complete 30 mins of ADL's  - Be MOD I in UB ADLs   - Be MOD I  in LB ADLs  - Be MOD I  in ADL transfers without LOB  - Be MOD I  in toileting tasks  - Improve B UE strength and endurance to [facility] in order to participate in self-care activities as projected. Patient Goal: Patient goals :  To get out of here alive     Discussed and agreed upon: Yes Comments:       Therapy Time:   Individual   Time In 0933   Time Out 0950   Minutes 17          Eval: 15 minutes     Electronically signed by:    DEAN Jones,   7/10/2022, 10:17 AM

## 2022-07-10 NOTE — PROGRESS NOTES
07/10/22    From: HOME ALONE     Admit:FREQUENT/MULT FALLS     PMH:Dm2, BIPOLAR DEPRESSION, HTN, PTSD, TIA, R BKA W/PROSTHETIC LEG, BACK SURG    Anticipated Discharge Disposition: REHAB VS OP PT     Patient Mobility or PT/OT ordered: 7/10  Consults: NEPHRO, NEURO, ORTHO    Clinical: BUN/CREAT 55/3.26> NAYELY,    Barriers to Discharge: CONSULTS, IV CEFTRIAXONE.  CT HEAD, C-SPINE, RENAL U/S    Assessments: CMI DONE

## 2022-07-11 ENCOUNTER — HOSPITAL ENCOUNTER (INPATIENT)
Age: 72
LOS: 4 days | Discharge: HOME HEALTH | DRG: 560 | End: 2022-07-15
Attending: PHYSICAL MEDICINE & REHABILITATION | Admitting: PHYSICAL MEDICINE & REHABILITATION
Payer: MEDICARE

## 2022-07-11 VITALS
BODY MASS INDEX: 30.16 KG/M2 | TEMPERATURE: 98.1 F | OXYGEN SATURATION: 100 % | SYSTOLIC BLOOD PRESSURE: 156 MMHG | HEART RATE: 67 BPM | HEIGHT: 68 IN | DIASTOLIC BLOOD PRESSURE: 54 MMHG | WEIGHT: 199 LBS | RESPIRATION RATE: 18 BRPM

## 2022-07-11 DIAGNOSIS — Z89.511 HISTORY OF BELOW-KNEE AMPUTATION OF RIGHT LOWER EXTREMITY (HCC): ICD-10-CM

## 2022-07-11 DIAGNOSIS — Z74.09 IMPAIRED MOBILITY AND ADLS: ICD-10-CM

## 2022-07-11 DIAGNOSIS — Z78.9 IMPAIRED MOBILITY AND ADLS: ICD-10-CM

## 2022-07-11 DIAGNOSIS — Z78.9 IMPAIRED MOBILITY AND ACTIVITIES OF DAILY LIVING: Primary | ICD-10-CM

## 2022-07-11 DIAGNOSIS — Z74.09 IMPAIRED MOBILITY AND ACTIVITIES OF DAILY LIVING: Primary | ICD-10-CM

## 2022-07-11 PROBLEM — M50.10 CERVICAL DISC DISORDER WITH RADICULOPATHY: Status: ACTIVE | Noted: 2021-09-01

## 2022-07-11 PROBLEM — E78.5 DYSLIPIDEMIA: Status: ACTIVE | Noted: 2019-01-09

## 2022-07-11 PROBLEM — R19.7 DIARRHEA IN ADULT PATIENT: Status: ACTIVE | Noted: 2022-07-11

## 2022-07-11 PROBLEM — G06.2 EPIDURAL ABSCESS: Status: ACTIVE | Noted: 2021-09-09

## 2022-07-11 PROBLEM — M79.672 LEFT FOOT PAIN: Status: ACTIVE | Noted: 2017-01-09

## 2022-07-11 PROBLEM — R79.89 LFT ELEVATION: Status: ACTIVE | Noted: 2022-07-11

## 2022-07-11 PROBLEM — E78.5 HYPERLIPIDEMIA: Status: ACTIVE | Noted: 2022-07-11

## 2022-07-11 PROBLEM — I95.9 LOW BP: Status: ACTIVE | Noted: 2022-07-11

## 2022-07-11 PROBLEM — M46.46 DISCITIS OF LUMBAR REGION: Status: ACTIVE | Noted: 2021-09-09

## 2022-07-11 PROBLEM — F43.10 PTSD (POST-TRAUMATIC STRESS DISORDER): Status: ACTIVE | Noted: 2021-09-09

## 2022-07-11 PROBLEM — M54.2 CHRONIC NECK PAIN: Status: ACTIVE | Noted: 2021-09-01

## 2022-07-11 PROBLEM — G89.29 CHRONIC NECK PAIN: Status: ACTIVE | Noted: 2021-09-01

## 2022-07-11 PROBLEM — D61.818 PANCYTOPENIA (HCC): Status: ACTIVE | Noted: 2022-07-11

## 2022-07-11 PROBLEM — R42 ORTHOSTATIC DIZZINESS: Status: ACTIVE | Noted: 2022-07-11

## 2022-07-11 PROBLEM — I65.29 STENOSIS OF CAROTID ARTERY: Status: ACTIVE | Noted: 2018-10-22

## 2022-07-11 LAB
ALBUMIN SERPL-MCNC: 3 G/DL (ref 3.5–4.6)
ALP BLD-CCNC: 81 U/L (ref 40–130)
ALT SERPL-CCNC: 26 U/L (ref 0–33)
ANION GAP SERPL CALCULATED.3IONS-SCNC: 11 MEQ/L (ref 9–15)
AST SERPL-CCNC: 13 U/L (ref 0–35)
BASOPHILS ABSOLUTE: 0 K/UL (ref 0–0.2)
BASOPHILS RELATIVE PERCENT: 0.7 %
BILIRUB SERPL-MCNC: <0.2 MG/DL (ref 0.2–0.7)
BILIRUBIN DIRECT: <0.2 MG/DL (ref 0–0.4)
BILIRUBIN, INDIRECT: ABNORMAL MG/DL (ref 0–0.6)
BUN BLDV-MCNC: 30 MG/DL (ref 8–23)
CALCIUM SERPL-MCNC: 8.2 MG/DL (ref 8.5–9.9)
CHLORIDE BLD-SCNC: 114 MEQ/L (ref 95–107)
CO2: 17 MEQ/L (ref 20–31)
CORTISOL COLLECTION INFO: NORMAL
CORTISOL: 5.1 UG/DL (ref 2.7–18.4)
CREAT SERPL-MCNC: 1.02 MG/DL (ref 0.5–0.9)
EKG ATRIAL RATE: 58 BPM
EKG P AXIS: 27 DEGREES
EKG P-R INTERVAL: 160 MS
EKG Q-T INTERVAL: 500 MS
EKG QRS DURATION: 114 MS
EKG QTC CALCULATION (BAZETT): 490 MS
EKG R AXIS: 36 DEGREES
EKG T AXIS: -11 DEGREES
EKG VENTRICULAR RATE: 58 BPM
EOSINOPHILS ABSOLUTE: 0 K/UL (ref 0–0.7)
EOSINOPHILS RELATIVE PERCENT: 1.5 %
GFR AFRICAN AMERICAN: >60
GFR NON-AFRICAN AMERICAN: 53.1
GLOBULIN: 2.9 G/DL (ref 2.3–3.5)
GLUCOSE BLD-MCNC: 80 MG/DL (ref 70–99)
GLUCOSE BLD-MCNC: 82 MG/DL (ref 70–99)
GLUCOSE BLD-MCNC: 83 MG/DL (ref 70–99)
GLUCOSE BLD-MCNC: 84 MG/DL (ref 70–99)
GLUCOSE BLD-MCNC: 87 MG/DL (ref 70–99)
GLUCOSE BLD-MCNC: 96 MG/DL (ref 70–99)
HCT VFR BLD CALC: 31.9 % (ref 37–47)
HEMOGLOBIN: 10.3 G/DL (ref 12–16)
LYMPHOCYTES ABSOLUTE: 0.9 K/UL (ref 1–4.8)
LYMPHOCYTES RELATIVE PERCENT: 35.1 %
MCH RBC QN AUTO: 30.7 PG (ref 27–31.3)
MCHC RBC AUTO-ENTMCNC: 32.4 % (ref 33–37)
MCV RBC AUTO: 94.9 FL (ref 82–100)
MONOCYTES ABSOLUTE: 0.2 K/UL (ref 0.2–0.8)
MONOCYTES RELATIVE PERCENT: 7.7 %
NEUTROPHILS ABSOLUTE: 1.4 K/UL (ref 1.4–6.5)
NEUTROPHILS RELATIVE PERCENT: 55 %
PDW BLD-RTO: 15.7 % (ref 11.5–14.5)
PERFORMED ON: NORMAL
PHOSPHORUS: 3.1 MG/DL (ref 2.3–4.8)
PLATELET # BLD: 84 K/UL (ref 130–400)
POTASSIUM SERPL-SCNC: 4.1 MEQ/L (ref 3.4–4.9)
RBC # BLD: 3.36 M/UL (ref 4.2–5.4)
SARS-COV-2, NAAT: NOT DETECTED
SODIUM BLD-SCNC: 142 MEQ/L (ref 135–144)
TOTAL PROTEIN: 5.9 G/DL (ref 6.3–8)
UREA NITROGEN, UR: 421 MG/DL
WBC # BLD: 2.5 K/UL (ref 4.8–10.8)

## 2022-07-11 PROCEDURE — 6370000000 HC RX 637 (ALT 250 FOR IP): Performed by: PHYSICAL MEDICINE & REHABILITATION

## 2022-07-11 PROCEDURE — 99231 SBSQ HOSP IP/OBS SF/LOW 25: CPT | Performed by: PHYSICAL MEDICINE & REHABILITATION

## 2022-07-11 PROCEDURE — 6360000002 HC RX W HCPCS: Performed by: INTERNAL MEDICINE

## 2022-07-11 PROCEDURE — 80053 COMPREHEN METABOLIC PANEL: CPT

## 2022-07-11 PROCEDURE — 87635 SARS-COV-2 COVID-19 AMP PRB: CPT

## 2022-07-11 PROCEDURE — 84100 ASSAY OF PHOSPHORUS: CPT

## 2022-07-11 PROCEDURE — 85025 COMPLETE CBC W/AUTO DIFF WBC: CPT

## 2022-07-11 PROCEDURE — 82533 TOTAL CORTISOL: CPT

## 2022-07-11 PROCEDURE — 1180000000 HC REHAB R&B

## 2022-07-11 PROCEDURE — 2580000003 HC RX 258: Performed by: INTERNAL MEDICINE

## 2022-07-11 PROCEDURE — 36415 COLL VENOUS BLD VENIPUNCTURE: CPT

## 2022-07-11 PROCEDURE — 99233 SBSQ HOSP IP/OBS HIGH 50: CPT | Performed by: PSYCHIATRY & NEUROLOGY

## 2022-07-11 PROCEDURE — 6370000000 HC RX 637 (ALT 250 FOR IP): Performed by: INTERNAL MEDICINE

## 2022-07-11 PROCEDURE — 82248 BILIRUBIN DIRECT: CPT

## 2022-07-11 PROCEDURE — 93010 ELECTROCARDIOGRAM REPORT: CPT | Performed by: INTERNAL MEDICINE

## 2022-07-11 PROCEDURE — APPSS30 APP SPLIT SHARED TIME 16-30 MINUTES: Performed by: NURSE PRACTITIONER

## 2022-07-11 PROCEDURE — 83874 ASSAY OF MYOGLOBIN: CPT

## 2022-07-11 RX ORDER — CIPROFLOXACIN 500 MG/1
500 TABLET, FILM COATED ORAL EVERY 12 HOURS SCHEDULED
Status: DISCONTINUED | OUTPATIENT
Start: 2022-07-11 | End: 2022-07-12

## 2022-07-11 RX ORDER — QUETIAPINE FUMARATE 50 MG/1
150 TABLET, EXTENDED RELEASE ORAL NIGHTLY
Status: CANCELLED | OUTPATIENT
Start: 2022-07-11

## 2022-07-11 RX ORDER — ACETAMINOPHEN 325 MG/1
650 TABLET ORAL EVERY 6 HOURS PRN
Status: CANCELLED | OUTPATIENT
Start: 2022-07-11

## 2022-07-11 RX ORDER — LAMOTRIGINE 25 MG/1
100 TABLET ORAL 2 TIMES DAILY
Status: CANCELLED | OUTPATIENT
Start: 2022-07-11

## 2022-07-11 RX ORDER — DEXTROSE MONOHYDRATE 50 MG/ML
100 INJECTION, SOLUTION INTRAVENOUS PRN
Status: CANCELLED | OUTPATIENT
Start: 2022-07-11

## 2022-07-11 RX ORDER — L. ACIDOPHILUS/L.BULGARICUS 1MM CELL
2 TABLET ORAL 3 TIMES DAILY
Status: DISCONTINUED | OUTPATIENT
Start: 2022-07-11 | End: 2022-07-15 | Stop reason: HOSPADM

## 2022-07-11 RX ORDER — INSULIN LISPRO 100 [IU]/ML
0-6 INJECTION, SOLUTION INTRAVENOUS; SUBCUTANEOUS
Status: DISCONTINUED | OUTPATIENT
Start: 2022-07-12 | End: 2022-07-13

## 2022-07-11 RX ORDER — L. ACIDOPHILUS/L.BULGARICUS 1MM CELL
2 TABLET ORAL 3 TIMES DAILY
Status: DISCONTINUED | OUTPATIENT
Start: 2022-07-11 | End: 2022-07-11 | Stop reason: HOSPADM

## 2022-07-11 RX ORDER — CLONAZEPAM 0.5 MG/1
2 TABLET ORAL EVERY 12 HOURS PRN
Status: DISCONTINUED | OUTPATIENT
Start: 2022-07-11 | End: 2022-07-15 | Stop reason: HOSPADM

## 2022-07-11 RX ORDER — ERGOCALCIFEROL 1.25 MG/1
50000 CAPSULE ORAL WEEKLY
Status: DISCONTINUED | OUTPATIENT
Start: 2022-07-17 | End: 2022-07-12

## 2022-07-11 RX ORDER — CLONAZEPAM 1 MG/1
2 TABLET ORAL EVERY 12 HOURS PRN
Status: CANCELLED | OUTPATIENT
Start: 2022-07-11

## 2022-07-11 RX ORDER — ONDANSETRON 4 MG/1
4 TABLET, ORALLY DISINTEGRATING ORAL EVERY 8 HOURS PRN
Status: DISCONTINUED | OUTPATIENT
Start: 2022-07-11 | End: 2022-07-15 | Stop reason: HOSPADM

## 2022-07-11 RX ORDER — BUPROPION HYDROCHLORIDE 75 MG/1
75 TABLET ORAL 2 TIMES DAILY
Status: CANCELLED | OUTPATIENT
Start: 2022-07-11

## 2022-07-11 RX ORDER — POLYETHYLENE GLYCOL 3350 17 G/17G
17 POWDER, FOR SOLUTION ORAL DAILY PRN
Status: DISCONTINUED | OUTPATIENT
Start: 2022-07-11 | End: 2022-07-15 | Stop reason: HOSPADM

## 2022-07-11 RX ORDER — LAMOTRIGINE 25 MG/1
100 TABLET ORAL 2 TIMES DAILY
Status: DISCONTINUED | OUTPATIENT
Start: 2022-07-11 | End: 2022-07-15 | Stop reason: HOSPADM

## 2022-07-11 RX ORDER — L. ACIDOPHILUS/L.BULGARICUS 1MM CELL
2 TABLET ORAL 3 TIMES DAILY
Status: CANCELLED | OUTPATIENT
Start: 2022-07-11

## 2022-07-11 RX ORDER — ENOXAPARIN SODIUM 100 MG/ML
40 INJECTION SUBCUTANEOUS DAILY
Status: DISCONTINUED | OUTPATIENT
Start: 2022-07-12 | End: 2022-07-11 | Stop reason: HOSPADM

## 2022-07-11 RX ORDER — INSULIN LISPRO 100 [IU]/ML
0-6 INJECTION, SOLUTION INTRAVENOUS; SUBCUTANEOUS
Status: CANCELLED | OUTPATIENT
Start: 2022-07-12

## 2022-07-11 RX ORDER — INSULIN LISPRO 100 [IU]/ML
0-3 INJECTION, SOLUTION INTRAVENOUS; SUBCUTANEOUS NIGHTLY
Status: DISCONTINUED | OUTPATIENT
Start: 2022-07-11 | End: 2022-07-13

## 2022-07-11 RX ORDER — ONDANSETRON 2 MG/ML
4 INJECTION INTRAMUSCULAR; INTRAVENOUS EVERY 6 HOURS PRN
Status: CANCELLED | OUTPATIENT
Start: 2022-07-11

## 2022-07-11 RX ORDER — ENOXAPARIN SODIUM 100 MG/ML
40 INJECTION SUBCUTANEOUS DAILY
Status: CANCELLED | OUTPATIENT
Start: 2022-07-12

## 2022-07-11 RX ORDER — HYDRALAZINE HYDROCHLORIDE 20 MG/ML
10 INJECTION INTRAMUSCULAR; INTRAVENOUS EVERY 4 HOURS PRN
Status: DISCONTINUED | OUTPATIENT
Start: 2022-07-11 | End: 2022-07-15 | Stop reason: HOSPADM

## 2022-07-11 RX ORDER — ACETAMINOPHEN 650 MG/1
650 SUPPOSITORY RECTAL EVERY 6 HOURS PRN
Status: CANCELLED | OUTPATIENT
Start: 2022-07-11

## 2022-07-11 RX ORDER — BUPROPION HYDROCHLORIDE 75 MG/1
75 TABLET ORAL 2 TIMES DAILY
Status: DISCONTINUED | OUTPATIENT
Start: 2022-07-11 | End: 2022-07-15 | Stop reason: HOSPADM

## 2022-07-11 RX ORDER — ERGOCALCIFEROL 1.25 MG/1
50000 CAPSULE ORAL WEEKLY
Status: CANCELLED | OUTPATIENT
Start: 2022-07-17

## 2022-07-11 RX ORDER — SERTRALINE HYDROCHLORIDE 100 MG/1
100 TABLET, FILM COATED ORAL 2 TIMES DAILY
Status: CANCELLED | OUTPATIENT
Start: 2022-07-11

## 2022-07-11 RX ORDER — ENOXAPARIN SODIUM 100 MG/ML
40 INJECTION SUBCUTANEOUS DAILY
Status: DISCONTINUED | OUTPATIENT
Start: 2022-07-12 | End: 2022-07-13

## 2022-07-11 RX ORDER — CIPROFLOXACIN 500 MG/1
500 TABLET, FILM COATED ORAL EVERY 12 HOURS SCHEDULED
Status: DISCONTINUED | OUTPATIENT
Start: 2022-07-11 | End: 2022-07-11 | Stop reason: HOSPADM

## 2022-07-11 RX ORDER — ACETAMINOPHEN 325 MG/1
650 TABLET ORAL EVERY 6 HOURS PRN
Status: DISCONTINUED | OUTPATIENT
Start: 2022-07-11 | End: 2022-07-15 | Stop reason: HOSPADM

## 2022-07-11 RX ORDER — CIPROFLOXACIN 500 MG/1
500 TABLET, FILM COATED ORAL EVERY 12 HOURS SCHEDULED
Status: CANCELLED | OUTPATIENT
Start: 2022-07-11

## 2022-07-11 RX ORDER — DEXTROSE MONOHYDRATE 50 MG/ML
100 INJECTION, SOLUTION INTRAVENOUS PRN
Status: DISCONTINUED | OUTPATIENT
Start: 2022-07-11 | End: 2022-07-15 | Stop reason: HOSPADM

## 2022-07-11 RX ORDER — QUETIAPINE FUMARATE 50 MG/1
150 TABLET, EXTENDED RELEASE ORAL NIGHTLY
Status: DISCONTINUED | OUTPATIENT
Start: 2022-07-11 | End: 2022-07-15 | Stop reason: HOSPADM

## 2022-07-11 RX ORDER — POLYETHYLENE GLYCOL 3350 17 G/17G
17 POWDER, FOR SOLUTION ORAL DAILY PRN
Status: CANCELLED | OUTPATIENT
Start: 2022-07-11

## 2022-07-11 RX ORDER — HYDRALAZINE HYDROCHLORIDE 20 MG/ML
10 INJECTION INTRAMUSCULAR; INTRAVENOUS EVERY 4 HOURS PRN
Status: CANCELLED | OUTPATIENT
Start: 2022-07-11

## 2022-07-11 RX ORDER — INSULIN LISPRO 100 [IU]/ML
0-3 INJECTION, SOLUTION INTRAVENOUS; SUBCUTANEOUS NIGHTLY
Status: CANCELLED | OUTPATIENT
Start: 2022-07-11

## 2022-07-11 RX ORDER — ONDANSETRON 4 MG/1
4 TABLET, ORALLY DISINTEGRATING ORAL EVERY 8 HOURS PRN
Status: CANCELLED | OUTPATIENT
Start: 2022-07-11

## 2022-07-11 RX ORDER — SERTRALINE HYDROCHLORIDE 100 MG/1
100 TABLET, FILM COATED ORAL 2 TIMES DAILY
Status: DISCONTINUED | OUTPATIENT
Start: 2022-07-11 | End: 2022-07-15 | Stop reason: HOSPADM

## 2022-07-11 RX ORDER — ONDANSETRON 2 MG/ML
4 INJECTION INTRAMUSCULAR; INTRAVENOUS EVERY 6 HOURS PRN
Status: DISCONTINUED | OUTPATIENT
Start: 2022-07-11 | End: 2022-07-15 | Stop reason: HOSPADM

## 2022-07-11 RX ORDER — ACETAMINOPHEN 650 MG/1
650 SUPPOSITORY RECTAL EVERY 6 HOURS PRN
Status: DISCONTINUED | OUTPATIENT
Start: 2022-07-11 | End: 2022-07-15 | Stop reason: HOSPADM

## 2022-07-11 RX ADMIN — LACTOBACILLUS TAB 2 TABLET: TAB at 23:09

## 2022-07-11 RX ADMIN — SERTRALINE 100 MG: 100 TABLET, FILM COATED ORAL at 23:15

## 2022-07-11 RX ADMIN — SODIUM CHLORIDE: 9 INJECTION, SOLUTION INTRAVENOUS at 00:17

## 2022-07-11 RX ADMIN — LAMOTRIGINE 100 MG: 25 TABLET ORAL at 23:28

## 2022-07-11 RX ADMIN — CIPROFLOXACIN HYDROCHLORIDE 500 MG: 500 TABLET, FILM COATED ORAL at 23:12

## 2022-07-11 RX ADMIN — BUPROPION HYDROCHLORIDE 75 MG: 75 TABLET, FILM COATED ORAL at 23:28

## 2022-07-11 RX ADMIN — QUETIAPINE FUMARATE 150 MG: 50 TABLET, EXTENDED RELEASE ORAL at 23:13

## 2022-07-11 RX ADMIN — BUPROPION HYDROCHLORIDE 75 MG: 75 TABLET, FILM COATED ORAL at 10:40

## 2022-07-11 RX ADMIN — SERTRALINE 100 MG: 100 TABLET, FILM COATED ORAL at 10:40

## 2022-07-11 RX ADMIN — LACTOBACILLUS TAB 2 TABLET: TAB at 13:47

## 2022-07-11 RX ADMIN — PIPERACILLIN AND TAZOBACTAM 3375 MG: 3; .375 INJECTION, POWDER, LYOPHILIZED, FOR SOLUTION INTRAVENOUS at 01:40

## 2022-07-11 RX ADMIN — ENOXAPARIN SODIUM 30 MG: 100 INJECTION SUBCUTANEOUS at 10:42

## 2022-07-11 RX ADMIN — QUETIAPINE FUMARATE 150 MG: 50 TABLET, EXTENDED RELEASE ORAL at 23:09

## 2022-07-11 RX ADMIN — LAMOTRIGINE 100 MG: 25 TABLET ORAL at 10:41

## 2022-07-11 RX ADMIN — PIPERACILLIN AND TAZOBACTAM 3375 MG: 3; .375 INJECTION, POWDER, LYOPHILIZED, FOR SOLUTION INTRAVENOUS at 10:54

## 2022-07-11 ASSESSMENT — ENCOUNTER SYMPTOMS
CHEST TIGHTNESS: 0
EYE PAIN: 0
SHORTNESS OF BREATH: 1
ABDOMINAL PAIN: 0
BACK PAIN: 1
STRIDOR: 0
BLOOD IN STOOL: 0
CONSTIPATION: 1
DIARRHEA: 1
WHEEZING: 0
CONSTIPATION: 0
TROUBLE SWALLOWING: 0
NAUSEA: 0
DIARRHEA: 0
VOMITING: 0
SHORTNESS OF BREATH: 0
SORE THROAT: 0
COLOR CHANGE: 0
EYE REDNESS: 0
COUGH: 0
PHOTOPHOBIA: 0

## 2022-07-11 NOTE — FLOWSHEET NOTE
Assessment completed. VS WNL; c/o dizziness. A&Ox4. PM meds given. Pt request PRN Tylenol for 7/10 back pain, headache. Lung clear. Abd sounds active; Had 1 formed brown BM. Abrasion noted at amputation site. Pt state she is starting to feel anxiety coming back, so she will not be refusing am meds anymore. Denies further needs at the moment. Call light within reach. Bed in lowest position.

## 2022-07-11 NOTE — PROGRESS NOTES
Renal Adjustment Per Protocol:     Lovenox 30 mg daily changed to Lovenox 40 mg daily based on    Recent Labs     07/11/22  0528   CREATININE 1.02*   Estimated Creatinine Clearance: 59 mL/min (A) (based on SCr of 1.02 mg/dL (H)).   Jorge BestD  PGY-1 Pharmacy Resident  7/11/2022 10:42 AM

## 2022-07-11 NOTE — PROGRESS NOTES
Nephrology Progress Note  Vitals better from admission  Assessment:  NAYELY improving  DM type-2  Right BKA  Hypertension  PTSD  UTI vre   obese          Plan: maintain hydration  Checking on urine culture final  Klebsiella pneumonia per lab    Patient Active Problem List:     Hypertensive urgency     Hyperglycemia     Chest tightness or pressure     Visual disturbance, subjective     DM (diabetes mellitus) ([facility]     Bipolar affective disorder (HCC)     Acute sore throat     Syncope and collapse     Head injury     Dehydration      Subjective:  Admit Date: 7/9/2022    Interval History: feeling better    Medications:  Scheduled Meds:   piperacillin-tazobactam  3,375 mg IntraVENous q8h    vitamin D  50,000 Units Oral Weekly    enoxaparin  30 mg SubCUTAneous Daily    insulin lispro  0-6 Units SubCUTAneous TID WC    insulin lispro  0-3 Units SubCUTAneous Nightly    buPROPion  75 mg Oral BID    lamoTRIgine  100 mg Oral BID    QUEtiapine  150 mg Oral Nightly    sertraline  100 mg Oral BID     Continuous Infusions:   dextrose      sodium chloride 100 mL/hr at 07/11/22 0515       CBC:   Recent Labs     07/10/22  0550 07/11/22  0528   WBC 3.8* 2.5*   HGB 10.5* 10.3*   * 84*     CMP:    Recent Labs     07/09/22  1417 07/10/22  0550 07/11/22  0528    141 142   K 4.4 4.7 4.1    113* 114*   CO2 19* 15* 17*   BUN 51* 42* 30*   CREATININE 2.77* 1.48* 1.02*   GLUCOSE 120* 84 83   CALCIUM 8.5 7.9* 8.2*   LABGLOM 16.8* 34.6* 53.1*     Troponin: No results for input(s): TROPONINI in the last 72 hours. BNP: No results for input(s): BNP in the last 72 hours. INR: No results for input(s): INR in the last 72 hours. Lipids: No results for input(s): CHOL, LDLDIRECT, TRIG, HDL, AMYLASE, LIPASE in the last 72 hours. Liver:   Recent Labs     07/11/22  0528   AST 13   ALT 26   ALKPHOS 81   PROT 5.9*   LABALBU 3.0*   BILITOT <0.2     Iron:  No results for input(s): IRONS, FERRITIN in the last 72 hours.     Invalid input(s): LABIRONS  Urinalysis: No results for input(s): UA in the last 72 hours.     Objective:  Vitals: /73   Pulse 61   Temp 97.5 °F (36.4 °C) (Oral)   Resp 18   Ht 5' 8\" (1.727 m)   Wt 199 lb (90.3 kg)   SpO2 97%   BMI 30.26 kg/m²    Wt Readings from Last 3 Encounters:   07/09/22 199 lb (90.3 kg)   09/08/21 213 lb (96.6 kg)   05/07/19 195 lb (88.5 kg)      24HR INTAKE/OUTPUT:      Intake/Output Summary (Last 24 hours) at 7/11/2022 0758  Last data filed at 7/11/2022 0515  Gross per 24 hour   Intake 823.44 ml   Output --   Net 823.44 ml       General: alert, in no apparent distress  HEENT: normocephalic, atraumatic, anicteric  Neck: supple, no mass  Lungs: non-labored respirations, clear to auscultation bilaterally  Heart: regular rate and rhythm, no murmurs or rubs  Abdomen: soft, non-tender, non-distended  Ext: no cyanosis, no peripheral edema  Neuro: alert and oriented, no gross abnormalities  Psych: normal mood and affect  Skin: no rash      Electronically signed by Shawn Lombardi DO, MD

## 2022-07-11 NOTE — CARE COORDINATION
[facility] Pre-Admission Screening Document      Patient Name: Edis Hampton       MRN: [id number]    : 1950    Age: 66 y.o. Gender: female   Payor: Payor: MEDICARE / Plan: MEDICARE PART A AND B / Product Type: *No Product type* /   MSSP: No    Admitted from: [facility] Floor: 4W  Attending Care Provider: Gregory Loja MD  Inpatient Rehab Referring Care Provider: Gregory Loja MD   Primary Care Provider: Pennie Escobar DO  Inpatient Treatment Team including Consults: Treatment Team: Attending Provider: Gregory Loja MD; Consulting Physician: Karen Lopez MD; Utilization Reviewer: Chacorta Guerrero, RN; Consulting Physician: Jack Diaz DO; Registered Nurse: Gill Vasquez, ALEXEY; : Dalia Bang RN; Utilization Reviewer: Mony Feldman, ALEXEY; Registered Nurse: Jessie Jefferson RN; Consulting Physician: Padmini Mello DO; : Brittny Schuster RN    Reason for Hospitalization:   1. Acute renal injury ([address]    2. Dehydration      Chief Complaint   Patient presents with    Fall     felll x 3 times today,     Back Pain     back pain, neck pain, bilateral leg pain after falling 3 times today     Isolation:No active isolations    Hospital Course:  Admit Date: 2022 12:55 AM  Inpatient Rehab Referral Date: 22  Narrative of hospital course/history of present illness: 66 yr old female presented to ED for evaluation of weakness and frequent falls. Patient lives by herself and states that she fell multiple times today because her legs were giving out on her. She did hit her mid back at one point where she has had prior surgery. WD workup revealed acute renal injury and dehydration, T12 endplate compression of indeterminate age. Neurology: Dizziness secondary to hypotension and orthostatic hypotension from dehydration.        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    Head injury     Hypertension     Migraine     Migraine headache 7/18/2014    Osteomyelitis ([facility] [address] 12/7/2012    PTSD (post-traumatic stress disorder)     TIA (transient ischemic attack)     x 3     Past Surgical History:   Procedure Laterality Date    APPENDECTOMY      CHOLECYSTECTOMY      LEG AMPUTATION BELOW KNEE Bilateral     NASAL FRACTURE SURGERY N/A 4/25/2019    CLOSED REDUCTON EXTERNAL FIXATION OF NASAL BONE FRACTURE WITH SEVERE DNS (DEVIATED NASAL SEPTUM)  ROOM 190 performed by Rose Mary Helms MD at [address], [address] [place] [address] ACP-Advance Directive ACP-Power of     Not on File Not on File Not on File Not on File          Labs/Infection Control:  Recent Labs     07/09/22  0115 07/09/22  1417 07/10/22  0550 07/11/22  0528   WBC 6.7  --  3.8* 2.5*   HGB 12.0  --  10.5* 10.3*   HCT 36.3*  --  32.5* 31.9*   *  --  102* 84*   BUN 55* 51* 42* 30*   CREATININE 3.26* 2.77* 1.48* 1.02*   GLUCOSE 129* 120* 84 83    140 141 142   K 5.1* 4.4 4.7 4.1   CALCIUM 8.8 8.5 7.9* 8.2*   PROT 7.4  --  6.0* 5.9*      Blood cultures:  No results for input(s): BC in the last 72 hours. Urinalysis/C&S:  Recent Labs     07/09/22  0115   WBCUA *   RBCUA None seen   BACTERIA MANY*   LEUKOCYTESUR MODERATE*   BLOODU Negative   GLUCOSEU Negative   KETUA TRACE*   LABURIN Performed at [facility] [address] [facility], [address] [place] [address]  ([phone]  *  >[id number] CFU/ML       Radiology:  XR HAND LEFT (MIN 3 VIEWS)  Result Date: 7/9/2022  NO SUSPECTED DISPLACED FRACTURE OR SIGNIFICANT POSTTRAUMATIC COMPLICATION IDENTIFIED. CT HEAD WO CONTRAST  Result Date: 7/9/2022  FINAL IMPRESSION: NO ACUTE INTRACRANIAL PROCESS, FRACTURE, OR EVIDENCE OF CERVICAL SPINE INJURY IDENTIFIED.      CT CERVICAL SPINE WO CONTRAST  Result Date: 7/9/2022  FINAL IMPRESSION: NO ACUTE INTRACRANIAL PROCESS, FRACTURE, OR EVIDENCE OF CERVICAL SPINE INJURY IDENTIFIED. CT LUMBAR SPINE WO CONTRAST  Result Date: 7/9/2022  NO ACUTE FRACTURE OR SIGNIFICANT POSTTRAUMATIC COMPLICATION IDENTIFIED. CHRONIC AND DEGENERATIVE CHANGES, AS NOTED. US RETROPERITONEAL LIMITED  Result Date: 7/10/2022  ESSENTIALLY NEGATIVE LIMITED RENAL ULTRASOUND. Medications/IV's:  The patient is currently on lovenox for DVT prophylaxis. Scheduled:    [START ON 7/12/2022] enoxaparin, 40 mg, SubCUTAneous, Daily    lactobacillus acidophilus, 2 tablet, Oral, TID    piperacillin-tazobactam, 3,375 mg, IntraVENous, q8h    vitamin D, 50,000 Units, Oral, Weekly    insulin lispro, 0-6 Units, SubCUTAneous, TID WC    insulin lispro, 0-3 Units, SubCUTAneous, Nightly    buPROPion, 75 mg, Oral, BID    lamoTRIgine, 100 mg, Oral, BID    QUEtiapine, 150 mg, Oral, Nightly    sertraline, 100 mg, Oral, BID    PRN:  ondansetron **OR** ondansetron, polyethylene glycol, acetaminophen **OR** acetaminophen, hydrALAZINE, glucose, dextrose bolus **OR** dextrose bolus, glucagon (rDNA), dextrose, clonazePAM    Allergies: Allergies   Allergen Reactions    Iv Dye [Iodides]     Vancomycin     Morphine Rash         Most Recent Vitals, Height and Weight  BP (!) 156/54   Pulse 67   Temp 98.1 °F (36.7 °C) (Oral)   Resp 18   Ht 5' 8\" (1.727 m)   Wt 199 lb (90.3 kg)   SpO2 100%   BMI 30.26 kg/m²     Weight Bearing Restrictions: wbat  R AKA with prostetic in room    Current Diet Order: ADULT DIET;  Regular; 3 carb choices (45 gm/meal)    Skin: small bruise on back and L hand       Lungs:   Respiratory  Respiratory Pattern: Regular  Respiratory Depth: Normal  Respiratory Quality/Effort: Unlabored  Chest Assessment: Chest expansion symmetrical,Trachea midline  L Breath Sounds: Clear  R Breath Sounds: Clear  Level of Activity/Mobility: Up with assist      Cognition and Behavior:  Language Preference (if other than English):      Alertness/Behavior  Neuro (WDL): Within Defined Limits  Level of Consciousness: Alert (0)  History of Falling: Yes      History of Falling: Yes        Prior Level of Function and Living Arrangements:  Social/Functional History  Lives With: Alone  Type of Home: Apartment  Home Layout: One level  Home Access: Level entry  Bathroom Shower/Tub: Tub/Shower unit,Shower chair with back  Bathroom Toilet: Standard  Bathroom Equipment: Grab bars in shower,Shower chair  Bathroom Accessibility: Accessible  Home Equipment: Reacher,Wheelchair-manual  Has the patient had two or more falls in the past year or any fall with injury in the past year?: Yes (3 recent)  Receives Help From: Friend(s)  ADL Assistance: [facility] [address]: Independent (friend brought groceries, or has them delivered)  Homemaking Responsibilities: Yes  Ambulation Assistance: Non-ambulatory (w/c dependent, prior to COVID was ambulating)  Transfer Assistance: Independent  Active : No (does not have a car)  Occupation: Retired  IADL Comments: previously independent with IADLs, pt has assistance with groceries  Additional Comments: pt motiviated for increased independence; pt stating increased blurry vision at home and increased dizziness since fall  Living Arrangements: [address] [place] [address] Drive: Family Members  Type of Home Care Services: None  Dental Appliances: None  Vision - Corrective Lenses: Eyeglasses  Hearing Aid: None  Personal Equipment:   Dental Appliances: None  Vision - Corrective Lenses: Eyeglasses  Hearing Aid: None      CURRENT FUNCTIONAL LEVEL:  Physical Therapy  Bed mobility:  Bed mobility  Rolling to Left: Stand by assistance (07/10/22 1012)  Supine to Sit: Stand by assistance (07/10/22 1012)  Sit to Supine: Stand by assistance (07/10/22 1012)  Bed Mobility Comments: HOB elevated; increased time and effort due to increased back pain (07/10/22 1012)  Transfers:  Transfers  Comment: unsafe to assess due to pt c/o dizziness in sitting (07/10/22 1012)  Gait:   Ambulation  Comments: non-amb since 2020 at baseline (07/10/22 1013)  Stairs:  Stairs/Curb  Stairs?: No (07/10/22 1013)  W/C mobility:         Occupational Therapy  Hand Dominance: Left  ADL  Feeding: Setup (07/10/22 1002)  Grooming: Setup (07/10/22 1002)  UE Bathing: Minimal assistance (07/10/22 1002)  UE Bathing Skilled Clinical Factors: dizzy/ lightheaded and back pain limited sitting balance and endurance (07/10/22 1002)  LE Bathing: Moderate assistance (07/10/22 1002)  LE Bathing Skilled Clinical Factors: assist to wash feet d/t back pain (07/10/22 1002)  UE Dressing: Minimal assistance (07/10/22 1002)  UE Dressing Skilled Clinical Factors: dizzy/ lightheaded and back pain limited sitting balance and endurance (07/10/22 1002)  LE Dressing:  Moderate assistance (07/10/22 1002)  LE Dressing Skilled Clinical Factors: assist d/t pain (07/10/22 1002)  Toileting: Unable to assess(comment) (07/10/22 1002)  Toileting Skilled Clinical Factors: declined need (07/10/22 1002)  Toilet Transfers  Toilet Transfer: Unable to assess (07/10/22 1007)  Toilet Transfers Comments: NT d/t pain and dizziness (07/10/22 1007)  Tub Transfers  Tub Transfers: Not tested (07/10/22 1007)  Shower Transfers  Shower Transfers: Not tested (07/10/22 1007)      Speech Language Pathology n/a    Current Conditions Requiring Inpatient Rehabilitation  Bowel/Bladder Dysfunction: Yes  Intervention Required = Frequent toileting  Risk for Medical/Clinical Complications = moderate  Skin Healing/Breakdown Risk: Yes  Intervention Required = Side to side turns  Risk for Medical/Clinical Complications = moderate  Nutrition/Hydration Deficiency: Yes  Intervention Required = Monitor I&Os and Check Labs  Risk for Medical/Clinical Complications = moderate  Medical Comorbidities: Yes  Intervention Required = DVT risk  Risk for Medical/Clinical Complications = moderate    Rehab/Skilled Needs:   3 hours of Intensive

## 2022-07-11 NOTE — PROGRESS NOTES
[facility] Neurology Daily Progress Note  Name: Jeff Fields  Age: 66 y.o. Gender: female  CodeStatus: Full Code  Allergies: Iv Dye [Iodides]  Vancomycin  Morphine    Chief Complaint:Fall (felll x 3 times today, ) and Back Pain (back pain, neck pain, bilateral leg pain after falling 3 times today)    Primary Care Provider: Molly Larsen DO  InpatientTreatment Team: Treatment Team: Attending Provider: Javier Beasley MD; Consulting Physician: Venkatesh Guajardo MD; Utilization Reviewer: Tavon De Paz RN; Consulting Physician: Caitlin Morales DO; Consulting Physician: Lizzette Quesada MD; Registered Nurse: Chandler Jacobs, RN; : Ramsey Young RN; Utilization Reviewer: Zelda Sun RN; Registered Nurse: Cephus Peabody, RN; Consulting Physician: Jian Kimbrough DO; : Sharan Alfonso RN  Admission Date: 7/9/2022      HPI   Pt seen and examined for neuro follow up for dizziness. Currently alert and oriented x3, no acute distress, cooperative. Vertigo resolved. Nonfocal.  Afebrile. Blood pressure improved. NAYELY improved. Response to fluids was good and she is not dizzy anymore. Patient is a better    Vitals:    07/11/22 1024   BP: (!) 156/54   Pulse: 67   Resp:    Temp: 98.1 °F (36.7 °C)   SpO2: 100%      Review of Systems   Constitutional: Negative for appetite change and fever. HENT: Negative for hearing loss and trouble swallowing. Eyes: Negative for visual disturbance. Respiratory: Negative for cough, chest tightness, shortness of breath and wheezing. Cardiovascular: Negative for chest pain, palpitations and leg swelling. Gastrointestinal: Positive for diarrhea. Negative for constipation, nausea and vomiting. Skin: Negative for color change and rash. Neurological: Negative for dizziness, tremors, seizures, syncope, facial asymmetry, speech difficulty, weakness, light-headedness, numbness and headaches.    Psychiatric/Behavioral: Negative for agitation, confusion and hallucinations. The patient is not nervous/anxious. Denies any new symptoms  Physical Exam  Vitals and nursing note reviewed. Constitutional:       General: She is not in acute distress. Appearance: She is obese. She is not diaphoretic. HENT:      Head: Normocephalic. Eyes:      Pupils: Pupils are equal, round, and reactive to light. Cardiovascular:      Rate and Rhythm: Normal rate and regular rhythm. Pulmonary:      Effort: Pulmonary effort is normal. No respiratory distress. Breath sounds: Normal breath sounds. Skin:     General: Skin is warm and dry. Neurological:      General: No focal deficit present. Mental Status: She is alert and oriented to person, place, and time. Mental status is at baseline. Motor: No tremor or seizure activity.                Medications:  Reviewed    Infusion Medications:    dextrose      sodium chloride 100 mL/hr at 07/11/22 0515     Scheduled Medications:    [START ON 7/12/2022] enoxaparin  40 mg SubCUTAneous Daily    piperacillin-tazobactam  3,375 mg IntraVENous q8h    vitamin D  50,000 Units Oral Weekly    insulin lispro  0-6 Units SubCUTAneous TID WC    insulin lispro  0-3 Units SubCUTAneous Nightly    buPROPion  75 mg Oral BID    lamoTRIgine  100 mg Oral BID    QUEtiapine  150 mg Oral Nightly    sertraline  100 mg Oral BID     PRN Meds: ondansetron **OR** ondansetron, polyethylene glycol, acetaminophen **OR** acetaminophen, hydrALAZINE, glucose, dextrose bolus **OR** dextrose bolus, glucagon (rDNA), dextrose, clonazePAM    Labs:   Recent Labs     07/09/22  0115 07/10/22  0550 07/11/22  0528   WBC 6.7 3.8* 2.5*   HGB 12.0 10.5* 10.3*   HCT 36.3* 32.5* 31.9*   * 102* 84*     Recent Labs     07/09/22  1417 07/10/22  0550 07/11/22  0528    141 142   K 4.4 4.7 4.1    113* 114*   CO2 19* 15* 17*   BUN 51* 42* 30*   CREATININE 2.77* 1.48* 1.02*   CALCIUM 8.5 7.9* 8.2*   PHOS 5.7* 3.5 3.1     Recent Labs     07/09/22 0115 07/10/22  0550 07/11/22  0528   AST 90* 22 13   ALT 78* 38* 26   BILIDIR  --  <0.2 <0.2   BILITOT 0.3 <0.2 <0.2   ALKPHOS 112 91 81     No results for input(s): INR in the last 72 hours. Recent Labs     07/10/22  0809   CKTOTAL 87       Urinalysis:   Lab Results   Component Value Date/Time    NITRU Negative 07/09/2022 01:15 AM    WBCUA  07/09/2022 01:15 AM    BACTERIA MANY 07/09/2022 01:15 AM    RBCUA None seen 07/09/2022 01:15 AM    BLOODU Negative 07/09/2022 01:15 AM    SPECGRAV 1.015 07/09/2022 01:15 AM    GLUCOSEU Negative 07/09/2022 01:15 AM       Radiology:   Most recent    EEG No valid procedures specified. MRI of Brain No results found for this or any previous visit. No results found for this or any previous visit. MRA of the Head and Neck: No results found for this or any previous visit. No results found for this or any previous visit. No results found for this or any previous visit. CT of the Head: Results for orders placed during the hospital encounter of 07/09/22    CT HEAD WO CONTRAST    Narrative  CT HEAD WO CONTRAST, CT CERVICAL SPINE WO CONTRAST: 7/9/2022    CLINICAL HISTORY: Pain after falling. COMPARISON: Head and cervical spine CTs 4/24/2019. TECHNIQUE: ROUTINE    All CT scans at this facility use dose modulation, iterative reconstruction, and/or weight based dosing when appropriate to reduce radiation dose to as low as reasonably achievable. HEAD CT FINDINGS:    There is no intracranial hemorrhage, mass effect, midline shift, extra-axial collection, hydrocephalus, evidence of a recent ischemic infarct or skull fracture identified. Mild generalized cerebral volume loss and mild to moderate white matter changes are again noted. The mastoid air cells and visualized paranasal sinuses are essentially clear.       CERVICAL SPINE CT FINDINGS:    The spine is visualized from the craniovertebral junction nearly through the T2 level. There is no fracture, dislocation, or acute paraspinous soft tissue abnormalities identified. Moderate degenerative changes of the lower levels are again noted. Impression  FINAL IMPRESSION:    NO ACUTE INTRACRANIAL PROCESS, FRACTURE, OR EVIDENCE OF CERVICAL SPINE INJURY IDENTIFIED. No results found for this or any previous visit. No results found for this or any previous visit. Carotid duplex: No results found for this or any previous visit. No results found for this or any previous visit. Results for orders placed during the hospital encounter of 04/24/19    US CAROTID ARTERY BILATERAL    Narrative  EXAMINATION:  ULTRASOUND CAROTID ARTERIES    CLINICAL HISTORY:  Syncopal episode    COMPARISONS:  10/16/2018    TECHNIQUE:  Grayscale, duplex Doppler. Sonographic imaging was performed by a registered sonographer and the images are submitted for interpretation. FINDINGS:  Grayscale images demonstrate mild plaque in the carotid bulbs and proximal internal carotid arteries. The peak systolic velocity in the right internal carotid artery is 87 cm/s with an ICA/CCA ratio of 0.9. The peak systolic velocity in the  left internal carotid artery is 97 cm/s with an ICA/CCA ratio of 0.8. There is antegrade flow in both vertebral arteries. The peak systolic velocity in the right external carotid artery is 91cm/s and the peak systolic velocity in the left external carotid artery is 70cm/s. Impression  MILD PLAQUE IN THE CAROTID BULBS AND PROXIMAL INTERNAL CAROTID ARTERIES. ESTIMATED RIGHT INTERNAL CAROTID ARTERY STENOSIS IS LESS THAN 50% AND ESTIMATED LEFT INTERNAL CAROTID ARTERY STENOSIS IS LESS THAN 50%. Validated velocity measurements with angiographic measurements and velocity criteria are extrapolated from diameter data as defined by the Society of Radiologists in [facility] [address]. Radiology 2003; 116;[id number].       Echo No results found for this or any previous visit. Assessment/Plan:    Dizziness consistent with low blood pressures. She is orthostatic. This findings are not vestibular in nature. Autonomic dysfunction is a consideration given her low blood pressures. She is receiving a bolus and if this does not help then this may be autonomic and may require midodrine. Clinically I do not appreciate any other findings. She fell on her gluteal area lumbar spine he does not have anything significant. We will follow with you.    7/11/2022:  Dizziness secondary to hypotension and orthostatic hypotension from dehydration. NAYELY noted and significantly improved. Blood pressure improved dizziness resolved. Nonfocal.  Neurology to sign off. Please call with questions or concerns. Collaborating physicians: Dr Tomasa Barbosa    I have personally performed a face to face diagnostic evaluation on this patient, reviewed all data and investigations, and am the sole provider of all clinical decisions on the neurological status of this patient. Patient denies any symptoms and she is less orthostatic and bolus of fluid has helped. She is neurologically asymptomatic and have not recommended any intervention unless she develops autonomic dysfunction. 60% time spent by me      Len Barbosa MD, [id number] American Board of Psychiatry & Neurology  Board Certified in Vascular Neurology  Board Certified in Neuromuscular Medicine  Certified in Neurorehabilitation       Electronically signed by PUNEET Lobato CNP on 7/11/2022 at 12:09 PM

## 2022-07-11 NOTE — CONSULTS
Physical Medicine & Rehabilitation  Consult Note      Admitting Physician: Kuldeep Thomsa MD    Primary Care Provider: Maya Hill DO     Reason for Consult:  Asses rehab needs, promote physical and mental function, analyze level of care to determine rehab needs, improve ability to actively participate in the rehabilitation process, and decrease likelihood of re-admit to the hospital after discharge. History of Present Illness:    Lowella Apley is a 66 y.o. female admitted to [facility] on 7/9/2022. Patient was admitted to [facility] through the ER on 7/9/2022 after presenting with a fall with trauma to her head. She came in complaining of pain in her head but denied numbness or tingling. She has been found to have low blood pressure and her dizziness is not found to be vestibular in nature but rather hypoperfusion in nature. She was initially placed on-IV CEFTRIAXONE-she is now on Zosyn. She has a bruise over her T12 spinous process and a compression fracture there. She saw orthopedics to determine whether this is an old or new fracture. They thought it appeared chronic. She also has a chronic appearing fracture of the base of her fourth metacarpal joint however she still has a bruise there as well. She was treated with nonsurgical management and gentle range of motion. Fall  Pertinent negatives include no abdominal pain, fever, headaches, hematuria, nausea or vomiting. Back Pain  Associated symptoms include weakness. Pertinent negatives include no abdominal pain, chest pain, dysuria, fever or headaches. I reviewed recent nursing notes discussed care with acute care providers, \" VS WNL; c/o dizziness. A&Ox4. PM meds given. Pt request PRN Tylenol for 7/10 back pain, headache. Lung clear. Abd sounds active; Had 1 formed brown BM. Abrasion noted at amputation site.  Pt state she is starting to feel anxiety coming back, so she will not be refusing am meds anymore. Denies further needs at the moment. Call light within reach. Bed in lowest position. \".   Events from the previous 24 hours reviewed   -neurology is following regarding her gait deficits and dizziness. .      Their inpatient work up has included:    Imaging:  Imaging and other studies reviewed and discussed with patient and staff    XR HAND LEFT  : 7/9/2022 : Mild deformity of the medial base of the left fourth proximal phalanx is probably chronic rather than a nondisplaced fracture. There is no other fracture, dislocation, radiodense foreign bodies, worrisome bone destruction, or pathologic calcifications identified. The visualized joint spaces are intact, with mild to moderate osteoarthritic changes predominantly at the first carpometacarpal and fifth DIP joints. NO SUSPECTED DISPLACED FRACTURE OR SIGNIFICANT POSTTRAUMATIC COMPLICATION IDENTIFIED. CT HEAD   7/9/2022    HEAD CT FINDINGS: There is no intracranial hemorrhage, mass effect, midline shift, extra-axial collection, hydrocephalus, evidence of a recent ischemic infarct or skull fracture identified. Mild generalized cerebral volume loss and mild to moderate white matter changes are again noted. The mastoid air cells and visualized paranasal sinuses are essentially clear. CERVICAL SPINE CT FINDINGS: The spine is visualized from the craniovertebral junction nearly through the T2 level. There is no fracture, dislocation, or acute paraspinous soft tissue abnormalities identified. Moderate degenerative changes of the lower levels are again noted. FINAL IMPRESSION: NO ACUTE INTRACRANIAL PROCESS, FRACTURE, OR EVIDENCE OF CERVICAL SPINE INJURY IDENTIFIED. CT CERVICAL SPINE 7/9/2022   HEAD CT FINDINGS: There is no intracranial hemorrhage, mass effect, midline shift, extra-axial collection, hydrocephalus, evidence of a recent ischemic infarct or skull fracture identified.   Mild generalized cerebral volume loss and mild to moderate white matter changes are again noted. The mastoid air cells and visualized paranasal sinuses are essentially clear. CERVICAL SPINE CT FINDINGS: The spine is visualized from the craniovertebral junction nearly through the T2 level. There is no fracture, dislocation, or acute paraspinous soft tissue abnormalities identified. Moderate degenerative changes of the lower levels are again noted. FINAL IMPRESSION: NO ACUTE INTRACRANIAL PROCESS, FRACTURE, OR EVIDENCE OF CERVICAL SPINE INJURY IDENTIFIED. CT LUMBAR SPINE 7/9/2022  CT LUMBAR SPINE WO CONTRAST : 7/9/2022 CLINICAL HISTORY:Pain after falling. COMPARISON: Lumbar spine MRI 9/8/2021. TECHNIQUE: ROUTINE. All CT scans at this facility use dose modulation, iterative reconstruction, and/or weight based dosing when appropriate to reduce radiation dose to as low as reasonably achievable. FINDINGS: The spine is visualized from the T7-8 through the S3 levels. There is no acute fracture, dislocation, evidence of instability, acute paraspinous soft tissue abnormalities, or significant change from 9/8/2021 identified. Osteopenia, mild to moderate Rotary dextroscoliosis, moderately extensive degenerative changes, and mild chronic compression fractures of T12-L2 with previous vertebroplasties. NO ACUTE FRACTURE OR SIGNIFICANT POSTTRAUMATIC COMPLICATION IDENTIFIED. CHRONIC AND DEGENERATIVE CHANGES, AS NOTED. US RETROPERITONEAL   7/10/2022   The study is mild to moderately limited by the patient's body habitus. Both kidneys appear normal in size, position and morphology without significantly increased echogenicity. 3 approximately 1 cm simple cysts are noted within the left kidney. There is no hydronephrosis, abnormal perinephric collections, visualized nephrolithiasis, or solid renal masses. The right kidney measures approximately 10.0 x 4.7 x 4.3 cm. The left kidney measures approximately 11.1 x 4.8 x 4.6 cm.  The average renal parenchymal thickness is approximately 1 cm.     ESSENTIALLY NEGATIVE LIMITED RENAL ULTRASOUND. Labs:    Labs reviewed and discussed with patient and staff    Lab Results   Component Value Date/Time    POCGLU 84 07/11/2022 10:21 AM    POCGLU 80 07/11/2022 07:03 AM    POCGLU 152 07/10/2022 08:33 PM    POCGLU 87 07/10/2022 04:08 PM    POCGLU 85 07/10/2022 11:32 AM     Lab Results   Component Value Date/Time     07/11/2022 05:28 AM    K 4.1 07/11/2022 05:28 AM    K 3.2 04/27/2019 06:39 AM     07/11/2022 05:28 AM    CO2 17 07/11/2022 05:28 AM    BUN 30 07/11/2022 05:28 AM    CREATININE 1.02 07/11/2022 05:28 AM    CALCIUM 8.2 07/11/2022 05:28 AM    LABALBU 3.0 07/11/2022 05:28 AM    BILITOT <0.2 07/11/2022 05:28 AM    ALKPHOS 81 07/11/2022 05:28 AM    AST 13 07/11/2022 05:28 AM    ALT 26 07/11/2022 05:28 AM     Lab Results   Component Value Date/Time    WBC 2.5 07/11/2022 05:28 AM    RBC 3.36 07/11/2022 05:28 AM    HGB 10.3 07/11/2022 05:28 AM    HCT 31.9 07/11/2022 05:28 AM    MCV 94.9 07/11/2022 05:28 AM    MCH 30.7 07/11/2022 05:28 AM    MCHC 32.4 07/11/2022 05:28 AM    RDW 15.7 07/11/2022 05:28 AM    PLT 84 07/11/2022 05:28 AM     Lab Results   Component Value Date/Time    VITD25 14.1 07/09/2022 02:17 PM     Lab Results   Component Value Date/Time    COLORU Yellow 07/09/2022 01:15 AM    NITRU Negative 07/09/2022 01:15 AM    GLUCOSEU Negative 07/09/2022 01:15 AM    KETUA TRACE 07/09/2022 01:15 AM    UROBILINOGEN 0.2 07/09/2022 01:15 AM    BILIRUBINUR Negative 07/09/2022 01:15 AM     Lab Results   Component Value Date/Time    PROTIME 10.2 04/24/2019 03:45 PM     Lab Results   Component Value Date/Time    INR 1.0 04/24/2019 03:45 PM         I discussed results with patient.     Current Rehabilitation Assessments:    Rehabilitation:  Physical Therapy  Bed mobility:  Bed mobility  Rolling to Left: Stand by assistance (07/10/22 1012)  Supine to Sit: Stand by assistance (07/10/22 1012)  Sit to Supine: Stand by assistance (07/10/22 1012)  Bed Mobility Comments: HOB elevated; increased time and effort due to increased back pain (07/10/22 1012)  Transfers:  Transfers  Comment: unsafe to assess due to pt c/o dizziness in sitting (07/10/22 1012)  Gait:   Ambulation  Comments: non-amb since 2020 at baseline (07/10/22 1013)  Stairs:  Stairs/Curb  Stairs?: No (07/10/22 1013)  W/C mobility:         Occupational therapy:   Hand Dominance: Left  ADL  Feeding: Setup (07/10/22 1002)  Grooming: Setup (07/10/22 1002)  UE Bathing: Minimal assistance (07/10/22 1002)  UE Bathing Skilled Clinical Factors: dizzy/ lightheaded and back pain limited sitting balance and endurance (07/10/22 1002)  LE Bathing: Moderate assistance (07/10/22 1002)  LE Bathing Skilled Clinical Factors: assist to wash feet d/t back pain (07/10/22 1002)  UE Dressing: Minimal assistance (07/10/22 1002)  UE Dressing Skilled Clinical Factors: dizzy/ lightheaded and back pain limited sitting balance and endurance (07/10/22 1002)  LE Dressing: Moderate assistance (07/10/22 1002)  LE Dressing Skilled Clinical Factors: assist d/t pain (07/10/22 1002)  Toileting: Unable to assess(comment) (07/10/22 1002)  Toileting Skilled Clinical Factors: declined need (07/10/22 1002)  Toilet Transfers  Toilet Transfer: Unable to assess (07/10/22 1007)  Toilet Transfers Comments: NT d/t pain and dizziness (07/10/22 1007)  Tub Transfers  Tub Transfers: Not tested (07/10/22 1007)  Shower Transfers  Shower Transfers: Not tested (07/10/22 1007)      Speech therapy:            Diet/Swallow:                         COGNITION  OT:    SP:              Re-evals pending      Past Medical History:        Diagnosis Date    Anxiety     [facility]     [facility]     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    Head injury     Hypertension     Migraine     Migraine headache 7/18/2014    Osteomyelitis ([facility] 12/7/2012    PTSD (post-traumatic stress disorder)     TIA (transient ischemic attack) x 3         PastSurgical History:        Procedure Laterality Date    APPENDECTOMY      CHOLECYSTECTOMY      LEG AMPUTATION BELOW KNEE Bilateral     NASAL FRACTURE SURGERY N/A 4/25/2019    CLOSED REDUCTON EXTERNAL FIXATION OF NASAL BONE FRACTURE WITH SEVERE DNS (DEVIATED NASAL SEPTUM)  ROOM 190 performed by Polly Gómez MD at [address] [place] [address] Drive:     Allergies   Allergen Reactions    Iv Dye [Iodides]     Vancomycin     Morphine Rash        CurrentMedications:   Current Facility-Administered Medications: [START ON 7/12/2022] enoxaparin (LOVENOX) injection 40 mg, 40 mg, SubCUTAneous, Daily  lactobacillus acidophilus (FLORANEX) 2 tablet, 2 tablet, Oral, TID  piperacillin-tazobactam (ZOSYN) 3,375 mg in dextrose 5 % 50 mL IVPB (mini-bag), 3,375 mg, IntraVENous, q8h  vitamin D (ERGOCALCIFEROL) capsule 50,000 Units, 50,000 Units, Oral, Weekly  ondansetron (ZOFRAN-ODT) disintegrating tablet 4 mg, 4 mg, Oral, Q8H PRN **OR** ondansetron (ZOFRAN) injection 4 mg, 4 mg, IntraVENous, Q6H PRN  polyethylene glycol (GLYCOLAX) packet 17 g, 17 g, Oral, Daily PRN  acetaminophen (TYLENOL) tablet 650 mg, 650 mg, Oral, Q6H PRN **OR** acetaminophen (TYLENOL) suppository 650 mg, 650 mg, Rectal, Q6H PRN  hydrALAZINE (APRESOLINE) injection 10 mg, 10 mg, IntraVENous, Q4H PRN  glucose chewable tablet 16 g, 4 tablet, Oral, PRN  dextrose bolus 10% 125 mL, 125 mL, IntraVENous, PRN **OR** dextrose bolus 10% 250 mL, 250 mL, IntraVENous, PRN  glucagon (rDNA) injection 1 mg, 1 mg, IntraMUSCular, PRN  dextrose 5 % solution, 100 mL/hr, IntraVENous, PRN  insulin lispro (HUMALOG) injection vial 0-6 Units, 0-6 Units, SubCUTAneous, TID WC  insulin lispro (HUMALOG) injection vial 0-3 Units, 0-3 Units, SubCUTAneous, Nightly  buPROPion (WELLBUTRIN) tablet 75 mg, 75 mg, Oral, BID  lamoTRIgine (LAMICTAL) tablet 100 mg, 100 mg, Oral, BID  QUEtiapine (SEROQUEL XR) extended release tablet 150 mg, 150 mg, Oral, Nightly  sertraline (ZOLOFT) tablet 100 mg, 100 mg, Oral, BID  0.9 % sodium chloride infusion, , IntraVENous, Continuous  clonazePAM (KLONOPIN) tablet 2 mg, 2 mg, Oral, Q12H PRN      Social History:  Social History     Socioeconomic History    Marital status: Single     Spouse name: Not on file    Number of children: 1    Years of education: Not on file    Highest education level: Not on file   Occupational History    Not on file   Tobacco Use    Smoking status: Never Smoker    Smokeless tobacco: Never Used   Substance and Sexual Activity    Alcohol use: No    Drug use: No    Sexual activity: Not on file   Other Topics Concern    Not on file   Social History Narrative     Lives Alone -her only daughter lives in [place], she has high school friends and neighbors who are helpful especially friend Rose Marie Sarathhazel    She went to [employer] high school after graduation  her  who was in the [employer] and moved to [place]. She lived in [place] for many years moving back to the [facility] after she retired. Type of Home: Apartment in [address] [place] [address].  [id number]    Home Layout: One level    Home Access: Level entry    Bathroom Shower/Tub: Tub/Shower unit,Shower chair with back    Bathroom Toilet: Standard    Bathroom Equipment: Grab bars in shower,Shower chair    Bathroom Accessibility: Accessible    Home Equipment: Reacher,Wheelchair-manual    Has the patient had two or more falls in the past year or any fall with injury in the past year?: Yes (3 recent)    Receives Help From: Friend(s)    ADL Assistance: Independent    Homemaking Assistance: Independent (friend brought groceries, or has them delivered)    Homemaking Responsibilities: Yes    Ambulation Assistance: Non-ambulatory (w/c dependent, prior to COVID was ambulating)    Transfer Assistance: Independent    Active : No (does not have a car)    Occupation: Retired    IADL Comments: previously independent with IADLs, pt has assistance with groceries    Additional Comments: pt motiviated for increased independence; pt stating increased blurry vision at home and increased dizziness since fall         Social Determinants of Health     Financial Resource Strain:     Difficulty of Paying Living Expenses: Not on file   Food Insecurity:     Worried About Running Out of Food in the Last Year: Not on file    Chandrika of Food in the Last Year: Not on file   Transportation Needs:     Lack of Transportation (Medical): Not on file    Lack of Transportation (Non-Medical): Not on file   Physical Activity:     Days of Exercise per Week: Not on file    Minutes of Exercise per Session: Not on file   Stress:     Feeling of Stress : Not on file   Social Connections:     Frequency of Communication with Friends and Family: Not on file    Frequency of Social Gatherings with Friends and Family: Not on file    Attends Rastafari Services: Not on file    Active Member of [address] [place] [address] [email or web address] or Organizations: Not on file    Attends Club or Organization Meetings: Not on file    Marital Status: Not on file   Intimate Partner Violence:     Fear of Current or Ex-Partner: Not on file    Emotionally Abused: Not on file    Physically Abused: Not on file    Sexually Abused: Not on file   Housing Stability:     Unable to Pay for Housing in the Last Year: Not on file    Number of [place] in the Last Year: Not on file    Unstable Housing in the Last Year: Not on file        This history was obtained from family and caregivers and discussed to confirm. Family History:       Problem Relation Age of Onset    Cancer Mother         breast    Heart Attack Father        Review of Systems:  Review of Systems   Constitutional: Positive for activity change and fatigue. Negative for chills, diaphoresis and fever. HENT: Negative for congestion, ear discharge, ear pain, hearing loss, nosebleeds, sore throat and tinnitus. Eyes: Negative for photophobia, pain and redness.    Respiratory: Positive for shortness of breath. Negative for cough, wheezing and stridor. Shortness of breath on exertion   Cardiovascular: Negative for chest pain, palpitations and leg swelling. Gastrointestinal: Positive for constipation. Negative for abdominal pain, blood in stool, diarrhea, nausea and vomiting. Endocrine: Negative for polydipsia. Genitourinary: Negative for dysuria, flank pain, frequency, hematuria and urgency. Musculoskeletal: Positive for back pain, gait problem and myalgias. Negative for neck pain. Skin: Negative for rash. Allergic/Immunologic: Positive for immunocompromised state. Negative for environmental allergies. Neurological: Positive for dizziness and weakness. Negative for tremors, seizures and headaches. Hematological: Does not bruise/bleed easily. Psychiatric/Behavioral: Negative for hallucinations and suicidal ideas. The patient is not nervous/anxious. Physical Exam:  BP (!) 156/54   Pulse 67   Temp 98.1 °F (36.7 °C) (Oral)   Resp 18   Ht 5' 8\" (1.727 m)   Wt 199 lb (90.3 kg)   SpO2 100%   BMI 30.26 kg/m²      Physical Exam  Constitutional:       Appearance: She is well-developed. HENT:      Head: Normocephalic. Eyes:      Comments: Chin eccymotic and nose   Neck:      Thyroid: No thyromegaly. Vascular: No JVD. Trachea: No tracheal deviation. Cardiovascular:      Rate and Rhythm: Normal rate and regular rhythm. Heart sounds: Normal heart sounds. No murmur heard. No friction rub. No gallop. Pulmonary:      Effort: Pulmonary effort is normal. No respiratory distress. Breath sounds: Normal breath sounds. No wheezing or rales. Chest:      Chest wall: No tenderness. Abdominal:      General: Bowel sounds are normal.      Palpations: Abdomen is soft. Musculoskeletal:         General: No tenderness. Normal range of motion. Legs:       Comments: Right BKA   Lymphadenopathy:      Cervical: No cervical adenopathy.    Skin: Direct <0.2 0.0 - 0.4 mg/dL    Bilirubin, Indirect see below 0.0 - 0.6 mg/dL   Comprehensive Metabolic Panel    Collection Time: 07/11/22  5:28 AM   Result Value Ref Range    Globulin 2.9 2.3 - 3.5 g/dL   CBC with Auto Differential    Collection Time: 07/11/22  5:28 AM   Result Value Ref Range    WBC 2.5 (L) 4.8 - 10.8 K/uL    RBC 3.36 (L) 4.20 - 5.40 M/uL    Hemoglobin 10.3 (L) 12.0 - 16.0 g/dL    Hematocrit 31.9 (L) 37.0 - 47.0 %    MCV 94.9 82.0 - 100.0 fL    MCH 30.7 27.0 - 31.3 pg    MCHC 32.4 (L) 33.0 - 37.0 %    RDW 15.7 (H) 11.5 - 14.5 %    Platelets 84 (L) 721 - 400 K/uL    Neutrophils % 55.0 %    Lymphocytes % 35.1 %    Monocytes % 7.7 %    Eosinophils % 1.5 %    Basophils % 0.7 %    Neutrophils Absolute 1.4 1.4 - 6.5 K/uL    Lymphocytes Absolute 0.9 (L) 1.0 - 4.8 K/uL    Monocytes Absolute 0.2 0.2 - 0.8 K/uL    Eosinophils Absolute 0.0 0.0 - 0.7 K/uL    Basophils Absolute 0.0 0.0 - 0.2 K/uL   Cortisol Total    Collection Time: 07/11/22  5:28 AM   Result Value Ref Range    Cortisol 5.1 2.7 - 18.4 ug/dL    Cortisol Collection Info UNK    POCT Glucose    Collection Time: 07/11/22  7:03 AM   Result Value Ref Range    POC Glucose 80 70 - 99 mg/dl    Performed on ACCU-CHEK    POCT Glucose    Collection Time: 07/11/22 10:21 AM   Result Value Ref Range    POC Glucose 84 70 - 99 mg/dl    Performed on ACCU-CHEK               Impression:    1. Impaired mobility and ADLs due to severe dizziness and orthostasis with recent head trauma  2. Right below the knee amputation which is old  3. T12 compression fracture which I feel is likely new  4. Left hand fracture fourth digit felt to be chronic  5. Factors favoring recovery include:  near independent premorbid function   6. IV CEFTRIAXONE for UTI        Complex Active General Medical Issues that complicate care and require daily medical supervision: 1. Principal Problem:    Dehydration  Active Problems:    Essential hypertension    History of below-knee amputation of right lower extremity (HCC)    Impaired mobility and activities of daily living    Orthostatic dizziness    Low BP    LFT elevation    Diarrhea in adult patient    Pancytopenia (HCC)    Hyperglycemia    DM (diabetes mellitus) ([facility]    Bipolar affective disorder ([facility]  Resolved Problems:    * No resolved hospital problems. *            Recommendations:    1. Considering all of the factors above including the patient's current medical status, social status/home environment, their functional needs, and their ability to participate in a therapy program, I feel that they would best be served at:    acute intensive comprehensive inpatient rehabilitation program.  Due to the diagnoses above the patient has had significant decline in function and is now requiring acute intensive therapy to optimize function. They are expected to achieve meaningful functional gains. Due to medical complexity as above, rehabilitation physician services is reasonable and necessary including face-to-face visits at least 3 days/week with anticipated need to treat, manage and modify the rehabilitation course of treatment including interdisciplinary team conferences. They will require rehab physician care to monitor for neurogenic bowel bladder, postoperative pain, titration of opiate and high risk medications,   blood pressure and blood sugar control. It is my opinion that they will be able to tolerate and benefit from 3 hours of therapy a day. I reviewed the various options re: levels of care with the patient and family. Please see pre-admission screen note for further details. I discussed acute rehab with the patient and verify that the patient is able and willing to participate in 3 hours of therapy a day. Rehab and Acute Care Case Management has also reinforced this expectation.   This patient requires multidisciplinary rehabilitation treatment, including daily care and management from a PM&R physician, 24-hour rehabilitation nursing, Physical Therapy, Occupational Therapy, rehabilitation psychology, consideration of speech and language pathology, recreational therapy, nutritional services, and a rehabilitation . I feel that it is reasonable to plan for a discharge to home setting after acute rehab. 2. Specialized nursing care to focus on:  1. Bowel and bladder issues-Monitor for urinary retention-check PVRs, bladder scan--cath if no void. 2. Wound management re abrasion right BKA -pressure relief protocols-side to side turns  3. IV medication administration IV CEFTRIAXONE continue patient is now on Zosyn. 3. Monitor endurance and if necessary spread therapy out over a 7-day window-adding scheduled rest breaks when needed. Focus on energy conservation. Monitor heart rate and   cardiac medications effects on heart rate and blood pressure before, during and after therapy. Progress toward endurance training with pulse ox monitoring for saturation and heart rate. 4. continue to monitor closely for dehydration-- Improve hydration and nutrition by adding Vitamin B12 shot times one, adding Protein supplements and push PO fluids. 5.   Monitor for higher level cognitive deficits, focus on difficulty with sequencing and problem-solving. 6. Focus on higher-level balance and falls risk issues focusing on balance training and monitoring for orthostasis. Above recommendations are indicated to address medical complexity and need for appropriate rehab services. Will tailor individual care and rehab plan per individuals needs re -add lactobacillus to prevent antibiotic associated diarrhea, treat pain and monitor for frequent falls and recent fractures.     Focus of today's plan-   transition to acute rehab      Required Certification Data (potential inpatient rehabilitation facility patient's only)    Deficits:weakness, nutrition, mobility, impaired gait, high risk for falls, frequent falls, decreased endurance, deconditioning , cardiovascular compromise, balance, ADL's, pain limiting function, balance and righting reactions and cognitive deficits     Disability:mobility, locomotion, self care and bowel/ bladder status    Potential barriers to progress/discharge:complex medical conditions, complex social situations and long term IV antibiotics         It was my pleasure to evaluate Adolfo Torree today. Please call [phone] with questions.     Jesica Brasher, DO

## 2022-07-11 NOTE — CARE COORDINATION
PATIENT ACCEPTED TO REHAB ROOM 261, NURSE AWARE. · Patient presented with urinalysis showing moderate leukocytes, occasional bacteria, innumerable WBCs, negative nitrites    · Urine culture pending continue to trend  · Continue Rocephin 1 g IV Q 24 hours

## 2022-07-11 NOTE — CARE COORDINATION
Inpatient Rehab referral received. Met with patient and explained [facility] Acute Inpatient Rehab program and requirements, including 3 hours of intense therapy daily, anticipated length of stay and goal of discharge to home. All questions answered and patient verbalized understanding. Freedom of choice provided and patient requests admit to [facility] if approved by insurance. PM&R consult completed and recommendation for Acute Rehab. Electronically signed by Jame Meyers RN on 7/11/22 at 2:24 PM EDT    Case reviewed with PM&R Dr Lizzette Cabello, patient accepted to [address] [place] [address] room 261 pending negative covid and medical clearance. Lanny C3 aware.  Electronically signed by Jame Meyers RN on 7/11/22 at 2:56 PM EDT

## 2022-07-11 NOTE — PROGRESS NOTES
Progress Note  Date:2022       Room:[place] 677253-01  Patient Name:Ashley Dunn     YOB: 1950     Age:71 y.o. Subjective    Subjective:  Symptoms:  She reports malaise and weakness. No shortness of breath, cough, chest pain, headache, chest pressure, anorexia, diarrhea or anxiety. Review of Systems   Respiratory: Negative for cough and shortness of breath. Cardiovascular: Negative for chest pain. Gastrointestinal: Negative for anorexia and diarrhea. Neurological: Positive for weakness. Objective         Vitals Last 24 Hours:  TEMPERATURE:  Temp  Av.8 °F (36.6 °C)  Min: 97.5 °F (36.4 °C)  Max: 98.1 °F (36.7 °C)  RESPIRATIONS RANGE: No data recorded  PULSE OXIMETRY RANGE: SpO2  Av.5 %  Min: 97 %  Max: 100 %  PULSE RANGE: Pulse  Av  Min: 61  Max: 67  BLOOD PRESSURE RANGE: Systolic (93BDN), ZYR:255 , Min:111 , KPP:311   ; Diastolic (46OUO), SJY:61, Min:50, Max:73    I/O (24Hr): Intake/Output Summary (Last 24 hours) at 2022 1107  Last data filed at 2022 0515  Gross per 24 hour   Intake 823.44 ml   Output --   Net 823.44 ml     Objective:  General Appearance:  Comfortable, well-appearing and in no acute distress. Vital signs: (most recent): Blood pressure (!) 156/54, pulse 67, temperature 98.1 °F (36.7 °C), temperature source Oral, resp. rate 18, height 5' 8\" (1.727 m), weight 199 lb (90.3 kg), SpO2 100 %. HEENT: Normal HEENT exam.    Lungs:  Normal effort. Heart: Normal rate. S1 normal.    Abdomen: Abdomen is soft. Bowel sounds are normal.     Pulses: Distal pulses are intact. Neurological: Patient is alert and oriented to person, place and time. Pupils:  Pupils are equal, round, and reactive to light. Skin:  Warm and dry.       Labs/Imaging/Diagnostics    Labs:  CBC:  Recent Labs     22  0115 07/10/22  0550 22  0528   WBC 6.7 3.8* 2.5*   RBC 3.89* 3.44* 3.36*   HGB 12.0 10.5* 10.3*   HCT 36.3* 32.5* 31.9*   MCV 93.1 94.5 94.9   RDW 15.8* 15.9* 15.7*   * 102* 84*     CHEMISTRIES:  Recent Labs     07/09/22  1417 07/10/22  0550 07/11/22  0528    141 142   K 4.4 4.7 4.1    113* 114*   CO2 19* 15* 17*   BUN 51* 42* 30*   CREATININE 2.77* 1.48* 1.02*   GLUCOSE 120* 84 83   PHOS 5.7* 3.5 3.1     PT/INR:No results for input(s): PROTIME, INR in the last 72 hours. APTT:No results for input(s): APTT in the last 72 hours. LIVER PROFILE:  Recent Labs     07/09/22  0115 07/10/22  0550 07/11/22  0528   AST 90* 22 13   ALT 78* 38* 26   BILIDIR  --  <0.2 <0.2   BILITOT 0.3 <0.2 <0.2   ALKPHOS 112 91 81       Imaging Last 24 Hours:  US RETROPERITONEAL LIMITED    Result Date: 7/10/2022  US RETROPERITONEAL LIMITED : 7/9/2022 CLINICAL HISTORY: NAYELY. COMPARISON: Renal and renal artery ultrasound 10/17/2018, lumbar spine MRI 9/8/2021 and lumbar spine CT 7/9/2022. TECHNIQUE:  Transabdominal ultrasound of the kidneys was performed. FINDINGS: The study is mild to moderately limited by the patient's body habitus. Both kidneys appear normal in size, position and morphology without significantly increased echogenicity. 3 approximately 1 cm simple cysts are noted within the left kidney. There is no hydronephrosis, abnormal perinephric collections, visualized nephrolithiasis, or solid renal masses. The right kidney measures approximately 10.0 x 4.7 x 4.3 cm. The left kidney measures approximately 11.1 x 4.8 x 4.6 cm. The average renal parenchymal thickness is approximately 1 cm. ESSENTIALLY NEGATIVE LIMITED RENAL ULTRASOUND.      Assessment//Plan           Hospital Problems           Last Modified POA    * (Principal) Dehydration 7/9/2022 Yes    Essential hypertension 7/11/2022 Yes    History of below-knee amputation of right lower extremity ([address] 7/11/2022 Yes    Impaired mobility and activities of daily living 7/11/2022 Yes    Orthostatic dizziness 7/11/2022 Yes    Low BP 7/11/2022 Yes    LFT elevation 7/11/2022 Yes    Hyperglycemia 7/11/2022 Yes    DM (diabetes mellitus) ([facility] (Chronic) 7/11/2022 Yes    Bipolar affective disorder ([facility] (Chronic) 7/11/2022 Yes      Hypotension  GI  Acute kidney injury  Dizziness  Assessment & Plan  7/11: Patient doing better feeling better. Awaiting for rehab referral.  Patient was discharged to rehab anytime. Continue current medications. Urine culture sensitivities back. Anticipate discharge today to rehab today versus home tomorrow. Dizziness is better. Acute kidney injury resolved, spoke with casemanager.   Electronically signed by Elma Beck MD on 7/11/22 at 11:07 AM EDT

## 2022-07-12 PROBLEM — N39.0 UTI (URINARY TRACT INFECTION): Status: ACTIVE | Noted: 2022-07-12

## 2022-07-12 PROBLEM — Z78.9 IMPAIRED MOBILITY AND ADLS: Status: ACTIVE | Noted: 2022-07-12

## 2022-07-12 PROBLEM — Z74.09 IMPAIRED MOBILITY AND ADLS: Status: ACTIVE | Noted: 2022-07-12

## 2022-07-12 LAB
ALBUMIN SERPL-MCNC: 3.5 G/DL (ref 3.5–4.6)
ANION GAP SERPL CALCULATED.3IONS-SCNC: 15 MEQ/L (ref 9–15)
BASOPHILS ABSOLUTE: 0 K/UL (ref 0–0.2)
BASOPHILS RELATIVE PERCENT: 0.5 %
BILIRUBIN URINE: NEGATIVE
BLOOD, URINE: NEGATIVE
BUN BLDV-MCNC: 16 MG/DL (ref 8–23)
CALCIUM SERPL-MCNC: 8.4 MG/DL (ref 8.5–9.9)
CHLORIDE BLD-SCNC: 112 MEQ/L (ref 95–107)
CLARITY: CLEAR
CO2: 16 MEQ/L (ref 20–31)
COLOR: YELLOW
CREAT SERPL-MCNC: 0.75 MG/DL (ref 0.5–0.9)
EOSINOPHILS ABSOLUTE: 0 K/UL (ref 0–0.7)
EOSINOPHILS RELATIVE PERCENT: 1.4 %
GFR AFRICAN AMERICAN: >60
GFR NON-AFRICAN AMERICAN: >60
GLUCOSE BLD-MCNC: 108 MG/DL (ref 70–99)
GLUCOSE BLD-MCNC: 117 MG/DL (ref 70–99)
GLUCOSE BLD-MCNC: 82 MG/DL (ref 70–99)
GLUCOSE BLD-MCNC: 88 MG/DL (ref 70–99)
GLUCOSE BLD-MCNC: 97 MG/DL (ref 70–99)
GLUCOSE URINE: NEGATIVE MG/DL
HCT VFR BLD CALC: 34.2 % (ref 37–47)
HEMOGLOBIN: 10.8 G/DL (ref 12–16)
KETONES, URINE: ABNORMAL MG/DL
LEUKOCYTE ESTERASE, URINE: ABNORMAL
LYMPHOCYTES ABSOLUTE: 1.1 K/UL (ref 1–4.8)
LYMPHOCYTES RELATIVE PERCENT: 33.1 %
MCH RBC QN AUTO: 30.8 PG (ref 27–31.3)
MCHC RBC AUTO-ENTMCNC: 31.5 % (ref 33–37)
MCV RBC AUTO: 97.5 FL (ref 82–100)
MONOCYTES ABSOLUTE: 0.3 K/UL (ref 0.2–0.8)
MONOCYTES RELATIVE PERCENT: 8 %
NEUTROPHILS ABSOLUTE: 1.9 K/UL (ref 1.4–6.5)
NEUTROPHILS RELATIVE PERCENT: 57 %
NITRITE, URINE: NEGATIVE
PDW BLD-RTO: 16 % (ref 11.5–14.5)
PERFORMED ON: ABNORMAL
PERFORMED ON: ABNORMAL
PERFORMED ON: NORMAL
PERFORMED ON: NORMAL
PH UA: 5.5 (ref 5–9)
PHOSPHORUS: 3.3 MG/DL (ref 2.3–4.8)
PLATELET # BLD: 99 K/UL (ref 130–400)
POTASSIUM SERPL-SCNC: 3.8 MEQ/L (ref 3.4–4.9)
PROTEIN UA: NEGATIVE MG/DL
RBC # BLD: 3.51 M/UL (ref 4.2–5.4)
SODIUM BLD-SCNC: 143 MEQ/L (ref 135–144)
SPECIFIC GRAVITY UA: 1.01 (ref 1–1.03)
URINE REFLEX TO CULTURE: YES
UROBILINOGEN, URINE: 0.2 E.U./DL
WBC # BLD: 3.3 K/UL (ref 4.8–10.8)

## 2022-07-12 PROCEDURE — 6370000000 HC RX 637 (ALT 250 FOR IP): Performed by: PHYSICAL MEDICINE & REHABILITATION

## 2022-07-12 PROCEDURE — 80069 RENAL FUNCTION PANEL: CPT

## 2022-07-12 PROCEDURE — 6360000002 HC RX W HCPCS: Performed by: INTERNAL MEDICINE

## 2022-07-12 PROCEDURE — 97112 NEUROMUSCULAR REEDUCATION: CPT

## 2022-07-12 PROCEDURE — 99222 1ST HOSP IP/OBS MODERATE 55: CPT | Performed by: PHYSICAL MEDICINE & REHABILITATION

## 2022-07-12 PROCEDURE — 85025 COMPLETE CBC W/AUTO DIFF WBC: CPT

## 2022-07-12 PROCEDURE — 6360000002 HC RX W HCPCS: Performed by: PHYSICAL MEDICINE & REHABILITATION

## 2022-07-12 PROCEDURE — 6370000000 HC RX 637 (ALT 250 FOR IP): Performed by: INTERNAL MEDICINE

## 2022-07-12 PROCEDURE — 97162 PT EVAL MOD COMPLEX 30 MIN: CPT

## 2022-07-12 PROCEDURE — 81003 URINALYSIS AUTO W/O SCOPE: CPT

## 2022-07-12 PROCEDURE — 36415 COLL VENOUS BLD VENIPUNCTURE: CPT

## 2022-07-12 PROCEDURE — 97166 OT EVAL MOD COMPLEX 45 MIN: CPT

## 2022-07-12 PROCEDURE — 97530 THERAPEUTIC ACTIVITIES: CPT

## 2022-07-12 PROCEDURE — 1180000000 HC REHAB R&B

## 2022-07-12 PROCEDURE — 2500000003 HC RX 250 WO HCPCS: Performed by: PHYSICAL MEDICINE & REHABILITATION

## 2022-07-12 PROCEDURE — 97542 WHEELCHAIR MNGMENT TRAINING: CPT

## 2022-07-12 PROCEDURE — 97116 GAIT TRAINING THERAPY: CPT

## 2022-07-12 PROCEDURE — 97110 THERAPEUTIC EXERCISES: CPT

## 2022-07-12 RX ORDER — LIDOCAINE 4 G/G
3 PATCH TOPICAL DAILY
Status: DISCONTINUED | OUTPATIENT
Start: 2022-07-12 | End: 2022-07-15 | Stop reason: HOSPADM

## 2022-07-12 RX ORDER — HYDROCODONE BITARTRATE AND ACETAMINOPHEN 5; 325 MG/1; MG/1
0.5 TABLET ORAL EVERY 4 HOURS PRN
Status: DISCONTINUED | OUTPATIENT
Start: 2022-07-12 | End: 2022-07-12

## 2022-07-12 RX ORDER — POVIDONE-IODINE 7.5 MG/ML
SOLUTION TOPICAL 3 TIMES DAILY
Status: DISCONTINUED | OUTPATIENT
Start: 2022-07-12 | End: 2022-07-15 | Stop reason: HOSPADM

## 2022-07-12 RX ORDER — CIPROFLOXACIN 500 MG/1
500 TABLET, FILM COATED ORAL 2 TIMES DAILY WITH MEALS
Status: DISCONTINUED | OUTPATIENT
Start: 2022-07-12 | End: 2022-07-15 | Stop reason: HOSPADM

## 2022-07-12 RX ORDER — CYANOCOBALAMIN 1000 UG/ML
1000 INJECTION INTRAMUSCULAR; SUBCUTANEOUS WEEKLY
Status: DISCONTINUED | OUTPATIENT
Start: 2022-07-12 | End: 2022-07-15 | Stop reason: HOSPADM

## 2022-07-12 RX ORDER — VITAMIN B COMPLEX
2000 TABLET ORAL
Status: DISCONTINUED | OUTPATIENT
Start: 2022-07-12 | End: 2022-07-15 | Stop reason: HOSPADM

## 2022-07-12 RX ORDER — UBIDECARENONE 100 MG
100 CAPSULE ORAL DAILY
Status: DISCONTINUED | OUTPATIENT
Start: 2022-07-12 | End: 2022-07-15 | Stop reason: HOSPADM

## 2022-07-12 RX ORDER — HYDROCODONE BITARTRATE AND ACETAMINOPHEN 5; 325 MG/1; MG/1
1 TABLET ORAL EVERY 4 HOURS PRN
Status: DISCONTINUED | OUTPATIENT
Start: 2022-07-12 | End: 2022-07-12

## 2022-07-12 RX ORDER — SODIUM PHOSPHATE,MONO-DIBASIC 19G-7G/118
1 ENEMA (ML) RECTAL DAILY PRN
Status: DISCONTINUED | OUTPATIENT
Start: 2022-07-12 | End: 2022-07-15 | Stop reason: HOSPADM

## 2022-07-12 RX ORDER — BISACODYL 10 MG
10 SUPPOSITORY, RECTAL RECTAL DAILY PRN
Status: DISCONTINUED | OUTPATIENT
Start: 2022-07-12 | End: 2022-07-15 | Stop reason: HOSPADM

## 2022-07-12 RX ADMIN — SERTRALINE 100 MG: 100 TABLET, FILM COATED ORAL at 21:37

## 2022-07-12 RX ADMIN — CYANOCOBALAMIN 1000 MCG: 1000 INJECTION, SOLUTION INTRAMUSCULAR; SUBCUTANEOUS at 08:32

## 2022-07-12 RX ADMIN — BUPROPION HYDROCHLORIDE 75 MG: 75 TABLET, FILM COATED ORAL at 21:37

## 2022-07-12 RX ADMIN — ACETAMINOPHEN 650 MG: 325 TABLET ORAL at 16:20

## 2022-07-12 RX ADMIN — CIPROFLOXACIN HYDROCHLORIDE 500 MG: 500 TABLET, FILM COATED ORAL at 16:21

## 2022-07-12 RX ADMIN — BUPROPION HYDROCHLORIDE 75 MG: 75 TABLET, FILM COATED ORAL at 08:28

## 2022-07-12 RX ADMIN — ACETAMINOPHEN 650 MG: 325 TABLET ORAL at 08:31

## 2022-07-12 RX ADMIN — ENOXAPARIN SODIUM 40 MG: 100 INJECTION SUBCUTANEOUS at 08:28

## 2022-07-12 RX ADMIN — PROVIDONE IODINE: 7.5 STICK TOPICAL at 21:40

## 2022-07-12 RX ADMIN — LACTOBACILLUS TAB 2 TABLET: TAB at 12:34

## 2022-07-12 RX ADMIN — LAMOTRIGINE 100 MG: 25 TABLET ORAL at 21:37

## 2022-07-12 RX ADMIN — PROVIDONE IODINE: 7.5 STICK TOPICAL at 09:54

## 2022-07-12 RX ADMIN — SERTRALINE 100 MG: 100 TABLET, FILM COATED ORAL at 08:28

## 2022-07-12 RX ADMIN — QUETIAPINE FUMARATE 150 MG: 50 TABLET, EXTENDED RELEASE ORAL at 21:37

## 2022-07-12 RX ADMIN — PROVIDONE IODINE: 7.5 STICK TOPICAL at 12:41

## 2022-07-12 RX ADMIN — LACTOBACILLUS TAB 2 TABLET: TAB at 21:37

## 2022-07-12 RX ADMIN — LAMOTRIGINE 100 MG: 25 TABLET ORAL at 08:28

## 2022-07-12 RX ADMIN — LACTOBACILLUS TAB 2 TABLET: TAB at 08:28

## 2022-07-12 RX ADMIN — Medication 100 MG: at 12:34

## 2022-07-12 RX ADMIN — ACETAMINOPHEN 650 MG: 325 TABLET ORAL at 21:36

## 2022-07-12 RX ADMIN — CIPROFLOXACIN HYDROCHLORIDE 500 MG: 500 TABLET, FILM COATED ORAL at 12:34

## 2022-07-12 ASSESSMENT — PAIN DESCRIPTION - DESCRIPTORS
DESCRIPTORS: ACHING
DESCRIPTORS: ACHING

## 2022-07-12 ASSESSMENT — PAIN SCALES - GENERAL
PAINLEVEL_OUTOF10: 0
PAINLEVEL_OUTOF10: 7
PAINLEVEL_OUTOF10: 5
PAINLEVEL_OUTOF10: 5
PAINLEVEL_OUTOF10: 7

## 2022-07-12 ASSESSMENT — ENCOUNTER SYMPTOMS
NAUSEA: 0
EYE REDNESS: 0
DIARRHEA: 0
CONSTIPATION: 1
ABDOMINAL PAIN: 0
BACK PAIN: 1
WHEEZING: 0
PHOTOPHOBIA: 0
SHORTNESS OF BREATH: 1
BLOOD IN STOOL: 0
VOMITING: 0
STRIDOR: 0
SORE THROAT: 0
COUGH: 0
EYE PAIN: 0

## 2022-07-12 ASSESSMENT — PAIN DESCRIPTION - ORIENTATION
ORIENTATION: LEFT;LOWER;RIGHT
ORIENTATION: LEFT
ORIENTATION: LEFT;LOWER;MID

## 2022-07-12 ASSESSMENT — PAIN DESCRIPTION - LOCATION
LOCATION: BACK;HAND
LOCATION: HAND;BACK;SHOULDER
LOCATION: BACK;HAND;HIP

## 2022-07-12 NOTE — PLAN OF CARE
Problem: Discharge Planning  Goal: Discharge to home or other facility with appropriate resources  7/12/2022 0237 by Flori Bowie RN  Outcome: Progressing  Flowsheets (Taken 7/12/2022 0221)  Discharge to home or other facility with appropriate resources: Identify barriers to discharge with patient and caregiver  7/11/2022 2233 by Flori Bowie RN  Outcome: Progressing     Problem: Safety - Adult  Goal: Free from fall injury  7/12/2022 0237 by Flori Bowie RN  Outcome: Progressing  7/11/2022 2233 by Flori Bowie RN  Outcome: Progressing     Problem: ABCDS Injury Assessment  Goal: Absence of physical injury  7/12/2022 0237 by Flori Bowie RN  Outcome: Progressing  7/11/2022 2233 by Flori Bowie RN  Outcome: Progressing

## 2022-07-12 NOTE — DISCHARGE SUMMARY
Date: 07/12/2022Value: 10.8*       Ref range: 12.0 - 16.0 g/dL   Status: FinalHematocrit                                    Date: 07/12/2022Value: 34.2*       Ref range: 37.0 - 47.0 %      Status: FinalMCV                                           Date: 07/12/2022Value: 97.5        Ref range: 82.0 - 100.0 fL    Status: [address]                                           Date: 07/12/2022Value: 30.8        Ref range: 27.0 - 31.3 pg     Status: [address]                                          Date: 07/12/2022Value: 31.5*       Ref range: 33.0 - 37.0 %      Status: FinalRDW                                           Date: 07/12/2022Value: 16.0*       Ref range: 11.5 - 14.5 %      Status: FinalPlatelets                                     Date: 07/12/2022Value: 99*         Ref range: 130 - 400 K/uL     Status: FinalNeutrophils %                                 Date: 07/12/2022Value: 57.0        Ref range: %                  Status: FinalLymphocytes %                                 Date: 07/12/2022Value: 33.1        Ref range: %                  Status: FinalMonocytes %                                   Date: 07/12/2022Value: 8.0         Ref range: %                  Status: FinalEosinophils %                                 Date: 07/12/2022Value: 1.4         Ref range: %                  Status: FinalBasophils %                                   Date: 07/12/2022Value: 0.5         Ref range: %                  Status: FinalNeutrophils Absolute                          Date: 07/12/2022Value: 1.9         Ref range: 1.4 - 6.5 K/uL     Status: FinalLymphocytes Absolute                          Date: 07/12/2022Value: 1.1         Ref range: 1.0 - 4.8 K/uL     Status: FinalMonocytes Absolute                            Date: 07/12/2022Value: 0.3         Ref range: 0.2 - 0.8 K/uL     Status: FinalEosinophils Absolute                          Date: 07/12/2022Value: 0.0         Ref range: 0.0 - 0.7 K/uL     Status: Date: 07/12/2022Value: 3.5         Ref range: 3.5 - 4.6 g/dL     Status: FinalPOC Glucose                                   Date: 07/11/2022Value: 82          Ref range: 70 - 99 mg/dl      Status: FinalPerformed on                                  Date: 07/11/2022Value: ACCU-CHEK     Status: FinalPOC Glucose                                   Date: 07/12/2022Value: 88          Ref range: 70 - 99 mg/dl      Status: FinalPerformed on                                  Date: 07/12/2022Value: ACCU-CHEK     Status: FinalPOC Glucose                                   Date: 07/12/2022Value: 117*        Ref range: 70 - 99 mg/dl      Status: FinalPerformed on                                  Date: 07/12/2022Value: ACCU-CHEK     Status: FinalPOC Glucose                                   Date: 07/12/2022Value: 108*        Ref range: 70 - 99 mg/dl      Status: FinalPerformed on                                  Date: 07/12/2022Value: ACCU-CHEK     Status: Final------------Admission on 07/09/2022, Discharged on 07/11/2022No results displayed because visit has over 200 results. ------------    Radiology last 7 days:  XR HAND LEFT (MIN 3 VIEWS)Result Date: 7/9/2022NO SUSPECTED DISPLACED FRACTURE OR SIGNIFICANT POSTTRAUMATIC COMPLICATION IDENTIFIED. CT HEAD WO CONTRASTResult Date: 7/9/2022FINAL IMPRESSION: NO ACUTE INTRACRANIAL PROCESS, FRACTURE, OR EVIDENCE OF CERVICAL SPINE INJURY IDENTIFIED. CT CERVICAL SPINE WO CONTRASTResult Date: 7/9/2022FINAL IMPRESSION: NO ACUTE INTRACRANIAL PROCESS, FRACTURE, OR EVIDENCE OF CERVICAL SPINE INJURY IDENTIFIED. CT LUMBAR SPINE WO CONTRASTResult Date: 7/9/2022NO ACUTE FRACTURE OR SIGNIFICANT POSTTRAUMATIC COMPLICATION IDENTIFIED. CHRONIC AND DEGENERATIVE CHANGES, AS NOTED. [address] Date: 7/10/2022ESSENTIALLY NEGATIVE LIMITED RENAL ULTRASOUND.       Pending Labs   Order Current Status  Myoglobin, Urine In process  MISC ARUP 1 Preliminary result Discharge Medications    Discharge Medication List as of 7/11/2022  7:19 PM    Discharge Medication List as of 7/11/2022  7:19 PM    Discharge Medication List as of 7/11/2022  7:19 PMCONTINUE these medications which have NOT CHANGEDondansetron (ZOFRAN) 4 MG tabletTake 1 tablet by mouth 3 times daily as needed for Nausea or Vomiting, Disp-30 tablet, R-0Normalmetoprolol succinate (TOPROL XL) 50 MG extended release tabletTake 1 tablet by mouth daily, Disp-30 tablet, R-3Normalalendronate (FOSAMAX) 70 MG tabletTake 70 mg by mouth once a weekHistorical MedbuPROPion (WELLBUTRIN) 75 MG tabletTake 100 mg by mouth 2 times dailyHistorical Medfluticasone (FLONASE) 50 MCG/ACT nasal spray50 sprays by Each Nare route as neededHistorical Medsertraline (ZOLOFT) 100 MG tabletTake 100 mg by mouth 2 times dailyHistorical Medtopiramate (TOPAMAX) 25 MG tabletTake 1 tablet by mouth 2 times daily, Disp-60 tablet, R-1Printlisinopril (PRINIVIL;ZESTRIL) 10 MG tabletTake 1 tablet by mouth daily, Disp-30 tablet, R-3NormalclonazePAM (KLONOPIN) 2 MG tabletTake 2 mg by mouth 2 times daily. Historical MedlamoTRIgine (LAMICTAL) 100 MG tabletTake 100 mg by mouth 2 times dailyHistorical MedQUEtiapine (SEROQUEL XR) 150 MG TB24 extended release tabletTake 150 mg by mouth nightlyHistorical Med    Discharge Medication List as of 7/11/2022  7:19 PMSTOP taking these medicationsranitidine (ZANTAC) 150 MG tabletComments:Reason for Stopping:    Time Spent on Discharge:E] minutes were spent in patient examination, evaluation, counseling as well as medication reconciliation, prescriptions for required medications, discharge plan, and follow up.     Electronically signed by Rachel Caballero MD on 7/12/22 at 4:52 PM EDT   time spend discharging the pt 32 min  FU with PCP in 1 week

## 2022-07-12 NOTE — PROGRESS NOTES
Facility/Department: [facility] Initial Assessment: Physical Therapy  Room: R261/R261-01    NAME: Jarred Guerrero  : 1950  MRN: [id number]    Date of Service: 2022    Rehab Diagnosis(es): Impaired mobility and ADL's due to severe dizziness and orthostasis with recent head trauma.  [facility] Rehab admit 22  Patient Active Problem List    Diagnosis Date Noted    Impaired mobility and ADLs 2022    Hyperlipidemia 2022    Impaired mobility and activities of daily living dt T12 fx and gait instability dt ataxia 2022    Dizziness 2022    Low BP 2022    LFT elevation 2022    Diarrhea in adult patient 2022    Pancytopenia ([address] 2022    Acute renal injury ([address]     Dysautonomia (HCC)     Ataxia     Dehydration 2022    Epidural abscess 2021    Discitis of lumbar region 2021    PTSD (post-traumatic stress disorder) 2021    Chronic neck pain 2021    Cervical disc disorder with radiculopathy 2021    Dyslipidemia 2019    Stenosis of carotid artery 10/22/2018    Left foot pain 2017    GERD (gastroesophageal reflux disease) 2016    SOB (shortness of breath) 2015    History of below-knee amputation of right lower extremity (HCC) 2014    Insomnia 2014    Migraine headache 2014    Osteomyelitis ([address] 2012    Anxiety 05/15/2012    Anemia of chronic disease 05/15/2012    Chronic low back pain 05/15/2012    Essential hypertension 05/15/2012    Head injury     Syncope and collapse 2019    Hypertensive urgency 10/16/2018    Hyperglycemia 10/16/2018    Chest tightness or pressure 10/16/2018    Visual disturbance, subjective 10/16/2018    Acute sore throat 10/16/2018    DM (diabetes mellitus) ([address]     Bipolar affective disorder ([address]        Past Medical History:   Diagnosis Date    Anxiety     Bipolar affective disorder ([address]     DM (diabetes mellitus) [facility]     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    Head injury     Hypertension     Migraine     Migraine headache 7/18/2014    Osteomyelitis (HCC) 12/7/2012    PTSD (post-traumatic stress disorder)     TIA (transient ischemic attack)     x 3     Past Surgical History:   Procedure Laterality Date    APPENDECTOMY      CHOLECYSTECTOMY      LEG AMPUTATION BELOW KNEE Bilateral     NASAL FRACTURE SURGERY N/A 4/25/2019    CLOSED REDUCTON EXTERNAL FIXATION OF NASAL BONE FRACTURE WITH SEVERE DNS (DEVIATED NASAL SEPTUM)  ROOM 190 performed by Randi Sullivan MD at [facility] OR       Chart Reviewed: Yes  Family / Caregiver Present: No  Diagnosis: Impaired mobility and ADL's due to severe dizziness and orthostasis with recent head trauma. [facility] Rehab admit 7/11/22  General Comment  Comments: Pt resting in chair - agreeable to PT evaluation    Restrictions:  Restrictions/Precautions: Fall Risk  Position Activity Restriction  Spinal Precautions: Limit forceful spinal flexion  Other position/activity restrictions: R prostetic     SUBJECTIVE: Subjective: \"I'm doing better. \"    Pain  Pain: 7/10 back and shoulder pain pre and post session. Nursing providing pain patches. Prior Level of Function:  Social/Functional History  Lives With: Alone  Type of Home: Apartment  Home Layout: One level  Home Access: Level entry  Bathroom Shower/Tub: Tub/Shower unit,Shower chair with back  Bathroom Toilet: Standard  Bathroom Equipment: Grab bars in shower,Shower chair  Bathroom Accessibility: Accessible  Home Equipment: Reacher,Wheelchair-manual  Has the patient had two or more falls in the past year or any fall with injury in the past year?: Yes (3 prior to this admission)  Receives Help From: Friend(s)  ADL Assistance: Independent  Homemaking Assistance: Independent (friends assist for groceries, or delivery)  Homemaking Responsibilities: Yes  Ambulation Assistance: Non-ambulatory (WC dependent since Nov '21.  Was ambulating well    Patient Education  Education Given To: Patient  Education Provided: Role of Therapy;Plan of Care;Transfer Training  Education Method: Verbal  Education Outcome: Verbalized understanding    Quality Indicators (IRF-TAMIE):  Rolling L and R: Independent - 6  Sit>Supine: Independent - 6  Supine>Sit: Independent - 6  Sit>Stand: Supervision or Touching Assistance - 4  Chair/Bed>Chair Transfer: Supervision or Touching Assistance - 4  Car Transfers: NT - environmental limitations  Walk 10 ft: Supervision or Touching Assistance - 4  Walk 50 ft with two 90 degree turns: Not attempted due to Medical Condition or Safety Concerns (I.e. unsafe or physician orders) - 80  Walk 150 ft in [address]: Not attempted due to Medical Condition or Safety Concerns (I.e. unsafe or physician orders) - 80  Walking 10 ft on Unlevel Surface: Not attempted due to Medical Condition or Safety Concerns (I.e. unsafe or physician orders) - 80  Picking up Objects from Standing Position: Not attempted due to Medical Condition or Safety Concerns (I.e. unsafe or physician orders) - 80  Stairs: No Not Applicable (pt did not complete item prior to admission) - 9  WC Mobility: Yes    ASSESSMENT:  Body Structures, Functions, Activity Limitations Requiring Skilled Therapeutic Intervention: Decreased functional mobility ; Decreased ROM; Decreased strength;Decreased endurance;Decreased balance; Increased pain;Decreased posture;Decreased ADL status; Decreased coordination;Decreased safe awareness  Decision Making: Medium Complexity  History: high  Exam: high  Clinical Presentation: Med    Therapy Prognosis: Good  Barriers to Learning: none           CLINICAL IMPRESSION: Pt presents with weakness and impaired balance which has negatively impacted her functional mobility and increased her risk of falls. Continued PT indicated to progress mobility and faciltiate DC at highest level of indep and safety.     PLAN OF CARE:  Frequency: 1-2 treatment sessions per day, 5-7 days per week     Current Treatment Recommendations: Strengthening,ROM,Balance training,Functional mobility training,Transfer training,Wheelchair mobility training,Gait training,Neuromuscular re-education,Manual Therapy - Soft Tissue Mobilization,Pain management,Home exercise program,Safety education & training,Patient/Caregiver education & training,Equipment evaluation, education, & procurement,Modalities,Therapeutic activities,ADL/Self-care training,Stair training,Endurance training    Patient's Goal:  be able to go home    GOALS:  Patient goals : be able to go home  [address] term goal 1: Pt will demonstrate bed mobility indep.   Long term goal 2: Pt will demonstrate transfers (bed,chair,car) with indep  Long term goal 3: Pt will ambulate 50ft with LRD indep  Long term goal 4: Pt to manage curb step with [employer] and supervision/indep  Long term goal 5: Pt to complete HEP with indep  Additional Goals?: Yes  Long term goal 6: Pt to manage and propel WC 150ft indep    ELOS:   Plan weeks: 1    Therapy Time:    Individual   Time In 0930   Time Out 1000   Minutes 30     8 Minutes (transfers 3min; gait 5min)       Loco Norris, PT, 07/12/22 at 11:48 AM

## 2022-07-12 NOTE — PROGRESS NOTES
Physical Therapy Rehab Treatment Note  Facility/Department: [facility]  Room: [place] [address]       NAME: Edis Hampton  : 1950 (66 y.o.)  MRN: [id number]  CODE STATUS: Full Code    Date of Service: 2022     Restrictions:  Restrictions/Precautions: Fall Risk    SUBJECTIVE:      Pain   6/10 back declined intervention    OBJECTIVE:         Bed mobility  Rolling to Left: Modified independent  Rolling to Right: Modified independent  Supine to Sit: Modified independent  Sit to Supine: Modified independent  Bed Mobility Comments: Completed on flat mat without rails. Increased time and effort to complete. C/o back pain. Transfers  Bed to Chair: Stand by assistance (pivot WC to mat)        Wheelchair Activities  Propulsion: Yes  Propulsion 1  Propulsion: Manual  Method: RUE;LUE;RLE;LLE  Level of Assistance: Supervision  Distance: 100ft     PT Exercises  A/AROM Exercises: bridges x10, s/l clams x10, s/l hip flexion x10, s/l hip abd x10            ASSESSMENT/PROGRESS TOWARDS GOALS:   Body Structures, Functions, Activity Limitations Requiring Skilled Therapeutic Intervention: Decreased functional mobility ; Decreased ROM; Decreased strength;Decreased endurance;Decreased balance; Increased pain;Decreased posture;Decreased ADL status; Decreased coordination;Decreased safe awareness  Goals:  Long Term Goals  Long term goal 1: Pt will demonstrate bed mobility indep. Long term goal 2: Pt will demonstrate transfers (bed,chair,car) with indep  Long term goal 3: Pt will ambulate 50ft with LRD indep  Long term goal 4: Pt to manage curb step with [employer] and supervision/indep  Long term goal 5: Pt to complete HEP with indep    PLAN OF CARE/Safety: Cont per POC.        Therapy Time:   Individual   Time In 1000      Time Out 1030    Minutes 30      Minutes:  Transfer/Bed mobility trainin  Gait trainin  Neuro re education:0  Therapeutic ex:15  WC 10    Nicolasa Dunlap PTA, 22 at 10:55 AM

## 2022-07-12 NOTE — CONSULTS
Hospitalist Group   Consult for Medical Management      Reason for Consult:  Medical management of hypertension    History of Present Illness:  66 y.o. female with a history of hypertension, diabetes, anxiety, bipolar presents after a fall. Patient has right BKA. She usually uses wheelchair. She fell while she was trying to get up to take a shower. She hit her back. She was unable to get up. Patient lives by herself and she is fully independent. She currently denies any other complaints other than her back pain which is well controlled with lidocaine patch. She denies chest pain, shortness of breath, nausea, vomiting. She has diabetes listed on her medical history but she denies taking any medication for it. Her last A1c was 6.1 but in the past she had A1c 7.0. REVIEW OF SYSTEMS:  Complete ROS performed with patient, pertinent positives and negatives are listed in the HPI. 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    Head injury     Hypertension     Migraine     Migraine headache 7/18/2014    Osteomyelitis ([facility] [address] 12/7/2012    PTSD (post-traumatic stress disorder)     TIA (transient ischemic attack)     x 3       Surgical History:  Past Surgical History:   Procedure Laterality Date    APPENDECTOMY      CHOLECYSTECTOMY      LEG AMPUTATION BELOW KNEE Bilateral     NASAL FRACTURE SURGERY N/A 4/25/2019    CLOSED REDUCTON EXTERNAL FIXATION OF NASAL BONE FRACTURE WITH SEVERE DNS (DEVIATED NASAL SEPTUM)  ROOM 190 performed by Emilia Diggs MD at [facility]       Medications Prior to Admission:    Prior to Admission medications    Medication Sig Start Date End Date Taking?  Authorizing Provider   ondansetron (ZOFRAN) 4 MG tablet Take 1 tablet by mouth 3 times daily as needed for Nausea or Vomiting  Patient not taking: Reported on 7/11/2022 4/27/19   Alvarez Lea MD metoprolol succinate (TOPROL XL) 50 MG extended release tablet Take 1 tablet by mouth daily  Patient taking differently: Take 100 mg by mouth 2 times daily  4/26/19   PUNEET Garcia CNP   alendronate (FOSAMAX) 70 MG tablet Take 70 mg by mouth once a week 11/20/18   Historical Provider, MD   buPROPion (WELLBUTRIN) 75 MG tablet Take 100 mg by mouth 2 times daily 8/29/13   Historical Provider, MD   fluticasone (FLONASE) 50 MCG/ACT nasal spray 50 sprays by Each Nare route as needed 2/9/18   Historical Provider, MD   sertraline (ZOLOFT) 100 MG tablet Take 100 mg by mouth 2 times daily 10/22/18   Historical Provider, MD   topiramate (TOPAMAX) 25 MG tablet Take 1 tablet by mouth 2 times daily  Patient not taking: Reported on 7/9/2022 10/17/18   Charito Valentino MD   lisinopril (PRINIVIL;ZESTRIL) 10 MG tablet Take 1 tablet by mouth daily 10/17/18   Sherry Almodovar DO   clonazePAM (KLONOPIN) 2 MG tablet Take 2 mg by mouth 2 times daily. Historical Provider, MD   lamoTRIgine (LAMICTAL) 100 MG tablet Take 100 mg by mouth 2 times daily    Historical Provider, MD   QUEtiapine (SEROQUEL XR) 150 MG TB24 extended release tablet Take 150 mg by mouth nightly    Historical Provider, MD       Allergies: Iv dye [iodides], Vancomycin, and Morphine    Social History:    reports that she has never smoked. She has never used smokeless tobacco. She reports that she does not drink alcohol and does not use drugs.     Family History:       Problem Relation Age of Onset   [place] Cancer Mother         breast    Heart Attack Father          PHYSICAL EXAM:  Vitals:  BP (!) 140/70   Pulse 75   Temp 97.9 °F (36.6 °C)   Resp 16   Ht 5' 8\" (1.727 m)   Wt 190 lb (86.2 kg)   SpO2 98%   BMI 28.89 kg/m²   General Appearance: alert and oriented to person, place and time, well developed and well- nourished, in no acute distress  Skin: warm and dry, no rash or erythema  Head: normocephalic and atraumatic  Eyes: pupils equal, round, and reactive to light, extraocular eye movements intact, conjunctivae normal  ENT: tympanic membrane, external ear and ear canal normal bilaterally, nose without deformity, nasal mucosa and turbinates normal without polyps  Neck: supple and non-tender without mass, no thyromegaly or thyroid nodules, no cervical lymphadenopathy  Pulmonary/Chest: clear to auscultation bilaterally- no wheezes, rales or rhonch   Cardiovascular: normal rate, regular rhythm, normal S1 and S2, no murmurs, rubs   Abdomen: soft, non-tender, non-distended, normal bowel sounds, no masses or organomegaly  Extremities: no cyanosis, clubbing, right BKA  Musculoskeletal: normal range of motion   Neurologic: reflexes normal and symmetric, no cranial nerve deficit       LABS:  Recent Labs     07/10/22  0550 07/11/22  0528 07/12/22  0539    142 143   K 4.7 4.1 3.8   * 114* 112*   CO2 15* 17* 16*   BUN 42* 30* 16   CREATININE 1.48* 1.02* 0.75   GLUCOSE 84 83 82   CALCIUM 7.9* 8.2* 8.4*       Recent Labs     07/10/22  0550 07/11/22  0528 07/12/22  0539   WBC 3.8* 2.5* 3.3*   RBC 3.44* 3.36* 3.51*   HGB 10.5* 10.3* 10.8*   HCT 32.5* 31.9* 34.2*   MCV 94.5 94.9 97.5   MCH 30.6 30.7 30.8   MCHC 32.3* 32.4* 31.5*   RDW 15.9* 15.7* 16.0*   * 84* 99*       Recent Labs     07/11/22  1710 07/11/22  2303 07/12/22  0649 07/12/22  1131   POCGLU 87 82 88 117*       CBC:   Lab Results   Component Value Date/Time    WBC 3.3 07/12/2022 05:39 AM    RBC 3.51 07/12/2022 05:39 AM    HGB 10.8 07/12/2022 05:39 AM    HCT 34.2 07/12/2022 05:39 AM    MCV 97.5 07/12/2022 05:39 AM    MCH 30.8 07/12/2022 05:39 AM    MCHC 31.5 07/12/2022 05:39 AM    RDW 16.0 07/12/2022 05:39 AM    PLT 99 07/12/2022 05:39 AM     CMP:    Lab Results   Component Value Date/Time     07/12/2022 05:39 AM    K 3.8 07/12/2022 05:39 AM    K 3.2 04/27/2019 06:39 AM     07/12/2022 05:39 AM    CO2 16 07/12/2022 05:39 AM    BUN 16 07/12/2022 05:39 AM    CREATININE 0.75 07/12/2022 05:39 AM    GFRAA >60.0 07/12/2022 05:39 AM    LABGLOM >60.0 07/12/2022 05:39 AM    GLUCOSE 82 07/12/2022 05:39 AM    PROT 5.9 07/11/2022 05:28 AM    LABALBU 3.5 07/12/2022 05:39 AM    CALCIUM 8.4 07/12/2022 05:39 AM    BILITOT <0.2 07/11/2022 05:28 AM    ALKPHOS 81 07/11/2022 05:28 AM    AST 13 07/11/2022 05:28 AM    ALT 26 07/11/2022 05:28 AM       Radiology:   No orders to display          ASSESSMENT/ PLAN:    1. Fall/inability to get up- has right BKA and she is usually uses wheelchair. She is independent. Patient was found to be hypotensive on presentation which could explain her weakness. Seen by PT/OT. Currently in rehab and doing well. 2. Hypertension-resume home medication monitor blood pressure closely to avoid hypotension. 3. Diabetes-not on any medication. Last A1c 6.1. Monitor blood glucose. Insulin sliding scale  4. UTI- no symptoms of UTI currently. UA was positive for UTI. Urine culture shows Klebsiella pneumonia with significant number of colonies. Continue Cipro for now           Electronically signed by Douglas Patino MD on 7/12/2022 at 1:31 PM    NOTE: This report was transcribed using voice recognition software. Every effort was made to ensure accuracy; however, inadvertent computerized transcription errors may be present.

## 2022-07-12 NOTE — PLAN OF CARE
Nutrition Problem #1: Inadequate oral intake  Intervention: Food and/or Nutrient Delivery: Continue Current Diet,Start Oral Nutrition Supplement  Nutritional Goals: PO intake >50%, maintain wt.

## 2022-07-12 NOTE — PROGRESS NOTES
Nephrology Progress Note    Assessment:  NAYELY resolved  DM type-2  Hypertension  PTSC  UTI  Right BKA  Pancytopenia      Plan:   Will sign off case  Labs and vitals fine    Patient Active Problem List:     Hypertensive urgency     Hyperglycemia     Chest tightness or pressure     Visual disturbance, subjective     DM (diabetes mellitus) (HCC)     Bipolar affective disorder (HCC)     Acute sore throat     Syncope and collapse     Head injury     Dehydration     Anxiety     Anemia of chronic disease     Chronic low back pain     Chronic neck pain     Dyslipidemia     Epidural abscess     Discitis of lumbar region     Essential hypertension     GERD (gastroesophageal reflux disease)     History of below-knee amputation of right lower extremity (HCC)     Hyperlipidemia     Insomnia     Left foot pain     Migraine headache     Osteomyelitis (HCC)     PTSD (post-traumatic stress disorder)     SOB (shortness of breath)     Cervical disc disorder with radiculopathy     Stenosis of carotid artery     Impaired mobility and activities of daily living dt T12 fx and gait instability dt ataxia     Dizziness     Low BP     LFT elevation     Diarrhea in adult patient     Pancytopenia (HCC)     Acute renal injury ([address]     Dysautonomia ([address]     Ataxia     Impaired mobility and ADLs      Subjective:  Admit Date: 7/11/2022    Interval History: no issues     Medications:  Scheduled Meds:   Vitamin D  2,000 Units Oral Dinner    cyanocobalamin  1,000 mcg IntraMUSCular Weekly    coenzyme Q10  100 mg Oral Daily    lidocaine  3 patch TransDERmal Daily    ciprofloxacin  500 mg Oral BID WC    enoxaparin  40 mg SubCUTAneous Daily    insulin lispro  0-6 Units SubCUTAneous TID WC    insulin lispro  0-3 Units SubCUTAneous Nightly    buPROPion  75 mg Oral BID    lactobacillus acidophilus  2 tablet Oral TID    lamoTRIgine  100 mg Oral BID    QUEtiapine  150 mg Oral Nightly    sertraline  100 mg Oral BID     Continuous Infusions:   dextrose         CBC:   Recent Labs     07/11/22  0528 07/12/22  0539   WBC 2.5* 3.3*   HGB 10.3* 10.8*   PLT 84* 99*     CMP:    Recent Labs     07/10/22  0550 07/11/22  0528 07/12/22  0539    142 143   K 4.7 4.1 3.8   * 114* 112*   CO2 15* 17* 16*   BUN 42* 30* 16   CREATININE 1.48* 1.02* 0.75   GLUCOSE 84 83 82   CALCIUM 7.9* 8.2* 8.4*   LABGLOM 34.6* 53.1* >60.0     Troponin: No results for input(s): TROPONINI in the last 72 hours. BNP: No results for input(s): BNP in the last 72 hours. INR: No results for input(s): INR in the last 72 hours. Lipids: No results for input(s): CHOL, LDLDIRECT, TRIG, HDL, AMYLASE, LIPASE in the last 72 hours. Liver:   Recent Labs     07/11/22  0528 07/11/22  0528 07/12/22  0539   AST 13  --   --    ALT 26  --   --    ALKPHOS 81  --   --    PROT 5.9*  --   --    LABALBU 3.0*   < > 3.5   BILITOT <0.2  --   --     < > = values in this interval not displayed. Iron:  No results for input(s): IRONS, FERRITIN in the last 72 hours. Invalid input(s): LABIRONS  Urinalysis: No results for input(s): UA in the last 72 hours.     Objective:  Vitals: BP (!) 140/70   Pulse 75   Temp 97.9 °F (36.6 °C)   Resp 16   Ht 5' 8\" (1.727 m)   Wt 190 lb (86.2 kg)   SpO2 98%   BMI 28.89 kg/m²    Wt Readings from Last 3 Encounters:   07/11/22 190 lb (86.2 kg)   07/09/22 199 lb (90.3 kg)   09/08/21 213 lb (96.6 kg)      24HR INTAKE/OUTPUT:  No intake or output data in the 24 hours ending 07/12/22 0843    General: alert, in no apparent distress  HEENT: normocephalic, atraumatic, anicteric  Neck: supple, no mass  Lungs: non-labored respirations, clear to auscultation bilaterally  Heart: regular rate and rhythm, no murmurs or rubs  Abdomen: soft, non-tender, non-distended  Ext: no cyanosis, no peripheral edema right BKA  Neuro: alert and oriented, no gross abnormalities  Psych: normal mood and affect  Skin: no rash      Electronically signed by Jack Diaz DO, MD

## 2022-07-12 NOTE — PROGRESS NOTES
Comprehensive Nutrition Assessment    Type and Reason for Visit:  Initial,Consult (Eval and treat)    Nutrition Recommendations/Plan:   1. Provide clear nutrition supplement BID @ L+D (Ensure Clear)  2. RD noted dietary restrictions in  system      Malnutrition Assessment:  Malnutrition Status:  Mild malnutrition (07/12/22 1349)    Context:  Social/Environmental Circumstances     Findings of the 6 clinical characteristics of malnutrition:  Energy Intake:  Mild decrease in energy intake (Comment)  Weight Loss:  Mild weight loss (specify amount and time period) (10.8% in 10 months)     Body Fat Loss:  No significant body fat loss     Muscle Mass Loss:  No significant muscle mass loss    Fluid Accumulation:  No significant fluid accumulation     Strength:  Not Performed    Nutrition Assessment:    Pt meets criteria for mild malnutrition evidenced by mild wt loss in the last year with decreased PO intake d/t pain. RD to trial clear nutrition supplement and follow up for acceptance. Nutrition Related Findings:    Pt admitted with increased weakness/frequent falls. Pmhx: DM, bipolar, PTSD, TIA, R BKA. Labs/meds noted. Gluc WNL. Last BM 7/11. Pt c/o severe pain stating it is causing poor appetite. Ate <25% of lunch per observation. Pt reports lactose/sugar substitute intolerant stating it causes her diarrhea. Willing to try clear nutrition supplement. Wound Type: None       Current Nutrition Intake & Therapies:    Average Meal Intake: 26-50%,1-25%  Average Supplements Intake: None Ordered  ADULT DIET; Regular; 4 carb choices (60 gm/meal)  ADULT ORAL NUTRITION SUPPLEMENT; Lunch, Dinner; Clear Liquid Oral Supplement    Anthropometric Measures:  Height: 5' 8\" (172.7 cm)  Ideal Body Weight (IBW): 140 lbs (64 kg)    Admission Body Weight: 190 lb (86.2 kg) (stated)  Current Body Weight: 190 lb (86.2 kg),   IBW.  Weight Source: Stated  Current BMI (kg/m2): 28.9  Usual Body Weight: 213 lb (96.6 kg) (office visit 9/2021)  % Weight Change (Calculated): -10.8  Weight Adjustment For: Amputation  Total Adjusted Percentage (Calculated): 5.9           Adjusted BMI (kg/m2) (Calculated): 30.6  BMI Categories: Overweight (BMI 25.0-29. 9)    Estimated Daily Nutrient Needs:  Energy Requirements Based On: Kcal/kg  Weight Used for Energy Requirements: Current  Energy (kcal/day): 1720-1810kcal (20-21kcal/kg)  Weight Used for Protein Requirements: Ideal  Protein (g/day): 76-82g (1.2-1.3g/kg)  Method Used for Fluid Requirements: 1 ml/kcal  Fluid (ml/day): ~1800ml    Nutrition Diagnosis:   · Inadequate oral intake related to pain as evidenced by intake 26-50%,intake 0-25%,    Nutrition Interventions:   Food and/or Nutrient Delivery: Continue Current Diet,Start Oral Nutrition Supplement  Nutrition Education/Counseling: No recommendation at this time  Coordination of Nutrition Care: Continue to monitor while inpatient       Goals:     Goals: PO intake 75% or greater,other (specify)  Specify Other Goals: maintain wt    Nutrition Monitoring and Evaluation:   Behavioral-Environmental Outcomes: None Identified  Food/Nutrient Intake Outcomes: Food and Nutrient Intake  Physical Signs/Symptoms Outcomes: Biochemical Data,Weight,Meal Time Behavior    Discharge Planning:    No discharge needs at this time     Africa Dooley, MS, RD, LD

## 2022-07-12 NOTE — H&P
HISTORY & PHYSICAL       DATE OF ADMISSION:  7/11/2022    DATE OF SERVICE:  7/12/22    Subjective:    Wilson Murillo, 66 y.o. female presents today with:     CHIEF COMPLAINT:  Patient was initially admitted to [facility] through the ER on 7/9/2022 after presenting with a fall with trauma to her head. She fell while she was trying to get up to take a shower. She hit her back. She was unable to get up  She came in complaining of pain in her head but denied numbness or tingling. She has been found to have low blood pressure and her dizziness is not found to be vestibular in nature but rather hypoperfusion in nature. She has a remote history of a right BKA and occasionally uses the wheelchair at home. She was diagnosed with a UTI culture showed Klebsiella pneumoniae and she was prescribed Cipro.     She was initially placed on-IV CEFTRIAXONE-she is now on Zosyn. She has a bruise over her T12 spinous process and a compression fracture there. She saw orthopedics to determine whether this is an old or new fracture. They thought it appeared chronic. She also has a chronic appearing fracture of the base of her fourth metacarpal joint however she still has a bruise there as well. She was treated with nonsurgical management and gentle range of motion. Extremity Weakness  Associated symptoms include fatigue, myalgias and weakness. Pertinent negatives include no abdominal pain, chest pain, chills, congestion, coughing, diaphoresis, fever, headaches, nausea, neck pain, rash, sore throat or vomiting. I reviewed recent nursing note, \" 1036 - Shift assessment completed. A&Ox4 VSS BP: 156/54. Pulse ox 100% on room air. Lung sounds are clear. Pt denies having SOB, nausea, or pain. On tele SR. Abdomen is soft and non tender. Bowel sounds are active. BM 7/11 (today). Voiding via commode. Standby assist to commode. Right BKA with noted abrasion on stump from fall at home. Bruising BUE. Meds Given Per MAR.  Pt resting in bed with call light in reach. \". The patient has stabilized medically and is able to participate at acute level rehab but is too medically complex for SNF due to need for therapy at the acute level with at least 15 hours a week of PT OT and cognitive and recreational therapy at an acute level with daily medical monitoring. Imaging:  Imaging and other studies reviewed and discussed with patient and staff    XR HAND LEFT   7/9/2022: Mild deformity of the medial base of the left fourth proximal phalanx is probably chronic rather than a nondisplaced fracture. There is no other fracture, dislocation, radiodense foreign bodies, worrisome bone destruction, or pathologic calcifications identified. The visualized joint spaces are intact, with mild to moderate osteoarthritic changes predominantly at the first carpometacarpal and fifth DIP joints. NO SUSPECTED DISPLACED FRACTURE OR SIGNIFICANT POSTTRAUMATIC COMPLICATION IDENTIFIED. CT HEAD   7/9/2022   . HEAD CT FINDINGS: There is no intracranial hemorrhage, mass effect, midline shift, extra-axial collection, hydrocephalus, evidence of a recent ischemic infarct or skull fracture identified. Mild generalized cerebral volume loss and mild to moderate white matter changes are again noted. The mastoid air cells and visualized paranasal sinuses are essentially clear. CERVICAL SPINE CT FINDINGS: The spine is visualized from the craniovertebral junction nearly through the T2 level. There is no fracture, dislocation, or acute paraspinous soft tissue abnormalities identified. Moderate degenerative changes of the lower levels are again noted. FINAL IMPRESSION: NO ACUTE INTRACRANIAL PROCESS, FRACTURE, OR EVIDENCE OF CERVICAL SPINE INJURY IDENTIFIED.      CT CERVICAL SPINE : 7/9/2022    HEAD CT FINDINGS: There is no intracranial hemorrhage, mass effect, midline shift, extra-axial collection, hydrocephalus, evidence of a recent ischemic infarct or skull fracture identified. Mild generalized cerebral volume loss and mild to moderate white matter changes are again noted. The mastoid air cells and visualized paranasal sinuses are essentially clear. CERVICAL SPINE CT FINDINGS: The spine is visualized from the craniovertebral junction nearly through the T2 level. There is no fracture, dislocation, or acute paraspinous soft tissue abnormalities identified. Moderate degenerative changes of the lower levels are again noted. FINAL IMPRESSION: NO ACUTE INTRACRANIAL PROCESS, FRACTURE, OR EVIDENCE OF CERVICAL SPINE INJURY IDENTIFIED. CT LUMBAR SPINE  7/9/2022   The spine is visualized from the T7-8 through the S3 levels. There is no acute fracture, dislocation, evidence of instability, acute paraspinous soft tissue abnormalities, or significant change from 9/8/2021 identified. Osteopenia, mild to moderate Rotary dextroscoliosis, moderately extensive degenerative changes, and mild chronic compression fractures of T12-L2 with previous vertebroplasties. NO ACUTE FRACTURE OR SIGNIFICANT POSTTRAUMATIC COMPLICATION IDENTIFIED. CHRONIC AND DEGENERATIVE CHANGES, AS NOTED. US RETROPERITONEAL   7/10/2022   ESSENTIALLY NEGATIVE LIMITED RENAL ULTRASOUND.             Labs:    Labs reviewed and discussed with patient and staff    Lab Results   Component Value Date/Time    POCGLU 117 07/12/2022 11:31 AM    POCGLU 88 07/12/2022 06:49 AM    POCGLU 82 07/11/2022 11:03 PM    POCGLU 87 07/11/2022 05:10 PM    POCGLU 96 07/11/2022 01:08 PM     Lab Results   Component Value Date/Time     07/12/2022 05:39 AM    K 3.8 07/12/2022 05:39 AM    K 3.2 04/27/2019 06:39 AM     07/12/2022 05:39 AM    CO2 16 07/12/2022 05:39 AM    BUN 16 07/12/2022 05:39 AM    CREATININE 0.75 07/12/2022 05:39 AM    CALCIUM 8.4 07/12/2022 05:39 AM    LABALBU 3.5 07/12/2022 05:39 AM    BILITOT <0.2 07/11/2022 05:28 AM    ALKPHOS 81 07/11/2022 05:28 AM    AST 13 07/11/2022 05:28 AM    ALT 26 07/11/2022 05:28 AM     Lab Results   Component Value Date/Time    WBC 3.3 07/12/2022 05:39 AM    RBC 3.51 07/12/2022 05:39 AM    HGB 10.8 07/12/2022 05:39 AM    HCT 34.2 07/12/2022 05:39 AM    MCV 97.5 07/12/2022 05:39 AM    MCH 30.8 07/12/2022 05:39 AM    MCHC 31.5 07/12/2022 05:39 AM    RDW 16.0 07/12/2022 05:39 AM    PLT 99 07/12/2022 05:39 AM     Lab Results   Component Value Date/Time    VITD25 14.1 07/09/2022 02:17 PM     Lab Results   Component Value Date/Time    COLORU Yellow 07/09/2022 01:15 AM    NITRU Negative 07/09/2022 01:15 AM    GLUCOSEU Negative 07/09/2022 01:15 AM    KETUA TRACE 07/09/2022 01:15 AM    UROBILINOGEN 0.2 07/09/2022 01:15 AM    BILIRUBINUR Negative 07/09/2022 01:15 AM     Lab Results   Component Value Date/Time    PROTIME 10.2 04/24/2019 03:45 PM     Lab Results   Component Value Date/Time    INR 1.0 04/24/2019 03:45 PM           The patient remains highly medically complex and continues to have severe problems with activities of daily living and mobility. The patient was assessed to be able to tolerate intensive rehabilitation and therefore was admitted to Rehabilitation to address these needs.     The patient has been found to have severe abnormality of gait and mobility with impaired self care and is admitted to the acute inpatient rehab program.       Prior Function; everyday activities:     Social History     Socioeconomic History    Marital status: Single     Spouse name: Not on file    Number of children: 1    Years of education: Not on file    Highest education level: Not on file   Occupational History    Not on file   Tobacco Use    Smoking status: Never Smoker    Smokeless tobacco: Never Used   Substance and Sexual Activity    Alcohol use: No    Drug use: No    Sexual activity: Not on file   Other Topics Concern    Not on file   Social History Narrative     Lives Alone -her only daughter lives in [place], she has high school friends and neighbors who are helpful especially friend Talia Henderson    She went to [employer] after graduation  her  who was in the [employer] and moved to [place]. She lived in [place] for many years moving back to the [facility] area after she retired. Type of Home: Apartment in [address] [place] [address] [id number]    Home Layout: One level    Home Access: Level entry    Bathroom Shower/Tub: Tub/Shower unit,Shower chair with back    Bathroom Toilet: Standard    Bathroom Equipment: Grab bars in shower,Shower chair    Bathroom Accessibility: Accessible    Home Equipment: Reacher,Wheelchair-manual    Has the patient had two or more falls in the past year or any fall with injury in the past year?: Yes (3 recent)    Receives Help From: Friend(s)    ADL Assistance: [address]: Independent (friend brought groceries, or has them delivered)    Homemaking Responsibilities: Yes    Ambulation Assistance: Non-ambulatory (w/c dependent, prior to COVID was ambulating)    Transfer Assistance: Independent    Active : No (does not have a car)    Occupation: Retired    IADL Comments: previously independent with IADLs, pt has assistance with groceries    Additional Comments: pt motiviated for increased independence; pt stating increased blurry vision at home and increased dizziness since fall         Social Determinants of Health     Financial Resource Strain:     Difficulty of Paying Living Expenses: Not on file   Food Insecurity:     Worried About [address] in the Last Year: Not on file    [address] in the Last Year: Not on file   Transportation Needs:     Lack of Transportation (Medical): Not on file    Lack of Transportation (Non-Medical):  Not on file   Physical Activity:     Days of Exercise per Week: Not on file    Minutes of Exercise per Session: Not on file   Stress:     Feeling of Stress : Not on file   Social Connections:     Frequency of Communication with Friends and Family: 1020)  Stairs:  Stairs/Curb  Stairs?: No (07/12/22 1020)  W/C mobility:  Wheelchair Activities  Wheelchair Parts Management: Yes (07/12/22 1020)  All Wheelchair Parts Management: manages WC brakes indep. Pt doesn't use her footrests at home (07/12/22 1020)  Propulsion: Yes (07/12/22 1042)  Propulsion 1  Propulsion: Manual (07/12/22 1042)  Method: RUE;LUE;RLE;LLE (07/12/22 1042)  Level of Assistance: Supervision (07/12/22 1042)  Description/ Details: Verbal cues for managing obstacles. (07/12/22 1020)  Distance: 100ft (07/12/22 1042)    Occupational Therapy:   Hand Dominance: Left  ADL  Feeding: Unable to assess(comment) (07/12/22 1027)  Feeding Skilled Clinical Factors: not observed (07/12/22 1027)  Grooming: Setup (07/12/22 1027)  UE Bathing: Setup (07/12/22 1027)  LE Bathing: Setup (07/12/22 1027)  UE Dressing: Setup (07/12/22 1027)  LE Dressing: Setup (07/12/22 1027)  Toileting: Unable to assess(comment) (07/12/22 1027)  Toileting Skilled Clinical Factors: declined need (07/12/22 1027)  Toilet Transfers  Toilet Transfer: Unable to assess (07/12/22 1028)  Toilet Transfers Comments: declined need for toileting (07/12/22 1028)     Shower Transfers  Shower - Transfer To: Shower seat with back (07/12/22 1028)  Shower - Technique: Stand pivot (07/12/22 1028)  Shower Transfers: Stand by assistance (07/12/22 1028)  Shower Transfers Comments: use of grab bars (07/12/22 1028)    Speech Therapy:            Diet/Swallow:                         COGNITION:  OT: Cognition Comment: comp: SUP, exp: SUP, prob solving: SUP, mem: SUP, soc int: MOD I  SP:          Prior to admission patient was independent with all ADLs and mobilityand did not require any outside services.        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       Current Facility-Administered Medications   Medication Dose Route Frequency Provider Last Rate Last Admin    Vitamin D (CHOLECALCIFEROL) tablet 2,000 Units  2,000 Units Oral Dinner [place] Guzman, DO        cyanocobalamin injection 1,000 mcg  1,000 mcg IntraMUSCular Weekly Shirley Scullin, DO   1,000 mcg at 07/12/22 0832    coenzyme Q10 capsule 100 mg  100 mg Oral Daily Shirley Scullin, DO   100 mg at 07/12/22 1234    lidocaine 4 % external patch 3 patch  3 patch TransDERmal Daily Shirley Scullin, DO   3 patch at 07/12/22 0947    ciprofloxacin (CIPRO) tablet 500 mg  500 mg Oral BID WC Shirley Scullin, DO   500 mg at 07/12/22 1234    bisacodyl (DULCOLAX) suppository 10 mg  10 mg Rectal Daily PRN Shirley Scullin, DO        sodium phosphate (FLEET) enema 1 enema  1 enema Rectal Daily PRN Shirley Scullin, DO        Povidone-Iodine 7.5 % SWAB   Topical TID Shirley Scullin, DO   Given at 07/12/22 1241    acetaminophen (TYLENOL) tablet 650 mg  650 mg Oral Q6H PRN Jin Gabriel MD   650 mg at 07/12/22 0831    Or    acetaminophen (TYLENOL) suppository 650 mg  650 mg Rectal Q6H PRN Jin Gabriel MD        enoxaparin (LOVENOX) injection 40 mg  40 mg SubCUTAneous Daily Jin Gabriel MD   40 mg at 07/12/22 0828    ondansetron (ZOFRAN-ODT) disintegrating tablet 4 mg  4 mg Oral Q8H PRN Jin Gabriel MD        Or    ondansetron (ZOFRAN) injection 4 mg  4 mg IntraVENous Q6H PRN Jin Gabriel MD        polyethylene glycol (GLYCOLAX) packet 17 g  17 g Oral Daily PRN Jin Gabriel MD       [facility] Milder dextrose 5 % solution  100 mL/hr IntraVENous PRN Sherie Coughlin MD        dextrose bolus 10% 125 mL  125 mL IntraVENous PRN Sherie Coughlin MD        Or    dextrose bolus 10% 250 mL  250 mL IntraVENous PRN Sherie Coughlin MD        glucagon (rDNA) injection 1 mg  1 mg IntraMUSCular PRN Sherie Coughlin MD        glucose chewable tablet 16 g  4 tablet Oral PRN Sherie Coughlin MD        insulin lispro (HUMALOG) injection vial 0-6 Units  0-6 Units SubCUTAneous TID WC Sherie Coughlin MD        insulin lispro (HUMALOG) injection vial 0-3 Units  0-3 Units SubCUTAneous Nightly Sherie Coughlin MD        buPROPion [facility]) tablet 75 mg  75 mg Oral BID Sherie Coughlin MD   75 mg at 07/12/22 0828    clonazePAM (KLONOPIN) tablet 2 mg  2 mg Oral Q12H PRN Sherie Coughlin MD        hydrALAZINE (APRESOLINE) injection 10 mg  10 mg IntraVENous Q4H PRN Sherie Coughlin MD        lactobacillus acidophilus [facility]) 2 tablet  2 tablet Oral TID Sherie Coughlin MD   2 tablet at 07/12/22 1234    lamoTRIgine (LAMICTAL) tablet 100 mg  100 mg Oral BID Sherie Coughlin MD   100 mg at 07/12/22 0828    QUEtiapine (SEROQUEL XR) extended release tablet 150 mg  150 mg Oral Nightly Sherie Coughlin MD   150 mg at 07/11/22 2313    sertraline (ZOLOFT) tablet 100 mg  100 mg Oral BID Sherie Coughlin MD   100 mg at 07/12/22 2167       Allergies   Allergen Reactions    Iv Dye [Iodides]     Vancomycin     Morphine Rash                      FAMILY HISTORY:  Does not pertain to chief complaint. Review of Systems   Constitutional: Positive for activity change and fatigue. Negative for chills, diaphoresis and fever. HENT: Negative for congestion, ear discharge, ear pain, hearing loss, nosebleeds, sore throat and tinnitus. Eyes: Negative for photophobia, pain and redness. Respiratory: Positive for shortness of breath. Negative for cough, wheezing and stridor.          Shortness of breath on exertion   Cardiovascular: Negative for chest pain, palpitations and leg swelling. Gastrointestinal: Positive for constipation. Negative for abdominal pain, blood in stool, diarrhea, nausea and vomiting. Endocrine: Negative for polydipsia. Genitourinary: Negative for dysuria, flank pain, frequency, hematuria and urgency. Musculoskeletal: Positive for back pain, gait problem and myalgias. Negative for neck pain. Skin: Negative for rash. Allergic/Immunologic: Positive for immunocompromised state. Negative for environmental allergies. Neurological: Positive for weakness. Negative for dizziness, tremors, seizures and headaches. Hematological: Does not bruise/bleed easily. Psychiatric/Behavioral: Negative for hallucinations and suicidal ideas. The patient is not nervous/anxious. Objective  BP (!) 140/70   Pulse 75   Temp 97.9 °F (36.6 °C)   Resp 16   Ht 5' 8\" (1.727 m)   Wt 190 lb (86.2 kg)   SpO2 98%   BMI 28.89 kg/m² *    Physical Exam  Constitutional:       General: She is not in acute distress. Appearance: She is well-developed. She is ill-appearing. She is not toxic-appearing or diaphoretic. HENT:      Head: Normocephalic. Not macrocephalic and not microcephalic. No raccoon eyes or Estrada's sign. Mouth/Throat:      Pharynx: Oropharyngeal exudate present. Eyes:      General: No scleral icterus. Right eye: No discharge. Left eye: No discharge. Conjunctiva/sclera: Conjunctivae normal.      Pupils: Pupils are equal, round, and reactive to light. Neck:      Thyroid: No thyromegaly. Vascular: Normal carotid pulses. No carotid bruit, hepatojugular reflux or JVD. Trachea: No tracheal deviation. Pulmonary:      Effort: Pulmonary effort is normal.      Breath sounds: No stridor. Decreased breath sounds present. Musculoskeletal:      Cervical back: Normal range of motion. Tenderness present. Spinous process tenderness and muscular tenderness present. Thoracic back: Tenderness present.  Decreased range of motion. Lumbar back: Tenderness present. Decreased range of motion. Skin:     General: Skin is warm and dry. Coloration: Skin is pale. Findings: Bruising present. Comments: Old IV sites   Neurological:      Sensory: Sensory deficit present. Coordination: Coordination abnormal. Finger-Nose-Finger Test abnormal and Romberg Test abnormal.      Gait: Gait abnormal.      Deep Tendon Reflexes:      Reflex Scores:       Tricep reflexes are 1+ on the right side and 1+ on the left side. Bicep reflexes are 1+ on the right side and 1+ on the left side. Brachioradialis reflexes are 1+ on the right side and 1+ on the left side. Patellar reflexes are 0 on the left side. Achilles reflexes are 0 on the left side. Psychiatric:         Attention and Perception: She is attentive. Mood and Affect: Mood is not anxious or depressed. Affect is not angry. Speech: Speech is delayed. Speech is not rapid and pressured. Behavior: Behavior is slowed. Behavior is not withdrawn. Thought Content: Thought content normal.         Cognition and Memory: Cognition is impaired. Memory is not impaired. She exhibits impaired recent memory. She does not exhibit impaired remote memory. Judgment: Judgment is not impulsive or inappropriate. Ortho Exam  Neurologic Exam     Mental Status   Level of consciousness: alert  Knowledge: good. Cranial Nerves     CN III, IV, VI   Pupils are equal, round, and reactive to light.     Gait, Coordination, and Reflexes     Gait  Gait: wide-based    Coordination   Romberg: positive  Finger to nose coordination: abnormal    Reflexes   Right brachioradialis: 1+  Left brachioradialis: 1+  Right biceps: 1+  Left biceps: 1+  Right triceps: 1+  Left triceps: 1+  Left patellar: 0  Left achilles: 0      After extensive review of the records and above physical exam, I have formulated the following diagnoses and plan: DIAGNOSES:    1. The patient was admitted to the acute rehabilitation unit with the primary rehab diagnosis being severe abnormality of gait and mobility and impaired self care and ADL's due to osteoarthritis flareup status post fall with a T12 fracture. Compared to Pre-Admission Assessment, patients medical and functional status remain challengingly complex and patient continues to requireintensive therapeutic intervention from multiple therapies, therefore, initiate acute intensive comprehensive inpatient rehabilitation program including PT/OT to improve balance, ambulation, ADLs, and to improve the P/AROM. Functional and medical status reassessed regarding patients ability to participate in therapies and patient found to be able to participate in acute intensive comprehensive inpatient rehabilitation program.  Therapeutic modifications regarding activities in therapies, place, amount of time per day and intensity of therapy made daily. Enroll in acute course of therapy program to include 1 1/2 hours per day of PT 5 to 7days per week and 1 1/2 hours per day of OT 5 to 7 days per week,    and Rec T 1/2 hour per day 3-5 days per week. The patient is stable medically and physically on previous exam.  If deemed necessary therapy will be spread out over a 7-day window. This patient presented with significant new onset decreased mobility and inability to perform activities of daily living skills independently and is at significant risk for prolonged disability  For this reason they have been admitted to [address] [place] [address]. The patient's current functional and medical status are highly complex but the patient is able to participate in intensive rehabilitation. A comprehensive inpatient rehabilitation program is appropriate.   The patient will undergo initial evaluation by the rehabilitation team and be discussed at regular treatment team meetings to assess progress, mobility, self care, mood and discharge issues. Physical therapy will be consulted for mobility and endurance issues and should be performed 1 to 2 times per day, 7 days per week for the length of stay. Occupational therapy will be consulted for activities of daily living and should be performed 1 to 2 times per day, 7 days per week for the length of stay. Their capacity to participate at an acute level, decision to be treated in the gym, room or on the unit, their activity goals for the day and the number of minutes of active participation will be reassessed and re-prescribed daily. Because this patient is medically complex, I will check a CBC, BMP, UA and orthostatic blood pressures. They will be reassessed daily regarding their ability to participate in an acute level rehabilitation program.  Recreational Therapy will be consulted for community re-entry and adjustment to disability. Communication, cognitive and emotional issues will also be addressed during this rehabilitation stay by rehabilitation psychologist or speech therapist as appropriate. I reviewed the patient's old and current charts and discussed other rehabilitation options with the rehabilitation team including the rehab RN and the admission team as well as the patient. I feel that the patient's functional recovery would be best served at an acute inpatient rehabilitation program because the patient needs intense therapy three hours per day, direct RN supervision and daily monitoring by a physician for medical status. This cannot be sufficiently provided by home health care, a skilled nursing facility or in an outpatient setting. I further feel that the patient has the potential to improve functional abilities in an acute intensive rehabilitation program.    Old records were reviewed and summarized.     2.  Other diagnoses which complicate rehabilitation stay include:     Principal Problem:    Impaired mobility and safe carb count per meal advising 4 carbs per meal, add at bedtime snack to prevent a.m. hypoglycemia, adjust/add medications (sliding scale Humalog)         I am especially concerned about their recent medical complexities. The patient's high risk medication use includes medications for pain which she is refusing. The patient is high risk for urinary tract infection, an admission urinalysis has been ordered. I will have the nurses check post void residual bladder volumes and place acatheter if excessive urine is retained in the bladder after voiding. The patient is risk for deep venous thromosis, complex deep venous thrombosis protocol prophylaxis has been ordered  . The patient is high risk for orthostasis and a hydration program and orthostatic blood pressure screening have been ordered. I will attempt to get old records from the patient's previous hospital stay. Care everywhere on [employer] was utilized. 3.  Current and previous medications were reviewed and summarized and compared to old medication lists from home and from the acute floor. 4.  Complex labs and x-rays were reviewed. I will review patient's old EKG and labs. 5.  Will provide emotional support for this patient regarding adjustment to their disability. Cognition and mood will be screened daily and addressed by rehabilitation psychology and/or speech therapy as appropriate. I have encouraged the patient to attend the Rehab Adjustment to Disability Support Group and recreational therapy. 6.  Estimated length of stay is 1- 2 weeks. Discharge to home with help from family and home health PT, OT, RN, and aide. Patient should be independent at discharge. 7.  The patient's medical and rehab prognosis are good. [address] regarding the patient's back up to general medical needs.     A welcome letter was presented with an explanation of my services, my specialty and what to expect during the rehabilitation process. As well as introducing myself, I also wrote my name on their bedside marker board with their name as well as the names of the other physicians with an explanation of our individual roles in their care, as well as the rehab process.             Lula Bonilla D.O., F.BOWEN.&MARIBEL

## 2022-07-12 NOTE — CONSULTS
Spiritual Care Services     Summary of Visit:  Pt grieving the death of her  and her two brothers, during their service in the [place] war. Grief support provided. Pt coping well with present limitations, and past limitations from illness. Strong ruben, good coping skills. Able to make meaning from her loss. Grateful for good friends, former classmates, daughter and four grandsons. Will continue to provide accompaniment during her time in rehabilitation. Spiritual Assessment/Intervention/Outcomes:    Encounter Summary  Encounter Overview/Reason : Grief, Loss, and Adjustments  Service Provided For[de-identified] Patient  Referral/Consult From[de-identified] Other   Support System: Children,Friends/neighbors  Last Encounter : 07/10/22  Complexity of Encounter: Moderate  Begin Time: 1445  End Time : 1515  Total Time Calculated: 30 min  Encounter   Type: Follow up     Spiritual/Emotional needs  Type: Spiritual Support     Grief, Loss, and Adjustments  Type: Grief and loss,Life Adjustments,Adjustment to illness              Values / Beliefs  Do You Have Any Ethnic, Cultural, Sacramental, or Spiritual Hinduism Needs You Would Like Us To Be Aware of While You Are in the Hospital : Yes  Cultural Requests During Hospitalization: Cheondoism    Care Plan:    Grief support. Spiritual Care Services   Electronically signed by Thais Mcbride on 7/12/22 at 3:33 PM EDT     To reach a  for emotional and spiritual support, place an [facility] consult request.   If a  is needed immediately, dial 0 and ask to page the on-call .

## 2022-07-12 NOTE — PROGRESS NOTES
Facility/Department: [facility] Initial Assessment: Occupational Therapy  Room: R261/R261-01    NAME: Carola Adkins  : 1950  MRN: [id number]    Date of Service: 2022    Rehab Diagnosis(es): Impaired mobility and ADL's due to severe dizziness and orthostasis with recent head trauma.  [facility] Rehab admit 22  Patient Active Problem List    Diagnosis Date Noted    Impaired mobility and ADLs 2022    UTI (urinary tract infection) 2022    Hyperlipidemia 2022    Impaired mobility and activities of daily living dt T12 fx and gait instability dt ataxia 2022    Dizziness 2022    Low BP 2022    LFT elevation 2022    Diarrhea 2022    Pancytopenia ([address] 2022    Acute renal injury ([address]     Dysautonomia ([address]     Ataxia     Dehydration 2022    Epidural abscess 2021    Discitis of lumbar region 2021    PTSD (post-traumatic stress disorder) 2021    Chronic neck pain 2021    Cervical disc disorder with radiculopathy 2021    Dyslipidemia 2019    Stenosis of carotid artery 10/22/2018    Left foot pain 2017    GERD (gastroesophageal reflux disease) 2016    SOB (shortness of breath) 2015    History of below-knee amputation of right lower extremity (HCC) 2014    Insomnia 2014    Migraine headache 2014    Osteomyelitis ([address] 2012    Anxiety 05/15/2012    Anemia of chronic disease 05/15/2012    Chronic low back pain 05/15/2012    Essential hypertension 05/15/2012    Head injury     Syncope and collapse 2019    Hypertensive urgency 10/16/2018    Hyperglycemia 10/16/2018    Chest tightness or pressure 10/16/2018    Visual disturbance, subjective 10/16/2018    Acute sore throat 10/16/2018    DM (diabetes mellitus) ([address]     Bipolar affective disorder ([address]        Past Medical History:   Diagnosis Date    Anxiety     Bipolar affective disorder [facility]     DM (diabetes mellitus) ([facility]     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    Head injury     Hypertension     Migraine     Migraine headache 7/18/2014    Osteomyelitis (HCC) 12/7/2012    PTSD (post-traumatic stress disorder)     TIA (transient ischemic attack)     x 3     Past Surgical History:   Procedure Laterality Date    APPENDECTOMY      CHOLECYSTECTOMY      LEG AMPUTATION BELOW KNEE Bilateral     NASAL FRACTURE SURGERY N/A 4/25/2019    CLOSED REDUCTON EXTERNAL FIXATION OF NASAL BONE FRACTURE WITH SEVERE DNS (DEVIATED NASAL SEPTUM)  ROOM 190 performed by Randi Sullivan MD at [facility]       Restrictions:  Restrictions/Precautions  Restrictions/Precautions: Fall Risk (high fall risk)  Position Activity Restriction  Other position/activity restrictions: R prostetic    Subjective:  Subjective: \"I would love a good shower. I hope it helps my aches and pains\"    Patient's date of birth confirmed: Yes     Pain at start of treatment: Yes: 4/10    Pain at end of treatment: Yes: 6/10    Location: back and L hand   Nursing notified: Yes  RN: Reagan Aschoff  Intervention: RN provided pain medication    Social Functional:  Social/Functional History  Lives With: Alone  Type of Home: Apartment  Home Layout: One level  Home Access: Level entry  Bathroom Shower/Tub: Tub/Shower unit; Shower chair with back  [employer]: Standard  Bathroom Equipment: Grab bars in shower; Shower chair  Bathroom Accessibility: Accessible  Home Equipment: Reacher; [address] Help From: Friend(s)  ADL Assistance: Independent  Homemaking Assistance: Independent (friend brought groceries, or has them delivered)  Homemaking Responsibilities: Yes  Ambulation Assistance: Non-ambulatory (w/c dependent, prior to COVID was ambulating)  Transfer Assistance: Independent  Active : No (does not have a car)  Occupation: Retired  IADL Comments: previously independent with IADLs, pt has assistance with groceries  Additional Comments: pt motiviated for increased independence; pt stating increased blurry vision at home and increased dizziness since fall    Objective: OT eval completed in pt room     Self Care Status:  ADL  Feeding: Unable to assess(comment)  Feeding Skilled Clinical Factors: not observed  Grooming: Setup  UE Bathing: Setup  LE Bathing: Setup  UE Dressing: Setup  LE Dressing: Setup  Toileting: Unable to assess(comment)  Toileting Skilled Clinical Factors: declined need  Toilet Transfers  Toilet Transfer: Unable to assess  Toilet Transfers Comments: declined need for toileting  Shower Transfers  Shower - Transfer To: Shower seat with back  Shower - Technique: Stand pivot  Shower Transfers: Stand by assistance  Shower Transfers Comments: use of grab bars      Functional Mobility:  Balance  Sitting Balance: Independent  Standing Balance: Stand by assistance  Comment: SBA    Bed mobility and transfers:  Transfers  Sit to stand: Stand by assistance  Stand to sit: Stand by assistance      Observation:  Observation/Palpation  Observation: No acute distress noted. Pt pleasant and motivated.  Abrasion noted R tibial tuberosity, brusing on L hand, IV R elbow    Orientation and Cognition:  Orientation  Overall Orientation Status: Within Normal Limits  Orientation Level: Oriented X4  Cognition  Overall Cognitive Status: WFL  Cognition Comment: comp: SUP, exp: SUP, prob solving: SUP, mem: SUP, soc int: MOD I    Pt's current cognitive status is: comp: SUP, exp: SUP, prob solving: SUP, mem: SUP, soc int: MOD I    Vision and Hearing:  Vision  Vision: Impaired (pt reported that she was having blurry vision and was scheduled for appointment on the day she fell)  Vision Exceptions: Wears glasses for reading  Hearing  Hearing: Within functional limits  Perception  Overall Perceptual Status: WFL    Range of Motion:  LUE AROM (degrees)  LUE AROM : WFL  Left Hand AROM (degrees)  Left Hand AROM: WFL  RUE AROM (degrees)  RUE Education:  Education Given To: Patient  Education Provided: Role of Therapy,Plan of Care,Energy Conservation  Education Method: Demonstration,Verbal  Barriers to Learning: None  Education Outcome: Verbalized understanding,Demonstrated understanding      Plan:  Plan  Times per Week: 5-7x/week  Times per Day: Daily  Plan Weeks: 1 week  Current Treatment Recommendations: Strengthening;Balance training;Functional mobility training; Endurance training; Wheelchair mobility training;Pain management;Patient/Caregiver education & training;Equipment evaluation, education, & procurement;Self-Care / ADL; Home management training    Goals:  Patient goals : To get my strength back    Time Frame for Long term goals : Within 1 week, pt to demo progress in the following areas listed below to achieve LTGs as stated in the intial eval  Long Term Goal 1: Pt will improve ADL status to return to PLOF  Long Term Goal 2: Pt will improve overall activity tolerance for self-care tasks  Long Term Goal 3: Pt will improve standing balance and tolerance for ADL/IADL completion  Long Term Goal 4: Pt will improve coordination skills for ADL tasks   - Patient will complete self care as followed using the recommended adaptive equipment and/or adaptive techniques as instructed:  Feeding: Mod I  Grooming: Mod I  Bathing: Mod I  UE Dressing: Mod I  LE Dressing: Mod I  Toileting: Mod I  Toilet Transfer: Mod I  Shower/Tub Transfer: Mod I  - Patient will improve static and dynamic standing balance to complete pants management at MOD I level  - Patient will improve functional endurance to tolerate/complete 60 minutes of ADLs. - Patient will improve B UE strength and endurance to [facility] in order to participate in self-care activities as projected. - Patient will perform kitchen mobility at device level without episodes of LOB and good safety awareness   - Patient will perform basic room mobility at MOD I level.   - Patient and/or caregiver will demonstrate understanding of recommended HEP for BUE strengthening .   - Patient's cognition will improve or maintain baseline to safely perform ADLs:  Comprehension: Mod I  Expression: Mod I  Problem Solving: Mod I  Memory:  Mod I  Therapy Time:   Individual   Time In 0830   Time Out 0930   Minutes 60            Eval: 60 minutes     Electronically signed by:    DEAN Vincent,   7/12/2022, 3:09 PM

## 2022-07-12 NOTE — PROGRESS NOTES
Occupational Therapy  OCCUPATIONAL THERAPY  INPATIENT REHAB TREATMENT NOTE  [facility] [place]      NAME: Manuel Lopez  : 1950 (14 y.o.)  MRN: [id number]  CODE STATUS: Full Code  Room: 74/K861-28    Date of Service: 2022    Referring Physician:    Rehab Diagnosis:      Restrictions  Restrictions/Precautions  Restrictions/Precautions: Fall Risk         Position Activity Restriction  Spinal Precautions: Limit forceful spinal flexion  Other position/activity restrictions: R prostetic    Patient's date of birth confirmed: Yes    SAFETY:  Safety Devices  Safety Devices in place: Yes  Type of devices: All fall risk precautions in place    SUBJECTIVE:  Subjective: \" Oh good\"    Pain at start of treatment: Yes: 5/10    Pain at end of treatment: Yes: 5/10    Location:back, L shoulder and L hand  Nursing notified: Not Applicable  RN:   Intervention: Other: Pt has lidocaine patches on, states it helps with pain and cannot take pain meds    COGNITION:  Orientation  Overall Orientation Status: Within Normal Limits  Cognition  Overall Cognitive Status: WFL          OBJECTIVE:     Provided SBA for all sit<>stand to/from w/c to complete UE bike x 4 min in standingwith intermittent recovery periods. Pt required rest periods d/t decreased functional act tolerance leading to slight LOB at times and BLE fatigue leading to L knee buckling at times. Pt completed task to improve functional standing tolerance and overall functional act tolerance to improve safety and Ind with functional task performance skills. Pt started out with [facility] dowel denae tree with rings started in standing but L shoulder and back were starting to hurt and L knee starting to buckle within 2 min of standing. Pt also stated she was fatigued.   Therefore finished activity seated with dynamic reaching on different height levels intermittently with BUE to increase functional act tolerance and dynamic reaching to continue improving functional Ind with ADL skills. Sit to Stand  Assistance Level: Stand by assist  Stand to Sit  Assistance Level: Stand by assist                 Education: Pt noticed that her L knee was starting to buckle with now acknowledging that this leg is weak       Equipment recommendations: n/a         ASSESSMENT:  Activity Tolerance: Patient tolerated treatment well      PLAN OF CARE:     will continue Ot POC until discharge                Therapy Time:   Individual Group Co-Treat   Time In 1335       Time Out 1400         Minutes 25                   Neuromuscular reeducation: 8 minutes  Therapeutic activities: 22 minutes     Electronically signed by:     HARVEY Tenorio,   7/12/2022, 1:52 PM

## 2022-07-12 NOTE — PROGRESS NOTES
Physical Therapy Rehab Treatment Note  Facility/Department: [facility]  [facility]       NAME: Jennifer Rider  : 1950 (66 y.o.)  MRN: [id number]  CODE STATUS: Full Code    Date of Service: 2022     Restrictions:  Restrictions/Precautions: Fall Risk  Position Activity Restriction  Other position/activity restrictions: R LE prostetic     SUBJECTIVE:      Pain  Pain: 6/10 back; L hand, L shld    OBJECTIVE:         Bed mobility  Rolling to Left: Modified independent  Rolling to Right: Modified independent  Supine to Sit: Modified independent  Sit to Supine: Modified independent  Bed Mobility Comments: Completed on flat mat without rails. Increased time and effort to complete. C/o back pain. Transfers  Bed to Chair: Stand by assistance (pivot WC to mat)  Ambulation  Surface: carpet  Device: Rolling Walker  Assistance: Stand by assistance  Quality of Gait: FF over [employer]. Signficant bracing on [employer] for support. Progressed to step through pattern. Distance: 60ft     Ambulation  Carpet  Rolling walker  60ft  SBA  FF posture with increased UE support through [employer].  Progressed to step through. PT Exercises  A/AROM Exercises: bridges 2x10; STS x5            ASSESSMENT/PROGRESS TOWARDS GOALS:   Body Structures, Functions, Activity Limitations Requiring Skilled Therapeutic Intervention: Decreased functional mobility ; Decreased ROM; Decreased strength;Decreased endurance;Decreased balance; Increased pain;Decreased posture;Decreased ADL status; Decreased coordination;Decreased safe awareness    Goals:  Long Term Goals  Long term goal 1: Pt will demonstrate bed mobility indep.   Long term goal 2: Pt will demonstrate transfers (bed,chair,car) with indep  Long term goal 3: Pt will ambulate 50ft with LRD indep  Long term goal 4: Pt to manage curb step with [employer] and supervision/indep  Long term goal 5: Pt to complete HEP with indep  Long term goal 6: Pt to manage and propel WC 150ft indep  Patient Goals   Patient goals

## 2022-07-13 LAB
ALBUMIN SERPL-MCNC: 3.5 G/DL (ref 3.5–4.6)
ANION GAP SERPL CALCULATED.3IONS-SCNC: 16 MEQ/L (ref 9–15)
BASOPHILS ABSOLUTE: 0 K/UL (ref 0–0.2)
BASOPHILS RELATIVE PERCENT: 0.7 %
BUN BLDV-MCNC: 13 MG/DL (ref 8–23)
CALCIUM SERPL-MCNC: 8.6 MG/DL (ref 8.5–9.9)
CHLORIDE BLD-SCNC: 108 MEQ/L (ref 95–107)
CO2: 17 MEQ/L (ref 20–31)
CREAT SERPL-MCNC: 0.79 MG/DL (ref 0.5–0.9)
EOSINOPHILS ABSOLUTE: 0.1 K/UL (ref 0–0.7)
EOSINOPHILS RELATIVE PERCENT: 2 %
GFR AFRICAN AMERICAN: >60
GFR NON-AFRICAN AMERICAN: >60
GLUCOSE BLD-MCNC: 111 MG/DL (ref 70–99)
GLUCOSE BLD-MCNC: 89 MG/DL (ref 70–99)
HCT VFR BLD CALC: 33.1 % (ref 37–47)
HEMOGLOBIN: 10.6 G/DL (ref 12–16)
LYMPHOCYTES ABSOLUTE: 1.2 K/UL (ref 1–4.8)
LYMPHOCYTES RELATIVE PERCENT: 40 %
MCH RBC QN AUTO: 30.2 PG (ref 27–31.3)
MCHC RBC AUTO-ENTMCNC: 32 % (ref 33–37)
MCV RBC AUTO: 94.2 FL (ref 82–100)
MISCELLANEOUS LAB TEST ORDER: ABNORMAL
MONOCYTES ABSOLUTE: 0.1 K/UL (ref 0.2–0.8)
MONOCYTES RELATIVE PERCENT: 2.8 %
MYOGLOBIN URINE: <1 MG/L (ref 0–1)
NEUTROPHILS ABSOLUTE: 1.6 K/UL (ref 1.4–6.5)
NEUTROPHILS RELATIVE PERCENT: 55 %
PDW BLD-RTO: 15.5 % (ref 11.5–14.5)
PERFORMED ON: NORMAL
PHOSPHORUS: 3.2 MG/DL (ref 2.3–4.8)
PLATELET # BLD: 102 K/UL (ref 130–400)
PLATELET SLIDE REVIEW: ABNORMAL
POTASSIUM SERPL-SCNC: 3.6 MEQ/L (ref 3.4–4.9)
PROMYELOCYTES PERCENT: 1 %
RBC # BLD: 3.51 M/UL (ref 4.2–5.4)
RBC # BLD: NORMAL 10*6/UL
SMUDGE CELLS: 3.8
SODIUM BLD-SCNC: 141 MEQ/L (ref 135–144)
WBC # BLD: 2.9 K/UL (ref 4.8–10.8)
WHOPPER PROMPT: ABNORMAL

## 2022-07-13 PROCEDURE — 97542 WHEELCHAIR MNGMENT TRAINING: CPT

## 2022-07-13 PROCEDURE — 6370000000 HC RX 637 (ALT 250 FOR IP): Performed by: INTERNAL MEDICINE

## 2022-07-13 PROCEDURE — 1180000000 HC REHAB R&B

## 2022-07-13 PROCEDURE — 80069 RENAL FUNCTION PANEL: CPT

## 2022-07-13 PROCEDURE — 97535 SELF CARE MNGMENT TRAINING: CPT

## 2022-07-13 PROCEDURE — 85025 COMPLETE CBC W/AUTO DIFF WBC: CPT

## 2022-07-13 PROCEDURE — 97530 THERAPEUTIC ACTIVITIES: CPT

## 2022-07-13 PROCEDURE — 97112 NEUROMUSCULAR REEDUCATION: CPT

## 2022-07-13 PROCEDURE — 2500000003 HC RX 250 WO HCPCS: Performed by: PHYSICAL MEDICINE & REHABILITATION

## 2022-07-13 PROCEDURE — 97116 GAIT TRAINING THERAPY: CPT

## 2022-07-13 PROCEDURE — 6360000002 HC RX W HCPCS: Performed by: INTERNAL MEDICINE

## 2022-07-13 PROCEDURE — 6370000000 HC RX 637 (ALT 250 FOR IP): Performed by: PHYSICAL MEDICINE & REHABILITATION

## 2022-07-13 PROCEDURE — 99233 SBSQ HOSP IP/OBS HIGH 50: CPT | Performed by: PHYSICAL MEDICINE & REHABILITATION

## 2022-07-13 PROCEDURE — 97110 THERAPEUTIC EXERCISES: CPT

## 2022-07-13 PROCEDURE — 36415 COLL VENOUS BLD VENIPUNCTURE: CPT

## 2022-07-13 RX ADMIN — CIPROFLOXACIN HYDROCHLORIDE 500 MG: 500 TABLET, FILM COATED ORAL at 08:15

## 2022-07-13 RX ADMIN — LACTOBACILLUS TAB 2 TABLET: TAB at 14:10

## 2022-07-13 RX ADMIN — CIPROFLOXACIN HYDROCHLORIDE 500 MG: 500 TABLET, FILM COATED ORAL at 17:39

## 2022-07-13 RX ADMIN — PROVIDONE IODINE: 7.5 STICK TOPICAL at 17:35

## 2022-07-13 RX ADMIN — QUETIAPINE FUMARATE 150 MG: 50 TABLET, EXTENDED RELEASE ORAL at 21:51

## 2022-07-13 RX ADMIN — SERTRALINE 100 MG: 100 TABLET, FILM COATED ORAL at 21:51

## 2022-07-13 RX ADMIN — BUPROPION HYDROCHLORIDE 75 MG: 75 TABLET, FILM COATED ORAL at 08:16

## 2022-07-13 RX ADMIN — LACTOBACILLUS TAB 2 TABLET: TAB at 08:15

## 2022-07-13 RX ADMIN — LACTOBACILLUS TAB 2 TABLET: TAB at 21:51

## 2022-07-13 RX ADMIN — ACETAMINOPHEN 650 MG: 325 TABLET ORAL at 14:09

## 2022-07-13 RX ADMIN — SERTRALINE 100 MG: 100 TABLET, FILM COATED ORAL at 08:15

## 2022-07-13 RX ADMIN — BUPROPION HYDROCHLORIDE 75 MG: 75 TABLET, FILM COATED ORAL at 21:51

## 2022-07-13 RX ADMIN — ACETAMINOPHEN 650 MG: 325 TABLET ORAL at 08:16

## 2022-07-13 RX ADMIN — Medication 100 MG: at 08:15

## 2022-07-13 RX ADMIN — ACETAMINOPHEN 650 MG: 325 TABLET ORAL at 21:56

## 2022-07-13 RX ADMIN — PROVIDONE IODINE: 7.5 STICK TOPICAL at 21:51

## 2022-07-13 RX ADMIN — LAMOTRIGINE 75 MG: 25 TABLET ORAL at 17:39

## 2022-07-13 RX ADMIN — LAMOTRIGINE 25 MG: 25 TABLET ORAL at 08:14

## 2022-07-13 RX ADMIN — Medication 2000 UNITS: at 17:38

## 2022-07-13 RX ADMIN — ENOXAPARIN SODIUM 40 MG: 100 INJECTION SUBCUTANEOUS at 08:16

## 2022-07-13 ASSESSMENT — PAIN SCALES - GENERAL
PAINLEVEL_OUTOF10: 0
PAINLEVEL_OUTOF10: 7
PAINLEVEL_OUTOF10: 6
PAINLEVEL_OUTOF10: 7

## 2022-07-13 ASSESSMENT — PAIN DESCRIPTION - DESCRIPTORS
DESCRIPTORS: ACHING;SORE
DESCRIPTORS: ACHING;SORE

## 2022-07-13 ASSESSMENT — PAIN DESCRIPTION - ORIENTATION
ORIENTATION: MID
ORIENTATION: MID

## 2022-07-13 ASSESSMENT — PAIN DESCRIPTION - LOCATION
LOCATION: BACK
LOCATION: BACK

## 2022-07-13 NOTE — PROGRESS NOTES
Physical Therapy Rehab Treatment Note  Facility/Department: [facility]  Room: R2/R261-01       NAME: Merlin Flattery  : 1950 (67 y.o.)  MRN: [id number]  CODE STATUS: Full Code    Date of Service: 2022     Restrictions:  Restrictions/Precautions: Fall Risk  Position Activity Restriction  Other position/activity restrictions: R LE prostetic    SUBJECTIVE:   Subjective: Pt states she only slept for 5 hrs. States she was in alot of pain. Pain  Pain: 6/10 back; L hand, L shld  Pt staes 6/10 is tolerable. Declined intervention. OBJECTIVE:         Bed mobility  Rolling to Left: Modified independent  Rolling to Right: Modified independent  Supine to Sit: Modified independent  Sit to Supine: Modified independent     Transfers  Sit to Stand: Stand by assistance  Stand to sit: Stand by assistance  Bed to Chair: Stand by assistance  Comment: VCs for positioning of WC prior to transfer. Ambulation  Surface: carpet;level tile  Device: Rolling Walker  Assistance: Stand by assistance  Quality of Gait: fatigued with increased distance; standing rest break x1; VCs for [employer] safety with approach to chair. Distance: 60ft     Wheelchair Activities  Propulsion: Yes  Propulsion 1  Propulsion: Manual  Level: Level Tile  Method: RUE;RLE;LLE  Level of Assistance: Supervision  Description/ Details: Verbal cues for managing obstacles and positioning WC. PT Exercises  A/AROM Exercises: bridges x20, s/l clams x10, s/l hip flexion x10, s/l hip abd x10            ASSESSMENT/PROGRESS TOWARDS GOALS: Pt fatigued quickly with gait. Required cues for safe transfer. Goals:  Long Term Goals  Long term goal 1: Pt will demonstrate bed mobility indep.   Long term goal 2: Pt will demonstrate transfers (bed,chair,car) with indep  Long term goal 3: Pt will ambulate 50ft with LRD indep  Long term goal 4: Pt to manage curb step with [employer] and supervision/indep  Long term goal 5: Pt to complete HEP with indep  Long term goal 6: Pt to manage and propel WC 150ft indep  Patient Goals   Patient goals : be able to go home    PLAN OF CARE/Safety:   Plan Comment: Cont per POC.      Therapy Time:   Individual   Time In 0930   Time Out 1030   Minutes 60     Minutes:  Transfer/Bed mobility training:10  Gait training:10  Neuro re education:0  Therapeutic ex:30               WC 10  Orlin Cueto PTA, 07/13/22 at 10:38 AM

## 2022-07-13 NOTE — CARE COORDINATION
Social/Functional Status:  Social/Functional History  Lives With: Alone  Type of Home: Apartment  Home Layout: One level  Home Access: Level entry  Bathroom Shower/Tub: Tub/Shower unit,Shower chair with back,Curtain (grab bars in shower)  Bathroom Toilet: Standard  Bathroom Equipment: Grab bars in shower,Shower chair  Bathroom Accessibility: [address]: David Garcia (neither work, wheelchair was obtained 12-13 yrs ago and is broken)  Has the patient had two or more falls in the past year or any fall with injury in the past year?: Yes  Receives Help From: Friend(s)  ADL Assistance: Independent  Homemaking Assistance: Independent (friends assist for groceries, or delivery)  Homemaking Responsibilities: Yes  Ambulation Assistance: Independent (BP caused dizziness, used wc at that time but has used more in the last time. 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 Patient questions answered appropriately. Explained discharge process. Patient stated understanding. Pt graduated with her Bachelors in [employer] [place] and a minor in Psychology. Pt retired from marketing and founded the [employer] ([employer] the [address] [place] [address]). Pt is a gold star , her  and two brothers  during their service in the [place] war.  Pt's support system consists of her dtr (lives in [place]), best friend, and neighbor/friend. Pt stated that she lives alone in a one floor apartment with level entry. Pt has a tub/shower combo with shower curtains, shower chair w/ back, grab bars in shower, and the bathroom is wheelchair accessible. Pt also has a cane, rollator, and reacher, however the rollator and reacher no longer work. Pt reported that she has been working with her PCP Dr. Bereket Hyde regarding obtaining a new wheelchair but that it has been over a year and [facility] has not gotten it for her. Pt wants to go through [place] [facility] [employer] now. LSW called them at [phone] and left a voicemail discussing inquiry for wheelchair. Pt is hopeful to obtain a manual wheelchair which has an adaptor device that can make it motorized. Pt was independent with ADLs and IADLs prior to hospitalization. Pt stated that she was ambulatory with a rollator and used her wheelchair mainly when she was dizzy due to what she believes was caused by her blood pressure. Pt requested for two contacts to be added to her emergency contact list: Myla Simmons ([phone]) and Uriah Hansen ([phone]). Pt stated that [facility] should be the #2 emergency contact and Liz Schuler should be the #3. Advanced Directives were discussed and pt is interested in completing POA-HC, LSW will notify nursing to notify spiritual care. Pt gave permission for LSW to contact [facility] with updates.  Electronically signed by JOLENE Mead LSW on 7/13/2022 at 4:50 PM

## 2022-07-13 NOTE — PROGRESS NOTES
Physical Therapy Rehab Treatment Note  Facility/Department: [facility]  Room: [facility]/R261-01       NAME: Danay Farias  : 1950 (1 y.o.)  MRN: [id number]  CODE STATUS: Full Code    Date of Service: 2022     Restrictions:  Restrictions/Precautions: Fall Risk  Position Activity Restriction  Other position/activity restrictions: R LE prostetic    SUBJECTIVE:   Subjective: Pt states had Tylonol and is feeling better. Pain  Pain: 6/10 back; L hand declined intervention    OBJECTIVE:         Bed mobility  Rolling to Left: Modified independent  Rolling to Right: Modified independent  Supine to Sit: Modified independent  Sit to Supine: Modified independent    Transfers  Sit to Stand: Stand by assistance  Stand to sit: Stand by assistance  Bed to Chair: Stand by assistance  Comment: Improved positioning of WC prior to transfer. Ambulation  Surface: carpet  Device: Rolling Walker  Assistance: Stand by assistance  Quality of Gait: Fwd flexed trunk over [employer]  Distance: 75ft     Wheelchair Activities  Wheelchair Parts Management: Yes  All Wheelchair Parts Management: manages WC brakes indep. Pt doesn't use her footrests at home  Left Brakes Level of Assistance: Independent  Right Brakes Level of Assistance: Independent  Propulsion: Yes  Propulsion 1  Propulsion: Manual  Level: Level Tile  Method: RUE;RLE;LLE  Level of Assistance: Modified independent  Description/ Details: Verbal cues for managing obstacles and positioning WC. Distance: [id number]     PT Exercises  Static Standing Balance Exercises: Standing with 1 UE support SBA 60sec; withpout UE support 3 secx3  Dynamic Standing Balance Exercises: Gait drills F/R/L in //bars with B UE support SBA            ASSESSMENT/PROGRESS TOWARDS GOALS:   Assessment: Standing static balance challanged without UE support. Fatigues with increased gait distance. Goals:  Long Term Goals  Long term goal 1: Pt will demonstrate bed mobility indep.   Long term goal 2: Pt will demonstrate transfers (bed,chair,car) with indep  Long term goal 3: Pt will ambulate 50ft with LRD indep  Long term goal 4: Pt to manage curb step with [employer] and supervision/indep  Long term goal 5: Pt to complete HEP with indep  Long term goal 6: Pt to manage and propel WC 150ft indep  Patient Goals   Patient goals : be able to go home    PLAN OF CARE/Safety:   Plan Comment: Cont per POC. Therapy Time:   Individual   Time In 1525   Time Out 1605   Minutes 40     Minutes:5min of time made up from missed time on 7/12.    Transfer/Bed mobility training:10  Gait training:10  Neuro re education:10               Therapeutic ex:0             WC 10  Jayden Almazan PTA, 07/13/22 at 4:25 PM

## 2022-07-13 NOTE — CARE COORDINATION
[address] [place] [address] NOTE  Room: R261/R261-01  Admit Date: 2022       Date: 2022  Patient Name: Kristen Fink        MRN: [id number]    : 1950  (72 y.o.)  Gender: female        REHAB DIAGNOSIS:   Diagnosis: Impaired mobility and ADL's due to severe dizziness and orthostasis with recent head trauma.  [facility] admit 22    CO MORBIDITIES:      Past Medical History:   Diagnosis Date    Anxiety     [facility]     DM [facility]diabetes mellitus) [facility]     Epidural abscess 2021    Head injury     Hypertension     Migraine     Migraine headache 2014    Osteomyelitis (HCC) 2012    PTSD (post-traumatic stress disorder)     TIA (transient ischemic attack)     x 3     Past Surgical History:   Procedure Laterality Date    APPENDECTOMY      CHOLECYSTECTOMY      LEG AMPUTATION BELOW KNEE Bilateral     NASAL FRACTURE SURGERY N/A 2019    CLOSED REDUCTON EXTERNAL FIXATION OF NASAL BONE FRACTURE WITH SEVERE DNS (DEVIATED NASAL SEPTUM)  ROOM 190 performed by Polly Gómez MD at [facility]        Restrictions  Restrictions/Precautions: Fall Risk  Position Activity Restriction  Spinal Precautions: Limit forceful spinal flexion  Other position/activity restrictions: R LE prostetic  CASE MANAGEMENT    Social/Functional History  Social/Functional History  Lives With: Alone  Type of Home: Apartment  Home Layout: One level  Home Access: Level entry  Bathroom Shower/Tub: Tub/Shower unit,Shower chair with back,Curtain (grab bars in shower)  Bathroom Toilet: Standard  Bathroom Equipment: Grab bars in shower,Shower chair  Bathroom Accessibility: Accessible,Wheelchair accessible  Home Equipment: Reacher,Wheelchair-manual,Cane,Rollator (neither work, wheelchair was obtained 12-13 yrs ago and is broken)  Has the patient had two or more falls in the past year or any fall with injury in the past year?: Yes  Receives Help From: Friend(s)  ADL Assistance: Independent  Homemaking Assistance: Independent (friends assist for groceries, or delivery)  Homemaking Responsibilities: Yes  Ambulation Assistance: Independent (BP caused dizziness, used wc at that time but has used more in the last time. 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 ADULT DIET; Regular; 4 carb choices (60 gm/meal)    SpO2: 97 % (07/14/22 0830)    Oxygen to be continued upon discharge: n/a  Home-going needs (nocturnal Pox, sleep study, RX, equipment): No    Skin Issues: Various bruises from falls at home and lab draws/ IV Sticks while at hospital. Abrasion to right stump area-developed after fall at home. We are applying betadine and a mepilex when prosthesis is in place  Home-going needs (education, training, RX, Wound RN): Yes    Pain Managed: Yes and Will only accept tylenol (nothing stronger).      Bladder continence: Yes  Discharging with [place]: No   Training done: No   Urology following: No   Plan/date to remove rowland: No   Bladder retraining started: No    Bowel continence:  Yes  Last BM date: 7/13  Need for bowel program: No    Anticoagulants: None; Lovenox d/c'd 7/13  Home-going needs (Education, labs): No    Antibiotics: Cipro for UTI  Stop date: 7/16  Home-going needs (education, RX, PICC): No      Other:       PHYSICAL THERAPY  Bed mobility:  Bed mobility  Bridging: Modified independent  (07/12/22 1011)  Rolling to Left: Modified independent (07/13/22 1023)  Rolling to Right: Modified independent (07/13/22 1023)  Supine to Sit: Modified independent (07/13/22 1023)  Sit to Supine: Modified independent (07/13/22 1023)  Scooting: Modified independent (07/12/22 1011)  Bed Mobility Comments: Completed on flat mat without rails. Increased time and effort to complete. C/o back pain. (07/12/22 1038)  Transfers:  Transfers  Sit to Stand: Stand by assistance (07/13/22 1023)  Stand to sit: Stand by assistance (07/13/22 1023)  Bed to Chair: Stand by assistance (07/13/22 1023)  Comment: VCs for positioning of WC prior to transfer. (07/13/22 1009)  Gait:   Ambulation  Surface: carpet (07/13/22 1617)  Device: Henreitta Mu (07/13/22 1617)  Assistance: Stand by assistance (07/13/22 1617)  Quality of Gait: Fwd flexed trunk over [employer] (07/13/22 1617)  Distance: 75ft (07/13/22 1617)  Stairs:  Stairs/Curb  Stairs?: No (07/12/22 1020)  W/C mobility:  Wheelchair Activities  Wheelchair Parts Management: Yes (07/13/22 1617)  All Wheelchair Parts Management: manages WC brakes indep.  Pt doesn't use her footrests at home (07/13/22 1617)  Left Brakes Level of Assistance: Independent (07/13/22 1617)  Right Brakes Level of Assistance: Independent (07/13/22 1617)  Propulsion: Yes (07/13/22 1617)  Propulsion 1  Propulsion: Manual (07/13/22 1617)  Level: Level Tile (07/13/22 1617)  Method: RUE;RLE;LLE (07/13/22 1617)  Level of Assistance: Modified independent (07/13/22 1617)  Description/ Details: Verbal cues for managing obstacles and positioning WC. (07/13/22 1023)  Distance: [id number] (07/13/22 1617)  LTG:  Long term goal 1: Pt will demonstrate bed mobility indep. Long term goal 2: Pt will demonstrate transfers (bed,chair,car) with indep  Long term goal 3: Pt will ambulate 50ft with LRD indep  Long term goal 4: Pt to manage curb step with [facility] [place] and supervision/indep  Long term goal 5: Pt to complete HEP with indep  PT Treatment Time:  1.5 hrs      OCCUPATIONAL THERAPY    EVALUATION SELF CARE STATUS:  Hand Dominance: Left  Feeding: Unable to assess(comment) (07/12/22 1027)  Feeding Skilled Clinical Factors: not observed (07/12/22 1027)  Grooming: Kandi Sermons (07/12/22 1027)  UE Bathing: Setup (07/12/22 1027)  LE Bathing: Setup (07/12/22 1027)  UE Dressing: Setup (07/12/22 1027)  LE Dressing: Setup (07/12/22 1027)  Toileting: Unable to assess(comment) (07/12/22 1027)  Toileting Skilled Clinical Factors: declined need (07/12/22 1027)             CURRENT SELF CARE:  Feeding  Assistance Level: Independent (07/13/22 1107)  Grooming/Oral Hygiene  Assistance Level: Modified independent (07/13/22 1107)  Upper Extremity Bathing  Assistance Level: Modified independent (07/13/22 1107)  Lower Extremity Bathing  Assistance Level: Modified independent (07/13/22 1107)  Skilled Clinical Factors: pt sat to complete perineal care cleaning hip shifting side to side to complete (07/13/22 1107)  Upper Extremity Dressing  Assistance Level: Modified independent (07/13/22 1107)  Lower Extremity Dressing  Assistance Level: Modified independent (07/13/22 1107)  Tub/Shower Transfers  Type: Shower (07/13/22 1107)  Transfer To: Shower chair with back (07/13/22 1107)  Additional Factors:  With handrails (07/13/22 1107)  Assistance Level: Stand by assist (07/13/22 1107)        LTG:  Eating  Reason if not Attempted: Not attempted due to environmental limitations  CARE Score: 10  Discharge Goal: Independent, Oral Hygiene  Assistance Needed: Setup or clean-up assistance  CARE Score: 5  Discharge Goal: Independent, [address] [place]  Reason if not Attempted: Patient refused  CARE Score: 7  Discharge Goal: Independent, Shower/Bathe Self  Assistance Needed: Setup or clean-up assistance  CARE Score: 5  Discharge Goal: Independent  Upper Body Dressing  Assistance Needed: Setup or clean-up assistance  CARE Score: 5  Discharge Goal: Independent, Lower Body Dressing  Assistance Needed: Setup or clean-up assistance  CARE Score: 5  Discharge Goal: Independent, Putting On/Taking Off Footwear  Assistance Needed: Setup or clean-up assistance  CARE Score: 5  Discharge Goal: Independent, Toilet Transfer  Reason if not Attempted: Patient refused  CARE Score: 7  Discharge Goal: Independent  OT Treatment Time: 1.5 hrs      SPEECH THERAPY                 Diet/Swallow:                       LTG:                COGNITION  OT: Cognition Comment: comp: SUP, exp: SUP, prob solving: SUP, mem: SUP, soc int: MOD I  SP:        RECREATIONAL THERAPY  Attendance to recreational therapy programs:    []  Pet Therapy  [] Music Therapy  [] Art Therapy    [] Recreation Therapy Group [] Support Group           Patient social interaction (mood, participation): good, motivated    Patient strengths: independent prior    Patients goal: return home alone    Problems/Barriers: lives alone, decreased balance w/ L knee buckling, needs new wheelchair        1. Safety:          - Intervention / Plan:    [x]  falls protocol     [x]  PT/OT    []  SP        - Results:         2. Potential DME needs:         - Intervention / Plan:  [x]  PT/OT     [x]  Assess equipment needs/access       - Results:         3. Weakness:          - Intervention / Plan:  [x]  PT/OT      []  Other:         - Results:         4.   Discharge planning needs:          - Intervention / Plan:  [x]  Weekly team conference      [x]  family training        - Results:         5.            - Intervention / Plan:          - Results:         6.            - Intervention / Plan:         - Results:         7.            - Intervention / Plan:         - Results:           Discharge Plan Estimated Length of Stay: 10 days    Tentative Discharge date: 7/16/22      Anticipated Discharge Destination:  Home      Team recommendations:    1. Follow up Therapy :    PT  OT  RN  [place] Aide    2.  Home Health vs OP    Other:     Equipment needed at Discharge: W/C      Team Members Present at Conference:    Physician: Dr. Carleen Ramos  : Fozia Valdez RN  : Darylene Right, MSW, SHELDONW  RN: Phil Reynoso RN  Physical Therapist: Annabella Mackey PT  Occupational Therapist: Dimas Delarosa OTR  Speech Therapist: Russel Aponte, SLP  Nurse Manager: Helena Morales RN     Electronically signed by Darylene Right, MSW, LSW on 7/14/2022 at 10:25 AM

## 2022-07-13 NOTE — PROGRESS NOTES
Physical Therapy  Facility/Department: [facility] MED SURG M636/K228-69  Physical Therapy Discharge      NAME: Krishna Bermudez    : 1950 (66 y.o.)  MRN: [id number]    Account: [de-identified]  Gender: female      Patient has been discharged from acute care hospital. DC patient from current PT program.      Electronically signed by Roel Goff PT on 22 at 4:18 PM EDT

## 2022-07-13 NOTE — PROGRESS NOTES
Removed 4 x 4 Mepilex dressing from R knee, two small abrasions noted, no drainage, cleansed with betadine and left HARVEY for tonight as pt will not be using prosthesis. New dressing available at bedside to be placed tomorrow before therapy. Pt verbalizes understanding.

## 2022-07-13 NOTE — PROGRESS NOTES
Occupational Therapy  OCCUPATIONAL THERAPY  INPATIENT REHAB TREATMENT NOTE  [facility]      NAME: Dayo Fish  : 1950 (81 y.o.)  MRN: [id number]  CODE STATUS: Full Code  Room: U353/L571-72    Date of Service: 2022    Referring Physician:    Rehab Diagnosis:      Restrictions  Restrictions/Precautions  Restrictions/Precautions: Fall Risk         Position Activity Restriction  Spinal Precautions: Limit forceful spinal flexion  Other position/activity restrictions: R LE prostetic    Patient's date of birth confirmed: Yes    SAFETY:  Safety Devices  Safety Devices in place: Yes  Type of devices: All fall risk precautions in place    SUBJECTIVE:  Subjective: \" I have used neutrogena for a long time. \"    Pain at start of treatment: Yes: 6/10    Pain at end of treatment: Yes: 6/10    Location: L wrist, bottom of hand, L shoulder and neck, spinal area of back  Nursing notified: Yes, pt notified nurse this morning  RN: Dinora  Intervention: Other: lidocaine patches applied this morning and Tylenol per pt statement    COGNITION:  Orientation  Overall Orientation Status: Within Normal Limits  Orientation Level: Oriented X4  Cognition  Overall Cognitive Status: WFL      Pt's current cognitive status is:  Comprehension: Independent  Expression: Independent  Social Interaction: Independent  Problem Solving: Independent  Memory: Independent    OBJECTIVE:         Feeding  Assistance Level: Independent  Grooming/Oral Hygiene  Assistance Level: Modified independent  Upper Extremity Bathing  Assistance Level: Modified independent  Lower Extremity Bathing  Assistance Level: Modified independent  Skilled Clinical Factors: pt sat to complete perineal care cleaning hip shifting side to side to complete  Upper Extremity Dressing  Assistance Level: Modified independent  Lower Extremity Dressing  Assistance Level: Modified independent  Tub/Shower Transfers  Type: Shower  Transfer To:  Shower chair with back  Additional Factors: With handrails  Assistance Level: Stand by assist         Functional Mobility  Device: Wheelchair  Activity: To/From bathroom  Assistance Level: Modified independent  Sit to Stand  Assistance Level: Stand by assist  Stand to Sit  Assistance Level: Stand by assist                   Education: transfers to improve safety in/out of shower with use of grab bar and different positioning to get into shower       Equipment recommendations: none         ASSESSMENT:  Assessment: Pt particpated well today  Activity Tolerance: Patient tolerated treatment well      PLAN OF CARE:  Strengthening,Balance training,Functional mobility training,Endurance training,Wheelchair mobility training,Pain management,Patient/Caregiver education & training,Equipment evaluation, education, & procurement,Self-Care / ADL,Home management training  will continue Ot POC until discharge    Patient goals : To get my strength back  Time Frame for Long term goals : Within 1 week, pt to demo progress in the following areas listed below to achieve LTGs as stated in the intial eval  Long Term Goal 1: Pt will improve ADL status to return to PLOF  Long Term Goal 2: Pt will improve overall activity tolerance for self-care tasks  Long Term Goal 3: Pt will improve standing balance and tolerance for ADL/IADL completion  Long Term Goal 4: Pt will improve coordination skills for ADL tasks        Therapy Time:   Individual Group Co-Treat   Time In 1030       Time Out 1130         Minutes 60                   ADL/IADL trainin minutes     Electronically signed by:     HARVEY Archuleta,   2022, 11:11 AM

## 2022-07-13 NOTE — PLAN OF CARE
Problem: Discharge Planning  Goal: Discharge to home or other facility with appropriate resources  7/13/2022 1018 by Nelida Pierce RN  Outcome: Progressing  7/13/2022 0016 by Morena Laird RN  Outcome: Progressing     Problem: Safety - Adult  Goal: Free from fall injury  7/13/2022 1018 by Nelida Pierce RN  Outcome: Progressing  7/13/2022 0016 by Morena Laird RN  Outcome: Progressing     Problem: ABCDS Injury Assessment  Goal: Absence of physical injury  7/13/2022 1018 by Nelida Pierce RN  Outcome: Progressing  7/13/2022 0016 by Morena Laird RN  Outcome: Progressing  Flowsheets (Taken 7/13/2022 0016)  Absence of Physical Injury: Implement safety measures based on patient assessment     Problem: Chronic Conditions and Co-morbidities  Goal: Patient's chronic conditions and co-morbidity symptoms are monitored and maintained or improved  7/13/2022 1018 by Nelida Pierce RN  Outcome: Progressing  7/13/2022 0016 by Morena Laird RN  Outcome: Progressing     Problem: Nutrition Deficit:  Goal: Optimize nutritional status  7/13/2022 1018 by Nelida Pierce RN  Outcome: Progressing  7/13/2022 0016 by Morena Laird RN  Outcome: Progressing     Problem: Pain  Goal: Verbalizes/displays adequate comfort level or baseline comfort level  7/13/2022 1018 by Nelida Pierce RN  Outcome: Progressing  7/13/2022 0016 by Morena Laird RN  Outcome: Progressing

## 2022-07-13 NOTE — PROGRESS NOTES
Occupational Therapy  OCCUPATIONAL THERAPY  INPATIENT REHAB TREATMENT NOTE  [facility]      NAME: Peter Farias  : 1950 (51 y.o.)  MRN: [id number]  CODE STATUS: Full Code  Room: T795/A338-83    Date of Service: 2022    Referring Physician:    Rehab Diagnosis:      Restrictions  Restrictions/Precautions  Restrictions/Precautions: Fall Risk         Position Activity Restriction  Spinal Precautions: Limit forceful spinal flexion  Other position/activity restrictions: R LE prostetic    Patient's date of birth confirmed: Yes    SAFETY:  Safety Devices  Safety Devices in place: Yes  Type of devices: All fall risk precautions in place    SUBJECTIVE:  Subjective: \" I have used neutrogena for a long time. \"    Pain at start of treatment: Yes: 7/10    Pain at end of treatment: Yes: 7/10    Location: back through the spinal area, L wrist/hand  Nursing notified: Yes  RN: Avani Pineda  Intervention: Other: RN looking into what pt can have and will check with pt    COGNITION:  Orientation  Overall Orientation Status: Within Normal Limits  Orientation Level: Oriented X4  Cognition  Overall Cognitive Status: WFL          OBJECTIVE:     Pt participated in dynamic lateral standing/reaching/tossing of bean bags into bucket. Provided CGA/Min A per pt balance fluctuating throughout activity d/t L knee issues and guarding when reaching to the L side. Pt would reach to the L side with a slight squat, reach L laterally but without normal Leaning to reach (pt compensates when attempting to reach to the L). Pt states she feels \"off balance\" if she attempts to reach and lean to the L side. Pt compensates posturally for pain when standing and reaching to the right by completing R side trunk flexion, but c/o R hip pain but L side spinal pain. Pt is reminded to try to stand upright to maintain balance when tossing the bean bags into the bucket.   Pt demonstrates F- balance standing with CGA/Min A, and around a 1-2 min standing tolerance. Pt completed intermittent recovery periods during task d/t pain, act tolerance, and LLE weakness. Provided task to improve balance, coordination, dynamic standing tolerance and righting reactions to improve safety and decrease risk of falls during functional task performance skills. Provided pt with BUE strengthening exercises using a 1# wt in R arm and attempted to use wrist wt on L arm d/t injury to L hand but pt stated during task that it hurt, therefore discontinued use of wrist wt and completed exercises without wts in L hand. Pt completed shoulder flex/ext, chest presses, horiz ab/ad and bicep curls, only on R hand for wrist flex/ext d/t it hurting the L hand. Pt completed 10x1 set. Provided to increase strength and functional act tolerance in order to improve with functional transfers, mobility and ease with ADL tasks. Education:  Education  Education Given To: Patient  Education Provided: Transfer Training  Education Provided Comments: postural training/balance training  Education Method: Verbal  Barriers to Learning: None  Education Outcome: Demonstrated understanding    Equipment recommendations:         ASSESSMENT:  Assessment: Pt particpated well today  Activity Tolerance: Patient tolerated treatment well      PLAN OF CARE:  Strengthening,Balance training,Functional mobility training,Endurance training,Wheelchair mobility training,Pain management,Patient/Caregiver education & training,Equipment evaluation, education, & procurement,Self-Care / ADL,Home management training  will continue Ot POC until discharge    Patient goals : To get my strength back  Time Frame for Long term goals :  Within 1 week, pt to demo progress in the following areas listed below to achieve LTGs as stated in the intial eval  Long Term Goal 1: Pt will improve ADL status to return to PLOF  Long Term Goal 2: Pt will improve overall activity tolerance for self-care tasks  Long Term Goal 3: Pt will improve standing balance and tolerance for ADL/IADL completion  Long Term Goal 4: Pt will improve coordination skills for ADL tasks        Therapy Time:   Individual Group Co-Treat   Time In 1330       Time Out 1400         Minutes 30                   Neuromuscular reeducation: 20 minutes  Therapeutic activities: 10 minutes     Electronically signed by:     HARVEY Childress,   7/13/2022, 2:13 PM

## 2022-07-13 NOTE — PROGRESS NOTES
Subjective: The patient complains of severe acute on chronic progressive fatigue and  T12 pain partially relieved by rest, medications, PT,  OT, Tylenol SLP and rest and exacerbated by recent illness with worsening of her gait ataxia. She was initially admitted to [facility] through the ER on 7/9/2022 after presenting with a fall with trauma to her head. She fell while she was trying to get up to take a shower.  She hit her back. Sumanth Linda was unable to get up Sumanth Linda came in complaining of pain in her head but denied numbness or tingling.  She has been found to have low blood pressure and her dizziness is not found to be vestibular in nature but rather hypoperfusion in nature.     She has a remote history of a right BKA and occasionally uses the wheelchair at home. She was diagnosed with a UTI culture showed Klebsiella pneumoniae and she was prescribed Cipro. I am concerned about patients medical complexities and barriers to advancing in rehab goals including anxiety, intolerance to pain medications, low white count, and reliance on benzodiazepines in the past.        I reviewed current care and plans for further care with other rehab providers including nursing and case management. According to recent nursing note, \"  VSS. Medicated with tylenol. Pain to left hand,  left shoulder and mid-lower back 7/10. LBM 7/11. HS OT- 97. Patient denies being a diabetic despite it being documented in her history. Reports no blood sugars or insulin at home. PO cipro for UTI- re-ordered 7/12, end date 7/16. Urine sent 7/12- trace leukocyte esterase. Cx pending. No distress noted. Call light within reach and bed alarm activated. \".    ROS x10: The patient also complains of severely impaired mobility and activities of daily living.   Otherwise no new problems with vision, hearing, nose, mouth, throat, dermal, cardiovascular, GI, , pulmonary, musculoskeletal, psychiatric or neurological. See also Acute Rehab PM&R H&P.       Vital signs:  BP (!) 132/50   Pulse 88   Temp 98.4 °F (36.9 °C) (Oral)   Resp 19   Ht 5' 8\" (1.727 m)   Wt 190 lb (86.2 kg)   SpO2 96%   BMI 28.89 kg/m²   I/O:   PO/Intake:  fair PO intake,   low-carb diet diet    Bowel:   continent   Bladder: continent   No need for  [place]  General:  Patient is well developed,   adequately nourished, and    well kempt. HEENT:    Pupils equal, hearing intact to loud voice, external inspection of ear and nose benign. Inspection of lips, tongue and gums benign    Musculoskeletal: No significant change in strength or tone. All joints stable. Inspection and palpation of digits and nails show no clubbing, cyanosis or inflammatory conditions. Neuro/Psychiatric: Affect: flat but pleasant. Alert and oriented to person, place and situation with no needed cues. No significant change in deep tendon reflexes or sensation  Lungs:  Diminished, CTA-B. Respiration effort is   normal at rest.     Heart:   S1 = S2,   RRR. Abdomen:  Soft, non-tender, no enlargement of liver or spleen. Extremities:    lower extremity edema old right below the knee amputation. Skin:   Intact to general survey, small scab at distal anterior residual limb at distal tibial plateau. Rehabilitation:  Physical Therapy:   Bed mobility:  Bed mobility  Bridging: Modified independent  (07/12/22 1011)  Rolling to Left: Modified independent (07/12/22 1038)  Rolling to Right: Modified independent (07/12/22 1038)  Supine to Sit: Modified independent (07/12/22 1038)  Sit to Supine: Modified independent (07/12/22 1038)  Scooting: Modified independent (07/12/22 1011)  Bed Mobility Comments: Completed on flat mat without rails. Increased time and effort to complete. C/o back pain.  (07/12/22 1038)  Transfers:  Transfers  Sit to Stand: Stand by assistance (07/12/22 1019)  Stand to sit: Stand by assistance (07/12/22 1019)  Bed to Chair: Stand by assistance (pivot WC to mat) (07/12/22 1039)  Comment: Pt slow to complete with mild unsteadiness. Cues for hand placement. (07/12/22 1019)  Gait:   Ambulation  Surface: carpet (07/12/22 1609)  Device: Rolling Walker (07/12/22 1609)  Assistance: Stand by assistance (07/12/22 1609)  Quality of Gait: FF over [employer]. Signficant bracing on [employer] for support. Progressed to step through pattern. (07/12/22 1609)  Distance: 60ft (07/12/22 1609)  Stairs:  Stairs/Curb  Stairs?: No (07/12/22 1020)  W/C mobility:  Wheelchair Activities  Wheelchair Parts Management: Yes (07/12/22 1020)  All Wheelchair Parts Management: manages WC brakes indep. Pt doesn't use her footrests at home (07/12/22 1020)  Propulsion: Yes (07/12/22 1042)  Propulsion 1  Propulsion: Manual (07/12/22 1042)  Method: RUE;LUE;RLE;LLE (07/12/22 1042)  Level of Assistance: Supervision (07/12/22 1042)  Description/ Details: Verbal cues for managing obstacles. (07/12/22 1020)  Distance: 100ft (07/12/22 1042)    Occupational Therapy:   Hand Dominance: Left  ADL  Feeding: Unable to assess(comment) (07/12/22 1027)  Feeding Skilled Clinical Factors: not observed (07/12/22 1027)  Grooming: Setup (07/12/22 1027)  UE Bathing: Setup (07/12/22 1027)  LE Bathing: Setup (07/12/22 1027)  UE Dressing: Setup (07/12/22 1027)  LE Dressing: Setup (07/12/22 1027)  Toileting: Unable to assess(comment) (07/12/22 1027)  Toileting Skilled Clinical Factors: declined need (07/12/22 1027)  Toilet Transfers  Toilet Transfer: Unable to assess (07/12/22 1028)  Toilet Transfers Comments: declined need for toileting (07/12/22 1028)     Shower Transfers  Shower - Transfer To:  Shower seat with back (07/12/22 1028)  Shower - Technique: Stand pivot (07/12/22 1028)  Shower Transfers: Stand by assistance (07/12/22 1028)  Shower Transfers Comments: use of grab bars (07/12/22 1028)    Speech Therapy:            Diet/Swallow:                      Lab/X-ray studies reviewed, analyzed and discussed with patient and staff:   Recent Results (from the past 24 hour(s))   Urinalysis with Reflex to Culture    Collection Time: 07/12/22  9:30 AM    Specimen: Urine, clean catch   Result Value Ref Range    Color, UA Yellow Straw/Yellow    Clarity, UA Clear Clear    Glucose, Ur Negative Negative mg/dL    Bilirubin Urine Negative Negative    Ketones, Urine TRACE (A) Negative mg/dL    Specific Gravity, UA 1.015 1.005 - 1.030    Blood, Urine Negative Negative    pH, UA 5.5 5.0 - 9.0    Protein, UA Negative Negative mg/dL    Urobilinogen, Urine 0.2 <2.0 E.U./dL    Nitrite, Urine Negative Negative    Leukocyte Esterase, Urine TRACE (A) Negative    Urine Reflex to Culture Yes    POCT Glucose    Collection Time: 07/12/22 11:31 AM   Result Value Ref Range    POC Glucose 117 (H) 70 - 99 mg/dl    Performed on ACCU-CHEK    POCT Glucose    Collection Time: 07/12/22  4:09 PM   Result Value Ref Range    POC Glucose 108 (H) 70 - 99 mg/dl    Performed on ACCU-CHEK    POCT Glucose    Collection Time: 07/12/22  7:48 PM   Result Value Ref Range    POC Glucose 97 70 - 99 mg/dl    Performed on ACCU-CHEK    CBC with Auto Differential    Collection Time: 07/13/22  5:26 AM   Result Value Ref Range    WBC 2.9 (L) 4.8 - 10.8 K/uL    RBC 3.51 (L) 4.20 - 5.40 M/uL    Hemoglobin 10.6 (L) 12.0 - 16.0 g/dL    Hematocrit 33.1 (L) 37.0 - 47.0 %    MCV 94.2 82.0 - 100.0 fL    MCH 30.2 27.0 - 31.3 pg    MCHC 32.0 (L) 33.0 - 37.0 %    RDW 15.5 (H) 11.5 - 14.5 %    Platelets 734 (L) 762 - 400 K/uL    PLATELET SLIDE REVIEW Decreased     Neutrophils % 55.0 %    Lymphocytes % 40.0 %    Monocytes % 2.8 %    Eosinophils % 2.0 %    Basophils % 0.7 %    Neutrophils Absolute 1.6 1.4 - 6.5 K/uL    Lymphocytes Absolute 1.2 1.0 - 4.8 K/uL    Monocytes Absolute 0.1 (L) 0.2 - 0.8 K/uL    Eosinophils Absolute 0.1 0.0 - 0.7 K/uL    Basophils Absolute 0.0 0.0 - 0.2 K/uL    Promyelocytes Percent 1 (A) %    Smudge Cells 3.8     RBC Morphology Normal    Renal Function Panel    Collection Time: 07/13/22  5:26 AM   Result Value Ref Range    Sodium 141 135 - 144 mEq/L    Potassium 3.6 3.4 - 4.9 mEq/L    Chloride 108 (H) 95 - 107 mEq/L    CO2 17 (L) 20 - 31 mEq/L    Anion Gap 16 (H) 9 - 15 mEq/L    Glucose 111 (H) 70 - 99 mg/dL    BUN 13 8 - 23 mg/dL    CREATININE 0.79 0.50 - 0.90 mg/dL    GFR Non-African American >60.0 >60    GFR  >60.0 >60    Calcium 8.6 8.5 - 9.9 mg/dL    Phosphorus 3.2 2.3 - 4.8 mg/dL    Albumin 3.5 3.5 - 4.6 g/dL   POCT Glucose    Collection Time: 07/13/22  7:03 AM   Result Value Ref Range    POC Glucose 89 70 - 99 mg/dl    Performed on ACCU-CHEK        XR HAND LEFT (MIN 3 VIEWS)    Result Date: 7/9/2022  XR HAND LEFT (MIN 3 VIEWS) : 7/9/2022 CLINICAL HISTORY: Pain after fall. COMPARISON: None available. TECHNIQUE: PA, lateral, and oblique radiographs of the left hand were obtained. FINDINGS: Mild deformity of the medial base of the left fourth proximal phalanx is probably chronic rather than a nondisplaced fracture. There is no other fracture, dislocation, radiodense foreign bodies, worrisome bone destruction, or pathologic calcifications identified. The visualized joint spaces are intact, with mild to moderate osteoarthritic changes predominantly at the first carpometacarpal and fifth DIP joints. NO SUSPECTED DISPLACED FRACTURE OR SIGNIFICANT POSTTRAUMATIC COMPLICATION IDENTIFIED. CT HEAD WO CONTRAST    Result Date: 7/9/2022  CT HEAD WO CONTRAST, CT CERVICAL SPINE WO CONTRAST: 7/9/2022 CLINICAL HISTORY: Pain after falling. COMPARISON: Head and cervical spine CTs 4/24/2019. TECHNIQUE: ROUTINE All CT scans at this facility use dose modulation, iterative reconstruction, and/or weight based dosing when appropriate to reduce radiation dose to as low as reasonably achievable. HEAD CT FINDINGS: There is no intracranial hemorrhage, mass effect, midline shift, extra-axial collection, hydrocephalus, evidence of a recent ischemic infarct or skull fracture identified.   Mild generalized cerebral volume loss and mild to moderate white matter changes are again noted. The mastoid air cells and visualized paranasal sinuses are essentially clear. CERVICAL SPINE CT FINDINGS: The spine is visualized from the craniovertebral junction nearly through the T2 level. There is no fracture, dislocation, or acute paraspinous soft tissue abnormalities identified. Moderate degenerative changes of the lower levels are again noted. FINAL IMPRESSION: NO ACUTE INTRACRANIAL PROCESS, FRACTURE, OR EVIDENCE OF CERVICAL SPINE INJURY IDENTIFIED. CT CERVICAL SPINE WO CONTRAST    Result Date: 7/9/2022  CT HEAD WO CONTRAST, CT CERVICAL SPINE WO CONTRAST: 7/9/2022 CLINICAL HISTORY: Pain after falling. COMPARISON: Head and cervical spine CTs 4/24/2019. TECHNIQUE: ROUTINE All CT scans at this facility use dose modulation, iterative reconstruction, and/or weight based dosing when appropriate to reduce radiation dose to as low as reasonably achievable. HEAD CT FINDINGS: There is no intracranial hemorrhage, mass effect, midline shift, extra-axial collection, hydrocephalus, evidence of a recent ischemic infarct or skull fracture identified. Mild generalized cerebral volume loss and mild to moderate white matter changes are again noted. The mastoid air cells and visualized paranasal sinuses are essentially clear. CERVICAL SPINE CT FINDINGS: The spine is visualized from the craniovertebral junction nearly through the T2 level. There is no fracture, dislocation, or acute paraspinous soft tissue abnormalities identified. Moderate degenerative changes of the lower levels are again noted. FINAL IMPRESSION: NO ACUTE INTRACRANIAL PROCESS, FRACTURE, OR EVIDENCE OF CERVICAL SPINE INJURY IDENTIFIED. CT LUMBAR SPINE WO CONTRAST    Result Date: 7/9/2022  CT LUMBAR SPINE WO CONTRAST : 7/9/2022 CLINICAL HISTORY:Pain after falling. COMPARISON: Lumbar spine MRI 9/8/2021. TECHNIQUE: ROUTINE.  All CT scans at this facility use dose modulation, iterative reconstruction, and/or weight based dosing when appropriate to reduce radiation dose to as low as reasonably achievable. FINDINGS: The spine is visualized from the T7-8 through the S3 levels. There is no acute fracture, dislocation, evidence of instability, acute paraspinous soft tissue abnormalities, or significant change from 9/8/2021 identified. Osteopenia, mild to moderate Rotary dextroscoliosis, moderately extensive degenerative changes, and mild chronic compression fractures of T12-L2 with previous vertebroplasties. NO ACUTE FRACTURE OR SIGNIFICANT POSTTRAUMATIC COMPLICATION IDENTIFIED. CHRONIC AND DEGENERATIVE CHANGES, AS NOTED. US RETROPERITONEAL LIMITED    Result Date: 7/10/2022  US RETROPERITONEAL LIMITED : 7/9/2022 CLINICAL HISTORY: NAYELY. COMPARISON: Renal and renal artery ultrasound 10/17/2018, lumbar spine MRI 9/8/2021 and lumbar spine CT 7/9/2022. TECHNIQUE:  Transabdominal ultrasound of the kidneys was performed. FINDINGS: The study is mild to moderately limited by the patient's body habitus. Both kidneys appear normal in size, position and morphology without significantly increased echogenicity. 3 approximately 1 cm simple cysts are noted within the left kidney. There is no hydronephrosis, abnormal perinephric collections, visualized nephrolithiasis, or solid renal masses. The right kidney measures approximately 10.0 x 4.7 x 4.3 cm. The left kidney measures approximately 11.1 x 4.8 x 4.6 cm. The average renal parenchymal thickness is approximately 1 cm. ESSENTIALLY NEGATIVE LIMITED RENAL ULTRASOUND. Previous extensive, complex labs, notes and diagnostics reviewed and analyzed.      ALLERGIES:    Allergies as of 07/11/2022 - Fully Reviewed 07/11/2022   Allergen Reaction Noted    Iv dye [iodides]  04/25/2019    Vancomycin  10/16/2018    Morphine Rash 04/25/2019      (please also verify by checking MAR)      I reviewed her [facility] prescription monitoring service data sheets in hopes of eliminating polypharmacy and weaning to the lowest effective dose of pain medications and eliminating the concomitant use of benzodiazepines. I see no medications of concern. I see no habits of combining sedatives and narcotics. Complex Physical Medicine & Rehab Issues Assess & Plan:   1. Severe abnormality of gait and mobility and impaired self-care and ADL's secondary to progressive osteoarthritis flareup status post fall with a T12 fracture. .  Functional and medical status reassessed regarding patients ability to participate in therapies and patient found to be able to participate in acute intensive comprehensive inpatient rehabilitation program including PT/OT to improve balance, ambulation, ADLs, and to improve the P/AROM. Therapeutic modifications regarding activities in therapies, place, amount of time per day and intensity of therapy made daily. In bed therapies or bedside therapies prn.   2. Bowel and Bladder dysfunction  , Neurogenic bowel and bladder:  frequent toileting, ambulate to bathroom with assistance, check post void residuals. Check for C.difficile x1 if >2 loose stools in 24 hours, continue bowel & bladder program.  Monitor bowel and bladder function. Lactinex 2 PO every AC. MOM prn, Brown Bomb prn, Glycerin suppository prn, enema prn. Encourage therapy and nursing to co-treat and problem solve re continence. 3. Severe post fracture pain T12 pain as well as generalized OA pain: reassess pain every shift and prior to and after each therapy session, give prn Tylenol and consider scheduled Tylenol, modalities prn in therapy, masage, Lidoderm, K-pad prn. Consider scheduled AM pain meds. 4. Skin healing scab at distal right below the knee amputation and breakdown risk:  continue pressure relief program.  Daily skin exams and reports from nursing.   5. Fatigue due to nutritional and hydration deficiency: Add and titrate vitamin B12 vitamin D and CoQ10 continue to monitor I&Os, calorie counts prn, dietary consult prn. Add healthy snack at night. 6. Acute episodic insomnia with situational adjustment disorder:  prn Ambien, monitor for day time sedation. 7. Falls risk elevated:  patient to use call light to get nursing assistance to get up, bed and chair alarm. 8. Elevated DVT risk: progressive activities in PT, continue prophylaxis SANTIAGO hose, elevation and transition off Lovenox. 9. Complex discharge planning:  Weekly team meeting every Thursday to re-assess progress towards goals, discuss and address social, psychological and medical comorbidities and to address difficulties they may be having progressing in therapy. Patient and family education is in progress. The patient is to follow-up with their family physician after discharge. Complex Active General Medical Issues that complicate care Assess & Plan:    1. Anxiety-and bipolar disorder -emotional support provided daily, vitamin B12, encourage participation in rehabilitation support group and recreational therapy, adjust/add medications (Wellbutrin, Seroquel, Zoloft, add vitamin B12)  2. Chronic low back pain, Chronic neck pain,   Left foot pain, Cervical disc disorder with radiculopathy-lowest effective dose of pain medication including Tylenol and topicals heat and massage  3. Dyslipidemia, Essential hypertension-Acute rehab to monitor heart rate and rhythm with the option of telemetry and the effects of chronotropic medication with respect to increasing physical activity and exercise in PT, OT, ADLs with medication titration to lowest effective dosing. Continue blood signs every shift focusing on heart rate, rhythm and blood pressure checks with orthostatic checks-monitoring the effect of exercise, therapy and posture. Consult hospitalist for backup medical and adjust/add medications (Apresoline intravenous transitioning to home medications).   Monitor heart rate and blood pressure as well as medications effects on vital signs before during and after therapy with especial focus on preventing orthostasis and falls risk. 4.   GERD (gastroesophageal reflux disease)- Elevate head of bed after meals, monitor stools for blood, lowest effective dose of PPI, consider Tums. 5.   History of below-knee amputation of right lower extremity-basis as needed  6. Diarrhea-and immunosuppression due to pancytopenia. Add probiotic  7. Acute renal injury-promote hydration  Eliminate toxic medications, monitor I's and O's focusing on urine output, recheck BMP. 8.   Dysautonomia -focus on balance and therapy focus on getting up if she falls and monitor vital signs and orthostasis  9.   UTI (urinary tract infection)-Cipro pending culture monitor for retention  10. DM (diabetes mellitus) -Continue blood sugar checks every shift, diet, add diabetic add dietary education, restrict carbohydrates to lowest effective and safe carb count per meal advising 4 carbs per meal, add at bedtime snack to prevent a.m. hypoglycemia, adjust/add medications (sliding scale Humalog)         Focus of today's plan-  Initiate and modify therapuetic plan to meet patients individual needs, add rest breaks as needed, and transition off such frequent fingerstick blood sugars teach diabetic add monitor that with low white count consider resuming Neurontin as long as it was not contributing to her low white count and pancytopenia.     Electronically signed by Esme Ryan DO on 7/13/22 at 8:14 AM MARY Holley D.O., PM&R     Attending    [address]

## 2022-07-13 NOTE — PROGRESS NOTES
Spiritual Care Services     Summary of Visit:  Pt shared the story of her recent fall and wanting to get up herself, but finally, after many hours, allowing others to lift her up. Finds meaning in the phrase her friends said: Jessy Fields will lift you up. \" Continues to reflect on this. Often she lifts others up, okay to allow others to give to her, too. Processing the death of her  and two brothers while in service. Grateful that her son-in-law was able to return home, her daughter beautifully supports his ongoing recovery. Feels ann-marie all around her, even as she names the painful, dark place she experienced following the death of her . Spiritual Assessment/Intervention/Outcomes:    Encounter Summary  Encounter Overview/Reason : Spiritual/Emotional Needs  Service Provided For[de-identified] Patient  Referral/Consult From[de-identified] [facility]  Support System: Children,Friends/neighbors  Last Encounter : 07/12/22  Complexity of Encounter: Moderate  Begin Time: 1510  End Time : 1540  Total Time Calculated: 30 min  Encounter   Type: Follow up     Spiritual/Emotional needs  Type: Spiritual Support  Rituals, Rites and Sacraments  Type: Yazidism Communion  Grief, Loss, and Adjustments  Type: Grief and loss,Life Adjustments,Adjustment to illness  Primary Decision Maker (Healthcare Proxy)  [address] [facility] is[de-identified]  (pt agreeable to spiritual care to complete POA paperwork)           Values / Beliefs  Do You Have Any Ethnic, Cultural, Sacramental, or Spiritual Jewish Needs You Would Like Us To Be Aware of While You Are in the Hospital : Yes  Cultural Requests During Hospitalization: Orthodox    Care Plan:    Ongoing support/meaning making. Spiritual Care Services   Electronically signed by Rogelio Montes on 7/13/22 at 4:19 PM EDT     To reach a  for emotional and spiritual support, place an [facility] consult request.   If a  is needed immediately, dial 0 and ask to page the on-call .

## 2022-07-13 NOTE — PROGRESS NOTES
Assessment completed. VSS. Medicated with tylenol. Pain to left hand,  left shoulder and mid-lower back 7/10. LBM 7/11. HS OT- 97. Patient denies being a diabetic despite it being documented in her history. Reports no blood sugars or insulin at home. PO cipro for UTI- re-ordered 7/12, end date 7/16. Urine sent 7/12- trace leukocyte esterase. Cx pending. No distress noted. Call light within reach and bed alarm activated.   Electronically signed by Kareem Alva RN on 7/13/2022 at 1:32 AM

## 2022-07-14 LAB
GLUCOSE BLD-MCNC: 105 MG/DL (ref 70–99)
PERFORMED ON: ABNORMAL

## 2022-07-14 PROCEDURE — 2500000003 HC RX 250 WO HCPCS: Performed by: PHYSICAL MEDICINE & REHABILITATION

## 2022-07-14 PROCEDURE — 99233 SBSQ HOSP IP/OBS HIGH 50: CPT | Performed by: PHYSICAL MEDICINE & REHABILITATION

## 2022-07-14 PROCEDURE — 97530 THERAPEUTIC ACTIVITIES: CPT

## 2022-07-14 PROCEDURE — 97535 SELF CARE MNGMENT TRAINING: CPT

## 2022-07-14 PROCEDURE — 1180000000 HC REHAB R&B

## 2022-07-14 PROCEDURE — 97116 GAIT TRAINING THERAPY: CPT

## 2022-07-14 PROCEDURE — 6370000000 HC RX 637 (ALT 250 FOR IP): Performed by: INTERNAL MEDICINE

## 2022-07-14 PROCEDURE — 6370000000 HC RX 637 (ALT 250 FOR IP): Performed by: PHYSICAL MEDICINE & REHABILITATION

## 2022-07-14 PROCEDURE — 97542 WHEELCHAIR MNGMENT TRAINING: CPT

## 2022-07-14 RX ORDER — CLONAZEPAM 0.5 MG/1
2 TABLET ORAL NIGHTLY
Status: DISCONTINUED | OUTPATIENT
Start: 2022-07-14 | End: 2022-07-15 | Stop reason: HOSPADM

## 2022-07-14 RX ORDER — GABAPENTIN 100 MG/1
100 CAPSULE ORAL EVERY 12 HOURS
Status: DISCONTINUED | OUTPATIENT
Start: 2022-07-15 | End: 2022-07-15 | Stop reason: HOSPADM

## 2022-07-14 RX ORDER — KETOROLAC TROMETHAMINE 30 MG/ML
30 INJECTION, SOLUTION INTRAMUSCULAR; INTRAVENOUS NIGHTLY
Status: DISCONTINUED | OUTPATIENT
Start: 2022-07-14 | End: 2022-07-14

## 2022-07-14 RX ORDER — GABAPENTIN 100 MG/1
100 CAPSULE ORAL 2 TIMES DAILY
Status: DISCONTINUED | OUTPATIENT
Start: 2022-07-14 | End: 2022-07-14

## 2022-07-14 RX ORDER — KETOROLAC TROMETHAMINE 30 MG/ML
15 INJECTION, SOLUTION INTRAMUSCULAR; INTRAVENOUS NIGHTLY PRN
Status: DISCONTINUED | OUTPATIENT
Start: 2022-07-14 | End: 2022-07-15 | Stop reason: HOSPADM

## 2022-07-14 RX ADMIN — LACTOBACILLUS TAB 2 TABLET: TAB at 23:29

## 2022-07-14 RX ADMIN — LACTOBACILLUS TAB 2 TABLET: TAB at 08:23

## 2022-07-14 RX ADMIN — SERTRALINE 100 MG: 100 TABLET, FILM COATED ORAL at 23:30

## 2022-07-14 RX ADMIN — CLONAZEPAM 2 MG: 0.5 TABLET ORAL at 23:28

## 2022-07-14 RX ADMIN — BUPROPION HYDROCHLORIDE 75 MG: 75 TABLET, FILM COATED ORAL at 08:23

## 2022-07-14 RX ADMIN — CLONAZEPAM 2 MG: 0.5 TABLET ORAL at 10:38

## 2022-07-14 RX ADMIN — LAMOTRIGINE 100 MG: 25 TABLET ORAL at 08:24

## 2022-07-14 RX ADMIN — QUETIAPINE FUMARATE 150 MG: 50 TABLET, EXTENDED RELEASE ORAL at 23:30

## 2022-07-14 RX ADMIN — CIPROFLOXACIN HYDROCHLORIDE 500 MG: 500 TABLET, FILM COATED ORAL at 17:06

## 2022-07-14 RX ADMIN — CIPROFLOXACIN HYDROCHLORIDE 500 MG: 500 TABLET, FILM COATED ORAL at 08:23

## 2022-07-14 RX ADMIN — PROVIDONE IODINE: 7.5 STICK TOPICAL at 23:31

## 2022-07-14 RX ADMIN — BUPROPION HYDROCHLORIDE 75 MG: 75 TABLET, FILM COATED ORAL at 23:29

## 2022-07-14 RX ADMIN — LAMOTRIGINE 100 MG: 25 TABLET ORAL at 23:28

## 2022-07-14 RX ADMIN — Medication 100 MG: at 08:23

## 2022-07-14 RX ADMIN — SERTRALINE 100 MG: 100 TABLET, FILM COATED ORAL at 08:24

## 2022-07-14 RX ADMIN — Medication 2000 UNITS: at 17:06

## 2022-07-14 RX ADMIN — LACTOBACILLUS TAB 2 TABLET: TAB at 14:13

## 2022-07-14 RX ADMIN — GABAPENTIN 100 MG: 100 CAPSULE ORAL at 12:37

## 2022-07-14 RX ADMIN — PROVIDONE IODINE: 7.5 STICK TOPICAL at 08:25

## 2022-07-14 ASSESSMENT — PAIN DESCRIPTION - DESCRIPTORS: DESCRIPTORS: ACHING

## 2022-07-14 ASSESSMENT — PAIN DESCRIPTION - LOCATION
LOCATION: BACK
LOCATION: BACK

## 2022-07-14 ASSESSMENT — PAIN SCALES - GENERAL
PAINLEVEL_OUTOF10: 7

## 2022-07-14 NOTE — CARE COORDINATION
SHELDONW called [facility] again and left a voicemail with callback number.  Electronically signed by JOLENE Barnes, YAN on 7/14/2022 at 11:49 AM

## 2022-07-14 NOTE — PLAN OF CARE

## 2022-07-14 NOTE — PROGRESS NOTES
Occupational Therapy  OCCUPATIONAL THERAPY  INPATIENT REHAB TREATMENT NOTE  [facility]      NAME: Radha Mckinney  : 1950 (31 y.o.)  MRN: [id number]  CODE STATUS: Full Code  Room: H545/D425-62    Date of Service: 2022    Referring Physician:    Rehab Diagnosis:      Restrictions  Restrictions/Precautions  Restrictions/Precautions: Fall Risk         Position Activity Restriction  Spinal Precautions: Limit forceful spinal flexion  Other position/activity restrictions: R LE prostetic    Patient's date of birth confirmed: Yes    SAFETY:  Safety Devices  Safety Devices in place: Yes  Type of devices: All fall risk precautions in place    SUBJECTIVE:  Subjective: \" I didnt know the other girl was going to come down. \"    Pain at start of treatment: Yes: 4/10    Pain at end of treatment: Yes: 4/10    Location: back/neck/L shoulder  Nursing notified: Declined  RN:   Intervention: None    COGNITION:  Orientation  Overall Orientation Status: Within Normal Limits  Orientation Level: Oriented X4  Cognition  Overall Cognitive Status: WFL          OBJECTIVE:         Putting On/Taking Off Footwear  Assistance Level: Independent  Skilled Clinical Factors: to don prosthesis to R leg and shoe/sock to L foot seated on EOB without LOB  Toileting  Assistance Level: Modified independent  Toilet Transfers  Technique: Stand step  Equipment: Standard toilet;Grab bars  Assistance Level: Modified independent         Functional Mobility  Device: Rolling walker  Activity: To/From bathroom; Retrieve items  Assistance Level: Modified independent  Supine to Sit  Assistance Level: Modified independent  Scooting  Assistance Level: Independent  Sit to Stand  Assistance Level: Supervision  Stand to Sit  Assistance Level: Supervision     Pt completed in room mobility with rolling walker with SBA/Sup. Pt fluctuated with stability during mobility appearing more stable at times and needing CGA 1x d/t slight LOB.  Pt walked into bathroom, completed toileting tasks, then completed mobility to sink-req SBA, then back into room with rest break on pt in room couch d/t increased lethargy. Pt then completed STS with SBA and used rolling walker to complete item retrieval from closet demonstrating ability to open/close closet door in room with SBA d/t balance for safety. Pt stated she did not feel very balanced and tired from taking some anxiety meds earlier in the day. Then pt sat in w/c to go to therapy room to complete kitchen mobility with rolling walker. Pt demonstrated the ability to open/close above counter top cupboards, open close microwave req rest break after about 2 min of standing. Then completed STS with SBA with mobility to fridge demonstrating ability to open, req vc's for walker positioning to keep fridge open and discussed use of rollator for this task to make item retrieval safer, d/t pt stating she uses rollator at home. Pt demonstrates F- balance during kitchen mobility with item retrieval tasks and req education/training on walker placement to retrieve items and open fridge safely. Pt completed all tasks to increase safety, ease and Ind with functional task performance skill upon returning home and decrease risk of falls. Education: Pt educated that she may want to obtain a rolling walker for the time being instead of using 4 w/w at home d/t balance and the amount of pressure she places through the hands of the walker       Equipment recommendations:          ASSESSMENT:  Activity Tolerance: Other (comment) (pt limited d/t anxiety/PTSD coming back up)      PLAN OF CARE:  Strengthening,Balance training,Functional mobility training,Endurance training,Wheelchair mobility training,Pain management,Patient/Caregiver education & training,Equipment evaluation, education, & procurement,Self-Care / ADL,Home management training  will continue Ot POC until discharge    Patient goals :  To get my strength back  Time Frame for Long term goals : Within 1 week, pt to demo progress in the following areas listed below to achieve LTGs as stated in the intial eval  Long Term Goal 1: Pt will improve ADL status to return to PLOF  Long Term Goal 2: Pt will improve overall activity tolerance for self-care tasks  Long Term Goal 3: Pt will improve standing balance and tolerance for ADL/IADL completion  Long Term Goal 4: Pt will improve coordination skills for ADL tasks        Therapy Time:   Individual Group Co-Treat   Time In 1330       Time Out 1430         Minutes 60                   ADL/IADL trainin minutes     Electronically signed by:     HARVEY Fontanez,   2022, 3:16 PM

## 2022-07-14 NOTE — CARE COORDINATION
Spoke with the patient about a potential DC of Saturday and she states she can't sleep here and wants to go today and follow up with OP therapy. Will check back in a few hours to see if she changes her mind she would like a few hours to sleep asked her nurse to medicate with Klonopin per patient request and left a message for Rehab Dr that the patient states she has taken at home Neurontin 100mg daily as needed for pain and would like to try it today.   Electronically signed by Yohannes Solis RN on 7/14/22 at 10:48 AM MARY

## 2022-07-14 NOTE — PROGRESS NOTES
Physical Therapy Missed Treatment   Facility/Department: [facility] MED SURG W423/V574-42    NAME: Jennifer Rider  Patient Status:   : 1950 (72 y.o.)  MRN: [id number]  Account: [de-identified]  Gender: female        [x] Patient Declines PT Treatment            [] Patient Unavailable:     Pt resting in bed and woke up to speak to me. States she was trying to rest before she had to come to therapy at 2 pm. Pt states she was told by physician to rest today. Will attempt PT Treatment again at earliest convenience.         Electronically signed by Alfonzo Boone PTA on 22 at 1:20 PM EDT

## 2022-07-14 NOTE — PROGRESS NOTES
Physical Therapy Missed Treatment   Facility/Department: [facility] Z331/C806-83    NAME: Jennifer Rider    : 1950 (66 y.o.)  MRN: [id number]    Account: [de-identified]  Gender: female      Pt declined therapies this AM secondary to poor sleep over past 3 days. Pt reports only getting 5 hours of sleep total over those days. Pt reports exhaustion. Pt aware of team meeting today and has requested to discharge home today. Pt asked what she will do if team wants her to stay longer and pt replied \"I'm still going home. \"  Pt asked if she is aware that this will be AMA and possibly not paid for by Medicare, as they have the right to deny her coverage. Pt states she was not aware of that, but this did not change her mind. Pt agreed to finish therapy in PM if allowed to discharge home today in order for therapies to complete discharge process. PT notified of pt's refusal.  [place] to notify nursing as she was in team.     Pt missed 60 minutes of therapy.         Claudetta Johnson, PTA, 22 at 9:54 AM

## 2022-07-14 NOTE — PROGRESS NOTES
Physical Therapy Rehab Treatment Note  Facility/Department: [facility]  [facility]       NAME: Radha Mckinney  : 1950 (72 y.o.)  MRN: [id number]  CODE STATUS: Full Code    Date of Service: 2022       Restrictions:  Restrictions/Precautions: Fall Risk  Position Activity Restriction  Spinal Precautions: Limit forceful spinal flexion  Other position/activity restrictions: R LE prostetic       SUBJECTIVE:   Subjective: \"I get a little dizzy. I think those meds are kicking in.\"    Pain  Pain: 6/10 back; L shoulder > hand: declined intervention      OBJECTIVE:     Bed Mobility  Additional Factors: Head of bed flat; Without handrails  Sit to Supine  Assistance Level: Modified independent  Scooting  Assistance Level: Modified independent    Transfers  Surface: Wheelchair  Sit to Stand  Assistance Level: Modified independent  Skilled Clinical Factors: Good technique maintained throughout  Stand to Sit  Assistance Level: Modified independent  Bed To/From Chair  Technique: Stand pivot  Assistance Level: Modified independent  Skilled Clinical Factors: W/C <> bed, good technique maintained as well as approach to bed with w/c    Curb  Curb Height: 4''  Device:  (rollator)  Number of Curbs: 1  Additional Factors: Verbal cues  Assistance Level: Contact guard assist  Skilled Clinical Factors: step by step cues for ascending curb with rollator    Wheelchair  Surface: Level surface; Uneven surface; Carpet  Device: Standard wheelchair  Assistance Level for Propulsion: Modified independent  Propulsion Method: Bilateral upper extremities; Bilateral lower extremities  Propulsion Distance: 350'  Skilled Clinical Factors: pt demonstrates good technique and safety navigating through busy environment, good apporach to bed, manuevering through room and tight spaces, no deviations with fwd propulsion    PT Exercises  A/AROM Exercises: SL bridges x10, SLR x10, hip abd x10 supine       ASSESSMENT/PROGRESS TOWARDS GOALS: Assessment  Assessment: Pt experiencing dizziness when standing to perform curb step, required to return to sitting to relieve dizziness. Pt requiring vc's throughout curb step training, good follow through. Good safety demonstrated with w/c mobility. Pt able to manuever with no safety concerns. Goals:  Long Term Goals  Long term goal 1: Pt will demonstrate bed mobility indep. Long term goal 2: Pt will demonstrate transfers (bed,chair,car) with indep  Long term goal 3: Pt will ambulate 50ft with LRD indep  Long term goal 4: Pt to manage curb step with [employer] and supervision/indep  Long term goal 5: Pt to complete HEP with indep  Long term goal 6: Pt to manage and propel WC 150ft indep    PLAN OF CARE/Safety:   Plan Comment: Cont per POC.       Therapy Time:   Individual   Time In 1500   Time Out 1530   Minutes 30   30 mins make up time from this AM  Transfer/Bed mobility trainin  Gait trainin  Neuro re education: 0  Therapeutic ex: 5  W/C: [address] GRIGSBY, PTA, 22 at 4:12 PM

## 2022-07-14 NOTE — PROGRESS NOTES
INDIVIDUALIZED OVERALL REHAB PLAN OF CARE  ADDENDUM TO REHAB PROGRESS NOTE-for audit purposes must also refer to this day's clinical note and combine the information      Date: 2022  Patient Name: Edgar Hopkins   Room: S744/F379-16    MRN: [id number]    : 1950  (72 y.o.)  Gender: female       Today 2022 during weekly team meeting, I reviewed the patient Edgar Hopkins in detail with the therapists and nurses involved in patient's care gathering complex physiatric data regarding current medical issues, progress in therapies, factors limiting progress, social issues, psychological issues, ongoing therapeutic plans and discharge planning. Legend:  I= independent Im =Modified independent  S=Supervised SB=stand by HIGUERA=set up CG=contact siddhartha Min= minimal Mod=Moderate Max=maximal Max of 2 =maximal assist of 2 people      CURRENT FUNCTIONAL STATUS:    Barriers to progress and discharge severe pain and complex social situations      NURSING ISSUES:       PO cipro for UTI.VSS. Medicated with tylenol for 6/10 left hand, left shoulder, neck and back pain. LBM . Continues PO cipro for UTI. Accu checks changed to daily and coverage d/c'd. No distress noted. Nursing will continue to focus on bowel and bladder continence transitioning toward independence by time of discharge. Monitoring post void residuals monitoring for severe constipation and bowel obstruction.     Bowel function- continent/normal  Plans to address- scheduled voids     Bladder function-  continent    Plans to address-  ok     Skin deficits- dressing changes   Plans to address- topicals and dressing changes     Hydration/Nutritional deficits- continue to monitor closely for dehydration  Plans to address-Push PO, assist with feeds as needed     Low BP- continue to monitor Ortho BPs  Plans to address- strict I's and O's    Pt and Family training goals-  teach home technology options like Lorna use and home assistive devices      Focus on achieving ADL goals with co-treating with OT when possible. Focus on cognition and co treat with SLP when possible. PHYSICAL THERAPY  Bed mobility:  Bed mobility  Bridging: Modified independent  (07/12/22 1011)  Rolling to Left: Modified independent (07/13/22 1023)  Rolling to Right: Modified independent (07/13/22 1023)  Supine to Sit: Modified independent (07/13/22 1023)  Sit to Supine: Modified independent (07/13/22 1023)  Scooting: Modified independent (07/12/22 1011)  Bed Mobility Comments: Completed on flat mat without rails. Increased time and effort to complete. C/o back pain. (07/12/22 1038)  Transfers:  Transfers  Sit to Stand: Stand by assistance (07/13/22 1023)  Stand to sit: Stand by assistance (07/13/22 1023)  Bed to Chair: Stand by assistance (07/13/22 1023)  Comment: VCs for positioning of WC prior to transfer. (07/13/22 1009)  Gait:   Ambulation  Surface: carpet (07/13/22 1617)  Device: y prime (07/13/22 1617)  Assistance: Stand by assistance (07/13/22 1617)  Quality of Gait: Fwd flexed trunk over [employer] (07/13/22 1617)  Distance: 75ft (07/13/22 1617)  Stairs:  Stairs/Curb  Stairs?: No (07/12/22 1020)  W/C mobility:  Wheelchair Activities  Wheelchair Parts Management: Yes (07/13/22 1617)  All Wheelchair Parts Management: manages WC brakes indep.  Pt doesn't use her footrests at home (07/13/22 1617)  Left Brakes Level of Assistance: Independent (07/13/22 1617)  Right Brakes Level of Assistance: Independent (07/13/22 1617)  Propulsion: Yes (07/13/22 1617)  Propulsion 1  Propulsion: Manual (07/13/22 1617)  Level: Level Tile (07/13/22 1617)  Method: RUE;RLE;LLE (07/13/22 1617)  Level of Assistance: Modified independent (07/13/22 1617)  Description/ Details: Verbal cues for managing obstacles and positioning WC. (07/13/22 1023)  Distance: [id number] (07/13/22 1617)        Pt and Family training goals-  Focus on sequencing and endurance        OCCUPATIONAL THERAPY  Feeding  Assistance Level: Independent (07/13/22 1107)  Grooming/Oral Hygiene  Assistance Level: Modified independent (07/13/22 1107)  Upper Extremity Bathing  Assistance Level: Modified independent (07/13/22 1107)  Lower Extremity Bathing  Assistance Level: Modified independent (07/13/22 1107)  Skilled Clinical Factors: pt sat to complete perineal care cleaning hip shifting side to side to complete (07/13/22 1107)  Upper Extremity Dressing  Assistance Level: Modified independent (07/13/22 1107)  Lower Extremity Dressing  Assistance Level: Modified independent (07/13/22 1107)  Tub/Shower Transfers  Type: Shower (07/13/22 1107)  Transfer To: Shower chair with back (07/13/22 1107)  Additional Factors: With handrails (07/13/22 1107)  Assistance Level: Stand by assist (07/13/22 1107)      Plans for addressing ADL deficits affecting: Focus on sequencing.         Bladder function disrupting functional progress- continent      Plans to address- coordinate scheduled voids before bed and therapy with nursing     Skin deficits- no problems noted   Plans to address- goals achieved                SPEECH THERAPY/SLP             Plans for cognitive and communication issues affecting addressing:   Pt and Family training goals-  teach home tecnology options like Lorna use and home assistive devices       Diet/Swallow:                           COGNITION  OT: Cognition Comment: comp: SUP, exp: SUP, prob solving: SUP, mem: SUP, soc int: MOD I  SP:        Social History     Socioeconomic History    Marital status: Single     Spouse name: Not on file    Number of children: 1    Years of education: Not on file    Highest education level: Not on file   Occupational History    Not on file   Tobacco Use    Smoking status: Never Smoker    Smokeless tobacco: Never Used   Substance and Sexual Activity    Alcohol use: No    Drug use: No    Sexual activity: Not on file   Other Topics Concern    Not on file   Social History Narrative     Lives Alone -her only daughter lives in [place], she has high school friends and neighbors who are helpful especially friend Mel Richter    She went to [employer] after graduation  her  who was in the [employer] and moved to [place]. She lived in [place] for many years moving back to the [place] [facility] after she retired. Type of Home: Apartment in [address] [place] [address] [address]    Home Layout: One level    Home Access: Level entry    Bathroom Shower/Tub: Tub/Shower unit,Shower chair with back    Bathroom Toilet: Standard    Bathroom Equipment: Grab bars in shower,Shower chair    Bathroom Accessibility: Accessible    Home Equipment: Reacher,Wheelchair-manual    Has the patient had two or more falls in the past year or any fall with injury in the past year?: Yes (3 recent)    Receives Help From: Friend(s)    ADL Assistance: [address]: Independent (friend brought groceries, or has them delivered)    Homemaking Responsibilities: Yes    Ambulation Assistance: Non-ambulatory (w/c dependent, prior to COVID was ambulating)    Transfer Assistance: Independent    Active : No (does not have a car)    Occupation: Retired    IADL Comments: previously independent with IADLs, pt has assistance with groceries    Additional Comments: pt motiviated for increased independence; pt stating increased blurry vision at home and increased dizziness since fall         Social Determinants of Health     Financial Resource Strain:     Difficulty of Paying Living Expenses: Not on file   Food Insecurity:     Worried About [address] in the Last Year: Not on file    [address] in the Last Year: Not on file   Transportation Needs:     Lack of Transportation (Medical): Not on file    Lack of Transportation (Non-Medical):  Not on file   Physical Activity:     Days of Exercise per Week: Not on file    Minutes of Exercise per Session: Not on file   Stress:     Feeling of Stress : Not on file   Social Connections:     Frequency of Communication with Friends and Family: Not on file    Frequency of Social Gatherings with Friends and Family: Not on file    Attends Jain Services: Not on file    Active Member of Clubs or Organizations: Not on file    Attends Club or Organization Meetings: Not on file    Marital Status: Not on file   Intimate Partner Violence:     Fear of Current or Ex-Partner: Not on file    Emotionally Abused: Not on file    Physically Abused: Not on file    Sexually Abused: Not on file   Housing Stability:     Unable to Pay for Housing in the Last Year: Not on file    Number of [place] in the Last Year: Not on file    Unstable Housing in the Last Year: Not on file           THERAPY, MEDICAL AND NURSING COORDINATION:    [x]  Pain medication before therapies     [x]  Check orthostatic BP and monitor heart rate and medications effects with therapy      [x]  Ambulate to the bathroom in room    [x]  Add scheduled rest beaks     [x]  In room therapies as needed      Discharge date set for:              7/16/22      Home with:   alone  with help from   daughter            And: Home Health Care:     [x]  PT    []  OT    []  ST   [x]  Aide   []  SW    [x]  RN               Or preferably     Outpatient Therapy:  []  PT    []  OT    []  ST   []  Rehab Psych                 Equipment:  [employer]      At D/C their function is goaled at:   PT:Long term goal 1: Pt will demonstrate bed mobility indep.   Long term goal 2: Pt will demonstrate transfers (bed,chair,car) with indep  Long term goal 3: Pt will ambulate 50ft with LRD indep  Long term goal 4: Pt to manage curb step with [employer] and supervision/indep  Long term goal 5: Pt to complete HEP with indep  OT:Eating  Reason if not Attempted: Not attempted due to environmental limitations  CARE Score: 10  Discharge Goal: Independent, Oral Hygiene  Assistance Needed: Setup or clean-up assistance  CARE Score: 5  Discharge Goal: Independent, Dejan Thompson [place]  Reason if not Attempted: Patient refused  CARE Score: 7  Discharge Goal: Independent, Shower/Bathe Self  Assistance Needed: Setup or clean-up assistance  CARE Score: 5  Discharge Goal: Independent  Upper Body Dressing  Assistance Needed: Setup or clean-up assistance  CARE Score: 5  Discharge Goal: Independent, Lower Body Dressing  Assistance Needed: Setup or clean-up assistance  CARE Score: 5  Discharge Goal: Independent, Putting On/Taking Off Footwear  Assistance Needed: Setup or clean-up assistance  CARE Score: 5  Discharge Goal: Independent, Toilet Transfer  Reason if not Attempted: Patient refused  CARE Score: 7  Discharge Goal: Independent  SP:               From a cognitive standpoint they will need:        24 hr supervision  --progress to occasional           Significant problems/ barriers to functional progress include: Pt is at a high risk for functional loss,      []  Acute infection/UTI    []  Low BP's     []  COPD flare-up   []  Uncontrolled blood sugar     []  Progressive anemia     [x]  poor endurance    []  Severe pain exacerbation     [x]  Impaired  Mood status    []  Urinary incontinence    []  Bowel incontinence           Plan to correct barriers to functional progress: Add scheduled rest breaks, CoQ 10 and Vit B 12, control pain by using ice Lidoderm rest and massage as well as pain medications prior to therapy. Spread therapy of 15 hours out over a 7 day window to accommodate rest breaks and medical interventions. Patient seems to be making fair to good response to these interventions. Based on a comprehensive evaluation of the above, the individualized therapy and Discharge plan will be:    -Times stated are an average that will be varied based on the patient's daily need.        PT    1 1/2  hrs/day 5-7 days per week      OT    1 1/2 hrs per day 5-7 days per week            Estimated LOS 1- 2 week(s)    - Overall functional prognosis:     [x]  Good    []  Fair

## 2022-07-14 NOTE — PROGRESS NOTES
Physical Therapy Rehab Treatment Note  Facility/Department: [facility]  [facility]       NAME: Tiffanie Santos  : 1950 (30 y.o.)  MRN: [id number]  CODE STATUS: Full Code    Date of Service: 2022       Restrictions:  Restrictions/Precautions: Fall Risk  Position Activity Restriction  Spinal Precautions: Limit forceful spinal flexion  Other position/activity restrictions: R LE prostetic       SUBJECTIVE:   Subjective: They gave me heavy meds to try and get some sleep this morning. She figured if I could sleep until one or 1:30, but I didn't sleep. I feel light headed. I think it's from the pills. Pain  Pain: 6/10 back; L shoulder > hand: declined intervention  Pre and post treatment. OBJECTIVE:                  Sit to Stand  Assistance Level: Modified independent;Supervision  Skilled Clinical Factors: Supervision as pt became lightheaded. Stand Pivot  Assistance Level: Modified independent;Supervision  Skilled Clinical Factors: Transferring to sit on rollator and back off. Pt with increased c/o supervision by end of session. Pt demonstrates correct use of brakes. Ambulation  Surface: carpet  Device: Rolling Walker;Rollator  Other Apparatus: Right (Prosthesis)  Assistance: Supervision  Quality of Gait: Steady, reciprocal gait. Gait Deviations: Slow Dorothy  Distance: 50' x 2 with 2 turns with each AD  Comments: Pt with improved gait with rollator vs wheeled walker. Ambulation  Distance: 48' x 2 with each AD    Stairs/Curb  Stairs?: No    Wheelchair Activities  Wheelchair Parts Management: Yes  All Wheelchair Parts Management: manages WC brakes indep. Pt doesn't use her footrests at home                                  ASSESSMENT/PROGRESS TOWARDS GOALS:   Assessment  Assessment: BP taken at beginning of treatment seated: 130/80. Pt inconsistent between Modified [place] to Supervision secondary to lightheadedness.   Pt needed increased cues and assist with curb step with rollator and had poor carry over after given demonstration. Goals:  Long Term Goals  Long term goal 1: Pt will demonstrate bed mobility indep. Long term goal 2: Pt will demonstrate transfers (bed,chair,car) with indep  Long term goal 3: Pt will ambulate 50ft with LRD indep  Long term goal 4: Pt to manage curb step with [employer] and supervision/indep  Long term goal 5: Pt to complete HEP with indep  Long term goal 6: Pt to manage and propel WC 150ft indep    PLAN OF CARE/Safety:   Safety Devices  Type of Devices:  All fall risk precautions in place      Therapy Time:   Individual   Time In 1430   Time Out 1500   Minutes 30     Minutes:30  Transfer/Bed mobility trainin  Gait trainin [facility], \[facility]\"", 22 at 3:26 PM

## 2022-07-14 NOTE — PROGRESS NOTES
transfer. (07/13/22 1009)  Gait:   Ambulation  Surface: carpet (07/13/22 1617)  Device: Rolling Walker (07/13/22 1617)  Assistance: Stand by assistance (07/13/22 1617)  Quality of Gait: Fwd flexed trunk over [employer] (07/13/22 1617)  Distance: 75ft (07/13/22 1617)  Stairs:  Stairs/Curb  Stairs?: No (07/12/22 1020)  W/C mobility:  Wheelchair Activities  Wheelchair Parts Management: Yes (07/13/22 1617)  All Wheelchair Parts Management: manages WC brakes indep. Pt doesn't use her footrests at home (07/13/22 1617)  Left Brakes Level of Assistance: Independent (07/13/22 1617)  Right Brakes Level of Assistance: Independent (07/13/22 1617)  Propulsion: Yes (07/13/22 1617)  Propulsion 1  Propulsion: Manual (07/13/22 1617)  Level: Level Tile (07/13/22 1617)  Method: RUE;RLE;LLE (07/13/22 1617)  Level of Assistance: Modified independent (07/13/22 1617)  Description/ Details: Verbal cues for managing obstacles and positioning WC. (07/13/22 1023)  Distance: 485tnr0 (07/13/22 1617)    Occupational Therapy:   Hand Dominance: Left  ADL  Feeding: Unable to assess(comment) (07/12/22 1027)  Feeding Skilled Clinical Factors: not observed (07/12/22 1027)  Grooming: Setup (07/12/22 1027)  UE Bathing: Setup (07/12/22 1027)  LE Bathing: Setup (07/12/22 1027)  UE Dressing: Setup (07/12/22 1027)  LE Dressing: Setup (07/12/22 1027)  Toileting: Unable to assess(comment) (07/12/22 1027)  Toileting Skilled Clinical Factors: declined need (07/12/22 1027)  Toilet Transfers  Toilet Transfer: Unable to assess (07/12/22 1028)  Toilet Transfers Comments: declined need for toileting (07/12/22 1028)     Shower Transfers  Shower - Transfer To:  Shower seat with back (07/12/22 1028)  Shower - Technique: Stand pivot (07/12/22 1028)  Shower Transfers: Stand by assistance (07/12/22 1028)  Shower Transfers Comments: use of grab bars (07/12/22 1028)    Speech Therapy:            Diet/Swallow:                      Lab/X-ray studies reviewed, analyzed and discussed with patient and staff:   Recent Results (from the past 24 hour(s))   POCT Glucose    Collection Time: 07/14/22  6:04 AM   Result Value Ref Range    POC Glucose 105 (H) 70 - 99 mg/dl    Performed on ACCU-CHEK        XR HAND LEFT  7/9/2022  NO SUSPECTED DISPLACED FRACTURE OR SIGNIFICANT POSTTRAUMATIC COMPLICATION IDENTIFIED. CT HEAD   7/9/2022   HEAD CT FINDINGS: There is no intracranial hemorrhage, mass effect, midline shift, extra-axial collection, hydrocephalus, evidence of a recent ischemic infarct or skull fracture identified. Mild generalized cerebral volume loss and mild to moderate white matter changes are again noted. The mastoid air cells and visualized paranasal sinuses are essentially clear. FINAL IMPRESSION: NO ACUTE INTRACRANIAL PROCESS, FRACTURE, OR EVIDENCE OF CERVICAL SPINE INJURY IDENTIFIED. CT CERVICAL SPINE   7/9/2022: The spine is visualized from the craniovertebral junction nearly through the T2 level. There is no fracture, dislocation, or acute paraspinous soft tissue abnormalities identified. Moderate degenerative changes of the lower levels are again noted. FINAL IMPRESSION: NO ACUTE INTRACRANIAL PROCESS, FRACTURE, OR EVIDENCE OF CERVICAL SPINE INJURY IDENTIFIED. CT LUMBAR SPINE 7/9/2022 The spine is visualized from the T7-8 through the S3 levels. There is no acute fracture, dislocation, evidence of instability, acute paraspinous soft tissue abnormalities, or significant change from 9/8/2021 identified. Osteopenia, mild to moderate Rotary dextroscoliosis, moderately extensive degenerative changes, and mild chronic compression fractures of T12-L2 with previous vertebroplasties. NO ACUTE FRACTURE OR SIGNIFICANT POSTTRAUMATIC COMPLICATION IDENTIFIED. CHRONIC AND DEGENERATIVE CHANGES, AS NOTED. US RETROPERITONEAL  : 7/10/2022  The study is mild to moderately limited by the patient's body habitus.  Both kidneys appear normal in size, position and morphology without acute intensive comprehensive inpatient rehabilitation program including PT/OT to improve balance, ambulation, ADLs, and to improve the P/AROM. Therapeutic modifications regarding activities in therapies, place, amount of time per day and intensity of therapy made daily. In bed therapies or bedside therapies prn.   2. Bowel and Bladder dysfunction  , Neurogenic bowel and bladder:  frequent toileting, ambulate to bathroom with assistance, check post void residuals. Check for C.difficile x1 if >2 loose stools in 24 hours, continue bowel & bladder program.  Monitor bowel and bladder function. Lactinex 2 PO every AC. MOM prn, Brown Bomb prn, Glycerin suppository prn, enema prn. Encourage therapy and nursing to co-treat and problem solve re continence. 3. Severe post fracture pain T12 pain as well as generalized OA pain: reassess pain every shift and prior to and after each therapy session, give prn Tylenol and consider scheduled Tylenol, modalities prn in therapy, masage, Lidoderm, K-pad prn. Consider scheduled AM pain meds. Add Neurontin twice daily  4. Skin healing scab at distal right residual limb above below the knee amputation and breakdown risk:  continue pressure relief program.  Daily skin exams and reports from nursing. 5. Fatigue due to nutritional and hydration deficiency: Add and titrate vitamin B12 vitamin D and CoQ10 continue to monitor I&Os, calorie counts prn, dietary consult prn. Add healthy snack at night. 6. Acute episodic insomnia with situational adjustment disorder:  prn Ambien, monitor for day time sedation. 7. Falls risk elevated:  patient to use call light to get nursing assistance to get up, bed and chair alarm. 8. Elevated DVT risk: progressive activities in PT, continue prophylaxis SANTIAGO hose, elevation and transition off Lovenox. 9. Complex discharge planning:  DC 7/16/22 home alone.   Weekly team meeting every Thursday to re-assess progress towards goals, discuss and address social, psychological and medical comorbidities and to address difficulties they may be having progressing in therapy. Patient and family education is in progress. The patient is to follow-up with their family physician after discharge. Complex Active General Medical Issues that complicate care Assess & Plan:    1. Anxiety-and bipolar disorder -emotional support provided daily, vitamin B12, encourage participation in rehabilitation support group and recreational therapy, adjust/add medications (Wellbutrin, Seroquel, Zoloft, add vitamin B12)  2. Chronic low back pain, Chronic neck pain,   Left foot pain, Cervical disc disorder with radiculopathy-lowest effective dose of pain medication including Tylenol and topicals heat and massage  3. Dyslipidemia, Essential hypertension-Acute rehab to monitor heart rate and rhythm with the option of telemetry and the effects of chronotropic medication with respect to increasing physical activity and exercise in PT, OT, ADLs with medication titration to lowest effective dosing. Continue blood signs every shift focusing on heart rate, rhythm and blood pressure checks with orthostatic checks-monitoring the effect of exercise, therapy and posture. Consult hospitalist for backup medical and adjust/add medications (Apresoline intravenous transitioning to home medications). Monitor heart rate and blood pressure as well as medications effects on vital signs before during and after therapy with especial focus on preventing orthostasis and falls risk. 4.   GERD (gastroesophageal reflux disease)- Elevate head of bed after meals, monitor stools for blood, lowest effective dose of PPI, consider Tums. 5.   History of below-knee amputation of right lower extremity-basis as needed  6. Diarrhea-and immunosuppression due to pancytopenia. Add probiotic  7.    Acute renal injury-promote hydration  Eliminate toxic medications, monitor I's and O's focusing on urine output, recheck BMP.  8.   Dysautonomia -focus on balance and therapy focus on getting up if she falls and monitor vital signs and orthostasis  9.   UTI (urinary tract infection)-Cipro pending culture monitor for retention  10. DM (diabetes mellitus) -Continue blood sugar checks every shift, diet, add diabetic add dietary education, restrict carbohydrates to lowest effective and safe carb count per meal advising 4 carbs per meal, add at bedtime snack to prevent a.m. hypoglycemia, adjust/add medications (sliding scale Humalog)  11. Focus of today's plan-  Initiate and modify therapuetic plan to meet patients individual needs, add rest breaks as needed, and transition off such frequent fingerstick blood sugars teach diabetic add monitor that with low white count  -resuming Neurontin  -monitor white count.     Electronically signed by [employer], DO on 7/13/22 at 8:14 AM MARY Dean D.O., PM&R     Attending    [address]

## 2022-07-14 NOTE — PROGRESS NOTES
Occupational Therapy  OCCUPATIONAL THERAPY  INPATIENT REHAB TREATMENT NOTE  [facility]      NAME: Jarred Guerrero  : 1950 (23 y.o.)  MRN: [id number]  CODE STATUS: Full Code  Room: I1/C207-45    Date of Service: 2022    Referring Physician:    Rehab Diagnosis:      Restrictions  Restrictions/Precautions  Restrictions/Precautions: Fall Risk         Position Activity Restriction  Spinal Precautions: Limit forceful spinal flexion  Other position/activity restrictions: R LE prostetic    Patient's date of birth confirmed: Yes    SAFETY:  Safety Devices  Safety Devices in place: Yes  Type of devices: All fall risk precautions in place    SUBJECTIVE:  Subjective: \" I just cant sleep, I have gotten 5 hours of sleep in the last 3 nights. \"    Pain at start of treatment: Yes: 6/10    Pain at end of treatment: Yes: 6/10    Location: L shoulder, and back  Nursing notified: Yes  RN: Naty St  Intervention: RN provided pain medication    COGNITION:  Orientation  Overall Orientation Status: Within Normal Limits  Orientation Level: Oriented X4  Cognition  Overall Cognitive Status: WFL          OBJECTIVE:    Pt educated on importance of therapy and that therapy can be completed in room per certain situations but is important that it is completed. Pt provided with examples of in room tasks that can be completed this day that would be actively therapeutic toward pt goals and that would beneficial for pt toward progressing back to PLOF and home safely. Pt was provided with in room mobility, sponging up, toileting and oral care. Pt care discussion regarding pt hesitation to complete therapy this day.   Pt discussed not being able to sleep the past 3 nights and is wore out, how she has not discussed her husbands death much in the past 11 years but how she stepped in some kind of hole and that is how she initially hurt one of her legs, and that she is having nightmares d/t being in inpatient rehab after not having a good experience when she had her amputation done and being in inpatient rehab. Pt states she is not sad or depressed but is tired. Pt thanked therapist for talking d/t having anxiety this day. Nursing provided medication during therapy visit for anxiety. During discussion pt completed BUE ex without wts with shoulder flex/ext, horiz ab/ad, and chest presses 10x2 sets with Sup. Pt educated to complete movements only as far as tolerable with LUE d/t shoulder pain. Pt completed task to increase functional participation, movement and improve strength to increase pt transfers and ADL tasks. Pt encouraged at end of session to get up for therapy for this afternoon to maximize therapy session. Education: see objective part of note        Equipment recommendations:n/a         ASSESSMENT:  Activity Tolerance: Other (comment) (Pt limited d/t anxiety and lack of sleep)      PLAN OF CARE:  Strengthening,Balance training,Functional mobility training,Endurance training,Wheelchair mobility training,Pain management,Patient/Caregiver education & training,Equipment evaluation, education, & procurement,Self-Care / ADL,Home management training  will continue Ot POC until discharge    Patient goals : To get my strength back  Time Frame for Long term goals : Within 1 week, pt to demo progress in the following areas listed below to achieve LTGs as stated in the intial eval  Long Term Goal 1: Pt will improve ADL status to return to PLOF  Long Term Goal 2: Pt will improve overall activity tolerance for self-care tasks  Long Term Goal 3: Pt will improve standing balance and tolerance for ADL/IADL completion  Long Term Goal 4: Pt will improve coordination skills for ADL tasks        Therapy Time:   Individual Group Co-Treat   Time In 1030       Time Out 1100         Minutes 30                   Therapeutic activities: 30 minutes     Electronically signed by:     HARVEY Li,   7/14/2022, 11:00 AM

## 2022-07-14 NOTE — PROGRESS NOTES
Assessment completed. VSS. Medicated with tylenol for 6/10 left hand, left shoulder, neck and back pain. LBM 7/13. Continues PO cipro for UTI. Accu checks changed to daily and coverage d/c'd. No distress noted. Call light within reach and bed alarm activated.   Electronically signed by Darian Knapp RN on 7/14/2022 at 3:50 AM

## 2022-07-15 VITALS
WEIGHT: 190 LBS | OXYGEN SATURATION: 97 % | HEART RATE: 88 BPM | DIASTOLIC BLOOD PRESSURE: 90 MMHG | SYSTOLIC BLOOD PRESSURE: 156 MMHG | HEIGHT: 68 IN | BODY MASS INDEX: 28.79 KG/M2 | RESPIRATION RATE: 16 BRPM | TEMPERATURE: 98.2 F

## 2022-07-15 LAB
ALBUMIN SERPL-MCNC: 3.7 G/DL (ref 3.5–4.6)
ANION GAP SERPL CALCULATED.3IONS-SCNC: 14 MEQ/L (ref 9–15)
ANISOCYTOSIS: ABNORMAL
BASOPHILS ABSOLUTE: 0.1 K/UL (ref 0–0.2)
BASOPHILS RELATIVE PERCENT: 2 %
BUN BLDV-MCNC: 12 MG/DL (ref 8–23)
CALCIUM SERPL-MCNC: 8.5 MG/DL (ref 8.5–9.9)
CHLORIDE BLD-SCNC: 111 MEQ/L (ref 95–107)
CO2: 19 MEQ/L (ref 20–31)
CREAT SERPL-MCNC: 0.75 MG/DL (ref 0.5–0.9)
EOSINOPHILS ABSOLUTE: 0.2 K/UL (ref 0–0.7)
EOSINOPHILS RELATIVE PERCENT: 5 %
GFR AFRICAN AMERICAN: >60
GFR NON-AFRICAN AMERICAN: >60
GLUCOSE BLD-MCNC: 128 MG/DL (ref 70–99)
GLUCOSE BLD-MCNC: 89 MG/DL (ref 70–99)
GLUCOSE BLD-MCNC: 90 MG/DL (ref 70–99)
HCT VFR BLD CALC: 32 % (ref 37–47)
HEMOGLOBIN: 10.5 G/DL (ref 12–16)
LYMPHOCYTES ABSOLUTE: 1.1 K/UL (ref 1–4.8)
LYMPHOCYTES RELATIVE PERCENT: 35 %
MCH RBC QN AUTO: 30.7 PG (ref 27–31.3)
MCHC RBC AUTO-ENTMCNC: 32.8 % (ref 33–37)
MCV RBC AUTO: 93.6 FL (ref 82–100)
MICROCYTES: ABNORMAL
MONOCYTES ABSOLUTE: 0.1 K/UL (ref 0.2–0.8)
MONOCYTES RELATIVE PERCENT: 3.8 %
NEUTROPHILS ABSOLUTE: 1.7 K/UL (ref 1.4–6.5)
NEUTROPHILS RELATIVE PERCENT: 54 %
PDW BLD-RTO: 15.7 % (ref 11.5–14.5)
PERFORMED ON: ABNORMAL
PERFORMED ON: NORMAL
PHOSPHORUS: 3.2 MG/DL (ref 2.3–4.8)
PLATELET # BLD: 105 K/UL (ref 130–400)
PLATELET SLIDE REVIEW: NORMAL
POTASSIUM SERPL-SCNC: 3.6 MEQ/L (ref 3.4–4.9)
RBC # BLD: 3.42 M/UL (ref 4.2–5.4)
SODIUM BLD-SCNC: 144 MEQ/L (ref 135–144)
WBC # BLD: 3.2 K/UL (ref 4.8–10.8)

## 2022-07-15 PROCEDURE — 97535 SELF CARE MNGMENT TRAINING: CPT

## 2022-07-15 PROCEDURE — 97110 THERAPEUTIC EXERCISES: CPT

## 2022-07-15 PROCEDURE — 6370000000 HC RX 637 (ALT 250 FOR IP): Performed by: INTERNAL MEDICINE

## 2022-07-15 PROCEDURE — 6370000000 HC RX 637 (ALT 250 FOR IP): Performed by: PHYSICAL MEDICINE & REHABILITATION

## 2022-07-15 PROCEDURE — 97112 NEUROMUSCULAR REEDUCATION: CPT

## 2022-07-15 PROCEDURE — 97116 GAIT TRAINING THERAPY: CPT

## 2022-07-15 PROCEDURE — 97542 WHEELCHAIR MNGMENT TRAINING: CPT

## 2022-07-15 PROCEDURE — 99239 HOSP IP/OBS DSCHRG MGMT >30: CPT | Performed by: PHYSICAL MEDICINE & REHABILITATION

## 2022-07-15 PROCEDURE — 85025 COMPLETE CBC W/AUTO DIFF WBC: CPT

## 2022-07-15 PROCEDURE — 80069 RENAL FUNCTION PANEL: CPT

## 2022-07-15 PROCEDURE — 2500000003 HC RX 250 WO HCPCS: Performed by: PHYSICAL MEDICINE & REHABILITATION

## 2022-07-15 PROCEDURE — 97530 THERAPEUTIC ACTIVITIES: CPT

## 2022-07-15 PROCEDURE — 36415 COLL VENOUS BLD VENIPUNCTURE: CPT

## 2022-07-15 RX ORDER — L. ACIDOPHILUS/L.BULGARICUS 1MM CELL
2 TABLET ORAL 3 TIMES DAILY
Qty: 120 TABLET | Refills: 5 | Status: SHIPPED | OUTPATIENT
Start: 2022-07-15

## 2022-07-15 RX ORDER — CHOLECALCIFEROL (VITAMIN D3) 50 MCG
2000 TABLET ORAL
Qty: 60 TABLET | Refills: 3 | Status: SHIPPED | OUTPATIENT
Start: 2022-07-15

## 2022-07-15 RX ADMIN — LAMOTRIGINE 75 MG: 25 TABLET ORAL at 09:15

## 2022-07-15 RX ADMIN — CIPROFLOXACIN HYDROCHLORIDE 500 MG: 500 TABLET, FILM COATED ORAL at 09:15

## 2022-07-15 RX ADMIN — SERTRALINE 100 MG: 100 TABLET, FILM COATED ORAL at 09:15

## 2022-07-15 RX ADMIN — LACTOBACILLUS TAB 2 TABLET: TAB at 09:15

## 2022-07-15 RX ADMIN — ACETAMINOPHEN 650 MG: 325 TABLET ORAL at 01:09

## 2022-07-15 RX ADMIN — PROVIDONE IODINE: 7.5 STICK TOPICAL at 11:00

## 2022-07-15 RX ADMIN — LACTOBACILLUS TAB 2 TABLET: TAB at 14:21

## 2022-07-15 RX ADMIN — ACETAMINOPHEN 650 MG: 325 TABLET ORAL at 14:20

## 2022-07-15 RX ADMIN — BUPROPION HYDROCHLORIDE 75 MG: 75 TABLET, FILM COATED ORAL at 09:15

## 2022-07-15 RX ADMIN — Medication 100 MG: at 09:15

## 2022-07-15 ASSESSMENT — PAIN SCALES - GENERAL: PAINLEVEL_OUTOF10: 7

## 2022-07-15 ASSESSMENT — PAIN DESCRIPTION - LOCATION
LOCATION: BACK
LOCATION: GENERALIZED

## 2022-07-15 ASSESSMENT — PAIN DESCRIPTION - ORIENTATION: ORIENTATION: MID

## 2022-07-15 ASSESSMENT — PAIN DESCRIPTION - DESCRIPTORS
DESCRIPTORS: SORE;ACHING
DESCRIPTORS: ACHING

## 2022-07-15 NOTE — CARE COORDINATION
LSW spoke to Chayo Garcia at [facility] and she stated that they closed pt's referral back in March due to pt saying that she did not want to work with them because of their reviews on [employer]. LSW notified pt and she stated that was not true, and she had stopped working with [employer] due to their BBB review. However, now pt does not want to use [facility] moving forward. LSW discussed the process of obtaining the wheelchair and that it will start with her PCP completing the evaluation for wheelchair mobility. Pt verbalized understanding and requested to go through [facility] in [place] instead. LSW called [facility] and spoke to Anne Marie ELAM, she requested a copy of pt's demographics and recent therapy notes. Anne Marie Hinds did confirm that they can provide a wheelchair that has adaptor to make it motorized. In addition to this, Anne Marie Hinds explained the rest of the process for obtaining the wheelchair. She stated that once they receive the referral/evaluation from Dr. Tc Zavala, then they will request therapy info from pt's therapists. From there, they will then do their evaluation. They also noted that they may be able to provide a loaner wheelchair in the interim if they have one that can meet her needs. Pt did state that she would like [facility] instead of outpatient and the referral was made with Anne Marie Hinds.

## 2022-07-15 NOTE — DISCHARGE INSTR - COC
Continuity of Care Form    Patient Name: Kristen Fink   :  1950  MRN:  [id number]    Admit date:  2022  Discharge date:  07/15/22    Code Status Order: Full Code   Advance Directives:     Admitting Physician:  Eleno Guerrero DO  PCP: Cheng Desir DO    Discharging Nurse: [facility] Unit/Room#: L337/N368-47  Discharging Unit Phone Number:     Emergency Contact:   Extended Emergency Contact Information  Primary Emergency Contact: [facility]  Mobile Phone: [phone]  Relation: Girlfriend  Preferred language: English   needed?  No  Secondary Emergency Contact: Adelina Morales  Mobile Phone: [phone]  Relation: Child    Past Surgical History:  Past Surgical History:   Procedure Laterality Date    APPENDECTOMY      CHOLECYSTECTOMY      LEG AMPUTATION BELOW KNEE Bilateral     NASAL FRACTURE SURGERY N/A 2019    CLOSED REDUCTON EXTERNAL FIXATION OF NASAL BONE FRACTURE WITH SEVERE DNS (DEVIATED NASAL SEPTUM)  ROOM 190 [place] Polly G[place]z [place] [facility] [place]       Immunization History:   Immunization History   Administered Date(s) Administered    COVID-19, PFIZER PURPLE top, DILUTE for use, (age 15 y+), 30mcg/0.3mL 2021, 2021, 2021    Tdap (Boostrix, Adacel) 2019       Active Problems:  Patient Active Problem List   Diagnosis Code    Hypertensive urgency I16.0    Hyperglycemia R73.9    Chest tightness or pressure R07.89    Visual disturbance, subjective H53.10    DM (diabetes mellitus) ([facility]) E11.9    Bipolar affective disorder ([facility]) F31.9    Acute sore throat J02.9    Syncope and collapse R55    Head injury S09.90XA    Dehydration E86.0    Anxiety F41.9    Anemia of chronic disease D63.8    Chronic low back pain M54.50, G89.29    Chronic neck pain M54.2, G89.29    Dyslipidemia E78.5    Epidural abscess G06.2    Discitis of lumbar region M46.46    Essential hypertension I10    GERD (gastroesophageal reflux disease) K21.9    History of below-knee amputation of right lower extremity ([facility]) Z89.511    Hyperlipidemia E78.5    Insomnia G47.00    Left foot pain M79.672    Migraine headache G43.909    Osteomyelitis ([facility]) M86.9    PTSD (post-traumatic stress disorder) F43.10    SOB (shortness of breath) R06.02    Cervical disc disorder with radiculopathy M50.10    Stenosis of carotid artery I65.29    Impaired mobility and activities of daily living dt T12 fx and gait instability dt ataxia Z74.09, Z78.9    Dizziness R42    Low BP I95.9    LFT elevation R79.89    Diarrhea R19.7    Pancytopenia ([facility]) D61.818    Acute renal injury ([address] N17.9    Dysautonomia ([facility]) G90.1    Ataxia R27.0    Impaired mobility and ADLs Z74.09, Z78.9    UTI (urinary tract infection) N39.0       Isolation/Infection:   Isolation            No Isolation          Patient Infection Status       None to display            Nurse Assessment:  Last Vital Signs: BP (!) 156/90   Pulse 88   Temp 98.2 °F (36.8 °C) (Oral)   Resp 16   Ht 5' 8\" (1.727 m)   Wt 190 lb (86.2 kg)   SpO2 97%   BMI 28.89 kg/m²     Last documented pain score (0-10 scale): Pain Level: 7 (RN Kaylee aware)  Last Weight:   Wt Readings from Last 1 Encounters:   07/11/22 190 lb (86.2 kg)     Mental Status:  oriented, alert, coherent, logical, and thought processes intact    IV Access:  - None    Nursing Mobility/ADLs:  Walking   Independent  Transfer  Independent  Bathing  Independent  Dressing  Independent  Toileting  Independent  Feeding  Independent  Med Admin  Independent  Med Delivery   whole    Wound Care Documentation and Therapy:  Wound 07/13/22 Knee Anterior;Right two small abrasions (Active)   Wound Etiology Traumatic 07/13/22 1730   Wound Cleansed Betadine/povidone iodine 07/14/22 2230   Dressing/Treatment Open to air 07/14/22 2230   Dressing Change Due 07/14/22 07/13/22 1730   Drainage Amount None 07/13/22 2156   Odor None 07/13/22 2156   Jyoti-wound Assessment Other (Comment) 07/13/22 1730   Margins Attached edges 07/13/22 1730   Wound Thickness Description not for Pressure Injury Partial thickness 07/13/22 1730   Number of days: 1        Elimination:  Continence: Bowel: Yes  Bladder: Yes  Urinary Catheter: None   Colostomy/Ileostomy/Ileal Conduit: No       Date of Last BM: 07/13/22  No intake or output data in the 24 hours ending 07/15/22 1010  No intake/output data recorded. Safety Concerns:     History of Falls (last 30 days)    Impairments/Disabilities:      Amputation - right lower extremity     Nutrition Therapy:  Current Nutrition Therapy:   - Oral Diet:  General and Carb Control 4 carbs/meal (1800kcals/day)    Routes of Feeding: Oral  Liquids: Thin Liquids  Daily Fluid Restriction: no  Last Modified Barium Swallow with Video (Video Swallowing Test): not done    Treatments at the Time of Hospital Discharge:   Respiratory Treatments:   Oxygen Therapy:  is not on home oxygen therapy. Ventilator:    - No ventilator support    Rehab Therapies: Physical Therapy, Occupational Therapy, and Speech/Language Therapy  Weight Bearing Status/Restrictions: No weight bearing restrictions  Other Medical Equipment (for information only, NOT a DME order):  wheelchair  Other Treatments:     Patient's personal belongings (please select all that are sent with patient): All personal belongings sent home with pt.      RN SIGNATURE:  Electronically signed by Quoc Keller RN on 7/15/22 at 2:56 PM EDT    CASE MANAGEMENT/SOCIAL WORK SECTION    Inpatient Status Date: Admitted to inpatient on 7/11/22    Readmission Risk Assessment Score:  Readmission Risk              Risk of Unplanned Readmission:  [id number]           Discharging to Facility/ Agency   Name: [facility]  Address:  Phone: [phone]  Fax:    Dialysis Facility (if applicable)   Name:  Address:  Dialysis Schedule:  Phone:  Fax:    / signature: Electronically signed by JOLENE Mcginnis, SHELDONW on 7/15/22 at 3:51 PM EDT    PHYSICIAN SECTION    Prognosis: Good    Condition at Discharge: Stable    Rehab Potential (if transferring to Rehab):     Recommended Labs or Other Treatments After Discharge:     Physician Certification: I certify the above information and transfer of Adolfo Elizabeth  is necessary for the continuing treatment of the diagnosis listed and that she requires Home Care for 30 days.      Update Admission H&P: No change in H&P    PHYSICIAN SIGNATURE:  Jesica Brasher DO  Electronically signed by JOLENE Dominguez, YAN on 7/15/22 at 3:51 PM EDT07/15/22

## 2022-07-15 NOTE — PROGRESS NOTES
OCCUPATIONAL THERAPY  INPATIENT REHAB TREATMENT NOTE  [facility]      NAME: Hattie Block  : 1950 (87 y.o.)  MRN: [id number]  CODE STATUS: Full Code  Room: S436/M247-55    Date of Service: 7/15/2022    Referring Physician:    Rehab Diagnosis:      Restrictions  Restrictions/Precautions  Restrictions/Precautions: Fall Risk         Position Activity Restriction  Spinal Precautions: Limit forceful spinal flexion  Other position/activity restrictions: R LE prostetic    Patient's date of birth confirmed: Yes    SAFETY:  Safety Devices  Safety Devices in place: Yes  Type of devices: All fall risk precautions in place    SUBJECTIVE:  Subjective: \" My daughter is 5'4\". Pain at start of treatment: Yes: 610    Pain at end of treatment: Yes: 6/10    Location: back, neck, L hand/shoulder  Nursing notified: Declined  RN:   Intervention: Other: will let floor nurse of pt know when pt returns to room per pt request to get pain patches/medicine post therapy    COGNITION:  Orientation  Overall Orientation Status: Within Normal Limits  Cognition  Overall Cognitive Status: WFL      Pt's current cognitive status is:  Comprehension: Independent  Expression: Independent  Social Interaction: Independent  Problem Solving: Mod I  Memory: Independent    OBJECTIVE:         Feeding  Assistance Level: Independent  Upper Extremity Bathing  Assistance Level: Independent  Lower Extremity Bathing  Assistance Level: Modified independent  Upper Extremity Dressing  Assistance Level: Independent  Lower Extremity Dressing  Assistance Level: Modified independent  Putting On/Taking Off Footwear  Assistance Level: Independent  Tub/Shower Transfers  Type: Shower  Transfer To: Shower chair with back  Assistance Level: Supervision  Skilled Clinical Factors: also completed tub transfer bench transfer from w/c using horizontal grab bar this day with Sup. OT Exercises  Exercise Treatment: Provided UE strengthening HEP.  Pt req demo for some of the exercises but demonstrated understanding through performance of task. Pt completed shoulder, bicep and wrist exercises, instructing pt to not use wts d/t L hand, could start to use wts once hand was feeling better and to listen to her body using these. Pt completed task 10x1 set with SBA. Provided task to increase strength and act tolerance to decrease risk of functional decline upon returning home. Education:  Education  Education Given To: Patient  Education Provided: Home Exercise Program  Education Provided Comments: UE strengthening HEP  Education Method: Demonstration;Verbal  Barriers to Learning: None  Education Outcome: Demonstrated understanding    Equipment recommendations: n/a         ASSESSMENT:  Assessment: Pt particpated well today  Activity Tolerance: Patient tolerated treatment well      PLAN OF CARE:  Strengthening, Balance training, Functional mobility training, Endurance training, Wheelchair mobility training, Pain management, Patient/Caregiver education & training, Equipment evaluation, education, & procurement, Self-Care / ADL, Home management training  will continue Ot POC until discharge 7/15/22    Patient goals : To get my strength back  Time Frame for Long term goals : Within 1 week, pt to demo progress in the following areas listed below to achieve LTGs as stated in the intial eval  Long Term Goal 1: Pt will improve ADL status to return to PLOF  Long Term Goal 2: Pt will improve overall activity tolerance for self-care tasks  Long Term Goal 3: Pt will improve standing balance and tolerance for ADL/IADL completion  Long Term Goal 4: Pt will improve coordination skills for ADL tasks        Therapy Time:   Individual Group Co-Treat   Time In 1030       Time Out 1200         Minutes 90                   ADL/IADL trainin minutes  Therapeutic activities: 15 minutes     Electronically signed by:     HARVEY Duenas,   7/15/2022, 12:11 PM

## 2022-07-15 NOTE — PROGRESS NOTES
[facility] OCCUPATIONAL THERAPY DISCHARGE SUMMARY- REHAB     Date: 7/15/2022  Patient Name: Cristian Mckeon        MRN: [id number]  Account: [de-identified]   : 1950  (66 y.o.)  Room: [place] [address]    Diagnosis:  Impaired mobility and activities of daily living dt T12 fx and gait instability dt ataxia    Past Medical History:   Diagnosis Date    Anxiety     Bipolar affective disorder ([facility]     DM (diabetes mellitus) ([facility]     Epidural abscess 2021    Head injury     Hypertension     Migraine     Migraine headache 2014    Osteomyelitis ([facility] 2012    PTSD (post-traumatic stress disorder)     TIA (transient ischemic attack)     x 3     Past Surgical History:   Procedure Laterality Date    APPENDECTOMY      CHOLECYSTECTOMY      LEG AMPUTATION BELOW KNEE Bilateral     NASAL FRACTURE SURGERY N/A 2019    CLOSED REDUCTON EXTERNAL FIXATION OF NASAL BONE FRACTURE WITH SEVERE DNS (DEVIATED NASAL SEPTUM)  ROOM 190 performed by Lasha Wilkerson MD at [facility] OR       Precautions:   Restrictions/Precautions: Fall Risk  Spinal Precautions: Limit forceful spinal flexion  Other position/activity restrictions: R LE prostetic     Social/Functional History:  Social/Functional History  Lives With: Alone  Type of Home: Apartment  Home Layout: One level  Home Access: Level entry  Bathroom Shower/Tub: Tub/Shower unit, Shower chair with back, Curtain (grab bars in shower)  Bathroom Toilet: Standard  Bathroom Equipment: Grab bars in shower, Shower chair  Bathroom Accessibility: Accessible, Wheelchair accessible  Home Equipment: [address], [facility], [address] (neither work, wheelchair was obtained 12-13 yrs ago and is broken)  Has the patient had two or more falls in the past year or any fall with injury in the past year?: Yes  Receives Help From: Friend(s)  ADL Assistance: Independent  Homemaking Assistance: Independent (friends assist for groceries, or delivery)  Homemaking Responsibilities: Yes  Ambulation Assistance: Independent (BP caused dizziness, used wc at that time but has used more in the last time. 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[place] [address]  IADL Comments: previously independent with IADLs, pt has assistance with groceries  Additional Comments: pt motiviated for increased independence; pt stating increased blurry vision at home and increased dizziness since fall    Current Functional Status:  ADL  Feeding: Independent  Grooming: Independent  UE Bathing: Independent  LE Bathing: Modified independent   UE Dressing: Independent  LE Dressing: Modified independent   Toileting: Modified independent  Toilet Transfers  Toilet - Technique: Stand pivot  Equipment Used: Standard toilet  Toilet Transfer: modified independent  Tub Transfers  Tub - Transfer From: Wheelchair  Tub - Transfer Type: To and From  Tub - Transfer To: Shower seat with back  Tub Transfers: Supervision  Shower Transfers  Shower - Transfer From: Wheelchair  Shower - Transfer Type: To and From  Shower - Transfer To:  Shower seat with back  Shower - Technique: Stand pivot  Shower Transfers: Supervision      Orientation Status:  Orientation  Overall Orientation Status: Within Normal Limits    Cognition Status:  Cognition  Overall Cognitive Status: WFL    Perception Status:  Perception  Overall Perceptual Status: WFL    Sensation Status:  Sensation  Overall Sensation Status: WFL    Vision and Hearing Status:  Vision  Vision Exceptions: Wears glasses for reading  Hearing  Hearing: Within functional limits     UE Function Status:    ROM:   LUE AROM (degrees)  LUE AROM : WFL  Left Hand AROM (degrees)  Left Hand AROM: WFL  RUE AROM (degrees)  RUE AROM : WFL  Right Hand AROM (degrees)  Right Hand AROM: WFL    Strength:  LUE Strength  Gross LUE Strength: WFL  LUE Strength Comment: grossly 3+/5  RUE Strength  Gross RUE Strength: WFL  RUE Strength Comment: grossly 3+/5    Coordination, Tone, Quality of Movement:    Tone RUE  RUE Tone: Normotonic  Tone LUE  LUE Tone: Normotonic  Coordination  Movements Are Fluid And Coordinated: No  Coordination and Movement Description: Ataxia, Gross motor impairments    D/C Recommendations:    Equipment Recommendations:   Has all needed equipment    OT Follow Up:  OT D/C RECOMMENDATIONS  REQUIRES OT FOLLOW-UP: Yes  Type: Home OT    Home Exercise Program Provided: [x] Yes [] No  If yes, type of HEP: UE strengthening    Electronically signed by:    GEO Hutton/L,    7/15/2022, 4:09 PM

## 2022-07-15 NOTE — PROGRESS NOTES
Subjective: The patient complains of severe acute on chronic progressive fatigue and  T12 pain partially relieved by rest, medications, PT,  OT, Tylenol SLP and rest and exacerbated by recent illness with worsening of her gait ataxia. She was initially admitted to [facility] through the ER on 7/9/2022 after presenting with a fall with trauma to her head. She fell while she was trying to get up to take a shower. She hit her back. She was unable to get up  She came in complaining of pain in her head but denied numbness or tingling. She has been found to have low blood pressure and her dizziness is not found to be vestibular in nature but rather hypoperfusion in nature. She has a remote history of a right BKA and occasionally uses the wheelchair at home. She was diagnosed with a UTI culture showed Klebsiella pneumoniae and she was prescribed Cipro. I am concerned about patients medical complexities and barriers to advancing in rehab goals including anxiety, intolerance to pain medications, low white count, and reliance on benzodiazepines in the past.        She is eager for DC. Poor sleep dt PTSD. Discussed going back on her Klonopin and starting Neurontin twice a day which is helped in the past.  She slept much better last night I would hope that she would stay till tomorrow which is her originally planned discharge though she may insist on discharge today. She complains of some muscle cramps we discussed possibly using magnesium she wants to stick with the Neurontin. I reviewed current care and plans for further care with other rehab providers including nursing and case management. According to recent nursing note, \"  Pt resting quietly Daily BS, LBM 7/13/22 Tylenol administer for 7/10 pain positive UTI Cipro administered, call light within reach bed arm on.  Content of B&B Pt requested gabapentin with HS meds but ordered DCd new order starts at 2000 7/15/22, shift assessment complete. .brandy\". ROS x10: The patient also complains of severely impaired mobility and activities of daily living. Otherwise no new problems with vision, hearing, nose, mouth, throat, dermal, cardiovascular, GI, , pulmonary, musculoskeletal, psychiatric or neurological. See also Acute Rehab PM&R H&P. Vital signs:  BP (!) 156/90   Pulse 88   Temp 98.2 °F (36.8 °C) (Oral)   Resp 16   Ht 5' 8\" (1.727 m)   Wt 190 lb (86.2 kg)   SpO2 97%   BMI 28.89 kg/m²   I/O:   PO/Intake:  fair PO intake,   low-carb diet diet    Bowel:   continent  LBM 7/13. Bladder: continent    - PO Cipro for UTI. General:  Patient is well developed,   adequately nourished, and    well kempt. HEENT:    Pupils equal, hearing intact to loud voice, external inspection of ear and nose benign. Inspection of lips, tongue and gums benign    Musculoskeletal: No significant change in strength or tone. All joints stable. Inspection and palpation of digits and nails show no clubbing, cyanosis or inflammatory conditions. Neuro/Psychiatric: Affect: flat but pleasant. Alert and oriented to person, place and situation with no needed cues. No significant change in deep tendon reflexes or sensation  Lungs:  Diminished, CTA-B. Respiration effort is   normal at rest.     Heart:   S1 = S2,   RRR. Abdomen:  Soft, non-tender, no enlargement of liver or spleen. Extremities:    lower extremity edema old right below the knee amputation. Skin:   Intact to general survey, small scab at distal anterior residual limb at distal tibial plateau.     Rehabilitation:  Physical Therapy:   Bed mobility:  Bed mobility  Bridging: Modified independent  (07/12/22 1011)  Rolling to Left: Modified independent (07/13/22 1023)  Rolling to Right: Modified independent (07/13/22 1023)  Supine to Sit: Modified independent (07/13/22 1023)  Sit to Supine: Modified independent (07/13/22 1023)  Scooting: Modified independent (07/12/22 1011)  Bed Mobility Comments: Completed on flat mat without rails. Increased time and effort to complete. C/o back pain. (07/12/22 1038)  Bed Mobility  Additional Factors: Head of bed flat; Without handrails (07/14/22 1604)  Additional Factors: Head of bed flat; Without handrails (07/14/22 1604)  Sit to Supine  Assistance Level: Modified independent (07/14/22 1604)  Scooting  Assistance Level: Modified independent (07/14/22 1604)  Transfers:  Transfers  Sit to Stand: Stand by assistance (07/13/22 1023)  Stand to sit: Stand by assistance (07/13/22 1023)  Bed to Chair: Stand by assistance (07/13/22 1023)  Comment: VCs for positioning of WC prior to transfer. (07/13/22 1009)  Transfers  Surface: Wheelchair (07/14/22 1604)  Sit to Stand  Assistance Level: Modified independent (07/14/22 1604)  Skilled Clinical Factors: Good technique maintained throughout (07/14/22 1604)  Stand to Sit  Assistance Level: Modified independent (07/14/22 1604)  Bed To/From Chair  Technique: Stand pivot (07/14/22 1604)  Assistance Level: Modified independent (07/14/22 1604)  Skilled Clinical Factors: W/C <> bed, good technique maintained as well as approach to bed with w/c (07/14/22 1604)  Stand Pivot  Assistance Level: Modified independent;Supervision (07/14/22 1519)  Skilled Clinical Factors: Transferring to sit on rollator and back off. Pt with increased c/o supervision by end of session. Pt demonstrates correct use of brakes. (07/14/22 1519)  Gait:   Ambulation  Surface: carpet (07/14/22 1446)  Device: Rolling Walker;Rollator (07/14/22 1446)  Other Apparatus: Right (Prosthesis) (07/14/22 1446)  Assistance: Supervision (07/14/22 1446)  Quality of Gait: Steady, reciprocal gait.  (07/14/22 1446)  Gait Deviations: Slow Dorothy (07/14/22 1446)  Distance: 50' x 2 with 2 turns with each AD (07/14/22 1446)  Comments: Pt with improved gait with rollator vs wheeled walker. (07/14/22 1446)  Ambulation  Distance: 50' x 2 with each AD (07/14/22 1522)  Stairs:  Stairs/Curb  Stairs?: No (07/14/22 1446)  Curb  Curb Height: 4'' (07/14/22 1606)  Device:  (rollator) (07/14/22 1606)  Number of Curbs: 1 (07/14/22 1606)  Additional Factors: Verbal cues (07/14/22 1606)  Assistance Level: Contact guard assist (07/14/22 1606)  Skilled Clinical Factors: step by step cues for ascending curb with rollator (07/14/22 1606)  W/C mobility:  Wheelchair Activities  Wheelchair Parts Management: Yes (07/14/22 1446)  All Wheelchair Parts Management: manages WC brakes indep. Pt doesn't use her footrests at home (07/14/22 1446)  Left Brakes Level of Assistance: Independent (07/13/22 1617)  Right Brakes Level of Assistance: Independent (07/13/22 1617)  Propulsion: Yes (07/13/22 1617)  Propulsion 1  Propulsion: Manual (07/13/22 1617)  Level: Level Tile (07/13/22 1617)  Method: RUE;RLE;LLE (07/13/22 1617)  Level of Assistance: Modified independent (07/13/22 1617)  Description/ Details: Verbal cues for managing obstacles and positioning WC. (07/13/22 1023)  Distance: [id number] (07/13/22 1617)  Wheelchair  Surface: Level surface; Uneven surface; Carpet (07/14/22 1606)  Device: Standard wheelchair (07/14/22 1606)  Assistance Level for Propulsion: Modified independent (07/14/22 1606)  Propulsion Method: Bilateral upper extremities; Bilateral lower extremities (07/14/22 1606)  Propulsion Distance: 350' (07/14/22 1606)  Skilled Clinical Factors: pt demonstrates good technique and safety navigating through busy environment, good apporach to bed, manuevering through room and tight spaces, no deviations with fwd propulsion (07/14/22 1606)    Occupational Therapy:   Hand Dominance: Left  ADL  Feeding: Unable to assess(comment) (07/12/22 1027)  Feeding Skilled Clinical Factors: not observed (07/12/22 1027)  Grooming: Setup (07/12/22 1027)  UE Bathing: Setup (07/12/22 1027)  LE Bathing: Setup (07/12/22 1027)  UE Dressing: Setup (07/12/22 1027)  LE Dressing: Setup (07/12/22 1027)  Toileting: Unable to assess(comment) (07/12/22 1027)  Toileting Skilled Clinical Factors: declined need (07/12/22 1027)  Toilet Transfers  Toilet Transfer: Unable to assess (07/12/22 1028)  Toilet Transfers Comments: declined need for toileting (07/12/22 1028)     Shower Transfers  Shower - Transfer To:  Shower seat with back (07/12/22 1028)  Shower - Technique: Stand pivot (07/12/22 1028)  Shower Transfers: Stand by assistance (07/12/22 1028)  Shower Transfers Comments: use of grab bars (07/12/22 1028)    Speech Therapy:            Diet/Swallow:                      Lab/X-ray studies reviewed, analyzed and discussed with patient and staff:   Recent Results (from the past 24 hour(s))   CBC with Auto Differential    Collection Time: 07/15/22  4:05 AM   Result Value Ref Range    WBC 3.2 (L) 4.8 - 10.8 K/uL    RBC 3.42 (L) 4.20 - 5.40 M/uL    Hemoglobin 10.5 (L) 12.0 - 16.0 g/dL    Hematocrit 32.0 (L) 37.0 - 47.0 %    MCV 93.6 82.0 - 100.0 fL    MCH 30.7 27.0 - 31.3 pg    MCHC 32.8 (L) 33.0 - 37.0 %    RDW 15.7 (H) 11.5 - 14.5 %    Platelets 941 (L) 754 - 400 K/uL    PLATELET SLIDE REVIEW Normal     Neutrophils % 54.0 %    Lymphocytes % 35.0 %    Monocytes % 3.8 %    Eosinophils % 5.0 %    Basophils % 2.0 %    Neutrophils Absolute 1.7 1.4 - 6.5 K/uL    Lymphocytes Absolute 1.1 1.0 - 4.8 K/uL    Monocytes Absolute 0.1 (L) 0.2 - 0.8 K/uL    Eosinophils Absolute 0.2 0.0 - 0.7 K/uL    Basophils Absolute 0.1 0.0 - 0.2 K/uL    Anisocytosis 1+     Microcytes 1+    Renal Function Panel    Collection Time: 07/15/22  4:05 AM   Result Value Ref Range    Sodium 144 135 - 144 mEq/L    Potassium 3.6 3.4 - 4.9 mEq/L    Chloride 111 (H) 95 - 107 mEq/L    CO2 19 (L) 20 - 31 mEq/L    Anion Gap 14 9 - 15 mEq/L    Glucose 89 70 - 99 mg/dL    BUN 12 8 - 23 mg/dL    CREATININE 0.75 0.50 - 0.90 mg/dL    GFR Non-African American >60.0 >60    GFR  >60.0 >60    Calcium 8.5 8.5 - 9.9 mg/dL    Phosphorus 3.2 2.3 - 4.8 mg/dL    Albumin 3.7 3.5 - 4.6 g/dL   POCT Glucose    Collection Time: 07/15/22  6:45 AM   Result Value Ref Range    POC Glucose 90 70 - 99 mg/dl    Performed on ACCU-CHEK        XR HAND LEFT  7/9/2022  NO SUSPECTED DISPLACED FRACTURE OR SIGNIFICANT POSTTRAUMATIC COMPLICATION IDENTIFIED. CT HEAD   7/9/2022   HEAD CT FINDINGS: There is no intracranial hemorrhage, mass effect, midline shift, extra-axial collection, hydrocephalus, evidence of a recent ischemic infarct or skull fracture identified. Mild generalized cerebral volume loss and mild to moderate white matter changes are again noted. The mastoid air cells and visualized paranasal sinuses are essentially clear. FINAL IMPRESSION: NO ACUTE INTRACRANIAL PROCESS, FRACTURE, OR EVIDENCE OF CERVICAL SPINE INJURY IDENTIFIED. CT CERVICAL SPINE   7/9/2022: The spine is visualized from the craniovertebral junction nearly through the T2 level. There is no fracture, dislocation, or acute paraspinous soft tissue abnormalities identified. Moderate degenerative changes of the lower levels are again noted. FINAL IMPRESSION: NO ACUTE INTRACRANIAL PROCESS, FRACTURE, OR EVIDENCE OF CERVICAL SPINE INJURY IDENTIFIED. CT LUMBAR SPINE 7/9/2022 The spine is visualized from the T7-8 through the S3 levels. There is no acute fracture, dislocation, evidence of instability, acute paraspinous soft tissue abnormalities, or significant change from 9/8/2021 identified. Osteopenia, mild to moderate Rotary dextroscoliosis, moderately extensive degenerative changes, and mild chronic compression fractures of T12-L2 with previous vertebroplasties. NO ACUTE FRACTURE OR SIGNIFICANT POSTTRAUMATIC COMPLICATION IDENTIFIED. CHRONIC AND DEGENERATIVE CHANGES, AS NOTED. US RETROPERITONEAL  : 7/10/2022  The study is mild to moderately limited by the patient's body habitus. Both kidneys appear normal in size, position and morphology without significantly increased echogenicity.  3 approximately 1 cm simple cysts are noted within the left kidney. There is no hydronephrosis, abnormal perinephric collections, visualized nephrolithiasis, or solid renal masses. The right kidney measures approximately 10.0 x 4.7 x 4.3 cm. The left kidney measures approximately 11.1 x 4.8 x 4.6 cm. The average renal parenchymal thickness is approximately 1 cm. ESSENTIALLY NEGATIVE LIMITED RENAL ULTRASOUND. Previous extensive, complex labs, notes and diagnostics reviewed and analyzed. ALLERGIES:    Allergies as of 07/11/2022 - Fully Reviewed 07/11/2022   Allergen Reaction Noted    Iv dye [iodides]  04/25/2019    Vancomycin  10/16/2018    Morphine Rash 04/25/2019      (please also verify by checking MAR)      Recently, I evaluated this patient for periodic reassessment of medical and functional status. The patient was discussed in detail at the treatment team meeting focusing on current medical issues, progress in therapies, social issues, psychological issues, barriers to progress and strategies to address these barriers, and discharge planning. See the hand written addendum to rehab progress note. The patient continues to be high risk for future disability and their medical and rehabilitation prognosis continue to be good and therefore, we will continue the patient's rehabilitation course as planned. The patient's tentative discharge date was set. Patient and family education was discussed. The patient was made aware of the team discussion regarding their progress. Discharge plans were discussed along with barriers to progress and strategies to address these barriers, patient encouraged to continue to discuss discharge plans with . Complex Physical Medicine & Rehab Issues Assess & Plan:   Severe abnormality of gait and mobility and impaired self-care and ADL's secondary to progressive osteoarthritis flareup status post fall with a T12 fracture.  .  Functional and medical status reassessed regarding patients ability to participate in therapies and patient found to be able to participate in acute intensive comprehensive inpatient rehabilitation program including PT/OT to improve balance, ambulation, ADLs, and to improve the P/AROM. Therapeutic modifications regarding activities in therapies, place, amount of time per day and intensity of therapy made daily. In bed therapies or bedside therapies prn. Bowel and Bladder dysfunction  , Neurogenic bowel and bladder:  frequent toileting, ambulate to bathroom with assistance, check post void residuals. Check for C.difficile x1 if >2 loose stools in 24 hours, continue bowel & bladder program.  Monitor bowel and bladder function. Lactinex 2 PO every AC. MOM prn, Brown Bomb prn, Glycerin suppository prn, enema prn. Encourage therapy and nursing to co-treat and problem solve re continence. Severe post fracture pain T12 pain as well as generalized OA pain: reassess pain every shift and prior to and after each therapy session, give prn Tylenol and consider scheduled Tylenol, modalities prn in therapy, masage, Lidoderm, K-pad prn. Consider scheduled AM pain meds. Add Neurontin twice daily  Skin healing scab at distal right residual limb above below the knee amputation and breakdown risk:  continue pressure relief program.  Daily skin exams and reports from nursing. Fatigue due to nutritional and hydration deficiency: Add and titrate vitamin B12 vitamin D and CoQ10 continue to monitor I&Os, calorie counts prn, dietary consult prn. Add healthy snack at night. Acute episodic insomnia with situational adjustment disorder:  prn Ambien, monitor for day time sedation. Falls risk elevated:  patient to use call light to get nursing assistance to get up, bed and chair alarm. Elevated DVT risk: progressive activities in PT, continue prophylaxis SANTIAGO hose, elevation and transition off Lovenox. Complex discharge planning:  GONZALO 7/16/22 home alone with home health care. .  SP weekly team meeting every Thursday to re-assess progress towards goals, discuss and address social, psychological and medical comorbidities and to address difficulties they may be having progressing in therapy. Patient and family education is in progress. The patient is to follow-up with their family physician after discharge. Complex Active General Medical Issues that complicate care Assess & Plan:    Anxiety-and bipolar disorder -emotional support provided daily, vitamin B12, encourage participation in rehabilitation support group and recreational therapy, adjust/add medications (Wellbutrin, Seroquel, Zoloft, add vitamin B12)    Chronic low back pain, Chronic neck pain,   Left foot pain, Cervical disc disorder with radiculopathy-lowest effective dose of pain medication including Tylenol and topicals heat and massage    Dyslipidemia, Essential hypertension-Acute rehab to monitor heart rate and rhythm with the option of telemetry and the effects of chronotropic medication with respect to increasing physical activity and exercise in PT, OT, ADLs with medication titration to lowest effective dosing. Continue blood signs every shift focusing on heart rate, rhythm and blood pressure checks with orthostatic checks-monitoring the effect of exercise, therapy and posture. Consult hospitalist for backup medical and adjust/add medications (Apresoline intravenous transitioning to home medications). Monitor heart rate and blood pressure as well as medications effects on vital signs before during and after therapy with especial focus on preventing orthostasis and falls risk. GERD (gastroesophageal reflux disease)- Elevate head of bed after meals, monitor stools for blood, lowest effective dose of PPI, consider Tums. History of below-knee amputation of right lower extremity-basis as needed    Diarrhea-and immunosuppression due to pancytopenia.   Add probiotic    Acute renal injury-promote hydration  Eliminate toxic medications, monitor I's and O's focusing on urine output, recheck BMP. Dysautonomia -focus on balance and therapy focus on getting up if she falls and monitor vital signs and orthostasis    UTI (urinary tract infection)-Cipro pending culture monitor for retention    DM (diabetes mellitus) -Continue blood sugar checks every shift, diet, add diabetic add dietary education, restrict carbohydrates to lowest effective and safe carb count per meal advising 4 carbs per meal, add at bedtime snack to prevent a.m. hypoglycemia, adjust/add medications (sliding scale Humalog)    Focus of today's plan-  Initiate and modify therapuetic plan to meet patients individual needs, add rest breaks as needed, and transition off such frequent fingerstick blood sugars teach diabetic add monitor that with low white count  -resuming Neurontin  -monitor white count.     Electronically signed by Kaila Tobias DO on 7/13/22 at 8:14 AM MARY Salazar D.O., PM&R     Attending    [facility] Shanthi Bourgeois

## 2022-07-15 NOTE — DISCHARGE INSTRUCTIONS
Change dressing to right knee daily and when loose, wet or soiled. Cleanse the area with betadine, let dry, then apply foam dressing.

## 2022-07-15 NOTE — PROGRESS NOTES
OCCUPATIONAL THERAPY  INPATIENT REHAB TREATMENT NOTE  [facility]      NAME: Paulino Rios  : 1950 (12 y.o.)  MRN: [id number]  CODE STATUS: Full Code  Room: Y081/O708-92    Date of Service: 7/15/2022    Referring Physician:    Rehab Diagnosis:      Restrictions  Restrictions/Precautions  Restrictions/Precautions: Fall Risk         Position Activity Restriction  Spinal Precautions: Limit forceful spinal flexion  Other position/activity restrictions: R LE prostetic    Patient's date of birth confirmed: Yes    SAFETY:  Safety Devices  Safety Devices in place: Yes  Type of devices: All fall risk precautions in place    SUBJECTIVE:  Subjective: \" The bean bags. \"    Pain at start of treatment: Yes: 6/10    Pain at end of treatment: Yes: 6/10    Location: back,neck  Nursing notified: No  RN:   Intervention: None    COGNITION:  Orientation  Overall Orientation Status: Within Normal Limits  Orientation Level: Oriented X4  Cognition  Overall Cognitive Status: WFL          OBJECTIVE:     Provided pt bean bag  using rolling walker for unilateral support during activity with L hand and SBA. Pt used R hand on reacher to  bean bags from from in all directions and then placing to the R in a bucket. Pt required several small rest breaks d/t decreased standing tolerance. Pt would over reach over the walker too far, but allowed for this to challenge pt balance, but educated pt to not do this regularly d/t increased risk of falls. Discussed with pt to  the bean bags closer to the walker, place them, then complete mobility with walker to  bags further away. Pt did take one step demo'ing decreased balance. Pt stood around 2 min max one time and all other stands were between 1-2 min. Pt completed about 4 STS transfers to complete task, req CGA at times d/t decreased balance from BLE fatigue. And demonstrated F- balance.   Pt thanked therapist for helping her out physically, mentally and spiritually during her stay. Provided task for improving balance , coordination, dynamic standing time to decrease risk of falls and improve functional ADL skills. Education: to not over reach the walker when completing a task to decrease risk of falls. Equipment recommendations: n/a         ASSESSMENT:  Assessment: Pt particpated well today  Activity Tolerance: Patient tolerated treatment well      PLAN OF CARE:  Strengthening, Balance training, Functional mobility training, Endurance training, Wheelchair mobility training, Pain management, Patient/Caregiver education & training, Equipment evaluation, education, & procurement, Self-Care / ADL, Home management training  will continue Ot POC until discharge 7/15/22    Patient goals : To get my strength back  Time Frame for Long term goals : Within 1 week, pt to demo progress in the following areas listed below to achieve LTGs as stated in the intial eval  Long Term Goal 1: Pt will improve ADL status to return to PLOF  Long Term Goal 2: Pt will improve overall activity tolerance for self-care tasks  Long Term Goal 3: Pt will improve standing balance and tolerance for ADL/IADL completion  Long Term Goal 4: Pt will improve coordination skills for ADL tasks        Therapy Time:   Individual Group Co-Treat   Time In 1330       Time Out 1345         Minutes 15                   Neuromuscular reeducation: 15 minutes     Electronically signed by:     HARVEY Lr,   7/15/2022, 3:04 PM

## 2022-07-15 NOTE — PROGRESS NOTES
Discharge planning in progress, visitor here to drive pt home. Wound care instructions have been given.

## 2022-07-15 NOTE — PROGRESS NOTES
Physical Therapy Rehab Treatment Note  Facility/Department: [facility]  Room: R261/R261-01       NAME: Carola Adkins  : 1950 (75 y.o.)  MRN: [id number]  CODE STATUS: Full Code    Date of Service: 7/15/2022       Restrictions:  Restrictions/Precautions: Fall Risk  Position Activity Restriction  Other position/activity restrictions: R LE prostetic       SUBJECTIVE:   Subjective: Pt states she is going home today. States she has been having night terrors here in the hospital and is very anxious. States she was out of it from meds and anxiety. States she is much better today. Pain  Pain: 6/10 shlds, back, L hand, leg cramps; Pt declined intervention. Pt states she can't take anything for pain. OBJECTIVE:         Bed mobility  Rolling to Left: Independent  Rolling to Right: Independent  Supine to Sit: Independent  Sit to Supine: Independent    Transfers  Sit to Stand: Independent  Stand to sit: Independent  Bed to Chair: Independent    Ambulation  Surface: level tile;carpet  Device: Rollator  Other Apparatus: Right (prosthesis)  Assistance: Modified Independent  Quality of Gait: flexed hips, knees, trunk  Distance: 60ftx2  Comments: Pt safely parked and transfered to sit on rollator. Indep with rollator brakes. Stairs/Curb  Stairs?: Yes  Stairs  # Steps : 1  Curbs: 4\"  Device:  (rollator)  Assistance: Stand by assistance    Wheelchair Activities  Wheelchair Parts Management: Yes  All Wheelchair Parts Management: manages WC brakes indep. Pt doesn't use her footrests at home  Left Brakes Level of Assistance: Independent  Right Brakes Level of Assistance: Independent  Propulsion: Yes  Propulsion 1  Propulsion: Manual  Level: Level Tile (carpet)  Method: RUE;RLE;LLE  Level of Assistance: Modified independent  Distance: 200ft                  ASSESSMENT/PROGRESS TOWARDS GOALS:   Assessment: Pt has met goals except curb step. Pt has level entry and does not need to do stairs to enter.     Goals:  Long Term Goals  Long term goal 1: Pt will demonstrate bed mobility indep. Long term goal 2: Pt will demonstrate transfers (bed,chair,car) with indep  Long term goal 3: Pt will ambulate 50ft with LRD indep  Long term goal 4: Pt to manage curb step with [address] and supervision/indep  Long term goal 5: Pt to complete HEP with indep  Long term goal 6: Pt to manage and propel WC 150ft indep  Patient Goals   Patient goals : be able to go home    PLAN OF CARE/Safety: Cont per POC.   Issued HEP and review car transfer this PM.         Therapy Time:   Individual   Time In 930   Time Out 1030   Minutes 60     Minutes:  Transfer/Bed mobility training:15  Gait trainin  Neuro re education:0  Therapeutic ex:0              WC 15    Miles GallHALI bautista, 07/15/22 at 11:55 AM

## 2022-07-15 NOTE — PROGRESS NOTES
Occupational Therapy Get up and Go Note            Date: 7/15/2022  Patient Name: Jazmine Ybarra        MRN: [id number]    Account #: [de-identified]  : 1950  (72 y.o.)      Subjective:  Patient states:  \" okay we can do that. \"  Pain:  Pain at start of treatment: Yes: 6/10    Pain at end of treatment: Yes: 6/10    Location: back, neck L shoulder/hand  Nursing notified: Declined      Objective:  ADL:  Feeding:    Grooming:  Ind  UE Self -Care:  n/a  LE Self-Care:  n/a  Toileting: Mod Ind  Toilet transfers: Mod Ind from toilet to w/c ( pt was on commode upon arrival therefore only observed pt transferring from commode to w/c.)        Treatment consisted of:   [x] ADL Training  [] Strengthening   [] Transfer Training    [] DME Education  [] HEP   [] Patient Education  [] Other:    Safety:  Safety Devices  Safety Devices in place:   Type of devices: All fall risk precautions in place      Therapy Time:   Individual Group Co-Treat   Time In 0753       Time Out 0810         Minutes 17             ADL/IADL trainin minutes         Electronically signed by:     HARVEY Duenas    7/15/2022, 11:38 AM

## 2022-07-15 NOTE — PROGRESS NOTES
,Physical Therapy Rehab Treatment Note  [facility]  [facility]       NAME: Jennifer Rider  : 1950 (11 y.o.)  MRN: [id number]  CODE STATUS: Full Code    Date of Service: 7/15/2022       Restrictions:  Restrictions/Precautions: Fall Risk  Position Activity Restriction  Other position/activity restrictions: R LE prostetic       SUBJECTIVE:   Subjective: Pt states she is going home today. States she has been having night terrors here in the hospital and is very anxious. States she was out of it from meds and anxiety. States she is much better today. Pain  Pain: 6/10 shlds, back, L hand, leg cramps; Pt declined intervention. Pt states she can't take anything for pain. OBJECTIVE:         Bed mobility  Rolling to Left: Independent  Rolling to Right: Independent  Supine to Sit: Independent  Sit to Supine: Independent    Transfers  Sit to Stand: Independent  Stand to sit: Independent  Bed to Chair: Independent  Car Transfer: Supervision  cues for safest technique               Education Provided: Home Exercise Program  Education  Education Given To: Patient  Education Provided: Home Exercise Program  Education Provided Comments: Reviewed and issued supine bridges, SLR, s/l clams, and s/l hip abd for HEP. Pt completed indep. No family present for training throughout stay. Pt lives alone. ASSESSMENT/PROGRESS TOWARDS GOALS:   Assessment: Pt has met goals except curb step. Pt has level entry and does not need to do stairs to enter. Goals:  Long Term Goals  Long term goal 1: Pt will demonstrate bed mobility indep.   Long term goal 2: Pt will demonstrate transfers (bed,chair,car) with indep  Long term goal 3: Pt will ambulate 50ft with LRD indep  Long term goal 4: Pt to manage curb step with [employer] and supervision/indep  Long term goal 5: Pt to complete HEP with indep  Long term goal 6: Pt to manage and propel WC 150ft indep  Patient Goals   Patient goals : be able to go home    PLAN OF CARE/Safety: Cont per POC.        Therapy Time:   Individual   Time In 930   Time Out 1030   Minutes 60     Minutes:  Transfer/Bed mobility training:10  Gait trainin  Neuro re education:0  Therapeutic ex:20    Miles HALI Hyde, 07/15/22 at 3:46 PM

## 2022-07-15 NOTE — DISCHARGE SUMMARY
Subjective: The patient complains of severe acute on chronic progressive fatigue and  T12 pain partially relieved by rest, medications, PT,  OT, Tylenol SLP and rest and exacerbated by recent illness with worsening of her gait ataxia. She was initially admitted to [facility] through the ER on 7/9/2022 after presenting with a fall with trauma to her head. She fell while she was trying to get up to take a shower. She hit her back. She was unable to get up  She came in complaining of pain in her head but denied numbness or tingling. She has been found to have low blood pressure and her dizziness is not found to be vestibular in nature but rather hypoperfusion in nature. She has a remote history of a right BKA and occasionally uses the wheelchair at home. She was diagnosed with a UTI culture showed Klebsiella pneumoniae and she was prescribed Cipro. I am concerned about patients medical complexities and barriers to advancing in rehab goals including anxiety, intolerance to pain medications, low white count, and reliance on benzodiazepines in the past.        She is eager for DC. Poor sleep dt PTSD. Discussed going back on her Klonopin and starting Neurontin twice a day which is helped in the past.  She slept much better last night I would hope that she would stay till tomorrow which is her originally planned discharge though she may insist on discharge today. She complains of some muscle cramps we discussed possibly using magnesium she wants to stick with the Neurontin. I reviewed current care and plans for further care with other rehab providers including nursing and case management. According to recent nursing note, \"  Pt resng quietly Daily BS, MOISES 7/13/22 Tylenol administer for 7/10 pain positive UTI Cipro administered, call light within reach bed arm on.  Content of B&B Pt requested gabapentin with HS meds but ordered DCd new order starts at 2000 7/15/22, shift assessment complete. .brandy\". [id number] [facility] Course: The patient was admitted to the Rehabilitation Unit to address ADL and mobility deficits-as detailed above and below. The patient was enrolled in acute PT, OT program.  Weekly team meetings were held to assess functional progress toward their goals-and modify the therapy program.  The patient's medical, emotional, psychosocial and functional issues were addressed. The patient progressed in the rehab program and is now ready for discharge home. Refer to functional assessments summary report for detailed functional status. Refer to the medical problem list below to see the medical issues addressed. The social and DC complexities are detailed in the DC planning section below. Greater than 35 minutes was spent on coordinating patients discharge including follow-up care, medications and patient/family education. Extended time needed because of the potential use of controlled medications are high risk medications and a high risk population individual.  Patient and family were instructed to use lowest effective dose of these medications and slowly titrate off over the next 2 to 4 weeks. They are not to combine opiates with sedatives. Patient insisted on discharge early because of her severe PTSD and difficulty relaxing and sleeping in the hospital.  Medications were adjusted as best we could however though she was able to sleep better and rest better she insisted on discharge home. I reviewed her [facility] prescription monitoring service data sheets in hopes of eliminating polypharmacy and weaning to the lowest effective dose of pain medications and eliminating the concomitant use of benzodiazepines. I see  BZD medications of concern. I see habits of combining sedatives and narcotics. I attempted to wean BZD doses--pt was resistant--severe PTSD. I advised that she weans benzodiazepines as an outpatient to the lowest effective dose. ROS x10:   The patient also complains of severely impaired mobility and activities of daily living. Otherwise no new problems with vision, hearing, nose, mouth, throat, dermal, cardiovascular, GI, , pulmonary, musculoskeletal, psychiatric or neurological. See also Acute Rehab PM&R H&P. Vital signs:  BP (!) 156/90   Pulse 88   Temp 98.2 °F (36.8 °C) (Oral)   Resp 16   Ht 5' 8\" (1.727 m)   Wt 190 lb (86.2 kg)   SpO2 97%   BMI 28.89 kg/m²   I/O:   PO/Intake:  fair PO intake,   low-carb diet diet    Bowel:   continent  LBM 7/13. Bladder: continent    - SP PO Cipro for UTI. General:  Patient is well developed,   adequately nourished, and    well kempt. HEENT:    Pupils equal, hearing intact to loud voice, external inspection of ear and nose benign. Inspection of lips, tongue and gums benign    Musculoskeletal: No significant change in strength or tone. All joints stable. Inspection and palpation of digits and nails show no clubbing, cyanosis or inflammatory conditions. Neuro/Psychiatric: Affect: flat but pleasant. Alert and oriented to person, place and situation with no needed cues. No significant change in deep tendon reflexes or sensation  Lungs:  Diminished, CTA-B. Respiration effort is   normal at rest.     Heart:   S1 = S2,   RRR. Abdomen:  Soft, non-tender, no enlargement of liver or spleen. Extremities:    lower extremity edema old right below the knee amputation. Skin:   Intact to general survey, small scab at distal anterior residual limb at distal tibial plateau. Rehabilitation:  Physical Therapy:   Bed mobility:  Bed mobility  Bridging: Modified independent  (07/12/22 1011)  Rolling to Left: Independent (07/15/22 0958)  Rolling to Right: Independent (07/15/22 0958)  Supine to Sit: Independent (07/15/22 0958)  Sit to Supine: Independent (07/15/22 0958)  Scooting: Modified independent (07/12/22 1011)  Bed Mobility Comments: Completed on flat mat without rails.   Increased time and effort to complete. C/o back pain. (07/12/22 1038)  Bed Mobility  Additional Factors: Head of bed flat; Without handrails (07/14/22 1604)  Additional Factors: Head of bed flat; Without handrails (07/14/22 1604)  Sit to Supine  Assistance Level: Modified independent (07/14/22 1604)  Scooting  Assistance Level: Modified independent (07/14/22 1604)  Transfers:  Transfers  Sit to Stand: Independent (07/15/22 7320)  Stand to sit: Independent (07/15/22 3331)  Bed to Chair: Independent (07/15/22 7358)  Car Transfer: Supervision (07/15/22 1335)  Comment: VCs for positioning of WC prior to transfer. (07/13/22 1009)  Transfers  Surface: Wheelchair (07/14/22 1604)  Sit to Stand  Assistance Level: Modified independent (07/14/22 1604)  Skilled Clinical Factors: Good technique maintained throughout (07/14/22 1604)  Stand to Sit  Assistance Level: Modified independent (07/14/22 1604)  Bed To/From Chair  Technique: Stand pivot (07/14/22 1604)  Assistance Level: Modified independent (07/14/22 1604)  Skilled Clinical Factors: W/C <> bed, good technique maintained as well as approach to bed with w/c (07/14/22 1604)  Stand Pivot  Assistance Level: Modified independent;Supervision (07/14/22 1519)  Skilled Clinical Factors: Transferring to sit on rollator and back off. Pt with increased c/o supervision by end of session. Pt demonstrates correct use of brakes. (07/14/22 1519)  Gait:   Ambulation  Surface: level tile;carpet (07/15/22 0958)  Device: Rollator (07/15/22 3036)  Other Apparatus: Right (prosthesis) (07/15/22 0958)  Assistance: Modified Independent (07/15/22 0958)  Quality of Gait: flexed hips, knees, trunk (07/15/22 0958)  Gait Deviations: Slow Dorothy (07/14/22 1446)  Distance: 60ftx2 (07/15/22 0958)  Comments: Pt safely parked and transfered to sit on rollator. Indep with rollator brakes.  (07/15/22 7740)  Ambulation  Distance: 50' x 2 with each AD (07/14/22 8615)  Stairs:  Stairs/Curb  Stairs?: Yes (07/15/22 1021)  Stairs  # Steps : 1 (07/15/22 1021)  Curbs: 4\" (07/15/22 1021)  Device:  (rollator) (07/15/22 1021)  Assistance: Stand by assistance (07/15/22 1021)  Curb  Curb Height: 4'' (07/14/22 1606)  Device:  (rollator) (07/14/22 1606)  Number of Curbs: 1 (07/14/22 1606)  Additional Factors: Verbal cues (07/14/22 1606)  Assistance Level: Contact guard assist (07/14/22 1606)  Skilled Clinical Factors: step by step cues for ascending curb with rollator (07/14/22 1606)  W/C mobility:  Wheelchair Activities  Wheelchair Parts Management: Yes (07/15/22 1023)  All Wheelchair Parts Management: manages WC brakes indep. Pt doesn't use her footrests at home (07/15/22 1023)  Left Brakes Level of Assistance: Independent (07/15/22 1023)  Right Brakes Level of Assistance: Independent (07/15/22 1023)  Propulsion: Yes (07/15/22 1023)  Propulsion 1  Propulsion: Manual (07/15/22 1023)  Level: Level Tile (carpet) (07/15/22 1023)  Method: RUE;RLE;LLE (07/15/22 1023)  Level of Assistance: Modified independent (07/15/22 1023)  Description/ Details: Verbal cues for managing obstacles and positioning WC. (07/13/22 1023)  Distance: 200ft (07/15/22 1023)  Wheelchair  Surface: Level surface; Uneven surface; Carpet (07/14/22 1606)  Device: Standard wheelchair (07/14/22 1606)  Assistance Level for Propulsion: Modified independent (07/14/22 1606)  Propulsion Method: Bilateral upper extremities; Bilateral lower extremities (07/14/22 1606)  Propulsion Distance: 350' (07/14/22 1606)  Skilled Clinical Factors: pt demonstrates good technique and safety navigating through busy environment, good apporach to bed, manuevering through room and tight spaces, no deviations with fwd propulsion (07/14/22 1606)    Occupational Therapy:   Hand Dominance: Left  ADL  Feeding: Unable to assess(comment) (07/12/22 1027)  Feeding Skilled Clinical Factors: not observed (07/12/22 1027)  Grooming: Setup (07/12/22 1027)  UE Bathing: Setup (07/12/22 1027)  LE Bathing: Setup (07/12/22 1027)  UE Dressing: Setup (07/12/22 3408)  LE Dressing: Setup (07/12/22 1027)  Toileting: Unable to assess(comment) (07/12/22 1027)  Toileting Skilled Clinical Factors: declined need (07/12/22 1027)  Toilet Transfers  Toilet Transfer: Unable to assess (07/12/22 1028)  Toilet Transfers Comments: declined need for toileting (07/12/22 1028)     Shower Transfers  Shower - Transfer To:  Shower seat with back (07/12/22 1028)  Shower - Technique: Stand pivot (07/12/22 1028)  Shower Transfers: Stand by assistance (07/12/22 1028)  Shower Transfers Comments: use of grab bars (07/12/22 1028)    Speech Therapy:            Diet/Swallow:                      Lab/X-ray studies reviewed, analyzed and discussed with patient and staff:   Recent Results (from the past 24 hour(s))   CBC with Auto Differential    Collection Time: 07/15/22  4:05 AM   Result Value Ref Range    WBC 3.2 (L) 4.8 - 10.8 K/uL    RBC 3.42 (L) 4.20 - 5.40 M/uL    Hemoglobin 10.5 (L) 12.0 - 16.0 g/dL    Hematocrit 32.0 (L) 37.0 - 47.0 %    MCV 93.6 82.0 - 100.0 fL    MCH 30.7 27.0 - 31.3 pg    MCHC 32.8 (L) 33.0 - 37.0 %    RDW 15.7 (H) 11.5 - 14.5 %    Platelets 409 (L) 217 - 400 K/uL    PLATELET SLIDE REVIEW Normal     Neutrophils % 54.0 %    Lymphocytes % 35.0 %    Monocytes % 3.8 %    Eosinophils % 5.0 %    Basophils % 2.0 %    Neutrophils Absolute 1.7 1.4 - 6.5 K/uL    Lymphocytes Absolute 1.1 1.0 - 4.8 K/uL    Monocytes Absolute 0.1 (L) 0.2 - 0.8 K/uL    Eosinophils Absolute 0.2 0.0 - 0.7 K/uL    Basophils Absolute 0.1 0.0 - 0.2 K/uL    Anisocytosis 1+     Microcytes 1+    Renal Function Panel    Collection Time: 07/15/22  4:05 AM   Result Value Ref Range    Sodium 144 135 - 144 mEq/L    Potassium 3.6 3.4 - 4.9 mEq/L    Chloride 111 (H) 95 - 107 mEq/L    CO2 19 (L) 20 - 31 mEq/L    Anion Gap 14 9 - 15 mEq/L    Glucose 89 70 - 99 mg/dL    BUN 12 8 - 23 mg/dL    CREATININE 0.75 0.50 - 0.90 mg/dL    GFR Non-African American >60.0 >60    GFR  >60.0 >60 Calcium 8.5 8.5 - 9.9 mg/dL    Phosphorus 3.2 2.3 - 4.8 mg/dL    Albumin 3.7 3.5 - 4.6 g/dL   POCT Glucose    Collection Time: 07/15/22  6:45 AM   Result Value Ref Range    POC Glucose 90 70 - 99 mg/dl    Performed on ACCU-CHEK    POCT Glucose    Collection Time: 07/15/22 11:29 AM   Result Value Ref Range    POC Glucose 128 (H) 70 - 99 mg/dl    Performed on ACCU-CHEK        XR HAND LEFT  7/9/2022  NO SUSPECTED DISPLACED FRACTURE OR SIGNIFICANT POSTTRAUMATIC COMPLICATION IDENTIFIED. CT HEAD   7/9/2022   HEAD CT FINDINGS: There is no intracranial hemorrhage, mass effect, midline shift, extra-axial collection, hydrocephalus, evidence of a recent ischemic infarct or skull fracture identified. Mild generalized cerebral volume loss and mild to moderate white matter changes are again noted. The mastoid air cells and visualized paranasal sinuses are essentially clear. FINAL IMPRESSION: NO ACUTE INTRACRANIAL PROCESS, FRACTURE, OR EVIDENCE OF CERVICAL SPINE INJURY IDENTIFIED. CT CERVICAL SPINE   7/9/2022: The spine is visualized from the craniovertebral junction nearly through the T2 level. There is no fracture, dislocation, or acute paraspinous soft tissue abnormalities identified. Moderate degenerative changes of the lower levels are again noted. FINAL IMPRESSION: NO ACUTE INTRACRANIAL PROCESS, FRACTURE, OR EVIDENCE OF CERVICAL SPINE INJURY IDENTIFIED. CT LUMBAR SPINE 7/9/2022 The spine is visualized from the T7-8 through the S3 levels. There is no acute fracture, dislocation, evidence of instability, acute paraspinous soft tissue abnormalities, or significant change from 9/8/2021 identified. Osteopenia, mild to moderate Rotary dextroscoliosis, moderately extensive degenerative changes, and mild chronic compression fractures of T12-L2 with previous vertebroplasties. NO ACUTE FRACTURE OR SIGNIFICANT POSTTRAUMATIC COMPLICATION IDENTIFIED. CHRONIC AND DEGENERATIVE CHANGES, AS NOTED.      [address] RETROPERITONEAL  : 7/10/2022  The study is mild to moderately limited by the patient's body habitus. Both kidneys appear normal in size, position and morphology without significantly increased echogenicity. 3 approximately 1 cm simple cysts are noted within the left kidney. There is no hydronephrosis, abnormal perinephric collections, visualized nephrolithiasis, or solid renal masses. The right kidney measures approximately 10.0 x 4.7 x 4.3 cm. The left kidney measures approximately 11.1 x 4.8 x 4.6 cm. The average renal parenchymal thickness is approximately 1 cm. ESSENTIALLY NEGATIVE LIMITED RENAL ULTRASOUND. Previous extensive, complex labs, notes and diagnostics reviewed and analyzed. ALLERGIES:    Allergies as of 07/11/2022 - Fully Reviewed 07/11/2022   Allergen Reaction Noted    Iv dye [iodides]  04/25/2019    Vancomycin  10/16/2018    Morphine Rash 04/25/2019      (please also verify by checking MAR)      Recently, I evaluated this patient for periodic reassessment of medical and functional status. The patient was discussed in detail at the treatment team meeting focusing on current medical issues, progress in therapies, social issues, psychological issues, barriers to progress and strategies to address these barriers, and discharge planning. See the hand written addendum to rehab progress note. The patient continues to be high risk for future disability and their medical and rehabilitation prognosis continue to be good and therefore, we will continue the patient's rehabilitation course as planned. The patient's tentative discharge date was set. Patient and family education was discussed. The patient was made aware of the team discussion regarding their progress. Discharge plans were discussed along with barriers to progress and strategies to address these barriers, patient encouraged to continue to discuss discharge plans with .      Complex Physical Medicine & Rehab Issues addressed during rehab stay:   Severe abnormality of gait and mobility and impaired self-care and ADL's secondary to progressive osteoarthritis flareup status post fall with a T12 fracture. .  Functional and medical status improved status postacute rehab at [address] and Bladder dysfunction  , Neurogenic bowel and bladder:  frequent toileting, ambulate to bathroom with assistance, check post void residuals. Check for C.difficile x1 if >2 loose stools in 24 hours, continue bowel & bladder program.  Monitor bowel and bladder function. Lactinex 2 PO every AC. MOM prn, Brown Bomb prn, Glycerin suppository prn, enema prn. Encourage therapy and nursing to co-treat and problem solve re continence. Severe post fracture pain T12 pain as well as generalized OA pain: reassess pain every shift and prior to and after each therapy session, give prn Tylenol and consider scheduled Tylenol, modalities prn in therapy, masage, Lidoderm, K-pad prn. Consider scheduled AM pain meds. Add Neurontin twice daily  Skin healing scab at distal right residual limb above below the knee amputation and breakdown risk:  continue pressure relief program.  Daily skin exams and reports from nursing. Fatigue due to nutritional and hydration deficiency: Add and titrate vitamin B12 vitamin D and CoQ10 continue to monitor I&Os, calorie counts prn, dietary consult prn. Add healthy snack at night. Acute episodic insomnia with situational adjustment disorder:  prn Ambien, monitor for day time sedation. Falls risk elevated:  patient to use call light to get nursing assistance to get up, bed and chair alarm. Elevated DVT risk: progressive activities in PT, continue prophylaxis SANTIAGO hose, elevation and transition off Lovenox. Complex discharge planning: And insisted on discharge early DC 7/15/22 home alone with home health care. .  SP weekly team meeting every Thursday to re-assess progress towards goals, discuss and address social, psychological and medical comorbidities and to address difficulties they may be having progressing in therapy. Patient and family education is in progress. The patient is to follow-up with their family physician after discharge. Complex Active General Medical Issues that complicated care: Anxiety-and bipolar disorder -emotional support provided daily, vitamin B12, encourage participation in rehabilitation support group and recreational therapy, adjust/add medications (Wellbutrin, Seroquel, Zoloft, add vitamin B12)    Chronic low back pain, Chronic neck pain,   Left foot pain, Cervical disc disorder with radiculopathy-lowest effective dose of pain medication including Tylenol and topicals heat and massage    Dyslipidemia, Essential hypertension-Acute rehab to monitor heart rate and rhythm with the option of telemetry and the effects of chronotropic medication with respect to increasing physical activity and exercise in PT, OT, ADLs with medication titration to lowest effective dosing. Continue blood signs every shift focusing on heart rate, rhythm and blood pressure checks with orthostatic checks-monitoring the effect of exercise, therapy and posture. Consult hospitalist for backup medical and adjust/add medications (Apresoline intravenous transitioning to home medications). Monitor heart rate and blood pressure as well as medications effects on vital signs before during and after therapy with especial focus on preventing orthostasis and falls risk. GERD (gastroesophageal reflux disease)- Elevate head of bed after meals, monitor stools for blood, lowest effective dose of PPI, consider Tums. History of below-knee amputation of right lower extremity-basis as needed    Diarrhea-and immunosuppression due to pancytopenia. Add probiotic    Acute renal injury-promote hydration  Eliminate toxic medications, monitor I's and O's focusing on urine output, recheck BMP.     Dysautonomia -focus on balance and therapy focus on getting up if she falls and monitor vital signs and orthostasis    UTI (urinary tract infection)-Cipro pending culture monitor for retention    DM (diabetes mellitus) -Continue blood sugar checks every shift, diet, add diabetic add dietary education, restrict carbohydrates to lowest effective and safe carb count per meal advising 4 carbs per meal, add at bedtime snack to prevent a.m. hypoglycemia, adjust/add medications (sliding scale Humalog)      Electronically signed by Ricardo Faustin DO on 7/13/22 at 8:14 AM EDT       Annette Heimlich, D.O., PM&R     Attending    [address]

## 2022-07-15 NOTE — DISCHARGE INSTR - DIET

## 2022-07-15 NOTE — PLAN OF CARE
Problem: Discharge Planning  Goal: Discharge to home or other facility with appropriate resources  Outcome: Completed     Problem: Safety - Adult  Goal: Free from fall injury  Outcome: Completed     Problem: ABCDS Injury Assessment  Goal: Absence of physical injury  Outcome: Completed     Problem: Chronic Conditions and Co-morbidities  Goal: Patient's chronic conditions and co-morbidity symptoms are monitored and maintained or improved  Outcome: Completed     Problem: Nutrition Deficit:  Goal: Optimize nutritional status  Outcome: Completed     Problem: Pain  Goal: Verbalizes/displays adequate comfort level or baseline comfort level  Outcome: Completed

## 2022-07-15 NOTE — PROGRESS NOTES
Discharge instructions reviewed and signed. Pt has all personal belongings. Visitor her to drive pt home.

## 2022-07-15 NOTE — PROGRESS NOTES
Agree with LPN' assessment on night shift Electronically signed by Cosmo Canavan.  Mely Munoz on 7/15/2022 at 8:28 AM

## 2022-07-19 NOTE — PROGRESS NOTES
Facility/Department: [facility]  Physical Therapy Acute Rehab Discharge Summary  Room: R2/R261-01    NAME: Payton De La Vega  : 1950  MRN: [id number]    Admission Date: 2022  7:28 PM  Discharge Date: 7/15/2022    Rehab Diagnosis(es): Impaired mobility and ADL's due to severe dizziness and orthostasis with recent head trauma.  [facility] Rehab admit 22  Patient Active Problem List    Diagnosis Date Noted    Impaired mobility and ADLs 2022    UTI (urinary tract infection) 2022    Hyperlipidemia 2022    Impaired mobility and activities of daily living dt T12 fx and gait instability dt ataxia 2022    Dizziness 2022    Low BP 2022    LFT elevation 2022    Diarrhea 2022    Pancytopenia ([address] 2022    Acute renal injury ([address]     Dysautonomia ([address]     Ataxia     Dehydration 2022    Epidural abscess 2021    Discitis of lumbar region 2021    PTSD (post-traumatic stress disorder) 2021    Chronic neck pain 2021    Cervical disc disorder with radiculopathy 2021    Dyslipidemia 2019    Stenosis of carotid artery 10/22/2018    Left foot pain 2017    GERD (gastroesophageal reflux disease) 2016    SOB (shortness of breath) 2015    History of below-knee amputation of right lower extremity ([address] 2014    Insomnia 2014    Migraine headache 2014    Osteomyelitis ([address] 2012    Anxiety 05/15/2012    Anemia of chronic disease 05/15/2012    Chronic low back pain 05/15/2012    Essential hypertension 05/15/2012    Head injury     Syncope and collapse 2019    Hypertensive urgency 10/16/2018    Hyperglycemia 10/16/2018    Chest tightness or pressure 10/16/2018    Visual disturbance, subjective 10/16/2018    Acute sore throat 10/16/2018    DM (diabetes mellitus) ([address]     Bipolar affective disorder ([address]        Past Medical History:   Diagnosis Date    Anxiety     Bipolar affective disorder ([facility] DM (diabetes mellitus) ([facility]     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    Head injury     Hypertension     Migraine     Migraine headache 7/18/2014    Osteomyelitis ([facility] 12/7/2012    PTSD (post-traumatic stress disorder)     TIA (transient ischemic attack)     x 3     Past Surgical History:   Procedure Laterality Date    APPENDECTOMY      CHOLECYSTECTOMY      LEG AMPUTATION BELOW KNEE Bilateral     NASAL FRACTURE SURGERY N/A 4/25/2019    CLOSED REDUCTON EXTERNAL FIXATION OF NASAL BONE FRACTURE WITH SEVERE DNS (DEVIATED NASAL SEPTUM)  ROOM 190 performed by Juany Batista MD at [facility]       Indications for Skilled Intervention: Decreased functional mobility , Decreased ROM, Decreased strength, Decreased endurance, Decreased balance, Increased pain, Decreased posture, Decreased ADL status, Decreased coordination, Decreased safe awareness    GOALS: MET ALL  Long Term Goals  Long term goal 1: Pt will demonstrate bed mobility indep. Long term goal 2: Pt will demonstrate transfers (bed,chair,car) with indep  Long term goal 3: Pt will ambulate 50ft with LRD indep  Long term goal 4: Pt to manage curb step with [employer] and supervision/indep  Long term goal 5: Pt to complete HEP with indep  Additional Goals?: Yes  Long term goal 6: Pt to manage and propel WC 150ft indep    Summary of POC: Throughout rehab stay, pt received skilled physical therapy treatment 1.5 hours daily for 4 days.     Skilled Services Provided: Strengthening, ROM, Balance training, Functional mobility training, Transfer training, Wheelchair mobility training, Gait training, Neuromuscular re-education, Manual Therapy - Soft Tissue Mobilization, Pain management, Home exercise program, Safety education & training, Patient/Caregiver education & training, Equipment evaluation, education, & procurement, Modalities, Therapeutic activities, ADL/Self-care training, Stair training, Endurance training (Please refer to daily notes for all treatment details)    ASSESSMENT: Pt achieving all goals as outlined above. Pt states readiness to return home. Appropriate for DC from acute rehab PT program.    Discharge Plan: DC home at ambulatory level with [employer]. F/u PT for continued LE strengthening recommended.      Irene Valdes, PT, 07/19/22 at 12:25 PM

## 2022-08-08 PROBLEM — E86.0 DEHYDRATION: Status: RESOLVED | Noted: 2022-07-09 | Resolved: 2022-08-08

## 2022-08-11 PROBLEM — N39.0 UTI (URINARY TRACT INFECTION): Status: RESOLVED | Noted: 2022-07-12 | Resolved: 2022-08-11

## 2022-12-30 ENCOUNTER — APPOINTMENT (OUTPATIENT)
Dept: GENERAL RADIOLOGY | Age: 72
End: 2022-12-30
Payer: MEDICARE

## 2022-12-30 ENCOUNTER — APPOINTMENT (OUTPATIENT)
Dept: CT IMAGING | Age: 72
End: 2022-12-30
Payer: MEDICARE

## 2022-12-30 ENCOUNTER — HOSPITAL ENCOUNTER (EMERGENCY)
Age: 72
Discharge: HOME | End: 2022-12-30
Payer: MEDICARE

## 2022-12-30 VITALS
SYSTOLIC BLOOD PRESSURE: 136 MMHG | WEIGHT: 198 LBS | RESPIRATION RATE: 17 BRPM | TEMPERATURE: 98.4 F | OXYGEN SATURATION: 98 % | BODY MASS INDEX: 30.11 KG/M2 | HEART RATE: 67 BPM | DIASTOLIC BLOOD PRESSURE: 78 MMHG

## 2022-12-30 DIAGNOSIS — S02.2XXA CLOSED FRACTURE OF NASAL BONE, INITIAL ENCOUNTER: ICD-10-CM

## 2022-12-30 DIAGNOSIS — S09.90XA CLOSED HEAD INJURY, INITIAL ENCOUNTER: ICD-10-CM
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
## 2022-12-30 DIAGNOSIS — S82.101A CLOSED FRACTURE OF PROXIMAL END OF RIGHT TIBIA, UNSPECIFIED FRACTURE MORPHOLOGY, INITIAL ENCOUNTER: Primary | ICD-10-CM

## 2022-12-30 LAB
ALBUMIN SERPL-MCNC: 4 G/DL (ref 3.5–4.6)
ALP BLD-CCNC: 109 U/L (ref 40–130)
ALT SERPL-CCNC: 21 U/L (ref 0–33)
ANION GAP SERPL CALCULATED.3IONS-SCNC: 13 MEQ/L (ref 9–15)
ANION GAP SERPL CALCULATED.3IONS-SCNC: 14 MEQ/L (ref 9–15)
AST SERPL-CCNC: 25 U/L (ref 0–35)
BASOPHILS ABSOLUTE: 0 K/UL (ref 0–0.2)
BASOPHILS RELATIVE PERCENT: 0.2 %
BILIRUB SERPL-MCNC: 0.3 MG/DL (ref 0.2–0.7)
BUN BLDV-MCNC: 24 MG/DL (ref 8–23)
BUN BLDV-MCNC: 26 MG/DL (ref 8–23)
CALCIUM SERPL-MCNC: 8.4 MG/DL (ref 8.5–9.9)
CALCIUM SERPL-MCNC: 8.8 MG/DL (ref 8.5–9.9)
CHLORIDE BLD-SCNC: 103 MEQ/L (ref 95–107)
CHLORIDE BLD-SCNC: 105 MEQ/L (ref 95–107)
CO2: 19 MEQ/L (ref 20–31)
CO2: 19 MEQ/L (ref 20–31)
CREAT SERPL-MCNC: 0.78 MG/DL (ref 0.5–0.9)
CREAT SERPL-MCNC: 0.89 MG/DL (ref 0.5–0.9)
EOSINOPHILS ABSOLUTE: 0 K/UL (ref 0–0.7)
EOSINOPHILS RELATIVE PERCENT: 0.5 %
GFR SERPL CREATININE-BSD FRML MDRD: >60 ML/MIN/{1.73_M2}
GFR SERPL CREATININE-BSD FRML MDRD: >60 ML/MIN/{1.73_M2}
GLOBULIN: 3.3 G/DL (ref 2.3–3.5)
GLUCOSE BLD-MCNC: 100 MG/DL (ref 70–99)
GLUCOSE BLD-MCNC: 131 MG/DL (ref 70–99)
HCT VFR BLD CALC: 38.6 % (ref 37–47)
HEMOGLOBIN: 12.3 G/DL (ref 12–16)
LYMPHOCYTES ABSOLUTE: 1.2 K/UL (ref 1–4.8)
LYMPHOCYTES RELATIVE PERCENT: 24.2 %
MCH RBC QN AUTO: 31.9 PG (ref 27–31.3)
MCHC RBC AUTO-ENTMCNC: 31.8 % (ref 33–37)
MCV RBC AUTO: 100.5 FL (ref 79.4–94.8)
MONOCYTES ABSOLUTE: 0.4 K/UL (ref 0.2–0.8)
MONOCYTES RELATIVE PERCENT: 8.3 %
NEUTROPHILS ABSOLUTE: 3.3 K/UL (ref 1.4–6.5)
NEUTROPHILS RELATIVE PERCENT: 66.8 %
PDW BLD-RTO: 16.1 % (ref 11.5–14.5)
PLATELET # BLD: 122 K/UL (ref 130–400)
POTASSIUM SERPL-SCNC: 5.6 MEQ/L (ref 3.4–4.9)
POTASSIUM SERPL-SCNC: 6 MEQ/L (ref 3.4–4.9)
RBC # BLD: 3.84 M/UL (ref 4.2–5.4)
SODIUM BLD-SCNC: 135 MEQ/L (ref 135–144)
SODIUM BLD-SCNC: 138 MEQ/L (ref 135–144)
TOTAL PROTEIN: 7.3 G/DL (ref 6.3–8)
WBC # BLD: 4.9 K/UL (ref 4.8–10.8)

## 2022-12-30 PROCEDURE — 70486 CT MAXILLOFACIAL W/O DYE: CPT

## 2022-12-30 PROCEDURE — 99285 EMERGENCY DEPT VISIT HI MDM: CPT

## 2022-12-30 PROCEDURE — 96375 TX/PRO/DX INJ NEW DRUG ADDON: CPT

## 2022-12-30 PROCEDURE — 72128 CT CHEST SPINE W/O DYE: CPT

## 2022-12-30 PROCEDURE — 6370000000 HC RX 637 (ALT 250 FOR IP): Performed by: PHYSICIAN ASSISTANT

## 2022-12-30 PROCEDURE — 73560 X-RAY EXAM OF KNEE 1 OR 2: CPT

## 2022-12-30 PROCEDURE — 74176 CT ABD & PELVIS W/O CONTRAST: CPT

## 2022-12-30 PROCEDURE — 73590 X-RAY EXAM OF LOWER LEG: CPT

## 2022-12-30 PROCEDURE — 6360000002 HC RX W HCPCS: Performed by: PERSONAL EMERGENCY RESPONSE ATTENDANT

## 2022-12-30 PROCEDURE — 96374 THER/PROPH/DIAG INJ IV PUSH: CPT

## 2022-12-30 PROCEDURE — 29515 APPLICATION SHORT LEG SPLINT: CPT

## 2022-12-30 PROCEDURE — 72131 CT LUMBAR SPINE W/O DYE: CPT

## 2022-12-30 PROCEDURE — 12011 RPR F/E/E/N/L/M 2.5 CM/<: CPT

## 2022-12-30 PROCEDURE — 70450 CT HEAD/BRAIN W/O DYE: CPT

## 2022-12-30 PROCEDURE — 96376 TX/PRO/DX INJ SAME DRUG ADON: CPT

## 2022-12-30 PROCEDURE — 36415 COLL VENOUS BLD VENIPUNCTURE: CPT

## 2022-12-30 PROCEDURE — 2580000003 HC RX 258: Performed by: PERSONAL EMERGENCY RESPONSE ATTENDANT

## 2022-12-30 PROCEDURE — 73552 X-RAY EXAM OF FEMUR 2/>: CPT

## 2022-12-30 PROCEDURE — 80053 COMPREHEN METABOLIC PANEL: CPT

## 2022-12-30 PROCEDURE — 71250 CT THORAX DX C-: CPT

## 2022-12-30 PROCEDURE — 72125 CT NECK SPINE W/O DYE: CPT

## 2022-12-30 PROCEDURE — 85025 COMPLETE CBC W/AUTO DIFF WBC: CPT

## 2022-12-30 RX ORDER — ACETAMINOPHEN 500 MG
1000 TABLET ORAL ONCE
Status: COMPLETED | OUTPATIENT
Start: 2022-12-30 | End: 2022-12-30

## 2022-12-30 RX ORDER — 0.9 % SODIUM CHLORIDE 0.9 %
1000 INTRAVENOUS SOLUTION INTRAVENOUS ONCE
Status: COMPLETED | OUTPATIENT
Start: 2022-12-30 | End: 2022-12-30

## 2022-12-30 RX ORDER — ONDANSETRON 2 MG/ML
4 INJECTION INTRAMUSCULAR; INTRAVENOUS ONCE
Status: COMPLETED | OUTPATIENT
Start: 2022-12-30 | End: 2022-12-30

## 2022-12-30 RX ORDER — FENTANYL CITRATE 50 UG/ML
50 INJECTION, SOLUTION INTRAMUSCULAR; INTRAVENOUS ONCE
Status: COMPLETED | OUTPATIENT
Start: 2022-12-30 | End: 2022-12-30

## 2022-12-30 RX ORDER — OXYCODONE HYDROCHLORIDE AND ACETAMINOPHEN 5; 325 MG/1; MG/1
1 TABLET ORAL EVERY 6 HOURS PRN
Qty: 12 TABLET | Refills: 0 | Status: SHIPPED | OUTPATIENT
Start: 2022-12-30 | End: 2023-01-02

## 2022-12-30 RX ORDER — AMOXICILLIN AND CLAVULANATE POTASSIUM 875; 125 MG/1; MG/1
1 TABLET, FILM COATED ORAL 2 TIMES DAILY
Qty: 14 TABLET | Refills: 0 | Status: SHIPPED | OUTPATIENT
Start: 2022-12-30 | End: 2023-01-06

## 2022-12-30 RX ADMIN — ACETAMINOPHEN 1000 MG: 500 TABLET ORAL at 06:23

## 2022-12-30 RX ADMIN — ONDANSETRON 4 MG: 2 INJECTION INTRAMUSCULAR; INTRAVENOUS at 02:38

## 2022-12-30 RX ADMIN — FENTANYL CITRATE 50 MCG: 50 INJECTION, SOLUTION INTRAMUSCULAR; INTRAVENOUS at 04:08

## 2022-12-30 RX ADMIN — FENTANYL CITRATE 50 MCG: 50 INJECTION, SOLUTION INTRAMUSCULAR; INTRAVENOUS at 02:38

## 2022-12-30 RX ADMIN — SODIUM CHLORIDE 1000 ML: 9 INJECTION, SOLUTION INTRAVENOUS at 03:47

## 2022-12-30 ASSESSMENT — ENCOUNTER SYMPTOMS
SORE THROAT: 0
ABDOMINAL PAIN: 0
NAUSEA: 0
SHORTNESS OF BREATH: 0
COLOR CHANGE: 0
BLOOD IN STOOL: 0
DIARRHEA: 0
VOMITING: 0
BACK PAIN: 1
RHINORRHEA: 0
COUGH: 0

## 2022-12-30 ASSESSMENT — PAIN - FUNCTIONAL ASSESSMENT: PAIN_FUNCTIONAL_ASSESSMENT: NONE - DENIES PAIN

## 2022-12-30 ASSESSMENT — PAIN DESCRIPTION - LOCATION: LOCATION: HEAD;FACE

## 2022-12-30 ASSESSMENT — PAIN SCALES - GENERAL: PAINLEVEL_OUTOF10: 8

## 2022-12-30 NOTE — ED NOTES
Patient resting in bed, A & O x4, skin warm, dry and pink, resp even and unlabored. Call light in reach. Vital signs stable. Patient remains on bedside monitor. Patient denies any needs at this time.       Sarah Lund RN  12/30/22 5097

## 2022-12-30 NOTE — ED PROVIDER NOTES
[facility] ED  eMERGENCY dEPARTMENT eNCOUnter      Pt Name: Edgar Hopkins  MRN: [id number]  [place] 1950  Date of evaluation: 12/30/2022  Provider: DINA Lind      HISTORY OF PRESENT ILLNESS    Edgar Hopkins is a 67 y.o. female with PMHx of anxiety, bipolar, DM, epidural abscess, hypertension, osteomyelitis, PTSD, TIA, right BKA presents to the emergency department with fall.  2 hours ago patient was transferring herself from her wheelchair to the toilet, when she lost her balance falling forward, hitting her face on the floor. Crawled to the phone to call a friend. No loss of consciousness, no blood thinners. Patient does have facial pain, headache, neck pain, generalized back pain, left rib pain, right hip pain, and bilateral knee pain. She denies shortness of breath, nausea, vomiting. HPI    Nursing Notes were reviewed. REVIEW OF SYSTEMS       Review of Systems   Constitutional:  Negative for appetite change, chills and fever. HENT:  Negative for congestion, rhinorrhea and sore throat. Respiratory:  Negative for cough and shortness of breath. Cardiovascular:  Positive for chest pain. Gastrointestinal:  Negative for abdominal pain, blood in stool, diarrhea, nausea and vomiting. Genitourinary:  Negative for difficulty urinating. Musculoskeletal:  Positive for arthralgias, back pain and neck pain. Negative for neck stiffness. Skin:  Negative for color change and rash. Neurological:  Positive for headaches. Negative for dizziness, syncope, weakness, light-headedness and numbness. All other systems reviewed and are negative.           PAST MEDICAL HISTORY     Past Medical History:   Diagnosis Date    Anxiety     Bipolar affective disorder ([facility]     DM (diabetes mellitus) ([facility]     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    Head injury     Hypertension     Migraine     Migraine headache 7/18/2014    Osteomyelitis ([facility] 12/7/2012    PTSD (post-traumatic stress disorder)     TIA (transient ischemic attack)     x 3         SURGICAL HISTORY       Past Surgical History:   Procedure Laterality Date    APPENDECTOMY      CHOLECYSTECTOMY      LEG AMPUTATION BELOW KNEE Bilateral     NASAL FRACTURE SURGERY N/A 4/25/2019    CLOSED REDUCTON EXTERNAL FIXATION OF NASAL BONE FRACTURE WITH SEVERE DNS (DEVIATED NASAL SEPTUM)  ROOM 190 performed by Conrad Bañuelos MD at [address] [facility]       Discharge Medication List as of 12/30/2022  7:24 AM        CONTINUE these medications which have NOT CHANGED    Details   lactobacillus acidophilus [facility]) Take 2 tablets by mouth in the morning and 2 tablets at noon and 2 tablets before bedtime. , Disp-120 tablet, R-5Normal      Vitamin D (CHOLECALCIFEROL) 50 MCG (2000 UT) TABS tablet Take 1 tablet by mouth Daily with supper, Disp-60 tablet, R-3Labeling may look different. 25 mcg=1000 Units. Please double check dosages. Normal      alendronate (FOSAMAX) 70 MG tablet Take 70 mg by mouth once a weekHistorical Med      buPROPion (WELLBUTRIN) 75 MG tablet Take 100 mg by mouth 2 times dailyHistorical Med      sertraline (ZOLOFT) 100 MG tablet Take 100 mg by mouth 2 times dailyHistorical Med      clonazePAM (KLONOPIN) 2 MG tablet Take 2 mg by mouth 2 times daily. Historical Med      lamoTRIgine (LAMICTAL) 100 MG tablet Take 100 mg by mouth 2 times dailyHistorical Med      QUEtiapine (SEROQUEL XR) 150 MG TB24 extended release tablet Take 150 mg by mouth nightlyHistorical Med             ALLERGIES     Iv dye [iodides], Vancomycin, and Morphine    FAMILY HISTORY       Family History   Problem Relation Age of Onset    Cancer Mother         breast    Heart Attack Father           SOCIAL HISTORY       Social History     Socioeconomic History    Marital status:      Number of children: 1   Tobacco Use    Smoking status: Never    Smokeless tobacco: Never   Substance and Sexual Activity    Alcohol use: No    Drug use: No   Social History Narrative    [employer]     Has a BA in [address] [place] [address] -her only daughter lives in [place], she has high school friends and neighbors who are helpful especially friend [facility]    She went to [employer] after graduation  her  who was in the [employer] and moved to [place]. She lived in [place] for many years moving back to the [facility] after she retired. Type of Home: Apartment in [address] [place] [address]. [address]    Home Layout: One level    Home Access: Level entry    Bathroom Shower/Tub: Tub/Shower unit,Shower chair with back    Bathroom Toilet: Standard    Bathroom Equipment: Grab bars in shower,Shower chair    Bathroom Accessibility: Accessible    Home Equipment: Reacher,Wheelchair-manual    Has the patient had two or more falls in the past year or any fall with injury in the past year?: Yes (3 recent)    Receives Help From: Friend(s)    ADL Assistance: [facility]: Independent (friend brought groceries, or has them delivered)    Homemaking Responsibilities: Yes    Ambulation Assistance: Non-ambulatory (w/c dependent, prior to COVID was ambulating)    Transfer Assistance: Independent    Active : No (does not have a car)    Occupation: Retired    IADL Comments: previously independent with IADLs, pt has assistance with groceries    Additional Comments: pt motiviated for increased independence; pt stating increased blurry vision at home and increased dizziness since fall             [address]         ED Triage Vitals [12/30/22 0202]   BP Temp Temp Source Heart Rate Resp SpO2 Height Weight   (!) 161/69 98.4 °F (36.9 °C) Oral 68 18 -- -- 198 lb (89.8 kg)       Physical Exam  Constitutional:       Appearance: She is well-developed. HENT:      Head: Normocephalic. Comments: 1 cm laceration above left eyebrow with swelling and ecchymosis to nasal bridge, swelling to left upper lip as well.   No malocclusion, dental fractures, intraoral lacerations     Right Ear: Tympanic membrane normal.      Left Ear: Tympanic membrane normal.   Eyes:      Conjunctiva/sclera: Conjunctivae normal.      Pupils: Pupils are equal, round, and reactive to light. Neck:      Trachea: No tracheal deviation. Comments: Mild C, T, L spinous process tenderness and in bilateral paraspinal muscles throughout back. No signs of trauma, no step-offs  Cardiovascular:      Heart sounds: Normal heart sounds. Pulmonary:      Effort: Pulmonary effort is normal. No respiratory distress. Breath sounds: Normal breath sounds. No stridor. Comments: Mild left lateral rib pain, no crepitus or signs of trauma  Chest:      Chest wall: Tenderness present. Abdominal:      General: Bowel sounds are normal. There is no distension. Palpations: Abdomen is soft. There is no mass. Tenderness: There is no abdominal tenderness. There is no guarding or rebound. Musculoskeletal:         General: Tenderness present. Normal range of motion. Cervical back: Normal range of motion and neck supple. Tenderness present. Comments: Mild right hip tenderness and bilateral knee tenderness including left femur and left tib-fib with faint ecchymosis to left proximal tib-fib. MSP intact distally. Skin:     General: Skin is warm and dry. Capillary Refill: Capillary refill takes less than 2 seconds. Findings: No rash. Neurological:      Mental Status: She is alert and oriented to person, place, and time. Deep Tendon Reflexes: Reflexes are normal and symmetric. Psychiatric:         Behavior: Behavior normal.         Thought Content:  Thought content normal.         Judgment: Judgment normal.       DIAGNOSTIC RESULTS     EKG:All EKG's are interpreted by the Emergency Department Physician who either signs or Co-signs this chart in the absence of a cardiologist.        RADIOLOGY:   Non-plain film images such as CT, Ultrasound and MRI are read by theradiologist. Plain radiographic images are visualized and preliminarily interpreted by the emergency physician with the below findings:    Interpretation per theRadiologist below, if available at the time of this note:    XR TIBIA FIBULA LEFT (2 VIEWS)   Final Result   No acute osseous abnormality. XR FEMUR LEFT (MIN 2 VIEWS)   Final Result   No acute osseous abnormality. XR KNEE RIGHT (1-2 VIEWS)   Final Result   Cortical irregularity of the medial proximal tibial metaphysis, concerning   for fracture in the setting of trauma. Correlate with point tenderness. XR KNEE LEFT (1-2 VIEWS)   Final Result   No acute osseous abnormality. CT CERVICAL SPINE WO CONTRAST   Final Result   1. There is no acute compression fracture or subluxation of the cervical   spine. 2. Multilevel degenerative disc and degenerative joint disease. CT FACIAL BONES WO CONTRAST   Final Result   1. Comminuted and inwardly buckled fractures of the right nasal bone. 2. Prominent rightward deviation of the nasal septum on a developmental   basis, resulting in narrowing of the right nasal canal.      RECOMMENDATIONS:   Unavailable         CT HEAD WO CONTRAST   Final Result   1. There is no acute intracranial abnormality. Specifically, there is no   intracranial hemorrhage. 2. Atrophy and periventricular leukomalacia,         CT LUMBAR SPINE WO CONTRAST   Final Result   No acute traumatic findings of the chest, abdomen or pelvis      No acute lumbar spine trauma. CT THORACIC SPINE WO CONTRAST   Final Result   1. There is no acute compression fracture of the thoracic spine   2. Multilevel degenerative disc and degenerative joint disease more prominent   within the inferior thoracic spine. CT CHEST WO CONTRAST   Final Result   No acute traumatic findings of the chest, abdomen or pelvis      No acute lumbar spine trauma.          CT ABDOMEN PELVIS WO CONTRAST Additional Contrast? None   Final Result   No acute traumatic findings of the chest, abdomen or pelvis      No acute lumbar spine trauma. CT of the facial bones shows acute comminuted fracture of the right nasal bone with fracture of the anterior and nasal septum. CT of the head no acute intracranial hemorrhage, abnormal intra or extra-axial collections or parenchymal lesions are seen. CT of the cervical spine no cervical fractures    CT of the thoracic spine no evidence for fracture or malalignment    CT of the lumbar spine no acute fracture or malalignment    All reports above are Reads completed by radiology      LABS:  Labs Reviewed   CBC WITH AUTO DIFFERENTIAL - Abnormal; Notable for the following components:       Result Value    RBC 3.84 (*)     .5 (*)     MCH 31.9 (*)     MCHC 31.8 (*)     RDW 16.1 (*)     Platelets 054 (*)     All other components within normal limits   COMPREHENSIVE METABOLIC PANEL - Abnormal; Notable for the following components:    Potassium 6.0 (*)     CO2 19 (*)     Glucose 131 (*)     BUN 26 (*)     All other components within normal limits    Narrative:     CALL  Mcd[facility]  [facility] tel. [phone],  POTASSIUM results called to and read back by Theodora ARROYO, 12/30/2022 03:14,  by [address] - Abnormal; Notable for the following components:    Potassium 5.6 (*)     CO2 19 (*)     Glucose 100 (*)     BUN 24 (*)     Calcium 8.4 (*)     All other components within normal limits       All other labs were within normal range or not returned as of this dictation.     EMERGENCY DEPARTMENT COURSE and DIFFERENTIAL DIAGNOSIS/MDM:   Vitals:    Vitals:    12/30/22 0500 12/30/22 0600 12/30/22 0630 12/30/22 0737   BP: (!) 156/61 (!) 169/60 (!) 170/70 136/78   Pulse: 70 70 70 67   Resp: 18 18 18 17   Temp:       TempSrc:       SpO2: 100% 97% 99% 98%   Weight:             MDM     Amount and/or Complexity of Data Reviewed  Decide to obtain previous medical records or to obtain history from someone other than the patient: yes      Right knee x-ray shows cortical irregularity of the medial proximal tibial metaphysis, concerning for fracture. Patient placed in short posterior splint, this is the side of BKA. Potassium 6, BUN 26. Patient given 1 L IV fluid, Zofran, fentanyl for pain. BMP then repeated and care handed off to [facility], AN AFFILIATE OF [facility]. CRITICAL CARE TIME   Total Critical Caretime was 0 minutes, excluding separately reportable procedures. There was a high probability of clinically significant/life threatening deterioration in the patient's condition which required my urgent intervention.          Lac Repair    Date/Time: 12/30/2022 3:59 AM  Performed by: DINA Pinon  Authorized by: DINA Pinon     Consent:     Consent obtained:  Verbal    Consent given by:  Patient    Risks discussed:  Infection, need for additional repair, nerve damage, poor wound healing, poor cosmetic result and pain  Anesthesia:     Anesthesia method:  None  Laceration details:     Location:  Face    Face location:  L eyebrow    Length (cm):  1  Pre-procedure details:     Preparation:  Patient was prepped and draped in usual sterile fashion and imaging obtained to evaluate for foreign bodies  Exploration:     Limited defect created (wound extended): no      Imaging outcome: foreign body not noted      Wound exploration: wound explored through full range of motion and entire depth of wound visualized      Wound extent: no areolar tissue violation noted, no fascia violation noted, no foreign bodies/material noted, no muscle damage noted, no nerve damage noted, no tendon damage noted, no underlying fracture noted and no vascular damage noted      Contaminated: no    Treatment:     Area cleansed with:  Chlorhexidine    Amount of cleaning:  Standard  Skin repair:     Repair method:  Tissue adhesive  Approximation:     Approximation:  Close  Repair type:     Repair type:  Simple  Post-procedure details:     Dressing:  Open (no dressing)    Procedure completion:  Tolerated    FINAL IMPRESSION      1. Closed fracture of proximal end of right tibia, unspecified fracture morphology, initial encounter    2. Closed fracture of nasal bone, initial encounter    3. Fall, initial encounter    4. Closed head injury, initial encounter          DISPOSITION/PLAN   DISPOSITION Decision To Discharge 12/30/2022 07:10:16 AM      PATIENT REFERRED TO:  Mary Payne.  [address]  [address] [facility],5Th & 6Th Floors [address], [place], [address] [place] [address] [id number]) [address] [facility] 9am-12pm        [address] Payne [id number]    In 3 days      DISCHARGE MEDICATIONS:  Discharge Medication List as of 12/30/2022  7:24 AM        START taking these medications    Details   amoxicillin-clavulanate (AUGMENTIN) 875-125 MG per tablet Take 1 tablet by mouth 2 times daily for 7 days, Disp-14 tablet, R-0Print      oxyCODONE-acetaminophen (PERCOCET) 5-325 MG per tablet Take 1 tablet by mouth every 6 hours as needed for Pain for up to 3 days. Intended supply: 3 days. Take lowest dose possible to manage pain Max Daily Amount: 4 tablets, Disp-12 tablet, R-0Print                (Please notethat portions of this note were completed with a voice recognition program.  Efforts were made to edit the dictations but occasionally words are mis-transcribed. )    DINA Rossi (electronically signed)  Emergency Physician Assistant          Bryanna Torres [place]  01/01/23 9478

## 2022-12-30 NOTE — ED NOTES
Patient resting in bed with call light in reach. Breathes are even and unlabored. Skin is warm and dry. Vital signs are stable. Patient remains on bedside monitor. Patient denies any needs at this time.       Aishwarya Almazan RN  12/30/22 7401

## 2023-08-27 ENCOUNTER — HOSPITAL ENCOUNTER (EMERGENCY)
Age: 73
Discharge: HOME | DRG: 149 | End: 2023-08-27
Attending: STUDENT IN AN ORGANIZED HEALTH CARE EDUCATION/TRAINING PROGRAM
Payer: MEDICARE

## 2023-08-27 ENCOUNTER — APPOINTMENT (OUTPATIENT)
Dept: CT IMAGING | Age: 73
DRG: 149 | End: 2023-08-27
Payer: MEDICARE

## 2023-08-27 VITALS
RESPIRATION RATE: 18 BRPM | HEART RATE: 63 BPM | SYSTOLIC BLOOD PRESSURE: 167 MMHG | OXYGEN SATURATION: 95 % | DIASTOLIC BLOOD PRESSURE: 67 MMHG | TEMPERATURE: 98 F

## 2023-08-27 DIAGNOSIS — R19.7 NAUSEA VOMITING AND DIARRHEA: Primary | ICD-10-CM

## 2023-08-27 DIAGNOSIS — N39.0 URINARY TRACT INFECTION WITHOUT HEMATURIA, SITE UNSPECIFIED: ICD-10-CM

## 2023-08-27 DIAGNOSIS — R11.2 NAUSEA VOMITING AND DIARRHEA: Primary | ICD-10-CM

## 2023-08-27 LAB
ALBUMIN SERPL-MCNC: 4.1 G/DL (ref 3.5–4.6)
ALP SERPL-CCNC: 113 U/L (ref 40–130)
ALT SERPL-CCNC: 14 U/L (ref 0–33)
ANION GAP SERPL CALCULATED.3IONS-SCNC: 14 MEQ/L (ref 9–15)
AST SERPL-CCNC: 27 U/L (ref 0–35)
BACTERIA URNS QL MICRO: ABNORMAL /HPF
BASOPHILS # BLD: 0 K/UL (ref 0–0.2)
BASOPHILS NFR BLD: 0.4 %
BILIRUB SERPL-MCNC: 0.4 MG/DL (ref 0.2–0.7)
BILIRUB UR QL STRIP: NEGATIVE
BUN SERPL-MCNC: 18 MG/DL (ref 8–23)
CALCIUM SERPL-MCNC: 8 MG/DL (ref 8.5–9.9)
CHLORIDE SERPL-SCNC: 106 MEQ/L (ref 95–107)
CK SERPL-CCNC: 53 U/L (ref 0–170)
CLARITY UR: CLEAR
CO2 SERPL-SCNC: 19 MEQ/L (ref 20–31)
COLOR UR: YELLOW
CREAT SERPL-MCNC: 0.53 MG/DL (ref 0.5–0.9)
EOSINOPHIL # BLD: 0 K/UL (ref 0–0.7)
EOSINOPHIL NFR BLD: 0.1 %
EPI CELLS #/AREA URNS AUTO: ABNORMAL /HPF (ref 0–5)
ERYTHROCYTE [DISTWIDTH] IN BLOOD BY AUTOMATED COUNT: 17.4 % (ref 11.5–14.5)
GLOBULIN SER CALC-MCNC: 3.5 G/DL (ref 2.3–3.5)
GLUCOSE SERPL-MCNC: 193 MG/DL (ref 70–99)
GLUCOSE UR STRIP-MCNC: NEGATIVE MG/DL
HCT VFR BLD AUTO: 38.8 % (ref 37–47)
HGB BLD-MCNC: 12.9 G/DL (ref 12–16)
HGB UR QL STRIP: NEGATIVE
HYALINE CASTS #/AREA URNS AUTO: ABNORMAL /HPF (ref 0–5)
KETONES UR STRIP-MCNC: 15 MG/DL
LACTATE BLDV-SCNC: 1.5 MMOL/L (ref 0.5–2.2)
LEUKOCYTE ESTERASE UR QL STRIP: ABNORMAL
LIPASE SERPL-CCNC: 13 U/L (ref 12–95)
LYMPHOCYTES # BLD: 0.7 K/UL (ref 1–4.8)
LYMPHOCYTES NFR BLD: 13.4 %
MAGNESIUM SERPL-MCNC: 1.8 MG/DL (ref 1.7–2.4)
MCH RBC QN AUTO: 33.5 PG (ref 27–31.3)
MCHC RBC AUTO-ENTMCNC: 33.3 % (ref 33–37)
MCV RBC AUTO: 100.4 FL (ref 79.4–94.8)
MONOCYTES # BLD: 0.2 K/UL (ref 0.2–0.8)
MONOCYTES NFR BLD: 4.3 %
NEUTROPHILS # BLD: 4 K/UL (ref 1.4–6.5)
NEUTS SEG NFR BLD: 81.8 %
NITRITE UR QL STRIP: POSITIVE
PH UR STRIP: 5.5 [PH] (ref 5–9)
PLATELET # BLD AUTO: 121 K/UL (ref 130–400)
POTASSIUM SERPL-SCNC: 4.5 MEQ/L (ref 3.4–4.9)
PROCALCITONIN SERPL IA-MCNC: 0.03 NG/ML (ref 0–0.15)
PROT SERPL-MCNC: 7.6 G/DL (ref 6.3–8)
PROT UR STRIP-MCNC: NEGATIVE MG/DL
RBC # BLD AUTO: 3.86 M/UL (ref 4.2–5.4)
RBC #/AREA URNS AUTO: ABNORMAL /HPF (ref 0–5)
SARS-COV-2 RDRP RESP QL NAA+PROBE: NOT DETECTED
SODIUM SERPL-SCNC: 139 MEQ/L (ref 135–144)
SP GR UR STRIP: 1.01 (ref 1–1.03)
TROPONIN T SERPL-MCNC: <0.01 NG/ML (ref 0–0.01)
URINE REFLEX TO CULTURE: YES
UROBILINOGEN UR STRIP-ACNC: 0.2 E.U./DL
WBC # BLD AUTO: 4.9 K/UL (ref 4.8–10.8)
WBC #/AREA URNS AUTO: ABNORMAL /HPF (ref 0–5)

## 2023-08-27 PROCEDURE — 83735 ASSAY OF MAGNESIUM: CPT

## 2023-08-27 PROCEDURE — 96375 TX/PRO/DX INJ NEW DRUG ADDON: CPT

## 2023-08-27 PROCEDURE — 87635 SARS-COV-2 COVID-19 AMP PRB: CPT

## 2023-08-27 PROCEDURE — 87186 SC STD MICRODIL/AGAR DIL: CPT

## 2023-08-27 PROCEDURE — 2580000003 HC RX 258: Performed by: STUDENT IN AN ORGANIZED HEALTH CARE EDUCATION/TRAINING PROGRAM

## 2023-08-27 PROCEDURE — A4216 STERILE WATER/SALINE, 10 ML: HCPCS | Performed by: STUDENT IN AN ORGANIZED HEALTH CARE EDUCATION/TRAINING PROGRAM

## 2023-08-27 PROCEDURE — 83605 ASSAY OF LACTIC ACID: CPT

## 2023-08-27 PROCEDURE — 99284 EMERGENCY DEPT VISIT MOD MDM: CPT

## 2023-08-27 PROCEDURE — 36415 COLL VENOUS BLD VENIPUNCTURE: CPT

## 2023-08-27 PROCEDURE — 87077 CULTURE AEROBIC IDENTIFY: CPT

## 2023-08-27 PROCEDURE — 6370000000 HC RX 637 (ALT 250 FOR IP): Performed by: EMERGENCY MEDICINE

## 2023-08-27 PROCEDURE — 85025 COMPLETE CBC W/AUTO DIFF WBC: CPT

## 2023-08-27 PROCEDURE — 84484 ASSAY OF TROPONIN QUANT: CPT

## 2023-08-27 PROCEDURE — 6360000002 HC RX W HCPCS: Performed by: STUDENT IN AN ORGANIZED HEALTH CARE EDUCATION/TRAINING PROGRAM

## 2023-08-27 PROCEDURE — 83690 ASSAY OF LIPASE: CPT

## 2023-08-27 PROCEDURE — 96374 THER/PROPH/DIAG INJ IV PUSH: CPT

## 2023-08-27 PROCEDURE — 96376 TX/PRO/DX INJ SAME DRUG ADON: CPT

## 2023-08-27 PROCEDURE — 81001 URINALYSIS AUTO W/SCOPE: CPT

## 2023-08-27 PROCEDURE — 84145 PROCALCITONIN (PCT): CPT

## 2023-08-27 PROCEDURE — 87086 URINE CULTURE/COLONY COUNT: CPT

## 2023-08-27 PROCEDURE — 80053 COMPREHEN METABOLIC PANEL: CPT

## 2023-08-27 PROCEDURE — 82550 ASSAY OF CK (CPK): CPT

## 2023-08-27 PROCEDURE — 2500000003 HC RX 250 WO HCPCS: Performed by: STUDENT IN AN ORGANIZED HEALTH CARE EDUCATION/TRAINING PROGRAM

## 2023-08-27 PROCEDURE — 96361 HYDRATE IV INFUSION ADD-ON: CPT

## 2023-08-27 PROCEDURE — 74176 CT ABD & PELVIS W/O CONTRAST: CPT

## 2023-08-27 RX ORDER — ONDANSETRON 2 MG/ML
4 INJECTION INTRAMUSCULAR; INTRAVENOUS ONCE
Status: COMPLETED | OUTPATIENT
Start: 2023-08-27 | End: 2023-08-27

## 2023-08-27 RX ORDER — LORAZEPAM 2 MG/ML
1 INJECTION INTRAMUSCULAR ONCE
Status: COMPLETED | OUTPATIENT
Start: 2023-08-27 | End: 2023-08-27

## 2023-08-27 RX ORDER — 0.9 % SODIUM CHLORIDE 0.9 %
1000 INTRAVENOUS SOLUTION INTRAVENOUS ONCE
Status: COMPLETED | OUTPATIENT
Start: 2023-08-27 | End: 2023-08-27

## 2023-08-27 RX ORDER — CEPHALEXIN 500 MG/1
500 CAPSULE ORAL ONCE
Status: COMPLETED | OUTPATIENT
Start: 2023-08-27 | End: 2023-08-27

## 2023-08-27 RX ORDER — ONDANSETRON 4 MG/1
4 TABLET, ORALLY DISINTEGRATING ORAL 3 TIMES DAILY PRN
Qty: 12 TABLET | Refills: 0 | Status: SHIPPED | OUTPATIENT
Start: 2023-08-27

## 2023-08-27 RX ORDER — CEPHALEXIN 500 MG/1
500 CAPSULE ORAL 2 TIMES DAILY
Qty: 14 CAPSULE | Refills: 0 | Status: ON HOLD | OUTPATIENT
Start: 2023-08-27 | End: 2023-08-30 | Stop reason: SDUPTHER

## 2023-08-27 RX ADMIN — FAMOTIDINE 20 MG: 10 INJECTION INTRAVENOUS at 04:51

## 2023-08-27 RX ADMIN — ONDANSETRON 4 MG: 2 INJECTION INTRAMUSCULAR; INTRAVENOUS at 04:51

## 2023-08-27 RX ADMIN — ONDANSETRON 4 MG: 2 INJECTION INTRAMUSCULAR; INTRAVENOUS at 07:25

## 2023-08-27 RX ADMIN — CEPHALEXIN 500 MG: 500 CAPSULE ORAL at 07:48

## 2023-08-27 RX ADMIN — LORAZEPAM 1 MG: 2 INJECTION INTRAMUSCULAR; INTRAVENOUS at 07:27

## 2023-08-27 RX ADMIN — SODIUM CHLORIDE 1000 ML: 9 INJECTION, SOLUTION INTRAVENOUS at 04:54

## 2023-08-27 ASSESSMENT — LIFESTYLE VARIABLES
HOW OFTEN DO YOU HAVE A DRINK CONTAINING ALCOHOL: MONTHLY OR LESS
HOW MANY STANDARD DRINKS CONTAINING ALCOHOL DO YOU HAVE ON A TYPICAL DAY: 1 OR 2

## 2023-08-27 ASSESSMENT — PAIN - FUNCTIONAL ASSESSMENT: PAIN_FUNCTIONAL_ASSESSMENT: NONE - DENIES PAIN

## 2023-08-27 NOTE — ED NOTES
Dr Cheko Taylor notified of patients BP being 1300 [address], [address]  08/27/23 0340

## 2023-08-27 NOTE — ED PROVIDER NOTES
[facility] ED  eMERGENCY dEPARTMENT eNCOUnter      Pt Name: Yasmeen Hammonds  MRN: [id number]  [address] [facility] 1950  Date of evaluation: 8/27/2023  Provider: Dee Murphy MD  Note Started: 8/27/23 4:44 AM EDT    HISTORY OF PRESENT ILLNESS      Chief Complaint   Patient presents with    Illness     N/V/D x1 week       The history is provided by the Patient. Yasmeen Hammonds is a 75 y.o. female with a PMH clinically significant for Dysautonomia, HTN, HLD, DM II, GERD, Anemia, Chronic back pain, Anxiety, PTSD, and Migraine HA's, S/P Cholecystectomy and S/P Appendectomy, S/p RLE BKA presenting to the ED via EMS c/o nausea, multiple episodes of nonbloody nonbilious emesis, multiple episodes of nonbloody diarrhea, generalized fatigue, poor p.o. intake/appetite, congestion, rhinorrhea, nonproductive cough and generalized myalgias with symptoms ongoing for the past week. States that she is started to feel lightheaded and dizzy as well, feeling like she is going to pass out because she is so dehydrated. States abdominal pain is aching, cramping and diffuse. Denies any associated: F/C/D, HA, Neck pain/stiffness, Focal Weakness, Hemoptysis, SOB, CP, Orthopnea, New or worsening BLE Edema/pain, Hematemesis, Melena, Hematochezia, Flank pain, Dysuria, Hematuria, or Difficulty urinating. Precipitating Factors: None. Denies preceding Trauma, Exertional or strenuous activities, Abnormal PO intake, or Known exposures to sick contacts. Alleviating Factors: Nothing, nothing yet tried for relief PTA. States history of similar previous episodes. States they have otherwise been feeling well. Per Chart Review: PMH as noted above obtained via outpatient chart review. Most recent CT A/P on 12/30/22 appreciated.        REVIEW OF SYSTEMS       Review of Systems  Otherwise as noted in HPI    PAST MEDICAL HISTORY     Past Medical History:   Diagnosis Date    Anxiety     Bipolar affective disorder ([address])     DM (diabetes mellitus)

## 2023-08-29 ENCOUNTER — APPOINTMENT (OUTPATIENT)
Dept: GENERAL RADIOLOGY | Age: 73
DRG: 149 | End: 2023-08-29
Payer: MEDICARE

## 2023-08-29 ENCOUNTER — HOSPITAL ENCOUNTER (INPATIENT)
Age: 73
LOS: 4 days | Discharge: ANOTHER HOSPITAL | DRG: 149 | End: 2023-09-02
Attending: INTERNAL MEDICINE | Admitting: INTERNAL MEDICINE
Payer: MEDICARE

## 2023-08-29 ENCOUNTER — APPOINTMENT (OUTPATIENT)
Dept: CT IMAGING | Age: 73
DRG: 149 | End: 2023-08-29
Payer: MEDICARE

## 2023-08-29 DIAGNOSIS — R27.0 ATAXIA: ICD-10-CM

## 2023-08-29 DIAGNOSIS — I42.9 CARDIOMYOPATHY (HCC): ICD-10-CM

## 2023-08-29 DIAGNOSIS — I95.9 HYPOTENSION, UNSPECIFIED HYPOTENSION TYPE: ICD-10-CM

## 2023-08-29 DIAGNOSIS — R42 ORTHOSTATIC DIZZINESS: Primary | ICD-10-CM

## 2023-08-29 LAB
ALBUMIN SERPL-MCNC: 3.9 G/DL (ref 3.5–4.6)
ALP SERPL-CCNC: 93 U/L (ref 40–130)
ALT SERPL-CCNC: 20 U/L (ref 0–33)
ANION GAP SERPL CALCULATED.3IONS-SCNC: 17 MEQ/L (ref 9–15)
APTT PPP: 25.7 SEC (ref 24.4–36.8)
AST SERPL-CCNC: 46 U/L (ref 0–35)
BACTERIA UR CULT: ABNORMAL
BACTERIA UR CULT: ABNORMAL
BASOPHILS # BLD: 0 K/UL (ref 0–0.2)
BASOPHILS NFR BLD: 0.4 %
BILIRUB SERPL-MCNC: <0.2 MG/DL (ref 0.2–0.7)
BUN SERPL-MCNC: 15 MG/DL (ref 8–23)
CALCIUM SERPL-MCNC: 8 MG/DL (ref 8.5–9.9)
CHLORIDE SERPL-SCNC: 106 MEQ/L (ref 95–107)
CK SERPL-CCNC: 58 U/L (ref 0–170)
CO2 SERPL-SCNC: 18 MEQ/L (ref 20–31)
CREAT SERPL-MCNC: 0.72 MG/DL (ref 0.5–0.9)
EOSINOPHIL # BLD: 0 K/UL (ref 0–0.7)
EOSINOPHIL NFR BLD: 0.7 %
ERYTHROCYTE [DISTWIDTH] IN BLOOD BY AUTOMATED COUNT: 17.4 % (ref 11.5–14.5)
GLOBULIN SER CALC-MCNC: 3.4 G/DL (ref 2.3–3.5)
GLUCOSE SERPL-MCNC: 106 MG/DL (ref 70–99)
HCT VFR BLD AUTO: 35.4 % (ref 37–47)
HGB BLD-MCNC: 11.7 G/DL (ref 12–16)
INR PPP: 1.1
LYMPHOCYTES # BLD: 0.9 K/UL (ref 1–4.8)
LYMPHOCYTES NFR BLD: 21.9 %
MAGNESIUM SERPL-MCNC: 1.9 MG/DL (ref 1.7–2.4)
MCH RBC QN AUTO: 33.9 PG (ref 27–31.3)
MCHC RBC AUTO-ENTMCNC: 33 % (ref 33–37)
MCV RBC AUTO: 102.6 FL (ref 79.4–94.8)
MONOCYTES # BLD: 0.3 K/UL (ref 0.2–0.8)
MONOCYTES NFR BLD: 8.9 %
NEUTROPHILS # BLD: 2.7 K/UL (ref 1.4–6.5)
NEUTS SEG NFR BLD: 68.1 %
ORGANISM: ABNORMAL
PLATELET # BLD AUTO: 110 K/UL (ref 130–400)
POTASSIUM SERPL-SCNC: 3.9 MEQ/L (ref 3.4–4.9)
PROT SERPL-MCNC: 7.3 G/DL (ref 6.3–8)
PROTHROMBIN TIME: 14.3 SEC (ref 12.3–14.9)
RBC # BLD AUTO: 3.46 M/UL (ref 4.2–5.4)
SODIUM SERPL-SCNC: 141 MEQ/L (ref 135–144)
TROPONIN T SERPL-MCNC: <0.01 NG/ML (ref 0–0.01)
TSH SERPL-MCNC: 2.8 UIU/ML (ref 0.44–3.86)
WBC # BLD AUTO: 3.9 K/UL (ref 4.8–10.8)

## 2023-08-29 PROCEDURE — 82550 ASSAY OF CK (CPK): CPT

## 2023-08-29 PROCEDURE — 6370000000 HC RX 637 (ALT 250 FOR IP): Performed by: PHYSICIAN ASSISTANT

## 2023-08-29 PROCEDURE — 85610 PROTHROMBIN TIME: CPT

## 2023-08-29 PROCEDURE — 93005 ELECTROCARDIOGRAM TRACING: CPT | Performed by: INTERNAL MEDICINE

## 2023-08-29 PROCEDURE — 80053 COMPREHEN METABOLIC PANEL: CPT

## 2023-08-29 PROCEDURE — 36415 COLL VENOUS BLD VENIPUNCTURE: CPT

## 2023-08-29 PROCEDURE — 85025 COMPLETE CBC W/AUTO DIFF WBC: CPT

## 2023-08-29 PROCEDURE — 85730 THROMBOPLASTIN TIME PARTIAL: CPT

## 2023-08-29 PROCEDURE — 84484 ASSAY OF TROPONIN QUANT: CPT

## 2023-08-29 PROCEDURE — 2060000000 HC ICU INTERMEDIATE R&B

## 2023-08-29 PROCEDURE — 2580000003 HC RX 258: Performed by: PHYSICIAN ASSISTANT

## 2023-08-29 PROCEDURE — 99285 EMERGENCY DEPT VISIT HI MDM: CPT

## 2023-08-29 PROCEDURE — 6360000002 HC RX W HCPCS: Performed by: NURSE PRACTITIONER

## 2023-08-29 PROCEDURE — 71045 X-RAY EXAM CHEST 1 VIEW: CPT

## 2023-08-29 PROCEDURE — 70450 CT HEAD/BRAIN W/O DYE: CPT

## 2023-08-29 PROCEDURE — 84443 ASSAY THYROID STIM HORMONE: CPT

## 2023-08-29 PROCEDURE — 83735 ASSAY OF MAGNESIUM: CPT

## 2023-08-29 RX ORDER — SODIUM CHLORIDE 0.9 % (FLUSH) 0.9 %
5-40 SYRINGE (ML) INJECTION PRN
Status: DISCONTINUED | OUTPATIENT
Start: 2023-08-29 | End: 2023-09-02 | Stop reason: HOSPADM

## 2023-08-29 RX ORDER — QUETIAPINE FUMARATE 100 MG/1
150 TABLET, FILM COATED ORAL
COMMUNITY

## 2023-08-29 RX ORDER — CLONAZEPAM 1 MG/1
2 TABLET ORAL 2 TIMES DAILY
Status: DISCONTINUED | OUTPATIENT
Start: 2023-08-29 | End: 2023-08-29

## 2023-08-29 RX ORDER — LISINOPRIL 40 MG/1
20 TABLET ORAL DAILY
COMMUNITY

## 2023-08-29 RX ORDER — LISINOPRIL 20 MG/1
20 TABLET ORAL DAILY
Status: DISCONTINUED | OUTPATIENT
Start: 2023-08-30 | End: 2023-08-31

## 2023-08-29 RX ORDER — LISINOPRIL 20 MG/1
40 TABLET ORAL DAILY
Status: DISCONTINUED | OUTPATIENT
Start: 2023-08-30 | End: 2023-08-29

## 2023-08-29 RX ORDER — SERTRALINE HYDROCHLORIDE 100 MG/1
100 TABLET, FILM COATED ORAL 2 TIMES DAILY
Status: DISCONTINUED | OUTPATIENT
Start: 2023-08-29 | End: 2023-08-29

## 2023-08-29 RX ORDER — LABETALOL HYDROCHLORIDE 5 MG/ML
10 INJECTION, SOLUTION INTRAVENOUS EVERY 4 HOURS PRN
Status: DISCONTINUED | OUTPATIENT
Start: 2023-08-29 | End: 2023-09-02 | Stop reason: HOSPADM

## 2023-08-29 RX ORDER — ACETAMINOPHEN 650 MG/1
650 SUPPOSITORY RECTAL EVERY 6 HOURS PRN
Status: DISCONTINUED | OUTPATIENT
Start: 2023-08-29 | End: 2023-09-02 | Stop reason: HOSPADM

## 2023-08-29 RX ORDER — POLYETHYLENE GLYCOL 3350 17 G/17G
17 POWDER, FOR SOLUTION ORAL DAILY PRN
Status: DISCONTINUED | OUTPATIENT
Start: 2023-08-29 | End: 2023-09-02 | Stop reason: HOSPADM

## 2023-08-29 RX ORDER — ENOXAPARIN SODIUM 100 MG/ML
40 INJECTION SUBCUTANEOUS DAILY
Status: DISCONTINUED | OUTPATIENT
Start: 2023-08-30 | End: 2023-09-02 | Stop reason: HOSPADM

## 2023-08-29 RX ORDER — SERTRALINE HYDROCHLORIDE 100 MG/1
150 TABLET, FILM COATED ORAL DAILY
Status: DISCONTINUED | OUTPATIENT
Start: 2023-08-30 | End: 2023-09-02 | Stop reason: HOSPADM

## 2023-08-29 RX ORDER — QUETIAPINE FUMARATE 50 MG/1
150 TABLET, EXTENDED RELEASE ORAL NIGHTLY
Status: DISCONTINUED | OUTPATIENT
Start: 2023-08-29 | End: 2023-08-29

## 2023-08-29 RX ORDER — HYDRALAZINE HYDROCHLORIDE 20 MG/ML
10 INJECTION INTRAMUSCULAR; INTRAVENOUS EVERY 6 HOURS PRN
Status: DISCONTINUED | OUTPATIENT
Start: 2023-08-29 | End: 2023-09-02 | Stop reason: HOSPADM

## 2023-08-29 RX ORDER — SODIUM CHLORIDE 9 MG/ML
INJECTION, SOLUTION INTRAVENOUS PRN
Status: DISCONTINUED | OUTPATIENT
Start: 2023-08-29 | End: 2023-09-02 | Stop reason: HOSPADM

## 2023-08-29 RX ORDER — BUPROPION HYDROCHLORIDE 75 MG/1
75 TABLET ORAL DAILY
Status: DISCONTINUED | OUTPATIENT
Start: 2023-08-30 | End: 2023-09-02 | Stop reason: HOSPADM

## 2023-08-29 RX ORDER — ONDANSETRON 2 MG/ML
4 INJECTION INTRAMUSCULAR; INTRAVENOUS EVERY 6 HOURS PRN
Status: DISCONTINUED | OUTPATIENT
Start: 2023-08-29 | End: 2023-09-02 | Stop reason: HOSPADM

## 2023-08-29 RX ORDER — ONDANSETRON 4 MG/1
4 TABLET, ORALLY DISINTEGRATING ORAL EVERY 8 HOURS PRN
Status: DISCONTINUED | OUTPATIENT
Start: 2023-08-29 | End: 2023-09-02 | Stop reason: HOSPADM

## 2023-08-29 RX ORDER — L. ACIDOPHILUS/L.BULGARICUS 1MM CELL
2 TABLET ORAL 3 TIMES DAILY
Status: DISCONTINUED | OUTPATIENT
Start: 2023-08-29 | End: 2023-09-02 | Stop reason: HOSPADM

## 2023-08-29 RX ORDER — MECLIZINE HYDROCHLORIDE 25 MG/1
25 TABLET ORAL ONCE
Status: COMPLETED | OUTPATIENT
Start: 2023-08-29 | End: 2023-08-29

## 2023-08-29 RX ORDER — BUPROPION HYDROCHLORIDE 100 MG/1
100 TABLET ORAL 2 TIMES DAILY
Status: DISCONTINUED | OUTPATIENT
Start: 2023-08-29 | End: 2023-08-29

## 2023-08-29 RX ORDER — METOPROLOL TARTRATE 50 MG/1
200 TABLET, FILM COATED ORAL 2 TIMES DAILY
Status: DISCONTINUED | OUTPATIENT
Start: 2023-08-30 | End: 2023-08-31

## 2023-08-29 RX ORDER — METOPROLOL TARTRATE 100 MG/1
150 TABLET ORAL 2 TIMES DAILY
COMMUNITY

## 2023-08-29 RX ORDER — SODIUM CHLORIDE 0.9 % (FLUSH) 0.9 %
5-40 SYRINGE (ML) INJECTION EVERY 12 HOURS SCHEDULED
Status: DISCONTINUED | OUTPATIENT
Start: 2023-08-29 | End: 2023-09-02 | Stop reason: HOSPADM

## 2023-08-29 RX ORDER — METOPROLOL TARTRATE 50 MG/1
100 TABLET, FILM COATED ORAL 2 TIMES DAILY
Status: DISCONTINUED | OUTPATIENT
Start: 2023-08-29 | End: 2023-08-29

## 2023-08-29 RX ORDER — CLONAZEPAM 1 MG/1
2 TABLET ORAL 2 TIMES DAILY PRN
Status: DISCONTINUED | OUTPATIENT
Start: 2023-08-30 | End: 2023-09-02 | Stop reason: HOSPADM

## 2023-08-29 RX ORDER — LAMOTRIGINE 100 MG/1
200 TABLET ORAL DAILY
Status: DISCONTINUED | OUTPATIENT
Start: 2023-08-30 | End: 2023-09-02 | Stop reason: HOSPADM

## 2023-08-29 RX ORDER — 0.9 % SODIUM CHLORIDE 0.9 %
1000 INTRAVENOUS SOLUTION INTRAVENOUS ONCE
Status: COMPLETED | OUTPATIENT
Start: 2023-08-29 | End: 2023-08-29

## 2023-08-29 RX ORDER — LAMOTRIGINE 100 MG/1
100 TABLET ORAL 2 TIMES DAILY
Status: DISCONTINUED | OUTPATIENT
Start: 2023-08-29 | End: 2023-08-29

## 2023-08-29 RX ORDER — ACETAMINOPHEN 325 MG/1
650 TABLET ORAL EVERY 6 HOURS PRN
Status: DISCONTINUED | OUTPATIENT
Start: 2023-08-29 | End: 2023-09-02 | Stop reason: HOSPADM

## 2023-08-29 RX ORDER — MECLIZINE HYDROCHLORIDE 25 MG/1
25 TABLET ORAL 3 TIMES DAILY PRN
Status: DISCONTINUED | OUTPATIENT
Start: 2023-08-29 | End: 2023-09-02 | Stop reason: HOSPADM

## 2023-08-29 RX ADMIN — ONDANSETRON 4 MG: 2 INJECTION INTRAMUSCULAR; INTRAVENOUS at 23:40

## 2023-08-29 RX ADMIN — SODIUM CHLORIDE 1000 ML: 9 INJECTION, SOLUTION INTRAVENOUS at 18:55

## 2023-08-29 RX ADMIN — MECLIZINE HYDROCHLORIDE 25 MG: 25 TABLET ORAL at 17:59

## 2023-08-29 ASSESSMENT — ENCOUNTER SYMPTOMS
PHOTOPHOBIA: 0
ALLERGIC/IMMUNOLOGIC NEGATIVE: 1
ANAL BLEEDING: 0
ABDOMINAL DISTENTION: 0
EYE DISCHARGE: 0
ABDOMINAL PAIN: 0
EYES NEGATIVE: 1
COUGH: 0
BACK PAIN: 0
WHEEZING: 0
NAUSEA: 1
SHORTNESS OF BREATH: 0
VOMITING: 0
SORE THROAT: 0
RHINORRHEA: 0
NAUSEA: 0
VOICE CHANGE: 0

## 2023-08-29 ASSESSMENT — PAIN SCALES - GENERAL: PAINLEVEL_OUTOF10: 0

## 2023-08-29 ASSESSMENT — LIFESTYLE VARIABLES
HOW OFTEN DO YOU HAVE A DRINK CONTAINING ALCOHOL: NEVER
HOW OFTEN DO YOU HAVE A DRINK CONTAINING ALCOHOL: NEVER
HOW MANY STANDARD DRINKS CONTAINING ALCOHOL DO YOU HAVE ON A TYPICAL DAY: PATIENT DOES NOT DRINK
HOW MANY STANDARD DRINKS CONTAINING ALCOHOL DO YOU HAVE ON A TYPICAL DAY: PATIENT DOES NOT DRINK

## 2023-08-29 ASSESSMENT — PAIN - FUNCTIONAL ASSESSMENT
PAIN_FUNCTIONAL_ASSESSMENT: 0-10
PAIN_FUNCTIONAL_ASSESSMENT: NONE - DENIES PAIN

## 2023-08-29 ASSESSMENT — PAIN DESCRIPTION - LOCATION: LOCATION: GENERALIZED

## 2023-08-29 ASSESSMENT — PAIN DESCRIPTION - DESCRIPTORS: DESCRIPTORS: DISCOMFORT

## 2023-08-29 NOTE — ED NOTES
Pt states increased dizziness due to lying flat in CT   Pt educated of medications given and to call this nurse when feeling better       Ron Parr RN  08/29/23 9936

## 2023-08-29 NOTE — ED NOTES
Pt states that she has not taken any prescribed medications since Sunday  Pt states due to blood pressure fluctuating  Pt educated that she should not abruptly stop taking medications and should always call prescribing physician before doing so. Pt states understanding stating \"Noone ever told me that\".    Pt also states that she has not taken gabapentin     Guerda Segura RN  08/29/23 8070

## 2023-08-29 NOTE — ED PROVIDER NOTES
[facility]  eMERGENCY dEPARTMENT eNCOUnter      Pt Name: Fara Fields  MRN: [id number]  [facility] 1950  Date of evaluation: 8/29/2023  Provider: Debby Padron, [facility]       Chief Complaint   Patient presents with    Dizziness     Pt states she was here Sunday and is feeling dizzy, nauseous and shaky. HISTORY OF PRESENT ILLNESS   (Location/Symptom, Timing/Onset,Context/Setting, Quality, Duration, Modifying Factors, Severity)  Note limiting factors. Fara Fields is a 75 y.o. female who presents to the emergency department patient seen on Sunday states feeling dizzy nauseous chest pain she states the room spinning dizziness has been present has seen neurology today was sent to emergency room. She denies fever chills pain burning frequent urination urinary bleeding rectal bleeding. Symptoms mild to moderate severity worse with change in position. HPI    NursingNotes were reviewed. REVIEW OF SYSTEMS    (2-9 systems for level 4, 10 or more for level 5)     Review of Systems   Constitutional:  Negative for activity change and appetite change. HENT:  Negative for ear discharge, nosebleeds and voice change. Eyes:  Negative for discharge and visual disturbance. Respiratory:  Negative for cough and shortness of breath. Cardiovascular:  Positive for chest pain. Gastrointestinal:  Positive for nausea. Negative for abdominal distention, anal bleeding and vomiting. Genitourinary:  Negative for hematuria. Musculoskeletal:  Positive for neck pain. Negative for joint swelling. Skin:  Negative for pallor. Neurological:  Positive for dizziness. Negative for seizures and facial asymmetry. Psychiatric/Behavioral:  Negative for behavioral problems, self-injury and sleep disturbance. All other systems reviewed and are negative. Except as noted above the remainder of the review of systems was reviewed and negative.        PAST MEDICAL HISTORY     Past Medical History:   Diagnosis Date    Anxiety     Bipolar affective disorder ([address])     DM (diabetes mellitus) ([address])     Epidural abscess 09/09/2021    Head injury     Hypertension     Migraine     Migraine headache 07/18/2014    Osteomyelitis ([address]) 12/07/2012    PTSD (post-traumatic stress disorder)     TIA (transient ischemic attack)     x 3         SURGICALHISTORY       Past Surgical History:   Procedure Laterality Date    APPENDECTOMY      CHOLECYSTECTOMY      LEG AMPUTATION BELOW KNEE Bilateral     NASAL FRACTURE SURGERY N/A 04/25/2019    CLOSED REDUCTON EXTERNAL FIXATION OF NASAL BONE FRACTURE WITH SEVERE DNS (DEVIATED NASAL SEPTUM)  ROOM 190 performed by Nataliya Rojas MD at [address] [place] [address]       Current Discharge Medication List        CONTINUE these medications which have NOT CHANGED    Details   prochlorperazine (COMPAZINE) 10 MG tablet Take 1 tablet by mouth every 6 hours as needed      metoprolol (LOPRESSOR) 100 MG tablet Take 1.5 tablets by mouth 2 times daily      lisinopril (PRINIVIL;ZESTRIL) 40 MG tablet Take 0.5 tablets by mouth daily      QUEtiapine (SEROQUEL) 100 MG tablet Take 1.5 tablets by mouth nightly      ondansetron (ZOFRAN-ODT) 4 MG disintegrating tablet Take 1 tablet by mouth 3 times daily as needed for Nausea or Vomiting  Qty: 12 tablet, Refills: 0      cephALEXin (KEFLEX) 500 MG capsule Take 1 capsule by mouth 2 times daily for 7 days  Qty: 14 capsule, Refills: 0      lactobacillus acidophilus (FLORANEX) Take 2 tablets by mouth in the morning and 2 tablets at noon and 2 tablets before bedtime. Qty: 120 tablet, Refills: 5      alendronate (FOSAMAX) 70 MG tablet Take 1 tablet by mouth once a week      buPROPion (WELLBUTRIN) 100 MG tablet Take 1 tablet by mouth daily      sertraline (ZOLOFT) 100 MG tablet Take 1.5 tablets by mouth daily      clonazePAM (KLONOPIN) 2 MG tablet Take 1 tablet by mouth 2 times daily as needed.       lamoTRIgine (LAMICTAL) 100 MG tablet Take 1 tablet

## 2023-08-29 NOTE — ED NOTES
Orthostatics obtained and Brittnee ARROYO updated at this time that pt was unable to stand for last set of vitals     Lori Snider RN  08/1950

## 2023-08-30 ENCOUNTER — APPOINTMENT (OUTPATIENT)
Dept: ULTRASOUND IMAGING | Age: 73
DRG: 149 | End: 2023-08-30
Payer: MEDICARE

## 2023-08-30 LAB
ALBUMIN SERPL-MCNC: 3.5 G/DL (ref 3.5–4.6)
ALP SERPL-CCNC: 85 U/L (ref 40–130)
ALT SERPL-CCNC: 16 U/L (ref 0–33)
AMPHET UR QL SCN: NORMAL
ANION GAP SERPL CALCULATED.3IONS-SCNC: 11 MEQ/L (ref 9–15)
AST SERPL-CCNC: 26 U/L (ref 0–35)
BARBITURATES UR QL SCN: NORMAL
BASOPHILS # BLD: 0 K/UL (ref 0–0.2)
BASOPHILS NFR BLD: 0.3 %
BENZODIAZ UR QL SCN: NORMAL
BILIRUB SERPL-MCNC: 0.4 MG/DL (ref 0.2–0.7)
BUN SERPL-MCNC: 11 MG/DL (ref 8–23)
CALCIUM SERPL-MCNC: 7.5 MG/DL (ref 8.5–9.9)
CANNABINOIDS UR QL SCN: NORMAL
CHLORIDE SERPL-SCNC: 109 MEQ/L (ref 95–107)
CO2 SERPL-SCNC: 21 MEQ/L (ref 20–31)
COCAINE UR QL SCN: NORMAL
CREAT SERPL-MCNC: 0.51 MG/DL (ref 0.5–0.9)
DRUG SCREEN COMMENT UR-IMP: NORMAL
EKG ATRIAL RATE: 60 BPM
EKG ATRIAL RATE: 88 BPM
EKG P AXIS: 80 DEGREES
EKG P AXIS: 93 DEGREES
EKG P-R INTERVAL: 144 MS
EKG P-R INTERVAL: 164 MS
EKG Q-T INTERVAL: 396 MS
EKG Q-T INTERVAL: 472 MS
EKG QRS DURATION: 98 MS
EKG QRS DURATION: 98 MS
EKG QTC CALCULATION (BAZETT): 472 MS
EKG QTC CALCULATION (BAZETT): 479 MS
EKG R AXIS: 126 DEGREES
EKG R AXIS: 52 DEGREES
EKG T AXIS: 147 DEGREES
EKG T AXIS: 30 DEGREES
EKG VENTRICULAR RATE: 60 BPM
EKG VENTRICULAR RATE: 88 BPM
EOSINOPHIL # BLD: 0 K/UL (ref 0–0.7)
EOSINOPHIL NFR BLD: 0.6 %
ERYTHROCYTE [DISTWIDTH] IN BLOOD BY AUTOMATED COUNT: 17.4 % (ref 11.5–14.5)
FENTANYL SCREEN, URINE: NORMAL
GLOBULIN SER CALC-MCNC: 2.6 G/DL (ref 2.3–3.5)
GLUCOSE SERPL-MCNC: 100 MG/DL (ref 70–99)
HCT VFR BLD AUTO: 32.4 % (ref 37–47)
HGB BLD-MCNC: 11 G/DL (ref 12–16)
LYMPHOCYTES # BLD: 1.2 K/UL (ref 1–4.8)
LYMPHOCYTES NFR BLD: 31.1 %
MAGNESIUM SERPL-MCNC: 1.6 MG/DL (ref 1.7–2.4)
MCH RBC QN AUTO: 34.3 PG (ref 27–31.3)
MCHC RBC AUTO-ENTMCNC: 33.9 % (ref 33–37)
MCV RBC AUTO: 101.3 FL (ref 79.4–94.8)
METHADONE UR QL SCN: NORMAL
MONOCYTES # BLD: 0.4 K/UL (ref 0.2–0.8)
MONOCYTES NFR BLD: 10.6 %
NEUTROPHILS # BLD: 2.3 K/UL (ref 1.4–6.5)
NEUTS SEG NFR BLD: 57.4 %
OPIATES UR QL SCN: NORMAL
OXYCODONE UR QL SCN: NORMAL
PCP UR QL SCN: NORMAL
PLATELET # BLD AUTO: 97 K/UL (ref 130–400)
PLATELET BLD QL SMEAR: ABNORMAL
POTASSIUM SERPL-SCNC: 3.7 MEQ/L (ref 3.4–4.9)
PROPOXYPH UR QL SCN: NORMAL
PROT SERPL-MCNC: 6.1 G/DL (ref 6.3–8)
RBC # BLD AUTO: 3.2 M/UL (ref 4.2–5.4)
SODIUM SERPL-SCNC: 141 MEQ/L (ref 135–144)
TROPONIN T SERPL-MCNC: <0.01 NG/ML (ref 0–0.01)
TROPONIN T SERPL-MCNC: <0.01 NG/ML (ref 0–0.01)
WBC # BLD AUTO: 4 K/UL (ref 4.8–10.8)

## 2023-08-30 PROCEDURE — 2580000003 HC RX 258: Performed by: NURSE PRACTITIONER

## 2023-08-30 PROCEDURE — 36415 COLL VENOUS BLD VENIPUNCTURE: CPT

## 2023-08-30 PROCEDURE — 84484 ASSAY OF TROPONIN QUANT: CPT

## 2023-08-30 PROCEDURE — 2060000000 HC ICU INTERMEDIATE R&B

## 2023-08-30 PROCEDURE — 6360000002 HC RX W HCPCS: Performed by: NURSE PRACTITIONER

## 2023-08-30 PROCEDURE — 83735 ASSAY OF MAGNESIUM: CPT

## 2023-08-30 PROCEDURE — 93010 ELECTROCARDIOGRAM REPORT: CPT | Performed by: INTERNAL MEDICINE

## 2023-08-30 PROCEDURE — 80053 COMPREHEN METABOLIC PANEL: CPT

## 2023-08-30 PROCEDURE — 80307 DRUG TEST PRSMV CHEM ANLYZR: CPT

## 2023-08-30 PROCEDURE — 93880 EXTRACRANIAL BILAT STUDY: CPT

## 2023-08-30 PROCEDURE — APPSS45 APP SPLIT SHARED TIME 31-45 MINUTES: Performed by: NURSE PRACTITIONER

## 2023-08-30 PROCEDURE — 6370000000 HC RX 637 (ALT 250 FOR IP): Performed by: NURSE PRACTITIONER

## 2023-08-30 PROCEDURE — 97162 PT EVAL MOD COMPLEX 30 MIN: CPT

## 2023-08-30 PROCEDURE — 85025 COMPLETE CBC W/AUTO DIFF WBC: CPT

## 2023-08-30 PROCEDURE — 99233 SBSQ HOSP IP/OBS HIGH 50: CPT | Performed by: PSYCHIATRY & NEUROLOGY

## 2023-08-30 PROCEDURE — 93005 ELECTROCARDIOGRAM TRACING: CPT | Performed by: NURSE PRACTITIONER

## 2023-08-30 RX ORDER — MECLIZINE HYDROCHLORIDE 25 MG/1
25 TABLET ORAL 3 TIMES DAILY PRN
Qty: 30 TABLET | Refills: 0 | Status: SHIPPED | OUTPATIENT
Start: 2023-08-30 | End: 2023-09-09

## 2023-08-30 RX ORDER — PROCHLORPERAZINE MALEATE 10 MG
10 TABLET ORAL EVERY 6 HOURS PRN
COMMUNITY

## 2023-08-30 RX ORDER — CEPHALEXIN 500 MG/1
500 CAPSULE ORAL 2 TIMES DAILY
Qty: 14 CAPSULE | Refills: 0 | Status: SHIPPED | OUTPATIENT
Start: 2023-08-30 | End: 2023-09-06

## 2023-08-30 RX ADMIN — ACETAMINOPHEN 650 MG: 325 TABLET ORAL at 14:31

## 2023-08-30 RX ADMIN — Medication 10 ML: at 00:55

## 2023-08-30 RX ADMIN — METOPROLOL TARTRATE 200 MG: 50 TABLET, FILM COATED ORAL at 20:23

## 2023-08-30 RX ADMIN — MECLIZINE HYDROCHLORIDE 25 MG: 25 TABLET ORAL at 01:53

## 2023-08-30 RX ADMIN — QUETIAPINE FUMARATE 150 MG: 100 TABLET ORAL at 00:53

## 2023-08-30 RX ADMIN — CEFTRIAXONE SODIUM 1000 MG: 1 INJECTION, POWDER, FOR SOLUTION INTRAMUSCULAR; INTRAVENOUS at 23:25

## 2023-08-30 RX ADMIN — Medication 10 ML: at 08:06

## 2023-08-30 RX ADMIN — SERTRALINE 150 MG: 100 TABLET, FILM COATED ORAL at 08:06

## 2023-08-30 RX ADMIN — Medication 2 TABLET: at 20:23

## 2023-08-30 RX ADMIN — ACETAMINOPHEN 650 MG: 325 TABLET ORAL at 20:30

## 2023-08-30 RX ADMIN — QUETIAPINE FUMARATE 150 MG: 100 TABLET ORAL at 20:23

## 2023-08-30 RX ADMIN — CLONAZEPAM 2 MG: 1 TABLET ORAL at 20:30

## 2023-08-30 RX ADMIN — LAMOTRIGINE 200 MG: 100 TABLET ORAL at 08:05

## 2023-08-30 RX ADMIN — Medication 2 TABLET: at 08:05

## 2023-08-30 RX ADMIN — HYDRALAZINE HYDROCHLORIDE 10 MG: 20 INJECTION, SOLUTION INTRAMUSCULAR; INTRAVENOUS at 01:33

## 2023-08-30 RX ADMIN — Medication 2 TABLET: at 00:53

## 2023-08-30 RX ADMIN — METOPROLOL TARTRATE 200 MG: 50 TABLET, FILM COATED ORAL at 00:52

## 2023-08-30 RX ADMIN — Medication 2 TABLET: at 14:29

## 2023-08-30 RX ADMIN — ENOXAPARIN SODIUM 40 MG: 40 INJECTION SUBCUTANEOUS at 08:06

## 2023-08-30 RX ADMIN — BUPROPION HYDROCHLORIDE 75 MG: 75 TABLET, FILM COATED ORAL at 08:05

## 2023-08-30 RX ADMIN — CEFTRIAXONE SODIUM 1000 MG: 1 INJECTION, POWDER, FOR SOLUTION INTRAMUSCULAR; INTRAVENOUS at 00:33

## 2023-08-30 RX ADMIN — LISINOPRIL 20 MG: 20 TABLET ORAL at 08:05

## 2023-08-30 RX ADMIN — ACETAMINOPHEN 650 MG: 325 TABLET ORAL at 08:05

## 2023-08-30 RX ADMIN — Medication 10 ML: at 20:24

## 2023-08-30 ASSESSMENT — ENCOUNTER SYMPTOMS
WHEEZING: 0
TROUBLE SWALLOWING: 0
VOMITING: 0
CHEST TIGHTNESS: 0
COLOR CHANGE: 0
SHORTNESS OF BREATH: 0
ABDOMINAL DISTENTION: 0
ABDOMINAL PAIN: 0
COUGH: 0
NAUSEA: 0

## 2023-08-30 ASSESSMENT — PAIN DESCRIPTION - LOCATION
LOCATION: BACK;NECK
LOCATION: BACK

## 2023-08-30 ASSESSMENT — PAIN SCALES - GENERAL
PAINLEVEL_OUTOF10: 6
PAINLEVEL_OUTOF10: 6

## 2023-08-30 ASSESSMENT — PAIN DESCRIPTION - ORIENTATION: ORIENTATION: RIGHT

## 2023-08-30 ASSESSMENT — PAIN DESCRIPTION - DESCRIPTORS: DESCRIPTORS: ACHING

## 2023-08-30 NOTE — DISCHARGE INSTR - DIET
Good nutrition is important when healing from an illness, injury, or surgery. Follow any nutrition recommendations given to you during your hospital stay. If you were given an oral nutrition supplement while in the hospital, continue to take this supplement at home. You can take it with meals, in-between meals, and/or before bedtime. These supplements can be purchased at most local grocery stores, pharmacies, and chain [employer]-stores. If you have any questions about your diet or nutrition, call the hospital and ask for the dietitian. Low fat, low salt, heart healthy diet.

## 2023-08-30 NOTE — CARE COORDINATION
Case Management Assessment  Initial Evaluation    Date/Time of Evaluation: 8/30/2023 11:48 AM  Assessment Completed by: Jose Alfredo Montiel RN    If patient is discharged prior to next notation, then this note serves as note for discharge by case management. Patient Name: Randal Bates                   YOB: 1950  Diagnosis: Dizziness [R42]  Orthostatic dizziness [R42]                   Date / Time: 8/29/2023  4:45 PM    Patient Admission Status: Inpatient   Readmission Risk (Low < 19, Mod (19-27), High > 27): Readmission Risk Score: 18.3    Current PCP: Anuel Headley, DO  PCP verified by CM? Yes    Chart Reviewed: Yes      History Provided by: Patient  Patient Orientation: Person, Alert and Oriented, Place, Situation, Self    Patient Cognition: Alert    Hospitalization in the last 30 days (Readmission):  No    If yes, Readmission Assessment in CM Navigator will be completed. Advance Directives:      Code Status: Full Code   Patient's Primary Decision Maker is: Legal Next of Kin      Discharge Planning:    Patient lives with: Alone Type of Home: Apartment  Primary Care Giver: Self  Patient Support Systems include: Children, Friends/Neighbors   Current Financial resources:    Current community resources:    Current services prior to admission: None            Current DME:              Type of Home Care services:  None    ADLS  Prior functional level: Independent in ADLs/IADLs  Current functional level: Independent in ADLs/IADLs    PT AM-PAC: 12 /24  OT AM-PAC:   /24    Family can provide assistance at DC: Yes  Would you like Case Management to discuss the discharge plan with any other family members/significant others, and if so, who?  No  Plans to Return to Present Housing: Yes  Other Identified Issues/Barriers to RETURNING to current housing: N/A  Potential Assistance needed at discharge: N/A            Potential DME:    Patient expects to discharge to: [address] [place] [address] for transportation at

## 2023-08-30 NOTE — CARE COORDINATION
SPOKE WITH PATIENT ABOUT DC PLANNING. PT STATES SHE DOES NOT WISH TO GO TO REHAB. SHE ALSO STATES SHE DOES NOT WANT HHC AT THIS TIME. PT STATES SHE WILL FOLLOW UP WITH [facility] PCP AND USE THEIR HHC AS SHE LIKES THE PHYSICAL THERAPIST WITH THEM. PT DENIES OTHER NEEDS.

## 2023-08-30 NOTE — DISCHARGE INSTR - COC
Schedule:  Phone:  Fax:    / signature: {Esignature:[id number]}    PHYSICIAN SECTION    Prognosis: {Prognosis:8872050643}    Condition at Discharge: [address] Patient Condition:[id number]}    Rehab Potential (if transferring to Rehab): {Prognosis:7461167107}    Recommended Labs or Other Treatments After Discharge: ***    Physician Certification: I certify the above information and transfer of Chani Calle  is necessary for the continuing treatment of the diagnosis listed and that she requires {Admit to Appropriate Level of Care:24387} for {GREATER/LESS:541874054} 30 days.      Update Admission H&P: {CHP DME Changes in FGYTF:748671696}    PHYSICIAN SIGNATURE:  {Esignature:[id number]}

## 2023-08-30 NOTE — CONSULTS
106 109*   CO2 18* 21   BUN 15 11   CREATININE 0.72 0.51   CALCIUM 8.0* 7.5*     Recent Labs     08/29/23  1645 08/30/23  0216   AST 46* 26   ALT 20 16   BILITOT <0.2 0.4   ALKPHOS 93 85     Recent Labs     08/29/23  1645   INR 1.1     Recent Labs     08/29/23  1645 08/30/23  0216 08/30/23  0753   CKTOTAL 58  --   --    TROPONINI <0.010 <0.010 <0.010       Urinalysis:   Lab Results   Component Value Date/Time    NITRU POSITIVE 08/27/2023 04:45 AM    WBCUA 10-20 08/27/2023 04:45 AM    BACTERIA MANY 08/27/2023 04:45 AM    RBCUA 0-2 08/27/2023 04:45 AM    BLOODU Negative 08/27/2023 04:45 AM    SPECGRAV 1.014 08/27/2023 04:45 AM    GLUCOSEU Negative 08/27/2023 04:45 AM       Radiology:   Most recent    EEG No valid procedures specified. MRI of Brain No results found for this or any previous visit. No results found for this or any previous visit. MRA of the Head and Neck: No results found for this or any previous visit. No results found for this or any previous visit. No results found for this or any previous visit. CT of the Head: Results for orders placed during the hospital encounter of 08/29/23    CT Head W/O Contrast    Narrative  EXAMINATION:  CT OF THE HEAD WITHOUT CONTRAST  8/29/2023 5:47 pm    TECHNIQUE:  CT of the head was performed without the administration of intravenous  contrast. Automated exposure control, iterative reconstruction, and/or weight  based adjustment of the mA/kV was utilized to reduce the radiation dose to as  low as reasonably achievable. COMPARISON:  None. HISTORY:  ORDERING SYSTEM PROVIDED HISTORY: dizziness  TECHNOLOGIST PROVIDED HISTORY:  Reason for exam:->dizziness  Has a \"code stroke\" or \"stroke alert\" been called? ->No  Decision Support Exception - unselect if not a suspected or confirmed  emergency medical condition->Emergency Medical Condition (MA)  What reading provider will be dictating this neurologist at the [facility]. I have personally performed a face to face diagnostic evaluation on this patient, reviewed all data and investigations, and am the sole provider of all clinical decisions on the neurological status of this patient. Referred to us from [facility] clinic though the etiology and reason of this is not clear from the chart review done. We were therefore blinded and therefore we completed her work-up the way we did and she appears to feel better for now. She has multiple medical issues and could have had some drug withdrawal as she was not taking her medication. Patient will be discharged back to her neurologist at [facility] and if there is something that has not been addressed not knowing what the symptoms were that can be addressed. 60% time spent on evaluating patient and discussion with the emergency room. Len Chong MD, [place], American Board of Psychiatry & Neurology  Board Certified in Vascular Neurology  Board Certified in Neuromuscular Medicine  Certified in Neurorehabilitation           Collaborating physicians: Dr Zay Chong    Electronically signed by PUNEET Cason CNP on 8/30/2023 at 10:25 AM

## 2023-08-30 NOTE — PROGRESS NOTES
Physical Therapy Med Surg Initial Assessment  Facility/Department: [facility]  Room: [facility]/B810-04       NAME: Steve Rosenthal  : 1950 (15 y.o.)  MRN: [id number]  CODE STATUS: Full Code    Date of Service: 2023    Patient Diagnosis(es): Dizziness [R42]  Orthostatic dizziness [R42]   Chief Complaint   Patient presents with    Dizziness     Pt states she was here  and is feeling dizzy, nauseous and shaky.       Patient Active Problem List    Diagnosis Date Noted    Impaired mobility and ADLs 2022    Hyperlipidemia 2022    Impaired mobility and activities of daily living dt T12 fx and gait instability dt ataxia 2022    Dizziness 2022    Low BP 2022    LFT elevation 2022    Diarrhea 2022    Pancytopenia ([address]) 2022    Acute renal injury ([address])     Dysautonomia ([address])     Ataxia     Epidural abscess 2021    Discitis of lumbar region 2021    PTSD (post-traumatic stress disorder) 2021    Chronic neck pain 2021    Cervical disc disorder with radiculopathy 2021    Dyslipidemia 2019    Stenosis of carotid artery 10/22/2018    Left foot pain 2017    GERD (gastroesophageal reflux disease) 2016    SOB (shortness of breath) 2015    History of below-knee amputation of right lower extremity ([address]) 2014    Insomnia 2014    Migraine headache 2014    Osteomyelitis ([address]) 2012    Anxiety 05/15/2012    Anemia of chronic disease 05/15/2012    Chronic low back pain 05/15/2012    Essential hypertension 05/15/2012    Head injury     Syncope and collapse 2019    Hypertensive urgency 10/16/2018    Hyperglycemia 10/16/2018    Chest tightness or pressure 10/16/2018    Visual disturbance, subjective 10/16/2018    Acute sore throat 10/16/2018    DM (diabetes mellitus) ([address])     Bipolar affective disorder ([address])         Past Medical History:   Diagnosis Date    Anxiety     Bipolar affective disorder

## 2023-08-30 NOTE — ED NOTES
Perfect Serve to [facility] Dr. Ned Palma @9809  Dr. Marino Jackson called [facility] Aslanidis  08/29/23 2118

## 2023-08-30 NOTE — ED NOTES
Pt states at this time that she does not ambulate and uses a W/C for mobility     Carie Sosa RN  08/29/23 2018

## 2023-08-30 NOTE — CARE COORDINATION
[facility] Pre-Admission Screening Document      Patient Name: Crystal Pastor       MRN: [id number]    : 1950    Age: 75 y.o. Gender: female   Payor: Payor: MEDICARE / Plan: MEDICARE PART A AND B / Product Type: *No Product type* /   MSSP: No    Admitted from: [facility] Floor: 1W  Attending Care Provider: Nesha Schroeder MD  Inpatient Rehab Referring Care Provider: Dr. Earnestine Mcburney  Primary Care Provider: Thais Batista DO  Inpatient Treatment Team including Consults: Treatment Team: Attending Provider: Nesha Schroeder MD; Consulting Physician: Jake Harrison MD; : Loli Herrera RN; Registered Nurse: Ariela Oconnor RN; Utilization Reviewer: Yao Huerta RN    Reason for Hospitalization:   1. Orthostatic dizziness      Chief Complaint   Patient presents with    Dizziness     Pt states she was here  and is feeling dizzy, nauseous and shaky. Isolation:No active isolations    Hospital Course:  Admit Date: 2023  4:45 PM  Inpatient Rehab Referral Date: 23  Narrative of hospital course/history of present illness: Patient to ER  with complaints of dizziness, nausea and shakiness. Patient reported in ER her dizziness is chronic, however, worse over the last 1-2 weeks and was advised by neurology to go to ER for evaluation. Patient also had complaints of left sided chest pain. Patient reports she was treated in ER  and was diagnosed with UTI but was not compliant with prescribed antibiotics. Patient also had not taken her scheduled home medications as prescribed for a few days.     Medical & Surgical History/Current Comorbidities:  Past Medical History:   Diagnosis Date    Anxiety     Bipolar affective disorder ([address])     DM (diabetes mellitus) ([address])     Epidural abscess 2021    Head injury     Hypertension     Migraine     Migraine headache 2014    Osteomyelitis ([address]) 2012    PTSD (post-traumatic needs:54194}    Cultural needs:   Values / Beliefs  Do You Have Any Ethnic, Cultural, Sacramental, or Spiritual Hindu Needs You Would Like Us To Be Aware of While You Are in the Hospital : No ***  Funding needs:     ***    Expected Level of Improvement with Rehab  Assist for ADL {Rehab goals:94537}  Assist for Transfers {Rehab goals:15021}  Assist for Gait {Rehab goals:04866}    Patient's willingness to participate: {YES / MUJICA:39175}  Patient's ability to tolerate proposed care: {YES / [place]:88258}  Patient/Family Goals of Rehab (in patient's/family's own words): ***    Anticipated Discharge Plan:  {Anticipateddispo:59147} with   {TeamLocate:93855}, {DC FollowUp:66509} to be determined  ***    Barriers to Discharge:  {BarriersToDischarge:44823}      Rehab evaluation plan: {Result of rehab eval:19467}  Rehabilitation Impairment Group Code: ***  Rehab Impairment Group: {Rehab admitting diagnosis:34126}  Estimated Length of Stay (days): ***  Rehab Diagnosis: Impaired mobility and ADL's due to ***  Reviewer's Signature: ***    I have reviewed and concur with the above Preadmission Screening.    Rehab Admitting Doctor: {Meg single:05283::\"Dr. Rubens Velez MD\",\"Dr. La Elise DO\"}

## 2023-08-30 NOTE — H&P
11/20/18   Historical Provider, MD   buPROPion (WELLBUTRIN) 75 MG tablet Take 100 mg by mouth 2 times daily 8/29/13   Historical Provider, MD   sertraline (ZOLOFT) 100 MG tablet Take 100 mg by mouth 2 times daily 10/22/18   Historical Provider, MD   fluticasone (FLONASE) 50 MCG/ACT nasal spray 50 sprays by Each Nare route as needed 2/9/18 7/15/22  Historical Provider, MD   topiramate (TOPAMAX) 25 MG tablet Take 1 tablet by mouth 2 times daily  Patient not taking: Reported on 7/9/2022 10/17/18 7/15/22  Maddi Huynh MD   lisinopril (PRINIVIL;ZESTRIL) 10 MG tablet Take 1 tablet by mouth daily 10/17/18 7/15/22  Dawna Almodovar DO   clonazePAM (KLONOPIN) 2 MG tablet Take 2 mg by mouth 2 times daily. Historical Provider, MD   lamoTRIgine (LAMICTAL) 100 MG tablet Take 100 mg by mouth 2 times daily    Historical Provider, MD   QUEtiapine (SEROQUEL XR) 150 MG TB24 extended release tablet Take 150 mg by mouth nightly    Historical Provider, MD       ALLERGIES: Iv dye [iodides], Vancomycin, and Morphine    REVIEW OF SYSTEM:   Review of Systems   Constitutional:  Negative for appetite change, fatigue, fever and unexpected weight change. HENT:  Negative for congestion, rhinorrhea and sore throat. Eyes: Negative. Negative for photophobia and visual disturbance. Respiratory:  Negative for shortness of breath and wheezing. Cardiovascular:  Positive for chest pain. Gastrointestinal:  Negative for abdominal pain, nausea and vomiting. Endocrine: Negative. Negative for polydipsia, polyphagia and polyuria. Genitourinary:  Negative for difficulty urinating, dysuria and pelvic pain. Musculoskeletal:  Negative for back pain. Skin: Negative. Negative for rash. Allergic/Immunologic: Negative. Neurological:  Positive for dizziness. Negative for speech difficulty and weakness. Hematological: Negative. Psychiatric/Behavioral: Negative. Negative for behavioral problems and confusion. 2023  TIME: 11:05 PM     Davey Lin, MD - supervising physician

## 2023-08-30 NOTE — ED NOTES
Pt states that she is still dizzy but feeling better   Pt is alert and oriented times 4  Pt lying in bed at this time       Ramón Baez RN  08/29/23 2036

## 2023-08-30 NOTE — PROGRESS NOTES
Physician Progress Note      PATIENTSarah Race  [facility]                  [id number]  :                       1950  ADMIT DATE:       2023 4:45 PM  [facility] DATE:  RESPONDING  PROVIDER #:        Moi Sanchez MD          QUERY TEXT:    Patient admitted with dizziness and hypertensive urgency, noted to have WBC   3.9, RBC 3.2, Platelets 97. If possible, please document in progress notes and   discharge summary if you are evaluating and/or treating any of the following: The medical record reflects the following:  Risk Factors: PRSD, Bipolar, chronic dizziness, HTN  Clinical Indicators: WBC 3.9-4, RBC 3.2-3.46, Platelets   Treatment: labs and monitoring    Thank you,  Uziel Wade   Clinical Documentation Improvement Specialist  W: [phone]  Options provided:  -- Pancytopenia  -- Pancytopenia due to, Please specify cause if known  -- Other - I will add my own diagnosis  -- Disagree - Not applicable / Not valid  -- Disagree - Clinically unable to determine / Unknown  -- Refer to Clinical Documentation Reviewer    PROVIDER RESPONSE TEXT:    Patient has pancytopenia.     Query created by: Uziel Wade on 2023 8:58 AM      Electronically signed by:  Moi Sanchez MD 2023 2:50 PM

## 2023-08-30 NOTE — ACP (ADVANCE CARE PLANNING)
Advance Care Planning   Healthcare Decision Maker:    Primary Decision Maker: Rose Mary Michel - Child - [phone]    Click here to complete Healthcare Decision Makers including selection of the Healthcare Decision Maker Relationship (ie \"Primary\"). Today we documented Decision Maker(s) consistent with Legal Next of Kin hierarchy.

## 2023-08-30 NOTE — ED NOTES
Updated Brittnee ARROYO at this time of PT orthostatic repeats and results  Pt states extreme dizziness when going from lying to sitting and pt must close eyes and hold rail   Pt also states at this time that she becomes nauseous but subsides   Pt has EKG rhythm changes when changing position to sitting  Pt returned to lying in bed at this time   Pt remains alert and oriented times [facility], RN  08/29/23 2055       Indra Powell, ALEXEY  08/29/23 2056

## 2023-08-31 ENCOUNTER — APPOINTMENT (OUTPATIENT)
Age: 73
DRG: 149 | End: 2023-08-31
Attending: INTERNAL MEDICINE
Payer: MEDICARE

## 2023-08-31 LAB
ALBUMIN SERPL-MCNC: 3.3 G/DL (ref 3.5–4.6)
ALP SERPL-CCNC: 83 U/L (ref 40–130)
ALT SERPL-CCNC: 10 U/L (ref 0–33)
ANION GAP SERPL CALCULATED.3IONS-SCNC: 8 MEQ/L (ref 9–15)
AST SERPL-CCNC: 13 U/L (ref 0–35)
BASOPHILS # BLD: 0 K/UL (ref 0–0.2)
BASOPHILS NFR BLD: 0.4 %
BILIRUB SERPL-MCNC: 0.4 MG/DL (ref 0.2–0.7)
BUN SERPL-MCNC: 12 MG/DL (ref 8–23)
CALCIUM SERPL-MCNC: 8.2 MG/DL (ref 8.5–9.9)
CHLORIDE SERPL-SCNC: 108 MEQ/L (ref 95–107)
CO2 SERPL-SCNC: 25 MEQ/L (ref 20–31)
CREAT SERPL-MCNC: 0.59 MG/DL (ref 0.5–0.9)
ECHO AO ROOT DIAM: 3.2 CM
ECHO AO ROOT INDEX: 1.67 CM/M2
ECHO AV AREA PEAK VELOCITY: 2.3 CM2
ECHO AV AREA VTI: 2.2 CM2
ECHO AV AREA/BSA PEAK VELOCITY: 1.2 CM2/M2
ECHO AV AREA/BSA VTI: 1.1 CM2/M2
ECHO AV CUSP MM: 1.4 CM
ECHO AV MEAN GRADIENT: 3 MMHG
ECHO AV MEAN VELOCITY: 0.8 M/S
ECHO AV PEAK GRADIENT: 6 MMHG
ECHO AV PEAK VELOCITY: 1.2 M/S
ECHO AV VELOCITY RATIO: 0.58
ECHO AV VTI: 32.5 CM
ECHO BSA: 1.94 M2
ECHO LA DIAMETER INDEX: 1.88 CM/M2
ECHO LA DIAMETER: 3.6 CM
ECHO LA TO AORTIC ROOT RATIO: 1.13
ECHO LA VOL 2C: 100 ML (ref 22–52)
ECHO LA VOL 2C: 95 ML (ref 22–52)
ECHO LA VOL 4C: 92 ML (ref 22–52)
ECHO LA VOL 4C: 98 ML (ref 22–52)
ECHO LA VOLUME AREA LENGTH: 101 ML
ECHO LA VOLUME INDEX AREA LENGTH: 53 ML/M2 (ref 16–34)
ECHO LV E' SEPTAL VELOCITY: 8 CM/S
ECHO LV EDV A2C: 91 ML
ECHO LV EDV A4C: 94 ML
ECHO LV EDV BP: 93 ML (ref 56–104)
ECHO LV EDV INDEX A4C: 49 ML/M2
ECHO LV EDV INDEX BP: 48 ML/M2
ECHO LV EDV NDEX A2C: 47 ML/M2
ECHO LV EJECTION FRACTION A2C: 36 %
ECHO LV EJECTION FRACTION A4C: 36 %
ECHO LV EJECTION FRACTION BIPLANE: 35 % (ref 55–100)
ECHO LV ESV A2C: 58 ML
ECHO LV ESV A4C: 60 ML
ECHO LV ESV BP: 60 ML (ref 19–49)
ECHO LV ESV INDEX A2C: 30 ML/M2
ECHO LV ESV INDEX A4C: 31 ML/M2
ECHO LV ESV INDEX BP: 31 ML/M2
ECHO LV FRACTIONAL SHORTENING: 14 % (ref 28–44)
ECHO LV INTERNAL DIMENSION DIASTOLE INDEX: 2.55 CM/M2
ECHO LV INTERNAL DIMENSION DIASTOLIC: 4.9 CM (ref 3.9–5.3)
ECHO LV INTERNAL DIMENSION SYSTOLIC INDEX: 2.19 CM/M2
ECHO LV INTERNAL DIMENSION SYSTOLIC: 4.2 CM
ECHO LV IVSD: 1.5 CM (ref 0.6–0.9)
ECHO LV IVSS: 1.6 CM
ECHO LV MASS 2D: 297.5 G (ref 67–162)
ECHO LV MASS INDEX 2D: 155 G/M2 (ref 43–95)
ECHO LV POSTERIOR WALL DIASTOLIC: 1.4 CM (ref 0.6–0.9)
ECHO LV POSTERIOR WALL SYSTOLIC: 1.3 CM
ECHO LV RELATIVE WALL THICKNESS RATIO: 0.57
ECHO LVOT AREA: 4.2 CM2
ECHO LVOT AV VTI INDEX: 0.55
ECHO LVOT DIAM: 2.3 CM
ECHO LVOT MEAN GRADIENT: 1 MMHG
ECHO LVOT PEAK GRADIENT: 2 MMHG
ECHO LVOT PEAK VELOCITY: 0.7 M/S
ECHO LVOT STROKE VOLUME INDEX: 38.7 ML/M2
ECHO LVOT SV: 74.3 ML
ECHO LVOT VTI: 17.9 CM
ECHO MV A VELOCITY: 0.34 M/S
ECHO MV E DECELERATION TIME (DT): 181.6 MS
ECHO MV E VELOCITY: 0.89 M/S
ECHO MV E/A RATIO: 2.62
ECHO MV E/E' SEPTAL: 11.13
ECHO PV MAX VELOCITY: 1 M/S
ECHO PV PEAK GRADIENT: 4 MMHG
ECHO RV INTERNAL DIMENSION: 2.8 CM
ECHO RV TAPSE: 2.1 CM (ref 1.7–?)
EOSINOPHIL # BLD: 0 K/UL (ref 0–0.7)
EOSINOPHIL NFR BLD: 1.4 %
ERYTHROCYTE [DISTWIDTH] IN BLOOD BY AUTOMATED COUNT: 17.3 % (ref 11.5–14.5)
GLOBULIN SER CALC-MCNC: 2.9 G/DL (ref 2.3–3.5)
GLUCOSE SERPL-MCNC: 88 MG/DL (ref 70–99)
HCT VFR BLD AUTO: 32.6 % (ref 37–47)
HGB BLD-MCNC: 11 G/DL (ref 12–16)
LYMPHOCYTES # BLD: 1.5 K/UL (ref 1–4.8)
LYMPHOCYTES NFR BLD: 45.1 %
MAGNESIUM SERPL-MCNC: 1.8 MG/DL (ref 1.7–2.4)
MCH RBC QN AUTO: 34.6 PG (ref 27–31.3)
MCHC RBC AUTO-ENTMCNC: 33.8 % (ref 33–37)
MCV RBC AUTO: 102.3 FL (ref 79.4–94.8)
MONOCYTES # BLD: 0.3 K/UL (ref 0.2–0.8)
MONOCYTES NFR BLD: 10.3 %
NEUTROPHILS # BLD: 1.4 K/UL (ref 1.4–6.5)
NEUTS SEG NFR BLD: 42.8 %
PLATELET # BLD AUTO: 96 K/UL (ref 130–400)
POTASSIUM SERPL-SCNC: 4.1 MEQ/L (ref 3.4–4.9)
PROT SERPL-MCNC: 6.2 G/DL (ref 6.3–8)
RBC # BLD AUTO: 3.19 M/UL (ref 4.2–5.4)
SODIUM SERPL-SCNC: 141 MEQ/L (ref 135–144)
WBC # BLD AUTO: 3.4 K/UL (ref 4.8–10.8)

## 2023-08-31 PROCEDURE — 99222 1ST HOSP IP/OBS MODERATE 55: CPT | Performed by: INTERNAL MEDICINE

## 2023-08-31 PROCEDURE — 93306 TTE W/DOPPLER COMPLETE: CPT

## 2023-08-31 PROCEDURE — 85025 COMPLETE CBC W/AUTO DIFF WBC: CPT

## 2023-08-31 PROCEDURE — 83735 ASSAY OF MAGNESIUM: CPT

## 2023-08-31 PROCEDURE — 99232 SBSQ HOSP IP/OBS MODERATE 35: CPT | Performed by: PSYCHIATRY & NEUROLOGY

## 2023-08-31 PROCEDURE — 6370000000 HC RX 637 (ALT 250 FOR IP): Performed by: NURSE PRACTITIONER

## 2023-08-31 PROCEDURE — 80053 COMPREHEN METABOLIC PANEL: CPT

## 2023-08-31 PROCEDURE — 6360000002 HC RX W HCPCS: Performed by: NURSE PRACTITIONER

## 2023-08-31 PROCEDURE — APPSS15 APP SPLIT SHARED TIME 0-15 MINUTES: Performed by: NURSE PRACTITIONER

## 2023-08-31 PROCEDURE — 6370000000 HC RX 637 (ALT 250 FOR IP): Performed by: INTERNAL MEDICINE

## 2023-08-31 PROCEDURE — 36415 COLL VENOUS BLD VENIPUNCTURE: CPT

## 2023-08-31 PROCEDURE — 97165 OT EVAL LOW COMPLEX 30 MIN: CPT

## 2023-08-31 PROCEDURE — 2060000000 HC ICU INTERMEDIATE R&B

## 2023-08-31 PROCEDURE — 2580000003 HC RX 258: Performed by: NURSE PRACTITIONER

## 2023-08-31 RX ORDER — ASPIRIN 81 MG/1
81 TABLET ORAL DAILY
Status: DISCONTINUED | OUTPATIENT
Start: 2023-08-31 | End: 2023-09-02 | Stop reason: HOSPADM

## 2023-08-31 RX ORDER — ATORVASTATIN CALCIUM 40 MG/1
40 TABLET, FILM COATED ORAL NIGHTLY
Status: DISCONTINUED | OUTPATIENT
Start: 2023-08-31 | End: 2023-09-02 | Stop reason: HOSPADM

## 2023-08-31 RX ORDER — LOSARTAN POTASSIUM 25 MG/1
25 TABLET ORAL DAILY
Status: DISCONTINUED | OUTPATIENT
Start: 2023-09-01 | End: 2023-09-02

## 2023-08-31 RX ORDER — CALCIUM CARBONATE 500 MG/1
500 TABLET, CHEWABLE ORAL ONCE
Status: COMPLETED | OUTPATIENT
Start: 2023-08-31 | End: 2023-08-31

## 2023-08-31 RX ADMIN — Medication 10 ML: at 08:48

## 2023-08-31 RX ADMIN — BUPROPION HYDROCHLORIDE 75 MG: 75 TABLET, FILM COATED ORAL at 08:44

## 2023-08-31 RX ADMIN — CEFTRIAXONE SODIUM 1000 MG: 1 INJECTION, POWDER, FOR SOLUTION INTRAMUSCULAR; INTRAVENOUS at 23:33

## 2023-08-31 RX ADMIN — ASPIRIN 81 MG: 81 TABLET, COATED ORAL at 18:07

## 2023-08-31 RX ADMIN — SERTRALINE 150 MG: 100 TABLET, FILM COATED ORAL at 08:45

## 2023-08-31 RX ADMIN — Medication 2 TABLET: at 08:45

## 2023-08-31 RX ADMIN — QUETIAPINE FUMARATE 150 MG: 100 TABLET ORAL at 21:24

## 2023-08-31 RX ADMIN — ACETAMINOPHEN 650 MG: 325 TABLET ORAL at 21:24

## 2023-08-31 RX ADMIN — ACETAMINOPHEN 650 MG: 325 TABLET ORAL at 13:53

## 2023-08-31 RX ADMIN — ENOXAPARIN SODIUM 40 MG: 40 INJECTION SUBCUTANEOUS at 08:48

## 2023-08-31 RX ADMIN — ANTACID TABLETS 500 MG: 500 TABLET, CHEWABLE ORAL at 11:30

## 2023-08-31 RX ADMIN — LAMOTRIGINE 200 MG: 100 TABLET ORAL at 08:45

## 2023-08-31 RX ADMIN — ACETAMINOPHEN 650 MG: 325 TABLET ORAL at 06:16

## 2023-08-31 RX ADMIN — CLONAZEPAM 2 MG: 1 TABLET ORAL at 21:24

## 2023-08-31 RX ADMIN — LISINOPRIL 20 MG: 20 TABLET ORAL at 08:46

## 2023-08-31 RX ADMIN — Medication 10 ML: at 21:26

## 2023-08-31 RX ADMIN — Medication 2 TABLET: at 13:58

## 2023-08-31 RX ADMIN — Medication 2 TABLET: at 21:24

## 2023-08-31 ASSESSMENT — ENCOUNTER SYMPTOMS
COUGH: 0
SHORTNESS OF BREATH: 0
VOMITING: 0
COLOR CHANGE: 0
CHEST TIGHTNESS: 0
NAUSEA: 0
TROUBLE SWALLOWING: 0
WHEEZING: 0

## 2023-08-31 ASSESSMENT — PAIN DESCRIPTION - DESCRIPTORS: DESCRIPTORS: ACHING

## 2023-08-31 ASSESSMENT — PAIN SCALES - GENERAL
PAINLEVEL_OUTOF10: 7
PAINLEVEL_OUTOF10: 7

## 2023-08-31 ASSESSMENT — PAIN - FUNCTIONAL ASSESSMENT: PAIN_FUNCTIONAL_ASSESSMENT: PREVENTS OR INTERFERES SOME ACTIVE ACTIVITIES AND ADLS

## 2023-08-31 ASSESSMENT — PAIN DESCRIPTION - LOCATION: LOCATION: LEG

## 2023-08-31 ASSESSMENT — PAIN DESCRIPTION - ORIENTATION: ORIENTATION: RIGHT

## 2023-08-31 NOTE — CONSULTS
Inpatient consult to Cardiology  Consult performed by: Ayah Jaimes MD  Consult ordered by: Rhea De Jesus MD    Patient Name: Brant Rivero Date: 2023  4:45 PM  MR #: [id number]  : 1950    Attending Physician: Rhea De Jesus MD  Reason for consult: Syncope with bradycardia    History of Presenting Illness:      Tereso Ayala is a 75 y.o. female on hospital day 2 with a history of hypertension, hyperlipidemia, diabetes obesity who was admitted to the hospital after sustaining a pre syncopal episode.  History Obtained From:  patient, electronic medical record    Admission patient was noted hypertensive  CT of the head negative for intracranial findings  Carotid ultrasound no significant stenosis  Questionable negative orthostatics  Neurology has already signed out    Patient was ready to be discharged, was noted bradycardic 40s to 50s for which cardiology was consulted  Telemetry reviewed since last night patient has been bradycardic 50s, with sinus pauses up to 2 seconds  Patient received metoprolol last night    Currently patient in bed denies chest pain or shortness of breath  Yesterday's EKG showing sinus rhythm with no signs of ischemia no major arrhythmias    Stat echocardiogram shows moderately reduced EF per my interpretation 30 to 35% without major valvular disease    History:      Past Medical History:   Diagnosis Date    Anxiety     Bipolar affective disorder ([address])     DM (diabetes mellitus) ([address])     Epidural abscess 2021    Head injury     Hypertension     Migraine     Migraine headache 2014    Osteomyelitis ([address]) 2012    PTSD (post-traumatic stress disorder)     TIA (transient ischemic attack)     x 3     Past Surgical History:   Procedure Laterality Date    APPENDECTOMY      CHOLECYSTECTOMY      LEG AMPUTATION BELOW KNEE Bilateral     NASAL FRACTURE SURGERY N/A 2019    CLOSED REDUCTON EXTERNAL FIXATION OF NASAL BONE FRACTURE WITH SEVERE DNS (DEVIATED NASAL anatomy  Purpose risk benefits and alternatives of the procedure discussed in detail with the pt all questions answer  Pt agree to proceed  Nurse at bed side  We will arrange a coronary angiogram this am    Comments: On this date 8/31/232 I have spent 50 minutes reviewing previous notes, test results and face to face with the patient discussing the diagnoses and importance of compliance with the treatment plan as well as documenting on the day of the visit. Thank you for allowing us to participate in the care of this patient. Will continue to follow. Please call if questions or concerns arise. Electronically signed by Bobby Leger MD on 8/31/2023 at 3:52 PM    Please note this report has been partially produced using speech recognition software and may cause contain errors related to that system including grammar, punctuation and spelling as well as words and phrases that may seem inappropriate. If there are questions or concerns please feel free to contact me to clarify.

## 2023-08-31 NOTE — PROGRESS NOTES
New consult for cardiology for discharge medications. Pts discharge on hold for echo, bradycardia. Dr Yuliya Maxwell in to see pt and will pt is ok with being discharged tomorrow.

## 2023-08-31 NOTE — PROGRESS NOTES
Patient states she has had severe burning in her esophagus yesterday morning and today. Wants something for heartburn. Does not want Zofran. Sent message to Dr. Douglas Hicks.

## 2023-08-31 NOTE — PROGRESS NOTES
[facility] OCCUPATIONAL THERAPY EVALUATION - ACUTE     NAME: Jeannette Mane  : 1950 (54 y.o.)  MRN: [id number]  CODE STATUS: Full Code  Room: E088/M149-33    Date of Service: 2023    Patient Diagnosis(es): Dizziness [R42]  Orthostatic dizziness [R42]   Patient Active Problem List    Diagnosis Date Noted    Impaired mobility and ADLs 2022    Hyperlipidemia 2022    Impaired mobility and activities of daily living dt T12 fx and gait instability dt ataxia 2022    Orthostatic dizziness 2022    Low BP 2022    LFT elevation 2022    Diarrhea 2022    Pancytopenia ([address]) 2022    Acute renal injury ([address])     Dysautonomia ([address])     Ataxia     Epidural abscess 2021    Discitis of lumbar region 2021    PTSD (post-traumatic stress disorder) 2021    Chronic neck pain 2021    Cervical disc disorder with radiculopathy 2021    Dyslipidemia 2019    Stenosis of carotid artery 10/22/2018    Left foot pain 2017    GERD (gastroesophageal reflux disease) 2016    SOB (shortness of breath) 2015    History of below-knee amputation of right lower extremity ([address]) 2014    Insomnia 2014    Migraine headache 2014    Osteomyelitis ([address]) 2012    Anxiety 05/15/2012    Anemia of chronic disease 05/15/2012    Chronic low back pain 05/15/2012    Essential hypertension 05/15/2012    Head injury     Syncope and collapse 2019    Hypertensive urgency 10/16/2018    Hyperglycemia 10/16/2018    Chest tightness or pressure 10/16/2018    Visual disturbance, subjective 10/16/2018    Acute sore throat 10/16/2018    DM (diabetes mellitus) ([address])     Bipolar affective disorder ([address])         Past Medical History:   Diagnosis Date    Anxiety     Bipolar affective disorder ([address])     DM (diabetes mellitus) ([address])     Epidural abscess 2021    Head injury     Hypertension     Migraine     Migraine headache 2014

## 2023-09-01 PROBLEM — I42.9 CARDIOMYOPATHY (HCC): Status: ACTIVE | Noted: 2023-08-29

## 2023-09-01 LAB
ALBUMIN SERPL-MCNC: 3.2 G/DL (ref 3.5–4.6)
ALP SERPL-CCNC: 74 U/L (ref 40–130)
ALT SERPL-CCNC: 8 U/L (ref 0–33)
ANION GAP SERPL CALCULATED.3IONS-SCNC: 10 MEQ/L (ref 9–15)
AST SERPL-CCNC: 10 U/L (ref 0–35)
BASOPHILS # BLD: 0 K/UL (ref 0–0.2)
BASOPHILS NFR BLD: 0.6 %
BILIRUB SERPL-MCNC: <0.2 MG/DL (ref 0.2–0.7)
BUN SERPL-MCNC: 16 MG/DL (ref 8–23)
CALCIUM SERPL-MCNC: 7.9 MG/DL (ref 8.5–9.9)
CHLORIDE SERPL-SCNC: 107 MEQ/L (ref 95–107)
CO2 SERPL-SCNC: 24 MEQ/L (ref 20–31)
CREAT SERPL-MCNC: 0.62 MG/DL (ref 0.5–0.9)
EOSINOPHIL # BLD: 0 K/UL (ref 0–0.7)
EOSINOPHIL NFR BLD: 1.2 %
ERYTHROCYTE [DISTWIDTH] IN BLOOD BY AUTOMATED COUNT: 17.2 % (ref 11.5–14.5)
GLOBULIN SER CALC-MCNC: 2.6 G/DL (ref 2.3–3.5)
GLUCOSE SERPL-MCNC: 85 MG/DL (ref 70–99)
HCT VFR BLD AUTO: 32.2 % (ref 37–47)
HGB BLD-MCNC: 10.7 G/DL (ref 12–16)
LYMPHOCYTES # BLD: 1.3 K/UL (ref 1–4.8)
LYMPHOCYTES NFR BLD: 42.2 %
MAGNESIUM SERPL-MCNC: 1.9 MG/DL (ref 1.7–2.4)
MCH RBC QN AUTO: 34.3 PG (ref 27–31.3)
MCHC RBC AUTO-ENTMCNC: 33.4 % (ref 33–37)
MCV RBC AUTO: 102.9 FL (ref 79.4–94.8)
MONOCYTES # BLD: 0.3 K/UL (ref 0.2–0.8)
MONOCYTES NFR BLD: 8.6 %
NEUTROPHILS # BLD: 1.5 K/UL (ref 1.4–6.5)
NEUTS SEG NFR BLD: 47.4 %
PLATELET # BLD AUTO: 83 K/UL (ref 130–400)
PLATELET BLD QL SMEAR: ABNORMAL
POTASSIUM SERPL-SCNC: 3.7 MEQ/L (ref 3.4–4.9)
PROT SERPL-MCNC: 5.8 G/DL (ref 6.3–8)
RBC # BLD AUTO: 3.13 M/UL (ref 4.2–5.4)
SLIDE REVIEW: ABNORMAL
SODIUM SERPL-SCNC: 141 MEQ/L (ref 135–144)
STOMATOCYTES BLD QL SMEAR: ABNORMAL
WBC # BLD AUTO: 3.1 K/UL (ref 4.8–10.8)

## 2023-09-01 PROCEDURE — 6370000000 HC RX 637 (ALT 250 FOR IP): Performed by: INTERNAL MEDICINE

## 2023-09-01 PROCEDURE — 93458 L HRT ARTERY/VENTRICLE ANGIO: CPT | Performed by: INTERNAL MEDICINE

## 2023-09-01 PROCEDURE — B2151ZZ FLUOROSCOPY OF LEFT HEART USING LOW OSMOLAR CONTRAST: ICD-10-PCS | Performed by: INTERNAL MEDICINE

## 2023-09-01 PROCEDURE — C1769 GUIDE WIRE: HCPCS | Performed by: INTERNAL MEDICINE

## 2023-09-01 PROCEDURE — 6370000000 HC RX 637 (ALT 250 FOR IP): Performed by: NURSE PRACTITIONER

## 2023-09-01 PROCEDURE — 2580000003 HC RX 258: Performed by: INTERNAL MEDICINE

## 2023-09-01 PROCEDURE — 2500000003 HC RX 250 WO HCPCS: Performed by: INTERNAL MEDICINE

## 2023-09-01 PROCEDURE — 99152 MOD SED SAME PHYS/QHP 5/>YRS: CPT | Performed by: INTERNAL MEDICINE

## 2023-09-01 PROCEDURE — 4A023N7 MEASUREMENT OF CARDIAC SAMPLING AND PRESSURE, LEFT HEART, PERCUTANEOUS APPROACH: ICD-10-PCS | Performed by: INTERNAL MEDICINE

## 2023-09-01 PROCEDURE — 7100000011 HC PHASE II RECOVERY - ADDTL 15 MIN: Performed by: INTERNAL MEDICINE

## 2023-09-01 PROCEDURE — 85025 COMPLETE CBC W/AUTO DIFF WBC: CPT

## 2023-09-01 PROCEDURE — C1894 INTRO/SHEATH, NON-LASER: HCPCS | Performed by: INTERNAL MEDICINE

## 2023-09-01 PROCEDURE — B2111ZZ FLUOROSCOPY OF MULTIPLE CORONARY ARTERIES USING LOW OSMOLAR CONTRAST: ICD-10-PCS | Performed by: INTERNAL MEDICINE

## 2023-09-01 PROCEDURE — 2580000003 HC RX 258: Performed by: NURSE PRACTITIONER

## 2023-09-01 PROCEDURE — 36415 COLL VENOUS BLD VENIPUNCTURE: CPT

## 2023-09-01 PROCEDURE — 6360000002 HC RX W HCPCS: Performed by: INTERNAL MEDICINE

## 2023-09-01 PROCEDURE — 80053 COMPREHEN METABOLIC PANEL: CPT

## 2023-09-01 PROCEDURE — 2709999900 HC NON-CHARGEABLE SUPPLY: Performed by: INTERNAL MEDICINE

## 2023-09-01 PROCEDURE — 2060000000 HC ICU INTERMEDIATE R&B

## 2023-09-01 PROCEDURE — 7100000010 HC PHASE II RECOVERY - FIRST 15 MIN: Performed by: INTERNAL MEDICINE

## 2023-09-01 PROCEDURE — 83735 ASSAY OF MAGNESIUM: CPT

## 2023-09-01 PROCEDURE — 6360000004 HC RX CONTRAST MEDICATION: Performed by: INTERNAL MEDICINE

## 2023-09-01 RX ORDER — HEPARIN SODIUM 1000 [USP'U]/ML
INJECTION, SOLUTION INTRAVENOUS; SUBCUTANEOUS PRN
Status: DISCONTINUED | OUTPATIENT
Start: 2023-09-01 | End: 2023-09-01 | Stop reason: HOSPADM

## 2023-09-01 RX ORDER — MIDAZOLAM HYDROCHLORIDE 1 MG/ML
INJECTION INTRAMUSCULAR; INTRAVENOUS PRN
Status: DISCONTINUED | OUTPATIENT
Start: 2023-09-01 | End: 2023-09-01 | Stop reason: HOSPADM

## 2023-09-01 RX ORDER — NICARDIPINE HYDROCHLORIDE 2.5 MG/ML
INJECTION INTRAVENOUS PRN
Status: DISCONTINUED | OUTPATIENT
Start: 2023-09-01 | End: 2023-09-01 | Stop reason: HOSPADM

## 2023-09-01 RX ORDER — SODIUM CHLORIDE 0.9 % (FLUSH) 0.9 %
5-40 SYRINGE (ML) INJECTION EVERY 12 HOURS SCHEDULED
Status: DISCONTINUED | OUTPATIENT
Start: 2023-09-01 | End: 2023-09-02 | Stop reason: HOSPADM

## 2023-09-01 RX ORDER — ONDANSETRON 2 MG/ML
4 INJECTION INTRAMUSCULAR; INTRAVENOUS EVERY 6 HOURS PRN
Status: DISCONTINUED | OUTPATIENT
Start: 2023-09-01 | End: 2023-09-02 | Stop reason: HOSPADM

## 2023-09-01 RX ORDER — MORPHINE SULFATE 2 MG/ML
2 INJECTION, SOLUTION INTRAMUSCULAR; INTRAVENOUS
Status: ACTIVE | OUTPATIENT
Start: 2023-09-01 | End: 2023-09-02

## 2023-09-01 RX ORDER — SODIUM CHLORIDE 0.9 % (FLUSH) 0.9 %
5-40 SYRINGE (ML) INJECTION PRN
Status: DISCONTINUED | OUTPATIENT
Start: 2023-09-01 | End: 2023-09-02 | Stop reason: HOSPADM

## 2023-09-01 RX ORDER — ACETAMINOPHEN 325 MG/1
650 TABLET ORAL EVERY 4 HOURS PRN
Status: DISCONTINUED | OUTPATIENT
Start: 2023-09-01 | End: 2023-09-02 | Stop reason: HOSPADM

## 2023-09-01 RX ORDER — HYDRALAZINE HYDROCHLORIDE 20 MG/ML
10 INJECTION INTRAMUSCULAR; INTRAVENOUS EVERY 10 MIN PRN
Status: DISCONTINUED | OUTPATIENT
Start: 2023-09-01 | End: 2023-09-02 | Stop reason: HOSPADM

## 2023-09-01 RX ORDER — SODIUM CHLORIDE 9 MG/ML
INJECTION, SOLUTION INTRAVENOUS PRN
Status: DISCONTINUED | OUTPATIENT
Start: 2023-09-01 | End: 2023-09-02 | Stop reason: HOSPADM

## 2023-09-01 RX ORDER — FENTANYL CITRATE 0.05 MG/ML
25 INJECTION, SOLUTION INTRAMUSCULAR; INTRAVENOUS
Status: ACTIVE | OUTPATIENT
Start: 2023-09-01 | End: 2023-09-02

## 2023-09-01 RX ORDER — FENTANYL CITRATE 50 UG/ML
INJECTION, SOLUTION INTRAMUSCULAR; INTRAVENOUS PRN
Status: DISCONTINUED | OUTPATIENT
Start: 2023-09-01 | End: 2023-09-01 | Stop reason: HOSPADM

## 2023-09-01 RX ORDER — NITROGLYCERIN 20 MG/100ML
INJECTION INTRAVENOUS CONTINUOUS PRN
Status: COMPLETED | OUTPATIENT
Start: 2023-09-01 | End: 2023-09-01

## 2023-09-01 RX ORDER — SODIUM CHLORIDE 9 MG/ML
INJECTION, SOLUTION INTRAVENOUS CONTINUOUS
Status: DISCONTINUED | OUTPATIENT
Start: 2023-09-01 | End: 2023-09-02

## 2023-09-01 RX ORDER — DIPHENHYDRAMINE HYDROCHLORIDE 50 MG/ML
50 INJECTION INTRAMUSCULAR; INTRAVENOUS ONCE
Status: COMPLETED | OUTPATIENT
Start: 2023-09-01 | End: 2023-09-01

## 2023-09-01 RX ORDER — LABETALOL HYDROCHLORIDE 5 MG/ML
10 INJECTION, SOLUTION INTRAVENOUS EVERY 30 MIN PRN
Status: DISCONTINUED | OUTPATIENT
Start: 2023-09-01 | End: 2023-09-02 | Stop reason: HOSPADM

## 2023-09-01 RX ORDER — MIDAZOLAM HYDROCHLORIDE 2 MG/2ML
2 INJECTION, SOLUTION INTRAMUSCULAR; INTRAVENOUS
Status: ACTIVE | OUTPATIENT
Start: 2023-09-01 | End: 2023-09-02

## 2023-09-01 RX ADMIN — CLONAZEPAM 2 MG: 1 TABLET ORAL at 20:56

## 2023-09-01 RX ADMIN — Medication 5 ML: at 20:58

## 2023-09-01 RX ADMIN — QUETIAPINE FUMARATE 150 MG: 100 TABLET ORAL at 20:55

## 2023-09-01 RX ADMIN — ASPIRIN 81 MG: 81 TABLET, COATED ORAL at 08:52

## 2023-09-01 RX ADMIN — Medication 10 ML: at 20:56

## 2023-09-01 RX ADMIN — DIPHENHYDRAMINE HYDROCHLORIDE 50 MG: 50 INJECTION INTRAMUSCULAR; INTRAVENOUS at 09:47

## 2023-09-01 RX ADMIN — Medication 2 TABLET: at 16:14

## 2023-09-01 RX ADMIN — SODIUM CHLORIDE: 9 INJECTION, SOLUTION INTRAVENOUS at 12:49

## 2023-09-01 RX ADMIN — LOSARTAN POTASSIUM 25 MG: 25 TABLET, FILM COATED ORAL at 08:52

## 2023-09-01 RX ADMIN — Medication 2 TABLET: at 20:55

## 2023-09-01 RX ADMIN — Medication 10 ML: at 08:53

## 2023-09-01 RX ADMIN — METHYLPREDNISOLONE SODIUM SUCCINATE 125 MG: 125 INJECTION, POWDER, FOR SOLUTION INTRAMUSCULAR; INTRAVENOUS at 09:46

## 2023-09-01 RX ADMIN — ACETAMINOPHEN 650 MG: 325 TABLET ORAL at 20:55

## 2023-09-01 RX ADMIN — SODIUM CHLORIDE, PRESERVATIVE FREE 40 MG: 5 INJECTION INTRAVENOUS at 09:46

## 2023-09-01 RX ADMIN — SERTRALINE 150 MG: 100 TABLET, FILM COATED ORAL at 08:52

## 2023-09-01 RX ADMIN — LAMOTRIGINE 200 MG: 100 TABLET ORAL at 08:52

## 2023-09-01 RX ADMIN — Medication 5 ML: at 21:10

## 2023-09-01 RX ADMIN — BUPROPION HYDROCHLORIDE 75 MG: 75 TABLET, FILM COATED ORAL at 08:52

## 2023-09-01 RX ADMIN — ATORVASTATIN CALCIUM 40 MG: 40 TABLET, FILM COATED ORAL at 20:56

## 2023-09-01 RX ADMIN — Medication 2 TABLET: at 08:52

## 2023-09-01 ASSESSMENT — PAIN DESCRIPTION - LOCATION: LOCATION: HEAD

## 2023-09-01 ASSESSMENT — PAIN SCALES - GENERAL: PAINLEVEL_OUTOF10: 7

## 2023-09-01 NOTE — PROGRESS NOTES
Pt resting post procedure. Pt verbalizes understanding of plan of care. Pt denies CP or SOB. Pt does c/o 7/10 migraine x 2 days. Pt is otherwise resting comfortably per her report. Pt is on the bedside cardiac monitor.   IV in left AC removed per pt request.

## 2023-09-01 NOTE — ED NOTES
08/31/23    From:HOME ALONE, WC, W, SHOWER BENCH    Admit:CP NAUSEA     PMH:HTN RT BKA BIPOLAR PTSD     Anticipated Discharge Disposition:HOME  Patient Mobility or PT/OT ordered:YES  Consults: BRYAN/KATE HERZOG    Clinical:   CTB: NEG;  CXR: NEG  8/29 UC +KLEB;  MG 1.9--1.6    Barriers to Discharge:RA, IV RAY, LOVENOX, HR 48- MONITOR, MEDS CHANGED    Assessments: CMI/HDM DONE

## 2023-09-01 NOTE — BRIEF OP NOTE
Section of Cardiology  Adult Brief Cardiac Cath Procedure Note        Procedure(s):    LHC, b/l coronary angio, LV gram    Pre-operative Diagnosis: Cardiomyopathy    H&P Status: Completed and reviewed. Post-operative Diagnosis: Severe triple-vessel CAD    Findings:  See full report  Right dominant system  Left main: Big size vessel mild disease  LAD: Big size vessel ostial 80% stenosis, mid to distal 90% diffuse stenosis. Vessel moderately to severely calcified  Circumflex: Big size vessel OM1 70% ostial stenosis, OM 3 medium sized vessel proximal 80% stenosis. Calcified vessel  RCA: Big size dominant vessel mid 99% stenosis, distal 99% stenosis followed by 100% occlusion with left-to-right collaterals.   Calcified vessel  LVEDP 3 mmHg  No aortic valve gradient on catheter pullback  EF 30% by LV gram      Complications:  none    Recommendations:  Transfer patient back to holding area for post diagnostic cath management  Transfer patient to Transfer clinic for CABG  Maximize medical therapy   Continue aspirin and high intensity statin indefinitely for secondary prevention of CAD  Avoid beta-blocker in the setting of bradycardia, continue losartan  Bedrest for 2 hours  IV hydration at 75 cc/h to complete 1 L then TKO  Follow-up morning labs including CBC BMP    Primary Proceduralist:   Tea Gomez MD    Full procedure note to follow

## 2023-09-01 NOTE — PROGRESS NOTES
Report called to [facility]. Pt aware of plan to transfer for CABG. Right wrist, radial site dressing remains dry and intact. No swelling or bleeding noted. Transport to return patient back to bed.

## 2023-09-02 VITALS
HEART RATE: 83 BPM | OXYGEN SATURATION: 100 % | RESPIRATION RATE: 19 BRPM | SYSTOLIC BLOOD PRESSURE: 153 MMHG | TEMPERATURE: 97.9 F | WEIGHT: 177.7 LBS | BODY MASS INDEX: 26.93 KG/M2 | DIASTOLIC BLOOD PRESSURE: 63 MMHG | HEIGHT: 68 IN

## 2023-09-02 LAB — ECHO BSA: 1.94 M2

## 2023-09-02 PROCEDURE — 99233 SBSQ HOSP IP/OBS HIGH 50: CPT | Performed by: INTERNAL MEDICINE

## 2023-09-02 PROCEDURE — 6360000002 HC RX W HCPCS: Performed by: NURSE PRACTITIONER

## 2023-09-02 PROCEDURE — 6370000000 HC RX 637 (ALT 250 FOR IP): Performed by: NURSE PRACTITIONER

## 2023-09-02 PROCEDURE — 6370000000 HC RX 637 (ALT 250 FOR IP): Performed by: INTERNAL MEDICINE

## 2023-09-02 PROCEDURE — 2580000003 HC RX 258: Performed by: INTERNAL MEDICINE

## 2023-09-02 PROCEDURE — 2700000000 HC OXYGEN THERAPY PER DAY

## 2023-09-02 RX ORDER — METOPROLOL SUCCINATE 25 MG/1
25 TABLET, EXTENDED RELEASE ORAL DAILY
Status: DISCONTINUED | OUTPATIENT
Start: 2023-09-02 | End: 2023-09-02 | Stop reason: HOSPADM

## 2023-09-02 RX ADMIN — Medication 10 ML: at 09:20

## 2023-09-02 RX ADMIN — METOPROLOL SUCCINATE 25 MG: 25 TABLET, EXTENDED RELEASE ORAL at 09:19

## 2023-09-02 RX ADMIN — ENOXAPARIN SODIUM 40 MG: 40 INJECTION SUBCUTANEOUS at 09:17

## 2023-09-02 RX ADMIN — LAMOTRIGINE 200 MG: 100 TABLET ORAL at 09:19

## 2023-09-02 RX ADMIN — Medication 2 TABLET: at 09:19

## 2023-09-02 RX ADMIN — BUPROPION HYDROCHLORIDE 75 MG: 75 TABLET, FILM COATED ORAL at 09:19

## 2023-09-02 RX ADMIN — ASPIRIN 81 MG: 81 TABLET, COATED ORAL at 09:18

## 2023-09-02 RX ADMIN — SERTRALINE 150 MG: 100 TABLET, FILM COATED ORAL at 09:18

## 2023-09-02 NOTE — FLOWSHEET NOTE
Patient sitting up in bed. Pt is alert and orientrd x 4. NSR 85 on telemetry. Right radial cath site is soft, dressing intact. No hematoma. Patient waiting to be transferred to the [facility].

## 2023-09-02 NOTE — PROGRESS NOTES
Phone call received from transfer center to inform staff that patient has a bed assignment at [facility]. Bed assignment is J73 Bed 20. It was stated that the earliest pickup time is 09/02/2023 0700.

## 2023-09-02 NOTE — PROGRESS NOTES
Patient Name: Jenna Parry  Admit Date: 2023  4:45 PM  MR #: [id number]  : 1950     Attending Physician: Mitul Cummings MD  Reason for consult: Syncope with bradycardia     History of Presenting Illness:       Jenna Parry is a 75 y.o. female on hospital day 2 with a history of hypertension, hyperlipidemia, diabetes obesity who was admitted to the hospital after sustaining a pre syncopal episode. History Obtained From:  patient, electronic medical record     Admission patient was noted hypertensive  CT of the head negative for intracranial findings  Carotid ultrasound no significant stenosis  Questionable negative orthostatics  Neurology has already signed out     Patient was ready to be discharged, was noted bradycardic 40s to 50s for which cardiology was consulted  Telemetry reviewed since last night patient has been bradycardic 50s, with sinus pauses up to 2 seconds  Patient received metoprolol last night     Currently patient in bed denies chest pain or shortness of breath  Yesterday's EKG showing sinus rhythm with no signs of ischemia no major arrhythmias     Stat echocardiogram shows moderately reduced EF per my interpretation 30 to 35% without major valvular disease      2023: Patient is resting comfort. No new issues overnight. No angina or heart failure type symptoms at this time.   Patient awaiting transfer to [facility] for CABG evaluation         History:       Past Medical History        Past Medical History:   Diagnosis Date    Anxiety      Bipolar affective disorder ([address])      DM (diabetes mellitus) ([address])      Epidural abscess 2021    Head injury      Hypertension      Migraine      Migraine headache 2014    Osteomyelitis ([address]) 2012    PTSD (post-traumatic stress disorder)      TIA (transient ischemic attack)       x 3         Past Surgical History         Past Surgical History:   Procedure Laterality Date    APPENDECTOMY        CHOLECYSTECTOMY        LEG AMPUTATION BELOW

## 2023-09-03 NOTE — PROGRESS NOTES
Physical Therapy  Facility/Department: [facility] MED SURG J378/W003-88  Physical Therapy Discharge      NAME: Jolyn Epley    : 1950 (53 y.o.)  MRN: [id number]    Account: [de-identified]  Gender: female      Patient has been discharged from acute care hospital. DC patient from current PT program.      Electronically signed by Edgardo Green PT on 9/3/23 at 2:29 PM EDT

## 2023-09-21 ENCOUNTER — HOSPITAL ENCOUNTER (INPATIENT)
Age: 73
LOS: 20 days | Discharge: HOME | DRG: 949 | End: 2023-10-11
Attending: PHYSICAL MEDICINE & REHABILITATION | Admitting: PHYSICAL MEDICINE & REHABILITATION
Payer: MEDICARE

## 2023-09-21 DIAGNOSIS — Z74.09 IMPAIRED MOBILITY AND ACTIVITIES OF DAILY LIVING: ICD-10-CM

## 2023-09-21 DIAGNOSIS — Z78.9 IMPAIRED MOBILITY AND ACTIVITIES OF DAILY LIVING: ICD-10-CM

## 2023-09-21 DIAGNOSIS — Z89.511 HX OF BKA, RIGHT (HCC): Primary | ICD-10-CM

## 2023-09-21 PROCEDURE — 6360000002 HC RX W HCPCS: Performed by: PHYSICAL MEDICINE & REHABILITATION

## 2023-09-21 PROCEDURE — 1180000000 HC REHAB R&B

## 2023-09-21 PROCEDURE — 6370000000 HC RX 637 (ALT 250 FOR IP): Performed by: PHYSICAL MEDICINE & REHABILITATION

## 2023-09-21 RX ORDER — GABAPENTIN 300 MG/1
300 CAPSULE ORAL 2 TIMES DAILY
COMMUNITY
Start: 2023-07-10

## 2023-09-21 RX ORDER — CALCIUM CARBONATE 500 MG/1
500 TABLET, CHEWABLE ORAL 3 TIMES DAILY PRN
Status: DISCONTINUED | OUTPATIENT
Start: 2023-09-21 | End: 2023-10-11 | Stop reason: HOSPADM

## 2023-09-21 RX ORDER — ROSUVASTATIN CALCIUM 5 MG/1
10 TABLET, COATED ORAL NIGHTLY
Status: DISCONTINUED | OUTPATIENT
Start: 2023-09-21 | End: 2023-10-11 | Stop reason: HOSPADM

## 2023-09-21 RX ORDER — ASPIRIN 81 MG/1
81 TABLET, CHEWABLE ORAL DAILY
Status: DISCONTINUED | OUTPATIENT
Start: 2023-09-21 | End: 2023-10-11 | Stop reason: HOSPADM

## 2023-09-21 RX ORDER — M-VIT,TX,IRON,MINS/CALC/FOLIC 27MG-0.4MG
1 TABLET ORAL DAILY
Status: DISCONTINUED | OUTPATIENT
Start: 2023-09-21 | End: 2023-10-11 | Stop reason: HOSPADM

## 2023-09-21 RX ORDER — OXYCODONE HYDROCHLORIDE 5 MG/1
5 CAPSULE ORAL EVERY 6 HOURS PRN
Status: DISCONTINUED | OUTPATIENT
Start: 2023-09-21 | End: 2023-10-11 | Stop reason: HOSPADM

## 2023-09-21 RX ORDER — ASPIRIN 81 MG/1
81 TABLET, CHEWABLE ORAL DAILY
COMMUNITY
Start: 2023-09-20

## 2023-09-21 RX ORDER — POTASSIUM CHLORIDE 20 MEQ/1
20 TABLET, EXTENDED RELEASE ORAL DAILY
COMMUNITY
Start: 2023-09-20

## 2023-09-21 RX ORDER — CEPHALEXIN 500 MG/1
500 CAPSULE ORAL EVERY 12 HOURS
Qty: 10 CAPSULE | Refills: 0 | Status: ON HOLD | COMMUNITY
Start: 2023-09-21 | End: 2023-10-10 | Stop reason: HOSPADM

## 2023-09-21 RX ORDER — AMOXICILLIN 250 MG
1 CAPSULE ORAL 2 TIMES DAILY PRN
COMMUNITY
Start: 2023-09-20

## 2023-09-21 RX ORDER — LAMOTRIGINE 200 MG/1
200 TABLET ORAL DAILY
COMMUNITY
Start: 2023-07-10

## 2023-09-21 RX ORDER — BUPROPION HYDROCHLORIDE 75 MG/1
75 TABLET ORAL DAILY
Status: DISCONTINUED | OUTPATIENT
Start: 2023-09-21 | End: 2023-10-11 | Stop reason: HOSPADM

## 2023-09-21 RX ORDER — ONDANSETRON 4 MG/1
4 TABLET, FILM COATED ORAL
Status: DISCONTINUED | OUTPATIENT
Start: 2023-09-21 | End: 2023-09-29

## 2023-09-21 RX ORDER — L. ACIDOPHILUS/L.BULGARICUS 1MM CELL
2 TABLET ORAL 3 TIMES DAILY
Status: DISCONTINUED | OUTPATIENT
Start: 2023-09-21 | End: 2023-10-11 | Stop reason: HOSPADM

## 2023-09-21 RX ORDER — POLYETHYLENE GLYCOL 3350 17 G/17G
17 POWDER, FOR SOLUTION ORAL 2 TIMES DAILY PRN
Status: DISCONTINUED | OUTPATIENT
Start: 2023-09-21 | End: 2023-10-11 | Stop reason: HOSPADM

## 2023-09-21 RX ORDER — LIDOCAINE 4 G/G
1 PATCH TOPICAL DAILY
Status: DISCONTINUED | OUTPATIENT
Start: 2023-09-21 | End: 2023-10-11 | Stop reason: HOSPADM

## 2023-09-21 RX ORDER — SENNOSIDES A AND B 8.6 MG/1
1 TABLET, FILM COATED ORAL 2 TIMES DAILY PRN
Status: DISCONTINUED | OUTPATIENT
Start: 2023-09-21 | End: 2023-10-11 | Stop reason: HOSPADM

## 2023-09-21 RX ORDER — FUROSEMIDE 20 MG/1
20 TABLET ORAL 2 TIMES DAILY
COMMUNITY
Start: 2023-09-21

## 2023-09-21 RX ORDER — UREA 10 %
1 LOTION (ML) TOPICAL NIGHTLY
COMMUNITY
Start: 2023-09-20

## 2023-09-21 RX ORDER — GABAPENTIN 300 MG/1
300 CAPSULE ORAL 2 TIMES DAILY
Status: DISCONTINUED | OUTPATIENT
Start: 2023-09-21 | End: 2023-10-11 | Stop reason: HOSPADM

## 2023-09-21 RX ORDER — POTASSIUM CHLORIDE 20 MEQ/1
20 TABLET, EXTENDED RELEASE ORAL DAILY
Status: DISCONTINUED | OUTPATIENT
Start: 2023-09-21 | End: 2023-10-08

## 2023-09-21 RX ORDER — LAMOTRIGINE 100 MG/1
200 TABLET ORAL DAILY
Status: DISCONTINUED | OUTPATIENT
Start: 2023-09-21 | End: 2023-10-11 | Stop reason: HOSPADM

## 2023-09-21 RX ORDER — CLONAZEPAM 1 MG/1
2 TABLET ORAL EVERY 12 HOURS PRN
Status: DISCONTINUED | OUTPATIENT
Start: 2023-09-21 | End: 2023-10-03

## 2023-09-21 RX ORDER — HEPARIN SODIUM 5000 [USP'U]/ML
5000 INJECTION, SOLUTION INTRAVENOUS; SUBCUTANEOUS EVERY 12 HOURS
Status: ON HOLD | COMMUNITY
Start: 2023-09-20 | End: 2023-10-10 | Stop reason: HOSPADM

## 2023-09-21 RX ORDER — PANTOPRAZOLE SODIUM 20 MG/1
20 TABLET, DELAYED RELEASE ORAL
COMMUNITY
Start: 2023-09-20

## 2023-09-21 RX ORDER — LANOLIN ALCOHOL/MO/W.PET/CERES
3 CREAM (GRAM) TOPICAL NIGHTLY PRN
Status: DISCONTINUED | OUTPATIENT
Start: 2023-09-21 | End: 2023-10-11 | Stop reason: HOSPADM

## 2023-09-21 RX ORDER — OXYCODONE HYDROCHLORIDE 5 MG/1
5 TABLET ORAL EVERY 6 HOURS PRN
Status: ON HOLD | COMMUNITY
Start: 2023-09-20 | End: 2023-10-10 | Stop reason: HOSPADM

## 2023-09-21 RX ORDER — GUAIFENESIN 600 MG/1
600 TABLET, EXTENDED RELEASE ORAL 2 TIMES DAILY PRN
Status: DISCONTINUED | OUTPATIENT
Start: 2023-09-21 | End: 2023-09-26 | Stop reason: SDUPTHER

## 2023-09-21 RX ORDER — HEPARIN SODIUM 5000 [USP'U]/ML
5000 INJECTION, SOLUTION INTRAVENOUS; SUBCUTANEOUS EVERY 12 HOURS
Status: DISCONTINUED | OUTPATIENT
Start: 2023-09-21 | End: 2023-10-11 | Stop reason: HOSPADM

## 2023-09-21 RX ORDER — SERTRALINE HYDROCHLORIDE 100 MG/1
100 TABLET, FILM COATED ORAL DAILY
Status: DISCONTINUED | OUTPATIENT
Start: 2023-09-21 | End: 2023-10-11 | Stop reason: HOSPADM

## 2023-09-21 RX ORDER — ACETAMINOPHEN 325 MG/1
650 TABLET ORAL EVERY 4 HOURS PRN
Status: DISCONTINUED | OUTPATIENT
Start: 2023-09-21 | End: 2023-09-22 | Stop reason: SDUPTHER

## 2023-09-21 RX ORDER — ROSUVASTATIN CALCIUM 10 MG/1
10 TABLET, COATED ORAL NIGHTLY
COMMUNITY
Start: 2023-09-20

## 2023-09-21 RX ORDER — PANTOPRAZOLE SODIUM 20 MG/1
20 TABLET, DELAYED RELEASE ORAL
Status: DISCONTINUED | OUTPATIENT
Start: 2023-09-22 | End: 2023-10-11 | Stop reason: HOSPADM

## 2023-09-21 RX ORDER — GUAIFENESIN 600 MG/1
600 TABLET, EXTENDED RELEASE ORAL
COMMUNITY
Start: 2023-09-20

## 2023-09-21 RX ORDER — FUROSEMIDE 20 MG/1
20 TABLET ORAL DAILY
Status: DISCONTINUED | OUTPATIENT
Start: 2023-09-21 | End: 2023-09-28

## 2023-09-21 RX ORDER — LACTULOSE 10 G/15ML
20 SOLUTION ORAL 3 TIMES DAILY
Status: DISCONTINUED | OUTPATIENT
Start: 2023-09-21 | End: 2023-10-11 | Stop reason: HOSPADM

## 2023-09-21 RX ADMIN — QUETIAPINE FUMARATE 150 MG: 100 TABLET ORAL at 22:46

## 2023-09-21 RX ADMIN — ROSUVASTATIN CALCIUM 10 MG: 10 TABLET, FILM COATED ORAL at 22:07

## 2023-09-21 RX ADMIN — Medication 2 TABLET: at 22:07

## 2023-09-21 RX ADMIN — MICONAZOLE NITRATE: 20 CREAM TOPICAL at 22:54

## 2023-09-21 RX ADMIN — Medication 3 MG: at 22:08

## 2023-09-21 RX ADMIN — OXYCODONE HYDROCHLORIDE 5 MG: 5 CAPSULE ORAL at 22:07

## 2023-09-21 RX ADMIN — GUAIFENESIN 600 MG: 600 TABLET ORAL at 22:16

## 2023-09-21 RX ADMIN — LACTULOSE 20 G: 20 SOLUTION ORAL at 22:07

## 2023-09-21 RX ADMIN — GABAPENTIN 300 MG: 300 CAPSULE ORAL at 22:07

## 2023-09-21 RX ADMIN — CLONAZEPAM 2 MG: 1 TABLET ORAL at 22:16

## 2023-09-21 RX ADMIN — METOPROLOL TARTRATE 12.5 MG: 25 TABLET, FILM COATED ORAL at 22:08

## 2023-09-21 RX ADMIN — LAMOTRIGINE 200 MG: 100 TABLET ORAL at 22:47

## 2023-09-21 RX ADMIN — HEPARIN SODIUM 5000 UNITS: 5000 INJECTION INTRAVENOUS; SUBCUTANEOUS at 22:07

## 2023-09-21 ASSESSMENT — PAIN SCALES - GENERAL
PAINLEVEL_OUTOF10: 3
PAINLEVEL_OUTOF10: 7

## 2023-09-21 ASSESSMENT — PAIN DESCRIPTION - LOCATION
LOCATION: STERNUM
LOCATION: CHEST

## 2023-09-21 ASSESSMENT — LIFESTYLE VARIABLES
HOW OFTEN DO YOU HAVE A DRINK CONTAINING ALCOHOL: MONTHLY OR LESS
HOW MANY STANDARD DRINKS CONTAINING ALCOHOL DO YOU HAVE ON A TYPICAL DAY: PATIENT DOES NOT DRINK

## 2023-09-21 ASSESSMENT — PAIN DESCRIPTION - ORIENTATION: ORIENTATION: MID

## 2023-09-21 ASSESSMENT — PAIN DESCRIPTION - PAIN TYPE: TYPE: ACUTE PAIN

## 2023-09-22 PROBLEM — R32 INCONTINENCE ASSOCIATED DERMATITIS: Status: ACTIVE | Noted: 2023-09-13

## 2023-09-22 PROBLEM — I50.22 CHRONIC SYSTOLIC CONGESTIVE HEART FAILURE (HCC): Status: ACTIVE | Noted: 2023-09-03

## 2023-09-22 PROBLEM — E11.65 HYPERGLYCEMIA DUE TO TYPE 2 DIABETES MELLITUS (HCC): Status: ACTIVE | Noted: 2023-09-22

## 2023-09-22 PROBLEM — Z74.09 IMPAIRED MOBILITY AND ADLS: Status: RESOLVED | Noted: 2022-07-12 | Resolved: 2023-09-22

## 2023-09-22 PROBLEM — Z74.09 IMPAIRED MOBILITY AND ADLS: Status: ACTIVE | Noted: 2023-09-22

## 2023-09-22 PROBLEM — R73.9 HYPERGLYCEMIA: Status: RESOLVED | Noted: 2018-10-16 | Resolved: 2023-09-22

## 2023-09-22 PROBLEM — Z78.9 IMPAIRED MOBILITY AND ADLS: Status: RESOLVED | Noted: 2023-09-22 | Resolved: 2023-09-22

## 2023-09-22 PROBLEM — S06.9XAS LATE EFFECT OF BRAIN INJURY: Status: ACTIVE | Noted: 2023-09-22

## 2023-09-22 PROBLEM — Z74.09 IMPAIRED MOBILITY AND ADLS: Status: RESOLVED | Noted: 2023-09-22 | Resolved: 2023-09-22

## 2023-09-22 PROBLEM — G06.2 EPIDURAL ABSCESS: Status: RESOLVED | Noted: 2021-09-09 | Resolved: 2023-09-22

## 2023-09-22 PROBLEM — S06.9X0S LATE EFFECT OF BRAIN INJURY (HCC): Status: ACTIVE | Noted: 2023-09-22

## 2023-09-22 PROBLEM — Z78.9 IMPAIRED MOBILITY AND ADLS: Status: ACTIVE | Noted: 2023-09-22

## 2023-09-22 PROBLEM — L25.8 INCONTINENCE ASSOCIATED DERMATITIS: Status: ACTIVE | Noted: 2023-09-13

## 2023-09-22 PROBLEM — G89.18 POST-OP PAIN: Status: ACTIVE | Noted: 2023-09-22

## 2023-09-22 PROBLEM — Z78.9 IMPAIRED MOBILITY AND ADLS: Status: RESOLVED | Noted: 2022-07-12 | Resolved: 2023-09-22

## 2023-09-22 PROBLEM — Z89.511 HX OF BKA, RIGHT (HCC): Status: ACTIVE | Noted: 2018-09-25

## 2023-09-22 PROBLEM — B36.9 FUNGAL DERMATITIS: Status: ACTIVE | Noted: 2023-09-13

## 2023-09-22 LAB
ANION GAP SERPL CALCULATED.3IONS-SCNC: 10 MEQ/L (ref 9–15)
BACTERIA URNS QL MICRO: ABNORMAL /HPF
BASOPHILS # BLD: 0 K/UL (ref 0–0.2)
BASOPHILS NFR BLD: 0.5 %
BILIRUB UR QL STRIP: ABNORMAL
BUN SERPL-MCNC: 17 MG/DL (ref 8–23)
CALCIUM SERPL-MCNC: 8.1 MG/DL (ref 8.5–9.9)
CHLORIDE SERPL-SCNC: 100 MEQ/L (ref 95–107)
CLARITY UR: ABNORMAL
CO2 SERPL-SCNC: 25 MEQ/L (ref 20–31)
COLOR UR: ABNORMAL
CREAT SERPL-MCNC: 0.64 MG/DL (ref 0.5–0.9)
EOSINOPHIL # BLD: 0.1 K/UL (ref 0–0.7)
EOSINOPHIL NFR BLD: 1.9 %
EPI CELLS #/AREA URNS AUTO: ABNORMAL /HPF (ref 0–5)
ERYTHROCYTE [DISTWIDTH] IN BLOOD BY AUTOMATED COUNT: 20.7 % (ref 11.5–14.5)
GLUCOSE BLD-MCNC: 140 MG/DL (ref 70–99)
GLUCOSE SERPL-MCNC: 134 MG/DL (ref 70–99)
GLUCOSE UR STRIP-MCNC: NEGATIVE MG/DL
HBA1C MFR BLD: 5.4 % (ref 4.8–5.9)
HCT VFR BLD AUTO: 34.1 % (ref 37–47)
HGB BLD-MCNC: 10.3 G/DL (ref 12–16)
HGB UR QL STRIP: ABNORMAL
HYALINE CASTS #/AREA URNS AUTO: ABNORMAL /HPF (ref 0–5)
KETONES UR STRIP-MCNC: ABNORMAL MG/DL
LEUKOCYTE ESTERASE UR QL STRIP: ABNORMAL
LYMPHOCYTES # BLD: 0.8 K/UL (ref 1–4.8)
LYMPHOCYTES NFR BLD: 19.2 %
MCH RBC QN AUTO: 30.3 PG (ref 27–31.3)
MCHC RBC AUTO-ENTMCNC: 30.2 % (ref 33–37)
MCV RBC AUTO: 100.3 FL (ref 79.4–94.8)
MONOCYTES # BLD: 0.3 K/UL (ref 0.2–0.8)
MONOCYTES NFR BLD: 7.8 %
NEUTROPHILS # BLD: 2.9 K/UL (ref 1.4–6.5)
NEUTS SEG NFR BLD: 70.4 %
NITRITE UR QL STRIP: POSITIVE
PERFORMED ON: ABNORMAL
PH UR STRIP: 6 [PH] (ref 5–9)
PLATELET # BLD AUTO: 250 K/UL (ref 130–400)
POTASSIUM SERPL-SCNC: 4.4 MEQ/L (ref 3.4–4.9)
PROT UR STRIP-MCNC: 30 MG/DL
RBC # BLD AUTO: 3.4 M/UL (ref 4.2–5.4)
RBC #/AREA URNS HPF: ABNORMAL /HPF (ref 0–2)
SODIUM SERPL-SCNC: 135 MEQ/L (ref 135–144)
SP GR UR STRIP: 1.02 (ref 1–1.03)
URINE REFLEX TO CULTURE: YES
UROBILINOGEN UR STRIP-ACNC: 1 E.U./DL
WBC # BLD AUTO: 4.1 K/UL (ref 4.8–10.8)
WBC #/AREA URNS AUTO: >100 /HPF (ref 0–5)

## 2023-09-22 PROCEDURE — 6360000002 HC RX W HCPCS: Performed by: PHYSICAL MEDICINE & REHABILITATION

## 2023-09-22 PROCEDURE — 85025 COMPLETE CBC W/AUTO DIFF WBC: CPT

## 2023-09-22 PROCEDURE — 97530 THERAPEUTIC ACTIVITIES: CPT

## 2023-09-22 PROCEDURE — 97167 OT EVAL HIGH COMPLEX 60 MIN: CPT

## 2023-09-22 PROCEDURE — 1180000000 HC REHAB R&B

## 2023-09-22 PROCEDURE — 6370000000 HC RX 637 (ALT 250 FOR IP): Performed by: PHYSICAL MEDICINE & REHABILITATION

## 2023-09-22 PROCEDURE — 87186 SC STD MICRODIL/AGAR DIL: CPT

## 2023-09-22 PROCEDURE — 36415 COLL VENOUS BLD VENIPUNCTURE: CPT

## 2023-09-22 PROCEDURE — 99223 1ST HOSP IP/OBS HIGH 75: CPT | Performed by: PHYSICAL MEDICINE & REHABILITATION

## 2023-09-22 PROCEDURE — 80048 BASIC METABOLIC PNL TOTAL CA: CPT

## 2023-09-22 PROCEDURE — 87086 URINE CULTURE/COLONY COUNT: CPT

## 2023-09-22 PROCEDURE — 87088 URINE BACTERIA CULTURE: CPT

## 2023-09-22 PROCEDURE — 97542 WHEELCHAIR MNGMENT TRAINING: CPT

## 2023-09-22 PROCEDURE — 83036 HEMOGLOBIN GLYCOSYLATED A1C: CPT

## 2023-09-22 PROCEDURE — 97535 SELF CARE MNGMENT TRAINING: CPT

## 2023-09-22 PROCEDURE — 92610 EVALUATE SWALLOWING FUNCTION: CPT

## 2023-09-22 PROCEDURE — 97162 PT EVAL MOD COMPLEX 30 MIN: CPT

## 2023-09-22 PROCEDURE — 92523 SPEECH SOUND LANG COMPREHEN: CPT

## 2023-09-22 PROCEDURE — 97163 PT EVAL HIGH COMPLEX 45 MIN: CPT

## 2023-09-22 PROCEDURE — 81001 URINALYSIS AUTO W/SCOPE: CPT

## 2023-09-22 PROCEDURE — 97116 GAIT TRAINING THERAPY: CPT

## 2023-09-22 PROCEDURE — 6370000000 HC RX 637 (ALT 250 FOR IP): Performed by: NURSE PRACTITIONER

## 2023-09-22 RX ORDER — DEXTROSE MONOHYDRATE 100 MG/ML
INJECTION, SOLUTION INTRAVENOUS CONTINUOUS PRN
Status: DISCONTINUED | OUTPATIENT
Start: 2023-09-22 | End: 2023-10-11 | Stop reason: HOSPADM

## 2023-09-22 RX ORDER — ENEMA 19; 7 G/133ML; G/133ML
1 ENEMA RECTAL DAILY PRN
Status: DISCONTINUED | OUTPATIENT
Start: 2023-09-22 | End: 2023-10-11 | Stop reason: HOSPADM

## 2023-09-22 RX ORDER — ACETAMINOPHEN 325 MG/1
650 TABLET ORAL EVERY 4 HOURS PRN
Status: DISCONTINUED | OUTPATIENT
Start: 2023-09-22 | End: 2023-10-11 | Stop reason: HOSPADM

## 2023-09-22 RX ORDER — INSULIN LISPRO 100 [IU]/ML
0-4 INJECTION, SOLUTION INTRAVENOUS; SUBCUTANEOUS
Status: DISCONTINUED | OUTPATIENT
Start: 2023-09-22 | End: 2023-10-11 | Stop reason: HOSPADM

## 2023-09-22 RX ORDER — INSULIN LISPRO 100 [IU]/ML
0-4 INJECTION, SOLUTION INTRAVENOUS; SUBCUTANEOUS NIGHTLY
Status: DISCONTINUED | OUTPATIENT
Start: 2023-09-22 | End: 2023-10-11 | Stop reason: HOSPADM

## 2023-09-22 RX ORDER — BISACODYL 10 MG
10 SUPPOSITORY, RECTAL RECTAL DAILY PRN
Status: DISCONTINUED | OUTPATIENT
Start: 2023-09-22 | End: 2023-10-11 | Stop reason: HOSPADM

## 2023-09-22 RX ORDER — CEPHALEXIN 500 MG/1
500 CAPSULE ORAL EVERY 12 HOURS SCHEDULED
Status: COMPLETED | OUTPATIENT
Start: 2023-09-22 | End: 2023-09-29

## 2023-09-22 RX ORDER — GLUCAGON 1 MG/ML
1 KIT INJECTION PRN
Status: DISCONTINUED | OUTPATIENT
Start: 2023-09-22 | End: 2023-10-11 | Stop reason: HOSPADM

## 2023-09-22 RX ADMIN — Medication 2 TABLET: at 14:26

## 2023-09-22 RX ADMIN — Medication 3 MG: at 20:30

## 2023-09-22 RX ADMIN — ACETAMINOPHEN 650 MG: 325 TABLET ORAL at 08:56

## 2023-09-22 RX ADMIN — LACTULOSE 20 G: 20 SOLUTION ORAL at 08:57

## 2023-09-22 RX ADMIN — PANTOPRAZOLE SODIUM 20 MG: 20 TABLET, DELAYED RELEASE ORAL at 06:14

## 2023-09-22 RX ADMIN — ACETAMINOPHEN 650 MG: 325 TABLET ORAL at 14:25

## 2023-09-22 RX ADMIN — MICONAZOLE NITRATE: 20 CREAM TOPICAL at 20:38

## 2023-09-22 RX ADMIN — ROSUVASTATIN CALCIUM 10 MG: 10 TABLET, FILM COATED ORAL at 20:29

## 2023-09-22 RX ADMIN — FUROSEMIDE 20 MG: 20 TABLET ORAL at 08:57

## 2023-09-22 RX ADMIN — CEPHALEXIN 500 MG: 250 CAPSULE ORAL at 20:29

## 2023-09-22 RX ADMIN — ASPIRIN 81 MG CHEWABLE TABLET 81 MG: 81 TABLET CHEWABLE at 08:57

## 2023-09-22 RX ADMIN — GABAPENTIN 300 MG: 300 CAPSULE ORAL at 20:29

## 2023-09-22 RX ADMIN — HEPARIN SODIUM 5000 UNITS: 5000 INJECTION INTRAVENOUS; SUBCUTANEOUS at 20:30

## 2023-09-22 RX ADMIN — GABAPENTIN 300 MG: 300 CAPSULE ORAL at 08:56

## 2023-09-22 RX ADMIN — SERTRALINE 100 MG: 100 TABLET, FILM COATED ORAL at 08:57

## 2023-09-22 RX ADMIN — BUPROPION HYDROCHLORIDE 75 MG: 75 TABLET, FILM COATED ORAL at 08:57

## 2023-09-22 RX ADMIN — GUAIFENESIN 600 MG: 600 TABLET ORAL at 20:29

## 2023-09-22 RX ADMIN — METOPROLOL TARTRATE 12.5 MG: 25 TABLET, FILM COATED ORAL at 08:57

## 2023-09-22 RX ADMIN — METOPROLOL TARTRATE 12.5 MG: 25 TABLET, FILM COATED ORAL at 20:30

## 2023-09-22 RX ADMIN — MICONAZOLE NITRATE: 20 CREAM TOPICAL at 09:00

## 2023-09-22 RX ADMIN — LACTULOSE 20 G: 20 SOLUTION ORAL at 14:26

## 2023-09-22 RX ADMIN — Medication 2 TABLET: at 20:29

## 2023-09-22 RX ADMIN — HEPARIN SODIUM 5000 UNITS: 5000 INJECTION INTRAVENOUS; SUBCUTANEOUS at 08:57

## 2023-09-22 RX ADMIN — LAMOTRIGINE 200 MG: 100 TABLET ORAL at 08:57

## 2023-09-22 RX ADMIN — SENNOSIDES 8.6 MG: 8.6 TABLET, FILM COATED ORAL at 08:56

## 2023-09-22 RX ADMIN — QUETIAPINE FUMARATE 150 MG: 100 TABLET ORAL at 22:23

## 2023-09-22 RX ADMIN — OXYCODONE HYDROCHLORIDE 5 MG: 5 CAPSULE ORAL at 18:30

## 2023-09-22 RX ADMIN — CLONAZEPAM 2 MG: 1 TABLET ORAL at 20:29

## 2023-09-22 RX ADMIN — Medication 2 TABLET: at 08:56

## 2023-09-22 ASSESSMENT — PAIN DESCRIPTION - LOCATION
LOCATION: CHEST
LOCATION: STERNUM
LOCATION: STERNUM
LOCATION: GENERALIZED

## 2023-09-22 ASSESSMENT — PAIN DESCRIPTION - DESCRIPTORS
DESCRIPTORS: ACHING
DESCRIPTORS: ACHING
DESCRIPTORS: SORE
DESCRIPTORS: ACHING

## 2023-09-22 ASSESSMENT — ENCOUNTER SYMPTOMS
NAUSEA: 0
PHOTOPHOBIA: 0
EYE REDNESS: 0
DIARRHEA: 0
BLOOD IN STOOL: 0
COUGH: 0
ABDOMINAL PAIN: 0
WHEEZING: 0
STRIDOR: 0
BACK PAIN: 1
CONSTIPATION: 1
SHORTNESS OF BREATH: 1
VOMITING: 0
SORE THROAT: 0
EYE PAIN: 0

## 2023-09-22 ASSESSMENT — PAIN DESCRIPTION - PAIN TYPE: TYPE: ACUTE PAIN

## 2023-09-22 ASSESSMENT — PAIN SCALES - GENERAL
PAINLEVEL_OUTOF10: 7
PAINLEVEL_OUTOF10: 8
PAINLEVEL_OUTOF10: 7

## 2023-09-22 ASSESSMENT — PAIN DESCRIPTION - ORIENTATION
ORIENTATION: MID
ORIENTATION: MID
ORIENTATION: LEFT

## 2023-09-22 NOTE — FLOWSHEET NOTE
Patient arrived to room 234 via ambulance from [facility]. Patient oriented to room and unit procedures. Assessment completed as charted, see flowsheet. Orders reviewed with Dr Tri Sosa via telephone. Call light within reach. Patient denies any needs or complaints at this time.

## 2023-09-22 NOTE — H&P
education, restrict carbohydrates to lowest effective and safe carb count per meal advising 4 carbs per meal, add at bedtime snack to prevent a.m. hypoglycemia, adjust/add medications. Late effect of brain injury -consider speech-language pathology consult    Post-op pain-CABG surgery    Hx of BKA, right -patient to learn donning and doffing the brace by herself but will need assistance where It is healing         I am especially concerned about their recent medical complexities. The patient is high risk for urinary tract infection, an admission urinalysis has been ordered. I will have the nurses check post void residual bladder volumes and place acatheter if excessive urine is retained in the bladder after voiding. The patient is risk for deep venous thromosis, complex deep venous thrombosis protocol prophylaxis has been ordered subcu heparin. The patient is high risk for orthostasis and a hydration program and orthostatic blood pressure screening have been ordered. I will attempt to get old records from the patient's previous hospital stay. Care everywhere on [employer] was utilized. 3.  Current and previous medications were reviewed and summarized and compared to old medication lists from home and from the acute floor. 4.  Complex labs and x-rays were reviewed. I will review patient's old EKG and labs. 5.  Will provide emotional support for this patient regarding adjustment to their disability. Cognition and mood will be screened daily and addressed by rehabilitation psychology and/or speech therapy as appropriate. I have encouraged the patient to attend the Rehab Adjustment to Disability Support Group and recreational therapy. 6.  Estimated length of stay is   2-3 weeks. Discharge to home with help from family and home health PT, OT, RN, and aide. Patient should be independent at discharge. 7.  The patient's medical and rehab prognosis are good.       [id number] [facility]

## 2023-09-23 LAB
GLUCOSE BLD-MCNC: 104 MG/DL (ref 70–99)
GLUCOSE BLD-MCNC: 123 MG/DL (ref 70–99)
GLUCOSE BLD-MCNC: 123 MG/DL (ref 70–99)
GLUCOSE BLD-MCNC: 98 MG/DL (ref 70–99)
PERFORMED ON: ABNORMAL
PERFORMED ON: NORMAL

## 2023-09-23 PROCEDURE — 6370000000 HC RX 637 (ALT 250 FOR IP): Performed by: PHYSICAL MEDICINE & REHABILITATION

## 2023-09-23 PROCEDURE — 6360000002 HC RX W HCPCS: Performed by: PHYSICAL MEDICINE & REHABILITATION

## 2023-09-23 PROCEDURE — 97530 THERAPEUTIC ACTIVITIES: CPT

## 2023-09-23 PROCEDURE — 97535 SELF CARE MNGMENT TRAINING: CPT

## 2023-09-23 PROCEDURE — 97542 WHEELCHAIR MNGMENT TRAINING: CPT

## 2023-09-23 PROCEDURE — 99233 SBSQ HOSP IP/OBS HIGH 50: CPT | Performed by: PHYSICAL MEDICINE & REHABILITATION

## 2023-09-23 PROCEDURE — 1180000000 HC REHAB R&B

## 2023-09-23 PROCEDURE — 97116 GAIT TRAINING THERAPY: CPT

## 2023-09-23 PROCEDURE — 6370000000 HC RX 637 (ALT 250 FOR IP): Performed by: NURSE PRACTITIONER

## 2023-09-23 PROCEDURE — 97110 THERAPEUTIC EXERCISES: CPT

## 2023-09-23 RX ORDER — OXYCODONE AND ACETAMINOPHEN 7.5; 325 MG/1; MG/1
1 TABLET ORAL EVERY 8 HOURS SCHEDULED
Status: DISCONTINUED | OUTPATIENT
Start: 2023-09-23 | End: 2023-09-25

## 2023-09-23 RX ADMIN — Medication 2 TABLET: at 14:36

## 2023-09-23 RX ADMIN — CEPHALEXIN 500 MG: 250 CAPSULE ORAL at 21:22

## 2023-09-23 RX ADMIN — ROSUVASTATIN CALCIUM 10 MG: 10 TABLET, FILM COATED ORAL at 21:22

## 2023-09-23 RX ADMIN — ACETAMINOPHEN 650 MG: 325 TABLET ORAL at 10:20

## 2023-09-23 RX ADMIN — SERTRALINE 100 MG: 100 TABLET, FILM COATED ORAL at 10:19

## 2023-09-23 RX ADMIN — BUPROPION HYDROCHLORIDE 75 MG: 75 TABLET, FILM COATED ORAL at 13:06

## 2023-09-23 RX ADMIN — GABAPENTIN 300 MG: 300 CAPSULE ORAL at 21:22

## 2023-09-23 RX ADMIN — POTASSIUM CHLORIDE 20 MEQ: 1500 TABLET, EXTENDED RELEASE ORAL at 10:20

## 2023-09-23 RX ADMIN — OXYCODONE AND ACETAMINOPHEN 1 TABLET: 7.5; 325 TABLET ORAL at 21:22

## 2023-09-23 RX ADMIN — FUROSEMIDE 20 MG: 20 TABLET ORAL at 10:19

## 2023-09-23 RX ADMIN — Medication 2 TABLET: at 21:22

## 2023-09-23 RX ADMIN — CEPHALEXIN 500 MG: 250 CAPSULE ORAL at 10:19

## 2023-09-23 RX ADMIN — HEPARIN SODIUM 5000 UNITS: 5000 INJECTION INTRAVENOUS; SUBCUTANEOUS at 10:23

## 2023-09-23 RX ADMIN — Medication 2 TABLET: at 10:19

## 2023-09-23 RX ADMIN — METOPROLOL TARTRATE 12.5 MG: 25 TABLET, FILM COATED ORAL at 21:22

## 2023-09-23 RX ADMIN — HEPARIN SODIUM 5000 UNITS: 5000 INJECTION INTRAVENOUS; SUBCUTANEOUS at 21:21

## 2023-09-23 RX ADMIN — OXYCODONE AND ACETAMINOPHEN 1 TABLET: 7.5; 325 TABLET ORAL at 16:46

## 2023-09-23 RX ADMIN — METOPROLOL TARTRATE 12.5 MG: 25 TABLET, FILM COATED ORAL at 10:19

## 2023-09-23 RX ADMIN — PANTOPRAZOLE SODIUM 20 MG: 20 TABLET, DELAYED RELEASE ORAL at 06:18

## 2023-09-23 RX ADMIN — Medication 1 TABLET: at 10:19

## 2023-09-23 RX ADMIN — GABAPENTIN 300 MG: 300 CAPSULE ORAL at 10:19

## 2023-09-23 RX ADMIN — QUETIAPINE FUMARATE 150 MG: 100 TABLET ORAL at 21:21

## 2023-09-23 RX ADMIN — LAMOTRIGINE 200 MG: 100 TABLET ORAL at 21:22

## 2023-09-23 RX ADMIN — ASPIRIN 81 MG CHEWABLE TABLET 81 MG: 81 TABLET CHEWABLE at 10:19

## 2023-09-23 ASSESSMENT — PAIN SCALES - GENERAL
PAINLEVEL_OUTOF10: 7

## 2023-09-23 ASSESSMENT — PAIN DESCRIPTION - LOCATION
LOCATION: CHEST

## 2023-09-23 ASSESSMENT — PAIN DESCRIPTION - DESCRIPTORS: DESCRIPTORS: ACHING

## 2023-09-23 ASSESSMENT — PAIN DESCRIPTION - ORIENTATION
ORIENTATION: MID

## 2023-09-24 LAB
ANION GAP SERPL CALCULATED.3IONS-SCNC: 12 MEQ/L (ref 9–15)
BACTERIA UR CULT: ABNORMAL
BACTERIA UR CULT: ABNORMAL
BASOPHILS # BLD: 0 K/UL (ref 0–0.2)
BASOPHILS NFR BLD: 0.5 %
BUN SERPL-MCNC: 26 MG/DL (ref 8–23)
CALCIUM SERPL-MCNC: 8.7 MG/DL (ref 8.5–9.9)
CHLORIDE SERPL-SCNC: 106 MEQ/L (ref 95–107)
CO2 SERPL-SCNC: 23 MEQ/L (ref 20–31)
CREAT SERPL-MCNC: 0.87 MG/DL (ref 0.5–0.9)
EOSINOPHIL # BLD: 0.1 K/UL (ref 0–0.7)
EOSINOPHIL NFR BLD: 1.8 %
ERYTHROCYTE [DISTWIDTH] IN BLOOD BY AUTOMATED COUNT: 20 % (ref 11.5–14.5)
GLUCOSE BLD-MCNC: 111 MG/DL (ref 70–99)
GLUCOSE BLD-MCNC: 146 MG/DL (ref 70–99)
GLUCOSE BLD-MCNC: 213 MG/DL (ref 70–99)
GLUCOSE BLD-MCNC: 327 MG/DL (ref 70–99)
GLUCOSE BLD-MCNC: 93 MG/DL (ref 70–99)
GLUCOSE SERPL-MCNC: 114 MG/DL (ref 70–99)
HCT VFR BLD AUTO: 37.9 % (ref 37–47)
HGB BLD-MCNC: 11.4 G/DL (ref 12–16)
LYMPHOCYTES # BLD: 1 K/UL (ref 1–4.8)
LYMPHOCYTES NFR BLD: 15.7 %
MCH RBC QN AUTO: 30.5 PG (ref 27–31.3)
MCHC RBC AUTO-ENTMCNC: 30.1 % (ref 33–37)
MCV RBC AUTO: 101.3 FL (ref 79.4–94.8)
MONOCYTES # BLD: 0.5 K/UL (ref 0.2–0.8)
MONOCYTES NFR BLD: 8.3 %
NEUTROPHILS # BLD: 4.4 K/UL (ref 1.4–6.5)
NEUTS SEG NFR BLD: 73.4 %
ORGANISM: ABNORMAL
PERFORMED ON: ABNORMAL
PERFORMED ON: NORMAL
PLATELET # BLD AUTO: 341 K/UL (ref 130–400)
POTASSIUM SERPL-SCNC: 4.8 MEQ/L (ref 3.4–4.9)
RBC # BLD AUTO: 3.74 M/UL (ref 4.2–5.4)
SODIUM SERPL-SCNC: 141 MEQ/L (ref 135–144)
WBC # BLD AUTO: 6 K/UL (ref 4.8–10.8)

## 2023-09-24 PROCEDURE — 6370000000 HC RX 637 (ALT 250 FOR IP): Performed by: NURSE PRACTITIONER

## 2023-09-24 PROCEDURE — 36415 COLL VENOUS BLD VENIPUNCTURE: CPT

## 2023-09-24 PROCEDURE — 80048 BASIC METABOLIC PNL TOTAL CA: CPT

## 2023-09-24 PROCEDURE — 85025 COMPLETE CBC W/AUTO DIFF WBC: CPT

## 2023-09-24 PROCEDURE — 6370000000 HC RX 637 (ALT 250 FOR IP): Performed by: PHYSICAL MEDICINE & REHABILITATION

## 2023-09-24 PROCEDURE — 93005 ELECTROCARDIOGRAM TRACING: CPT | Performed by: NURSE PRACTITIONER

## 2023-09-24 PROCEDURE — 6360000002 HC RX W HCPCS: Performed by: PHYSICAL MEDICINE & REHABILITATION

## 2023-09-24 PROCEDURE — 1180000000 HC REHAB R&B

## 2023-09-24 RX ORDER — BENZONATATE 100 MG/1
100 CAPSULE ORAL 3 TIMES DAILY PRN
Status: DISCONTINUED | OUTPATIENT
Start: 2023-09-24 | End: 2023-10-01

## 2023-09-24 RX ADMIN — OXYCODONE AND ACETAMINOPHEN 1 TABLET: 7.5; 325 TABLET ORAL at 15:53

## 2023-09-24 RX ADMIN — CEPHALEXIN 500 MG: 250 CAPSULE ORAL at 08:09

## 2023-09-24 RX ADMIN — HEPARIN SODIUM 5000 UNITS: 5000 INJECTION INTRAVENOUS; SUBCUTANEOUS at 20:54

## 2023-09-24 RX ADMIN — PANTOPRAZOLE SODIUM 20 MG: 20 TABLET, DELAYED RELEASE ORAL at 06:20

## 2023-09-24 RX ADMIN — ROSUVASTATIN CALCIUM 10 MG: 10 TABLET, FILM COATED ORAL at 20:54

## 2023-09-24 RX ADMIN — FUROSEMIDE 20 MG: 20 TABLET ORAL at 08:10

## 2023-09-24 RX ADMIN — SERTRALINE 100 MG: 100 TABLET, FILM COATED ORAL at 08:22

## 2023-09-24 RX ADMIN — Medication 3 MG: at 20:55

## 2023-09-24 RX ADMIN — METOPROLOL TARTRATE 12.5 MG: 25 TABLET, FILM COATED ORAL at 20:55

## 2023-09-24 RX ADMIN — GABAPENTIN 300 MG: 300 CAPSULE ORAL at 20:55

## 2023-09-24 RX ADMIN — LAMOTRIGINE 200 MG: 100 TABLET ORAL at 20:54

## 2023-09-24 RX ADMIN — Medication 2 TABLET: at 14:29

## 2023-09-24 RX ADMIN — HEPARIN SODIUM 5000 UNITS: 5000 INJECTION INTRAVENOUS; SUBCUTANEOUS at 08:10

## 2023-09-24 RX ADMIN — METOPROLOL TARTRATE 12.5 MG: 25 TABLET, FILM COATED ORAL at 08:10

## 2023-09-24 RX ADMIN — BUPROPION HYDROCHLORIDE 75 MG: 75 TABLET, FILM COATED ORAL at 08:10

## 2023-09-24 RX ADMIN — OXYCODONE AND ACETAMINOPHEN 1 TABLET: 7.5; 325 TABLET ORAL at 06:25

## 2023-09-24 RX ADMIN — QUETIAPINE FUMARATE 150 MG: 100 TABLET ORAL at 20:54

## 2023-09-24 RX ADMIN — Medication 2 TABLET: at 08:11

## 2023-09-24 RX ADMIN — ONDANSETRON HYDROCHLORIDE 4 MG: 4 TABLET, FILM COATED ORAL at 17:58

## 2023-09-24 RX ADMIN — BENZONATATE 100 MG: 100 CAPSULE ORAL at 12:59

## 2023-09-24 RX ADMIN — CEPHALEXIN 500 MG: 250 CAPSULE ORAL at 20:54

## 2023-09-24 RX ADMIN — Medication 2 TABLET: at 20:54

## 2023-09-24 RX ADMIN — ASPIRIN 81 MG CHEWABLE TABLET 81 MG: 81 TABLET CHEWABLE at 08:11

## 2023-09-24 RX ADMIN — BENZONATATE 100 MG: 100 CAPSULE ORAL at 17:58

## 2023-09-24 RX ADMIN — GABAPENTIN 300 MG: 300 CAPSULE ORAL at 08:11

## 2023-09-24 ASSESSMENT — PAIN SCALES - GENERAL
PAINLEVEL_OUTOF10: 8
PAINLEVEL_OUTOF10: 0

## 2023-09-24 NOTE — FLOWSHEET NOTE
Called to room per patient, c//o diaphoretic and not feeling well. Blood sugar 327, /65 HR 93 Resp 20,  O2 sat 96% RA. Alert and oriented, no chest pain. Abad Elder aware, orders received. Tele applied, NSR, Lab in to draw blood, Patient up to [facility] for lvery large soft BM. Returned to bed. EKG completed, consult to cardiology.

## 2023-09-24 NOTE — FLOWSHEET NOTE
2015-  PM assessment and evening medications given at this time. Pt resting in bed, calm/ cooperative A/o x4, VSS, PERLLA. Denies SOB and N/V, c/o pain r/t chest incision 7/10, percocet7.5 mg PO provided. Lung sounds clear and diminished bilaterally, O2-100% RA. S1/S2 no murmurs or gallops noted, HR-84, PPP, skin dry and warm with cap refill >3sec throughout, Bruising noted along abdominal panus. Chest incision clean, dry and intact, no redness, no drainage or odor noted. No acute distress noted. Call light within reach. 2030-  Pt reports pain improved to 3/10, no signs of adverse reaction to medication. No signs of acute distress notes at this time. Call light remains within reach.          SE  Electronically signed by Vj John RN on 9/24/2023 at 5:32 AM

## 2023-09-25 ENCOUNTER — APPOINTMENT (OUTPATIENT)
Dept: GENERAL RADIOLOGY | Age: 73
DRG: 949 | End: 2023-09-25
Attending: PHYSICAL MEDICINE & REHABILITATION
Payer: MEDICARE

## 2023-09-25 PROBLEM — Z86.79 HISTORY OF CARDIOMYOPATHY: Status: ACTIVE | Noted: 2023-09-25

## 2023-09-25 PROBLEM — Z95.1 HX OF CABG: Status: ACTIVE | Noted: 2023-09-25

## 2023-09-25 PROBLEM — Z86.79 HISTORY OF HYPERTENSION: Status: ACTIVE | Noted: 2023-09-25

## 2023-09-25 LAB
EKG ATRIAL RATE: 87 BPM
EKG P AXIS: 69 DEGREES
EKG P-R INTERVAL: 162 MS
EKG Q-T INTERVAL: 376 MS
EKG QRS DURATION: 102 MS
EKG QTC CALCULATION (BAZETT): 452 MS
EKG R AXIS: 10 DEGREES
EKG T AXIS: 44 DEGREES
EKG VENTRICULAR RATE: 87 BPM
GLUCOSE BLD-MCNC: 105 MG/DL (ref 70–99)
GLUCOSE BLD-MCNC: 120 MG/DL (ref 70–99)
GLUCOSE BLD-MCNC: 124 MG/DL (ref 70–99)
GLUCOSE BLD-MCNC: 131 MG/DL (ref 70–99)
PERFORMED ON: ABNORMAL
SARS-COV-2 RDRP RESP QL NAA+PROBE: NOT DETECTED

## 2023-09-25 PROCEDURE — 97535 SELF CARE MNGMENT TRAINING: CPT

## 2023-09-25 PROCEDURE — 97530 THERAPEUTIC ACTIVITIES: CPT

## 2023-09-25 PROCEDURE — 97112 NEUROMUSCULAR REEDUCATION: CPT

## 2023-09-25 PROCEDURE — 97542 WHEELCHAIR MNGMENT TRAINING: CPT

## 2023-09-25 PROCEDURE — 97110 THERAPEUTIC EXERCISES: CPT

## 2023-09-25 PROCEDURE — 1180000000 HC REHAB R&B

## 2023-09-25 PROCEDURE — 87635 SARS-COV-2 COVID-19 AMP PRB: CPT

## 2023-09-25 PROCEDURE — 99233 SBSQ HOSP IP/OBS HIGH 50: CPT | Performed by: PHYSICAL MEDICINE & REHABILITATION

## 2023-09-25 PROCEDURE — 6360000002 HC RX W HCPCS: Performed by: PHYSICAL MEDICINE & REHABILITATION

## 2023-09-25 PROCEDURE — APPSS45 APP SPLIT SHARED TIME 31-45 MINUTES: Performed by: PHYSICIAN ASSISTANT

## 2023-09-25 PROCEDURE — 6370000000 HC RX 637 (ALT 250 FOR IP): Performed by: NURSE PRACTITIONER

## 2023-09-25 PROCEDURE — 6370000000 HC RX 637 (ALT 250 FOR IP): Performed by: PHYSICAL MEDICINE & REHABILITATION

## 2023-09-25 PROCEDURE — 93010 ELECTROCARDIOGRAM REPORT: CPT | Performed by: INTERNAL MEDICINE

## 2023-09-25 PROCEDURE — 97116 GAIT TRAINING THERAPY: CPT

## 2023-09-25 PROCEDURE — 99223 1ST HOSP IP/OBS HIGH 75: CPT | Performed by: INTERNAL MEDICINE

## 2023-09-25 PROCEDURE — 71046 X-RAY EXAM CHEST 2 VIEWS: CPT

## 2023-09-25 RX ORDER — OXYCODONE AND ACETAMINOPHEN 7.5; 325 MG/1; MG/1
1 TABLET ORAL EVERY 4 HOURS PRN
Status: DISCONTINUED | OUTPATIENT
Start: 2023-09-25 | End: 2023-10-11 | Stop reason: HOSPADM

## 2023-09-25 RX ADMIN — BENZONATATE 100 MG: 100 CAPSULE ORAL at 20:57

## 2023-09-25 RX ADMIN — SERTRALINE 100 MG: 100 TABLET, FILM COATED ORAL at 10:01

## 2023-09-25 RX ADMIN — ROSUVASTATIN CALCIUM 10 MG: 10 TABLET, FILM COATED ORAL at 20:40

## 2023-09-25 RX ADMIN — BENZONATATE 100 MG: 100 CAPSULE ORAL at 06:57

## 2023-09-25 RX ADMIN — FUROSEMIDE 20 MG: 20 TABLET ORAL at 10:04

## 2023-09-25 RX ADMIN — ASPIRIN 81 MG CHEWABLE TABLET 81 MG: 81 TABLET CHEWABLE at 10:02

## 2023-09-25 RX ADMIN — LAMOTRIGINE 200 MG: 100 TABLET ORAL at 20:37

## 2023-09-25 RX ADMIN — MICONAZOLE NITRATE: 20 CREAM TOPICAL at 20:45

## 2023-09-25 RX ADMIN — HEPARIN SODIUM 5000 UNITS: 5000 INJECTION INTRAVENOUS; SUBCUTANEOUS at 10:02

## 2023-09-25 RX ADMIN — QUETIAPINE FUMARATE 150 MG: 100 TABLET ORAL at 20:38

## 2023-09-25 RX ADMIN — GABAPENTIN 300 MG: 300 CAPSULE ORAL at 10:03

## 2023-09-25 RX ADMIN — Medication 2 TABLET: at 20:40

## 2023-09-25 RX ADMIN — CEPHALEXIN 500 MG: 250 CAPSULE ORAL at 20:37

## 2023-09-25 RX ADMIN — CEPHALEXIN 500 MG: 250 CAPSULE ORAL at 10:01

## 2023-09-25 RX ADMIN — PANTOPRAZOLE SODIUM 20 MG: 20 TABLET, DELAYED RELEASE ORAL at 05:11

## 2023-09-25 RX ADMIN — HEPARIN SODIUM 5000 UNITS: 5000 INJECTION INTRAVENOUS; SUBCUTANEOUS at 20:40

## 2023-09-25 RX ADMIN — CLONAZEPAM 2 MG: 1 TABLET ORAL at 20:44

## 2023-09-25 RX ADMIN — METOPROLOL TARTRATE 12.5 MG: 25 TABLET, FILM COATED ORAL at 20:44

## 2023-09-25 RX ADMIN — GUAIFENESIN 600 MG: 600 TABLET ORAL at 17:12

## 2023-09-25 RX ADMIN — Medication 1 TABLET: at 10:01

## 2023-09-25 RX ADMIN — BUPROPION HYDROCHLORIDE 75 MG: 75 TABLET, FILM COATED ORAL at 10:22

## 2023-09-25 RX ADMIN — GABAPENTIN 300 MG: 300 CAPSULE ORAL at 20:37

## 2023-09-25 RX ADMIN — Medication 2 TABLET: at 10:02

## 2023-09-25 RX ADMIN — OXYCODONE HYDROCHLORIDE 5 MG: 5 CAPSULE ORAL at 15:34

## 2023-09-25 RX ADMIN — Medication 2 TABLET: at 15:35

## 2023-09-25 RX ADMIN — METOPROLOL TARTRATE 12.5 MG: 25 TABLET, FILM COATED ORAL at 10:14

## 2023-09-25 RX ADMIN — OXYCODONE HYDROCHLORIDE 5 MG: 5 CAPSULE ORAL at 09:12

## 2023-09-25 ASSESSMENT — PAIN SCALES - GENERAL
PAINLEVEL_OUTOF10: 8
PAINLEVEL_OUTOF10: 7
PAINLEVEL_OUTOF10: 6
PAINLEVEL_OUTOF10: 4

## 2023-09-25 ASSESSMENT — PAIN DESCRIPTION - LOCATION
LOCATION: GENERALIZED

## 2023-09-25 ASSESSMENT — ENCOUNTER SYMPTOMS
CHEST TIGHTNESS: 0
ABDOMINAL PAIN: 0
VOMITING: 0
COLOR CHANGE: 0
SHORTNESS OF BREATH: 1

## 2023-09-25 ASSESSMENT — PAIN DESCRIPTION - DESCRIPTORS
DESCRIPTORS: ACHING

## 2023-09-25 NOTE — CONSULTS
Consult Note            Date:9/22/2023        Patient Name:Ashley Matthews     YOB: 1950     Age:73 y.o. Reason for Consult: Medical management of hypertension and DM type II    Chief Complaint   Weakness    History Obtained From   patient, electronic medical record    History of Present Illness   Connor Matthews is a 62-year-old  female with significant past medical history of hypertension, CAD s/p CABG, CHF, TIA, DM type II, osteoarthritis, anxiety, bipolar affective disorder, who was transferred from [facility] s/p CABG surgery for revascularization of triple-vessel disease. Patient's condition stabilized. Currently, patient is admitted to [facility] for PT and OT. Patient with complaints of mild to moderate chest pain but claims that could possibly related to recent surgery. She denies fever, chills, dizziness, shortness of breath, and N/V/D. IM hospitalist team was consulted for medical management acute and chronic health conditions.   Past Medical History     Past Medical History:   Diagnosis Date    Anxiety     Bipolar affective disorder ([facility])     Chronic systolic congestive heart failure ([facility]) 9/3/2023    DM (diabetes mellitus) ([facility] [address] [facility])     Epidural abscess 09/09/2021    Head injury     Hypertension     Migraine     Migraine headache 07/18/2014    Osteomyelitis ([address] [facility]) 12/07/2012    PTSD (post-traumatic stress disorder)     TIA (transient ischemic attack)     x 3      Past Surgical History     Past Surgical History:   Procedure Laterality Date    APPENDECTOMY      CARDIAC PROCEDURE Bilateral 9/1/2023    Left heart cath / coronary angiography w grafts ROOM 191 performed by Ayah Jaimes MD at [facility] Bilateral     NASAL FRACTURE SURGERY N/A 04/25/2019    CLOSED REDUCTON EXTERNAL FIXATION OF NASAL BONE FRACTURE WITH SEVERE DNS (DEVIATED NASAL SEPTUM)  ROOM 190 performed by Gloria Cheema MD at [facility]
Drug use: No   Social History Narrative    Trino Ring , Has a BA in [address] [place] [address] with a cat -her only daughter lives in [place], she has high school friends and neighbors who are helpful especially friend Jaqueline Lang    She went to [employer] high school after graduation  her  who was in the [employer] and moved to [place]. She lived in [place] for many years moving back to the [facility] after she retired. Type of Home: Apartment in [address] [place] [address]. [address]    Home Layout: One level,  Access: Level entry    Bathroom Shower/Tub: Tub/Shower unit,Shower chair with back    [employer]: Standard,  Equipment: Grab bars in shower,Shower chair,  Accessibility: Accessible    Home Equipment: Reacher,Wheelchair-manual    Has the patient had two or more falls in the past year or any fall with injury in the past year?: Yes (3 recent)    Receives Help From: Adal Camp    ADL Assistance: [address]: Independent (friend brought groceries, or has them delivered),  Responsibilities: Yes    Ambulation Assistance: Non-ambulatory (w/c dependent, prior to COVID was ambulating), Transfer Assistance: Independent    Active : No (does not have a car)    Occupation: Retired    IADL Comments: previously independent with IADLs, pt has assistance with groceries    Additional Comments: pt motiviated for increased independence; pt stating increased blurry vision at home and increased dizziness since fall               Family Hx:  Family History   Problem Relation Age of Onset    Cancer Mother         breast    Heart Attack Father        Review of Systems:   Review of Systems   Constitutional:  Negative for fever. HENT:  Negative for congestion. Respiratory:  Positive for shortness of breath (with exertion). Negative for chest tightness. Cardiovascular:  Negative for chest pain, palpitations and leg swelling.    Gastrointestinal:  Negative for

## 2023-09-25 NOTE — FLOWSHEET NOTE
Pt assessment completed and medication given at this time. Pt is sitting in wheelchair, calm,alert and orientedx 4. Pt rates chest pain 7/10 at 0912 oxycotone given and lidocaine patch applied to chest area. After an hour pain rated 4/10. Pt denies SOB. Lung sounds are clear bilaterally,SpO2; 98% room air. cardiac is regular with S1 S2 sounds, on tele: NSR. Pt bowel sounds are active on all four quadrants and LBM 9/24. Chest incision is open to air, intact and healing. Bruises to her lower abdomen. Pt was educated on lactulose. Pt verbalized understanding. Call light within reach. 1337:Patient is alert and orientedx4 transferred via bed for a chest xray. 1357:pt returned from chest xray.

## 2023-09-26 PROBLEM — I25.119 CORONARY ARTERY DISEASE INVOLVING NATIVE CORONARY ARTERY OF NATIVE HEART WITH ANGINA PECTORIS (HCC): Status: ACTIVE | Noted: 2023-09-26

## 2023-09-26 LAB
GLUCOSE BLD-MCNC: 117 MG/DL (ref 70–99)
GLUCOSE BLD-MCNC: 122 MG/DL (ref 70–99)
GLUCOSE BLD-MCNC: 129 MG/DL (ref 70–99)
GLUCOSE BLD-MCNC: 155 MG/DL (ref 70–99)
PERFORMED ON: ABNORMAL

## 2023-09-26 PROCEDURE — 97530 THERAPEUTIC ACTIVITIES: CPT

## 2023-09-26 PROCEDURE — 6370000000 HC RX 637 (ALT 250 FOR IP): Performed by: REGISTERED NURSE

## 2023-09-26 PROCEDURE — 99232 SBSQ HOSP IP/OBS MODERATE 35: CPT | Performed by: PHYSICAL MEDICINE & REHABILITATION

## 2023-09-26 PROCEDURE — 97110 THERAPEUTIC EXERCISES: CPT

## 2023-09-26 PROCEDURE — 97535 SELF CARE MNGMENT TRAINING: CPT

## 2023-09-26 PROCEDURE — 99211 OFF/OP EST MAY X REQ PHY/QHP: CPT

## 2023-09-26 PROCEDURE — 6370000000 HC RX 637 (ALT 250 FOR IP): Performed by: NURSE PRACTITIONER

## 2023-09-26 PROCEDURE — 1180000000 HC REHAB R&B

## 2023-09-26 PROCEDURE — 6360000002 HC RX W HCPCS: Performed by: PHYSICAL MEDICINE & REHABILITATION

## 2023-09-26 PROCEDURE — 6370000000 HC RX 637 (ALT 250 FOR IP): Performed by: PHYSICAL MEDICINE & REHABILITATION

## 2023-09-26 PROCEDURE — 97116 GAIT TRAINING THERAPY: CPT

## 2023-09-26 RX ORDER — GUAIFENESIN/DEXTROMETHORPHAN 100-10MG/5
5 SYRUP ORAL EVERY 4 HOURS PRN
Status: DISCONTINUED | OUTPATIENT
Start: 2023-09-26 | End: 2023-10-11 | Stop reason: HOSPADM

## 2023-09-26 RX ORDER — POVIDONE-IODINE 10 MG/G
OINTMENT TOPICAL 2 TIMES DAILY
Status: DISCONTINUED | OUTPATIENT
Start: 2023-09-26 | End: 2023-10-11 | Stop reason: HOSPADM

## 2023-09-26 RX ADMIN — Medication 1 TABLET: at 07:58

## 2023-09-26 RX ADMIN — Medication 2 TABLET: at 14:37

## 2023-09-26 RX ADMIN — GABAPENTIN 300 MG: 300 CAPSULE ORAL at 21:20

## 2023-09-26 RX ADMIN — BUPROPION HYDROCHLORIDE 75 MG: 75 TABLET, FILM COATED ORAL at 07:57

## 2023-09-26 RX ADMIN — ACETAMINOPHEN 650 MG: 325 TABLET ORAL at 21:18

## 2023-09-26 RX ADMIN — METOPROLOL TARTRATE 12.5 MG: 25 TABLET, FILM COATED ORAL at 21:19

## 2023-09-26 RX ADMIN — MICONAZOLE NITRATE: 20 CREAM TOPICAL at 10:59

## 2023-09-26 RX ADMIN — LACTULOSE 20 G: 20 SOLUTION ORAL at 14:37

## 2023-09-26 RX ADMIN — HEPARIN SODIUM 5000 UNITS: 5000 INJECTION INTRAVENOUS; SUBCUTANEOUS at 21:20

## 2023-09-26 RX ADMIN — Medication 2 TABLET: at 07:57

## 2023-09-26 RX ADMIN — ASPIRIN 81 MG CHEWABLE TABLET 81 MG: 81 TABLET CHEWABLE at 07:58

## 2023-09-26 RX ADMIN — HEPARIN SODIUM 5000 UNITS: 5000 INJECTION INTRAVENOUS; SUBCUTANEOUS at 07:57

## 2023-09-26 RX ADMIN — PANTOPRAZOLE SODIUM 20 MG: 20 TABLET, DELAYED RELEASE ORAL at 06:37

## 2023-09-26 RX ADMIN — BENZONATATE 100 MG: 100 CAPSULE ORAL at 15:10

## 2023-09-26 RX ADMIN — GUAIFENESIN SYRUP AND DEXTROMETHORPHAN 5 ML: 100; 10 SYRUP ORAL at 21:18

## 2023-09-26 RX ADMIN — Medication 2 TABLET: at 21:19

## 2023-09-26 RX ADMIN — LACTULOSE 20 G: 20 SOLUTION ORAL at 07:57

## 2023-09-26 RX ADMIN — CEPHALEXIN 500 MG: 250 CAPSULE ORAL at 21:19

## 2023-09-26 RX ADMIN — GUAIFENESIN SYRUP AND DEXTROMETHORPHAN 5 ML: 100; 10 SYRUP ORAL at 15:10

## 2023-09-26 RX ADMIN — ROSUVASTATIN CALCIUM 10 MG: 10 TABLET, FILM COATED ORAL at 21:19

## 2023-09-26 RX ADMIN — CLONAZEPAM 2 MG: 1 TABLET ORAL at 21:19

## 2023-09-26 RX ADMIN — QUETIAPINE FUMARATE 150 MG: 100 TABLET ORAL at 21:19

## 2023-09-26 RX ADMIN — MICONAZOLE NITRATE: 20 CREAM TOPICAL at 21:20

## 2023-09-26 RX ADMIN — POVIDONE-IODINE: 100 OINTMENT TOPICAL at 16:35

## 2023-09-26 RX ADMIN — BENZONATATE 100 MG: 100 CAPSULE ORAL at 21:19

## 2023-09-26 RX ADMIN — CEPHALEXIN 500 MG: 250 CAPSULE ORAL at 10:58

## 2023-09-26 RX ADMIN — ACETAMINOPHEN 650 MG: 325 TABLET ORAL at 08:11

## 2023-09-26 RX ADMIN — SERTRALINE 100 MG: 100 TABLET, FILM COATED ORAL at 07:57

## 2023-09-26 RX ADMIN — METOPROLOL TARTRATE 12.5 MG: 25 TABLET, FILM COATED ORAL at 07:58

## 2023-09-26 RX ADMIN — LAMOTRIGINE 200 MG: 100 TABLET ORAL at 21:19

## 2023-09-26 RX ADMIN — GABAPENTIN 300 MG: 300 CAPSULE ORAL at 07:58

## 2023-09-26 RX ADMIN — FUROSEMIDE 20 MG: 20 TABLET ORAL at 07:58

## 2023-09-26 RX ADMIN — POTASSIUM CHLORIDE 20 MEQ: 1500 TABLET, EXTENDED RELEASE ORAL at 10:58

## 2023-09-26 ASSESSMENT — PAIN DESCRIPTION - LOCATION
LOCATION: HEAD
LOCATION: HEAD
LOCATION: GENERALIZED
LOCATION: HEAD

## 2023-09-26 ASSESSMENT — PAIN SCALES - GENERAL
PAINLEVEL_OUTOF10: 7
PAINLEVEL_OUTOF10: 4
PAINLEVEL_OUTOF10: 7
PAINLEVEL_OUTOF10: 4

## 2023-09-26 ASSESSMENT — PAIN DESCRIPTION - ORIENTATION: ORIENTATION: OTHER (COMMENT)

## 2023-09-26 ASSESSMENT — PAIN DESCRIPTION - DESCRIPTORS
DESCRIPTORS: SORE
DESCRIPTORS: ACHING

## 2023-09-26 NOTE — WOUND CARE
consult:  No    Discharge Plan:  Placement for patient upon discharge: home with support    Patient appropriate for Outpatient [address]: No    Referrals:  []   [] [address] [facility]  [] Supplies  [] Other    Patient/Caregiver Teaching:  Level of patient/caregiver understanding able to:   [x] Indicates understanding       [] Needs reinforcement  [] Unsuccessful      [] Verbal Understanding  [] Demonstrated understanding       [] No evidence of learning  [] Refused teaching         [] N/A       Electronically signed by  Cheryl Toscano RN (covering Wound/Ostomy Nurse)

## 2023-09-26 NOTE — FLOWSHEET NOTE
1018: Patient assessment completed, alert and oriented times 4, able to make all needs known. New orders for robitussin per patient request. Complies with all therapies. Call light in reach.  .Electronically signed by Tiffani Mora RN on 9/26/2023 at 10:19 AM

## 2023-09-27 LAB
GLUCOSE BLD-MCNC: 118 MG/DL (ref 70–99)
GLUCOSE BLD-MCNC: 128 MG/DL (ref 70–99)
GLUCOSE BLD-MCNC: 129 MG/DL (ref 70–99)
GLUCOSE BLD-MCNC: 160 MG/DL (ref 70–99)
PERFORMED ON: ABNORMAL

## 2023-09-27 PROCEDURE — 97116 GAIT TRAINING THERAPY: CPT

## 2023-09-27 PROCEDURE — 97535 SELF CARE MNGMENT TRAINING: CPT

## 2023-09-27 PROCEDURE — 6360000002 HC RX W HCPCS: Performed by: PHYSICAL MEDICINE & REHABILITATION

## 2023-09-27 PROCEDURE — 6370000000 HC RX 637 (ALT 250 FOR IP): Performed by: PHYSICAL MEDICINE & REHABILITATION

## 2023-09-27 PROCEDURE — 6370000000 HC RX 637 (ALT 250 FOR IP): Performed by: NURSE PRACTITIONER

## 2023-09-27 PROCEDURE — 1180000000 HC REHAB R&B

## 2023-09-27 PROCEDURE — 99232 SBSQ HOSP IP/OBS MODERATE 35: CPT | Performed by: PHYSICAL MEDICINE & REHABILITATION

## 2023-09-27 PROCEDURE — 97530 THERAPEUTIC ACTIVITIES: CPT

## 2023-09-27 PROCEDURE — 97110 THERAPEUTIC EXERCISES: CPT

## 2023-09-27 RX ADMIN — BENZONATATE 100 MG: 100 CAPSULE ORAL at 20:35

## 2023-09-27 RX ADMIN — Medication 2 TABLET: at 15:45

## 2023-09-27 RX ADMIN — Medication 1 TABLET: at 07:57

## 2023-09-27 RX ADMIN — METOPROLOL TARTRATE 12.5 MG: 25 TABLET, FILM COATED ORAL at 20:35

## 2023-09-27 RX ADMIN — SERTRALINE 100 MG: 100 TABLET, FILM COATED ORAL at 07:58

## 2023-09-27 RX ADMIN — CLONAZEPAM 2 MG: 1 TABLET ORAL at 20:36

## 2023-09-27 RX ADMIN — BENZONATATE 100 MG: 100 CAPSULE ORAL at 08:48

## 2023-09-27 RX ADMIN — ROSUVASTATIN CALCIUM 10 MG: 10 TABLET, FILM COATED ORAL at 20:35

## 2023-09-27 RX ADMIN — CEPHALEXIN 500 MG: 250 CAPSULE ORAL at 07:57

## 2023-09-27 RX ADMIN — POVIDONE-IODINE: 100 OINTMENT TOPICAL at 06:39

## 2023-09-27 RX ADMIN — ACETAMINOPHEN 650 MG: 325 TABLET ORAL at 08:48

## 2023-09-27 RX ADMIN — BUPROPION HYDROCHLORIDE 75 MG: 75 TABLET, FILM COATED ORAL at 07:58

## 2023-09-27 RX ADMIN — HEPARIN SODIUM 5000 UNITS: 5000 INJECTION INTRAVENOUS; SUBCUTANEOUS at 08:02

## 2023-09-27 RX ADMIN — CEPHALEXIN 500 MG: 250 CAPSULE ORAL at 20:35

## 2023-09-27 RX ADMIN — METOPROLOL TARTRATE 12.5 MG: 25 TABLET, FILM COATED ORAL at 07:57

## 2023-09-27 RX ADMIN — QUETIAPINE FUMARATE 150 MG: 100 TABLET ORAL at 20:36

## 2023-09-27 RX ADMIN — POVIDONE-IODINE: 100 OINTMENT TOPICAL at 15:53

## 2023-09-27 RX ADMIN — GUAIFENESIN SYRUP AND DEXTROMETHORPHAN 5 ML: 100; 10 SYRUP ORAL at 20:34

## 2023-09-27 RX ADMIN — LACTULOSE 20 G: 20 SOLUTION ORAL at 15:45

## 2023-09-27 RX ADMIN — Medication 2 TABLET: at 20:36

## 2023-09-27 RX ADMIN — Medication 2 TABLET: at 07:58

## 2023-09-27 RX ADMIN — GABAPENTIN 300 MG: 300 CAPSULE ORAL at 20:36

## 2023-09-27 RX ADMIN — ACETAMINOPHEN 650 MG: 325 TABLET ORAL at 20:36

## 2023-09-27 RX ADMIN — GUAIFENESIN SYRUP AND DEXTROMETHORPHAN 5 ML: 100; 10 SYRUP ORAL at 08:48

## 2023-09-27 RX ADMIN — ASPIRIN 81 MG CHEWABLE TABLET 81 MG: 81 TABLET CHEWABLE at 08:48

## 2023-09-27 RX ADMIN — GABAPENTIN 300 MG: 300 CAPSULE ORAL at 07:58

## 2023-09-27 RX ADMIN — FUROSEMIDE 20 MG: 20 TABLET ORAL at 20:55

## 2023-09-27 RX ADMIN — PANTOPRAZOLE SODIUM 20 MG: 20 TABLET, DELAYED RELEASE ORAL at 06:36

## 2023-09-27 RX ADMIN — LAMOTRIGINE 200 MG: 100 TABLET ORAL at 20:36

## 2023-09-27 RX ADMIN — HEPARIN SODIUM 5000 UNITS: 5000 INJECTION INTRAVENOUS; SUBCUTANEOUS at 20:35

## 2023-09-27 ASSESSMENT — PAIN DESCRIPTION - DESCRIPTORS
DESCRIPTORS: ACHING
DESCRIPTORS: ACHING
DESCRIPTORS: DISCOMFORT

## 2023-09-27 ASSESSMENT — PAIN DESCRIPTION - LOCATION
LOCATION: CHEST

## 2023-09-27 ASSESSMENT — PAIN SCALES - GENERAL
PAINLEVEL_OUTOF10: 6
PAINLEVEL_OUTOF10: 7
PAINLEVEL_OUTOF10: 4

## 2023-09-27 NOTE — FLOWSHEET NOTE
Assessment completed and medication given at this time. Pt is laying in bed and awake,  calm,alert and orientedx 4. Pt rates chest pain 6/10 lidocaine patch applied to chest area. After an hour pain rated 4/10. Pt denies SOB. On tele: NSR. LBM 9/24. Chest incision is open to air, intact and healing. Bruises to her lower abdomen. Bed alarm on and call light within reach.

## 2023-09-28 LAB
GLUCOSE BLD-MCNC: 116 MG/DL (ref 70–99)
GLUCOSE BLD-MCNC: 130 MG/DL (ref 70–99)
GLUCOSE BLD-MCNC: 131 MG/DL (ref 70–99)
PERFORMED ON: ABNORMAL

## 2023-09-28 PROCEDURE — 1180000000 HC REHAB R&B

## 2023-09-28 PROCEDURE — 6360000002 HC RX W HCPCS: Performed by: PHYSICAL MEDICINE & REHABILITATION

## 2023-09-28 PROCEDURE — 97116 GAIT TRAINING THERAPY: CPT

## 2023-09-28 PROCEDURE — 97535 SELF CARE MNGMENT TRAINING: CPT

## 2023-09-28 PROCEDURE — 6370000000 HC RX 637 (ALT 250 FOR IP): Performed by: INTERNAL MEDICINE

## 2023-09-28 PROCEDURE — 99232 SBSQ HOSP IP/OBS MODERATE 35: CPT | Performed by: PHYSICAL MEDICINE & REHABILITATION

## 2023-09-28 PROCEDURE — 97530 THERAPEUTIC ACTIVITIES: CPT

## 2023-09-28 PROCEDURE — 97542 WHEELCHAIR MNGMENT TRAINING: CPT

## 2023-09-28 PROCEDURE — 97110 THERAPEUTIC EXERCISES: CPT

## 2023-09-28 PROCEDURE — 6370000000 HC RX 637 (ALT 250 FOR IP): Performed by: NURSE PRACTITIONER

## 2023-09-28 PROCEDURE — 6370000000 HC RX 637 (ALT 250 FOR IP): Performed by: PHYSICAL MEDICINE & REHABILITATION

## 2023-09-28 RX ORDER — FUROSEMIDE 20 MG/1
20 TABLET ORAL NIGHTLY
Status: DISCONTINUED | OUTPATIENT
Start: 2023-09-28 | End: 2023-10-11 | Stop reason: HOSPADM

## 2023-09-28 RX ADMIN — HEPARIN SODIUM 5000 UNITS: 5000 INJECTION INTRAVENOUS; SUBCUTANEOUS at 20:39

## 2023-09-28 RX ADMIN — CEPHALEXIN 500 MG: 250 CAPSULE ORAL at 07:55

## 2023-09-28 RX ADMIN — ASPIRIN 81 MG CHEWABLE TABLET 81 MG: 81 TABLET CHEWABLE at 08:03

## 2023-09-28 RX ADMIN — BENZONATATE 100 MG: 100 CAPSULE ORAL at 08:47

## 2023-09-28 RX ADMIN — METOPROLOL TARTRATE 12.5 MG: 25 TABLET, FILM COATED ORAL at 07:54

## 2023-09-28 RX ADMIN — CEPHALEXIN 500 MG: 250 CAPSULE ORAL at 20:37

## 2023-09-28 RX ADMIN — PANTOPRAZOLE SODIUM 20 MG: 20 TABLET, DELAYED RELEASE ORAL at 06:04

## 2023-09-28 RX ADMIN — Medication 2 TABLET: at 07:54

## 2023-09-28 RX ADMIN — ACETAMINOPHEN 650 MG: 325 TABLET ORAL at 15:07

## 2023-09-28 RX ADMIN — LACTULOSE 20 G: 20 SOLUTION ORAL at 07:59

## 2023-09-28 RX ADMIN — MICONAZOLE NITRATE: 20 CREAM TOPICAL at 08:15

## 2023-09-28 RX ADMIN — GUAIFENESIN SYRUP AND DEXTROMETHORPHAN 5 ML: 100; 10 SYRUP ORAL at 08:47

## 2023-09-28 RX ADMIN — QUETIAPINE FUMARATE 150 MG: 100 TABLET ORAL at 20:36

## 2023-09-28 RX ADMIN — GUAIFENESIN SYRUP AND DEXTROMETHORPHAN 5 ML: 100; 10 SYRUP ORAL at 15:06

## 2023-09-28 RX ADMIN — GABAPENTIN 300 MG: 300 CAPSULE ORAL at 07:54

## 2023-09-28 RX ADMIN — CLONAZEPAM 2 MG: 1 TABLET ORAL at 20:55

## 2023-09-28 RX ADMIN — LAMOTRIGINE 200 MG: 100 TABLET ORAL at 20:37

## 2023-09-28 RX ADMIN — GABAPENTIN 300 MG: 300 CAPSULE ORAL at 20:35

## 2023-09-28 RX ADMIN — POVIDONE-IODINE: 100 OINTMENT TOPICAL at 17:30

## 2023-09-28 RX ADMIN — SERTRALINE 100 MG: 100 TABLET, FILM COATED ORAL at 07:54

## 2023-09-28 RX ADMIN — Medication 2 TABLET: at 15:07

## 2023-09-28 RX ADMIN — OXYCODONE AND ACETAMINOPHEN 1 TABLET: 7.5; 325 TABLET ORAL at 20:38

## 2023-09-28 RX ADMIN — HEPARIN SODIUM 5000 UNITS: 5000 INJECTION INTRAVENOUS; SUBCUTANEOUS at 07:56

## 2023-09-28 RX ADMIN — POVIDONE-IODINE: 100 OINTMENT TOPICAL at 08:02

## 2023-09-28 RX ADMIN — Medication 1 TABLET: at 07:55

## 2023-09-28 RX ADMIN — FUROSEMIDE 20 MG: 20 TABLET ORAL at 20:35

## 2023-09-28 RX ADMIN — BUPROPION HYDROCHLORIDE 75 MG: 75 TABLET, FILM COATED ORAL at 07:54

## 2023-09-28 RX ADMIN — Medication 2 TABLET: at 20:37

## 2023-09-28 RX ADMIN — GUAIFENESIN SYRUP AND DEXTROMETHORPHAN 5 ML: 100; 10 SYRUP ORAL at 20:38

## 2023-09-28 RX ADMIN — ROSUVASTATIN CALCIUM 10 MG: 10 TABLET, FILM COATED ORAL at 20:34

## 2023-09-28 ASSESSMENT — PAIN DESCRIPTION - DESCRIPTORS
DESCRIPTORS: STABBING
DESCRIPTORS: ACHING

## 2023-09-28 ASSESSMENT — PAIN DESCRIPTION - LOCATION
LOCATION: CHEST
LOCATION: GENERALIZED

## 2023-09-28 ASSESSMENT — PAIN SCALES - GENERAL
PAINLEVEL_OUTOF10: 7
PAINLEVEL_OUTOF10: 3
PAINLEVEL_OUTOF10: 7

## 2023-09-28 NOTE — FLOWSHEET NOTE
Assessment completed and medication given at this time. Pt is laying in bed and awake,  calm,alert and orientedx 4. Pt rates chest pain 3/10 lidocaine patch applied to chest area. Pt denies SOB. LBM 9/28. Chest incision is open to air, intact and healing. Bruises to her lower abdomen. Bed alarm on and call light within reach.

## 2023-09-29 LAB
ANION GAP SERPL CALCULATED.3IONS-SCNC: 11 MEQ/L (ref 9–15)
BASOPHILS # BLD: 0 K/UL (ref 0–0.2)
BASOPHILS NFR BLD: 0.6 %
BUN SERPL-MCNC: 29 MG/DL (ref 8–23)
CALCIUM SERPL-MCNC: 8.7 MG/DL (ref 8.5–9.9)
CHLORIDE SERPL-SCNC: 101 MEQ/L (ref 95–107)
CO2 SERPL-SCNC: 25 MEQ/L (ref 20–31)
CREAT SERPL-MCNC: 0.79 MG/DL (ref 0.5–0.9)
EOSINOPHIL # BLD: 0.1 K/UL (ref 0–0.7)
EOSINOPHIL NFR BLD: 2.9 %
ERYTHROCYTE [DISTWIDTH] IN BLOOD BY AUTOMATED COUNT: 19.4 % (ref 11.5–14.5)
GLUCOSE BLD-MCNC: 120 MG/DL (ref 70–99)
GLUCOSE BLD-MCNC: 127 MG/DL (ref 70–99)
GLUCOSE BLD-MCNC: 176 MG/DL (ref 70–99)
GLUCOSE BLD-MCNC: 198 MG/DL (ref 70–99)
GLUCOSE SERPL-MCNC: 154 MG/DL (ref 70–99)
HCT VFR BLD AUTO: 33.5 % (ref 37–47)
HGB BLD-MCNC: 10.2 G/DL (ref 12–16)
LYMPHOCYTES # BLD: 0.7 K/UL (ref 1–4.8)
LYMPHOCYTES NFR BLD: 20.6 %
MCH RBC QN AUTO: 31 PG (ref 27–31.3)
MCHC RBC AUTO-ENTMCNC: 30.4 % (ref 33–37)
MCV RBC AUTO: 101.8 FL (ref 79.4–94.8)
MONOCYTES # BLD: 0.4 K/UL (ref 0.2–0.8)
MONOCYTES NFR BLD: 10.6 %
NEUTROPHILS # BLD: 2.3 K/UL (ref 1.4–6.5)
NEUTS SEG NFR BLD: 65 %
PERFORMED ON: ABNORMAL
PLATELET # BLD AUTO: 221 K/UL (ref 130–400)
POTASSIUM SERPL-SCNC: 4.1 MEQ/L (ref 3.4–4.9)
RBC # BLD AUTO: 3.29 M/UL (ref 4.2–5.4)
SODIUM SERPL-SCNC: 137 MEQ/L (ref 135–144)
WBC # BLD AUTO: 3.5 K/UL (ref 4.8–10.8)

## 2023-09-29 PROCEDURE — 80048 BASIC METABOLIC PNL TOTAL CA: CPT

## 2023-09-29 PROCEDURE — 97535 SELF CARE MNGMENT TRAINING: CPT

## 2023-09-29 PROCEDURE — 6370000000 HC RX 637 (ALT 250 FOR IP): Performed by: NURSE PRACTITIONER

## 2023-09-29 PROCEDURE — 97530 THERAPEUTIC ACTIVITIES: CPT

## 2023-09-29 PROCEDURE — 1180000000 HC REHAB R&B

## 2023-09-29 PROCEDURE — 6370000000 HC RX 637 (ALT 250 FOR IP): Performed by: REGISTERED NURSE

## 2023-09-29 PROCEDURE — 36415 COLL VENOUS BLD VENIPUNCTURE: CPT

## 2023-09-29 PROCEDURE — 97112 NEUROMUSCULAR REEDUCATION: CPT

## 2023-09-29 PROCEDURE — 6370000000 HC RX 637 (ALT 250 FOR IP): Performed by: INTERNAL MEDICINE

## 2023-09-29 PROCEDURE — 99232 SBSQ HOSP IP/OBS MODERATE 35: CPT | Performed by: PHYSICAL MEDICINE & REHABILITATION

## 2023-09-29 PROCEDURE — 6370000000 HC RX 637 (ALT 250 FOR IP): Performed by: PHYSICAL MEDICINE & REHABILITATION

## 2023-09-29 PROCEDURE — 97116 GAIT TRAINING THERAPY: CPT

## 2023-09-29 PROCEDURE — 85025 COMPLETE CBC W/AUTO DIFF WBC: CPT

## 2023-09-29 PROCEDURE — 6360000002 HC RX W HCPCS: Performed by: PHYSICAL MEDICINE & REHABILITATION

## 2023-09-29 PROCEDURE — 97110 THERAPEUTIC EXERCISES: CPT

## 2023-09-29 RX ORDER — ONDANSETRON 4 MG/1
4 TABLET, FILM COATED ORAL EVERY 8 HOURS PRN
Status: DISCONTINUED | OUTPATIENT
Start: 2023-09-29 | End: 2023-10-11 | Stop reason: HOSPADM

## 2023-09-29 RX ORDER — FLUTICASONE PROPIONATE 110 UG/1
1 AEROSOL, METERED RESPIRATORY (INHALATION)
Status: DISCONTINUED | OUTPATIENT
Start: 2023-09-29 | End: 2023-10-11 | Stop reason: HOSPADM

## 2023-09-29 RX ADMIN — Medication 2 TABLET: at 09:17

## 2023-09-29 RX ADMIN — ASPIRIN 81 MG CHEWABLE TABLET 81 MG: 81 TABLET CHEWABLE at 09:17

## 2023-09-29 RX ADMIN — GABAPENTIN 300 MG: 300 CAPSULE ORAL at 09:17

## 2023-09-29 RX ADMIN — QUETIAPINE FUMARATE 150 MG: 100 TABLET ORAL at 21:02

## 2023-09-29 RX ADMIN — HEPARIN SODIUM 5000 UNITS: 5000 INJECTION INTRAVENOUS; SUBCUTANEOUS at 09:17

## 2023-09-29 RX ADMIN — CEPHALEXIN 500 MG: 250 CAPSULE ORAL at 09:17

## 2023-09-29 RX ADMIN — FUROSEMIDE 20 MG: 20 TABLET ORAL at 20:48

## 2023-09-29 RX ADMIN — METOPROLOL TARTRATE 12.5 MG: 25 TABLET, FILM COATED ORAL at 20:59

## 2023-09-29 RX ADMIN — Medication 1 TABLET: at 09:17

## 2023-09-29 RX ADMIN — PANTOPRAZOLE SODIUM 20 MG: 20 TABLET, DELAYED RELEASE ORAL at 05:14

## 2023-09-29 RX ADMIN — BENZONATATE 100 MG: 100 CAPSULE ORAL at 21:09

## 2023-09-29 RX ADMIN — POVIDONE-IODINE: 100 OINTMENT TOPICAL at 05:14

## 2023-09-29 RX ADMIN — ROSUVASTATIN CALCIUM 10 MG: 10 TABLET, FILM COATED ORAL at 21:03

## 2023-09-29 RX ADMIN — CLONAZEPAM 2 MG: 1 TABLET ORAL at 21:10

## 2023-09-29 RX ADMIN — GUAIFENESIN SYRUP AND DEXTROMETHORPHAN 5 ML: 100; 10 SYRUP ORAL at 21:11

## 2023-09-29 RX ADMIN — LAMOTRIGINE 200 MG: 100 TABLET ORAL at 20:58

## 2023-09-29 RX ADMIN — BENZONATATE 100 MG: 100 CAPSULE ORAL at 09:21

## 2023-09-29 RX ADMIN — Medication 2 TABLET: at 20:57

## 2023-09-29 RX ADMIN — ACETAMINOPHEN 650 MG: 325 TABLET ORAL at 11:18

## 2023-09-29 RX ADMIN — SERTRALINE 100 MG: 100 TABLET, FILM COATED ORAL at 09:17

## 2023-09-29 RX ADMIN — BUPROPION HYDROCHLORIDE 75 MG: 75 TABLET, FILM COATED ORAL at 09:17

## 2023-09-29 RX ADMIN — Medication 2 TABLET: at 15:46

## 2023-09-29 RX ADMIN — GABAPENTIN 300 MG: 300 CAPSULE ORAL at 20:54

## 2023-09-29 RX ADMIN — HEPARIN SODIUM 5000 UNITS: 5000 INJECTION INTRAVENOUS; SUBCUTANEOUS at 20:55

## 2023-09-29 ASSESSMENT — PAIN DESCRIPTION - LOCATION: LOCATION: GENERALIZED

## 2023-09-29 ASSESSMENT — PAIN DESCRIPTION - DESCRIPTORS: DESCRIPTORS: SQUEEZING

## 2023-09-29 ASSESSMENT — PAIN SCALES - GENERAL: PAINLEVEL_OUTOF10: 8

## 2023-09-30 LAB
GLUCOSE BLD-MCNC: 102 MG/DL (ref 70–99)
GLUCOSE BLD-MCNC: 124 MG/DL (ref 70–99)
GLUCOSE BLD-MCNC: 137 MG/DL (ref 70–99)
PERFORMED ON: ABNORMAL

## 2023-09-30 PROCEDURE — 6370000000 HC RX 637 (ALT 250 FOR IP): Performed by: PHYSICAL MEDICINE & REHABILITATION

## 2023-09-30 PROCEDURE — 6370000000 HC RX 637 (ALT 250 FOR IP): Performed by: REGISTERED NURSE

## 2023-09-30 PROCEDURE — 97116 GAIT TRAINING THERAPY: CPT

## 2023-09-30 PROCEDURE — 94640 AIRWAY INHALATION TREATMENT: CPT

## 2023-09-30 PROCEDURE — 6370000000 HC RX 637 (ALT 250 FOR IP): Performed by: NURSE PRACTITIONER

## 2023-09-30 PROCEDURE — 99232 SBSQ HOSP IP/OBS MODERATE 35: CPT | Performed by: PHYSICAL MEDICINE & REHABILITATION

## 2023-09-30 PROCEDURE — 6370000000 HC RX 637 (ALT 250 FOR IP): Performed by: INTERNAL MEDICINE

## 2023-09-30 PROCEDURE — 6360000002 HC RX W HCPCS: Performed by: PHYSICAL MEDICINE & REHABILITATION

## 2023-09-30 PROCEDURE — 97535 SELF CARE MNGMENT TRAINING: CPT

## 2023-09-30 PROCEDURE — 1180000000 HC REHAB R&B

## 2023-09-30 PROCEDURE — 94761 N-INVAS EAR/PLS OXIMETRY MLT: CPT

## 2023-09-30 RX ADMIN — Medication 1 TABLET: at 09:41

## 2023-09-30 RX ADMIN — Medication 2 TABLET: at 20:52

## 2023-09-30 RX ADMIN — BUPROPION HYDROCHLORIDE 75 MG: 75 TABLET, FILM COATED ORAL at 09:40

## 2023-09-30 RX ADMIN — Medication 2 TABLET: at 16:56

## 2023-09-30 RX ADMIN — CLONAZEPAM 2 MG: 1 TABLET ORAL at 20:59

## 2023-09-30 RX ADMIN — ASPIRIN 81 MG CHEWABLE TABLET 81 MG: 81 TABLET CHEWABLE at 09:41

## 2023-09-30 RX ADMIN — ROSUVASTATIN CALCIUM 10 MG: 10 TABLET, FILM COATED ORAL at 20:53

## 2023-09-30 RX ADMIN — HEPARIN SODIUM 5000 UNITS: 5000 INJECTION INTRAVENOUS; SUBCUTANEOUS at 09:41

## 2023-09-30 RX ADMIN — QUETIAPINE FUMARATE 150 MG: 100 TABLET ORAL at 20:47

## 2023-09-30 RX ADMIN — POVIDONE-IODINE: 100 OINTMENT TOPICAL at 16:57

## 2023-09-30 RX ADMIN — FLUTICASONE PROPIONATE 1 PUFF: 110 AEROSOL, METERED RESPIRATORY (INHALATION) at 16:10

## 2023-09-30 RX ADMIN — BENZONATATE 100 MG: 100 CAPSULE ORAL at 20:59

## 2023-09-30 RX ADMIN — GABAPENTIN 300 MG: 300 CAPSULE ORAL at 20:46

## 2023-09-30 RX ADMIN — SERTRALINE 100 MG: 100 TABLET, FILM COATED ORAL at 09:40

## 2023-09-30 RX ADMIN — POVIDONE-IODINE: 100 OINTMENT TOPICAL at 06:02

## 2023-09-30 RX ADMIN — FLUTICASONE PROPIONATE 1 PUFF: 110 AEROSOL, METERED RESPIRATORY (INHALATION) at 04:25

## 2023-09-30 RX ADMIN — GABAPENTIN 300 MG: 300 CAPSULE ORAL at 09:41

## 2023-09-30 RX ADMIN — Medication 2 TABLET: at 09:44

## 2023-09-30 RX ADMIN — METOPROLOL TARTRATE 12.5 MG: 25 TABLET, FILM COATED ORAL at 09:41

## 2023-09-30 RX ADMIN — LAMOTRIGINE 200 MG: 100 TABLET ORAL at 20:52

## 2023-09-30 RX ADMIN — HEPARIN SODIUM 5000 UNITS: 5000 INJECTION INTRAVENOUS; SUBCUTANEOUS at 20:49

## 2023-09-30 RX ADMIN — ACETAMINOPHEN 650 MG: 325 TABLET ORAL at 09:40

## 2023-09-30 RX ADMIN — GUAIFENESIN SYRUP AND DEXTROMETHORPHAN 5 ML: 100; 10 SYRUP ORAL at 20:59

## 2023-09-30 RX ADMIN — FUROSEMIDE 20 MG: 20 TABLET ORAL at 20:48

## 2023-09-30 RX ADMIN — PANTOPRAZOLE SODIUM 20 MG: 20 TABLET, DELAYED RELEASE ORAL at 06:02

## 2023-09-30 RX ADMIN — METOPROLOL TARTRATE 12.5 MG: 25 TABLET, FILM COATED ORAL at 20:46

## 2023-09-30 ASSESSMENT — PAIN DESCRIPTION - ORIENTATION: ORIENTATION: MID

## 2023-09-30 ASSESSMENT — PAIN DESCRIPTION - LOCATION: LOCATION: CHEST;STERNUM

## 2023-09-30 ASSESSMENT — PAIN DESCRIPTION - DESCRIPTORS: DESCRIPTORS: ACHING

## 2023-09-30 ASSESSMENT — PAIN SCALES - GENERAL
PAINLEVEL_OUTOF10: 0
PAINLEVEL_OUTOF10: 7

## 2023-10-01 LAB
GLUCOSE BLD-MCNC: 121 MG/DL (ref 70–99)
GLUCOSE BLD-MCNC: 168 MG/DL (ref 70–99)
GLUCOSE BLD-MCNC: 230 MG/DL (ref 70–99)
GLUCOSE BLD-MCNC: 97 MG/DL (ref 70–99)
PERFORMED ON: ABNORMAL
PERFORMED ON: NORMAL

## 2023-10-01 PROCEDURE — 6370000000 HC RX 637 (ALT 250 FOR IP): Performed by: REGISTERED NURSE

## 2023-10-01 PROCEDURE — 94761 N-INVAS EAR/PLS OXIMETRY MLT: CPT

## 2023-10-01 PROCEDURE — 99232 SBSQ HOSP IP/OBS MODERATE 35: CPT | Performed by: PHYSICAL MEDICINE & REHABILITATION

## 2023-10-01 PROCEDURE — 6360000002 HC RX W HCPCS: Performed by: PHYSICAL MEDICINE & REHABILITATION

## 2023-10-01 PROCEDURE — 6370000000 HC RX 637 (ALT 250 FOR IP): Performed by: INTERNAL MEDICINE

## 2023-10-01 PROCEDURE — 6370000000 HC RX 637 (ALT 250 FOR IP): Performed by: PHYSICAL MEDICINE & REHABILITATION

## 2023-10-01 PROCEDURE — 94640 AIRWAY INHALATION TREATMENT: CPT

## 2023-10-01 PROCEDURE — 1180000000 HC REHAB R&B

## 2023-10-01 PROCEDURE — 6370000000 HC RX 637 (ALT 250 FOR IP): Performed by: NURSE PRACTITIONER

## 2023-10-01 RX ORDER — BENZONATATE 100 MG/1
100 CAPSULE ORAL 3 TIMES DAILY
Status: DISCONTINUED | OUTPATIENT
Start: 2023-10-01 | End: 2023-10-11 | Stop reason: HOSPADM

## 2023-10-01 RX ADMIN — Medication 2 TABLET: at 20:47

## 2023-10-01 RX ADMIN — FUROSEMIDE 20 MG: 20 TABLET ORAL at 20:45

## 2023-10-01 RX ADMIN — Medication 1 TABLET: at 08:21

## 2023-10-01 RX ADMIN — GUAIFENESIN SYRUP AND DEXTROMETHORPHAN 5 ML: 100; 10 SYRUP ORAL at 08:21

## 2023-10-01 RX ADMIN — BUPROPION HYDROCHLORIDE 75 MG: 75 TABLET, FILM COATED ORAL at 08:21

## 2023-10-01 RX ADMIN — Medication 2 TABLET: at 08:21

## 2023-10-01 RX ADMIN — FLUTICASONE PROPIONATE 1 PUFF: 110 AEROSOL, METERED RESPIRATORY (INHALATION) at 17:44

## 2023-10-01 RX ADMIN — Medication 2 TABLET: at 13:21

## 2023-10-01 RX ADMIN — PANTOPRAZOLE SODIUM 20 MG: 20 TABLET, DELAYED RELEASE ORAL at 06:22

## 2023-10-01 RX ADMIN — BENZONATATE 100 MG: 100 CAPSULE ORAL at 17:36

## 2023-10-01 RX ADMIN — ACETAMINOPHEN 650 MG: 325 TABLET ORAL at 08:20

## 2023-10-01 RX ADMIN — HEPARIN SODIUM 5000 UNITS: 5000 INJECTION INTRAVENOUS; SUBCUTANEOUS at 20:43

## 2023-10-01 RX ADMIN — QUETIAPINE FUMARATE 150 MG: 100 TABLET ORAL at 20:46

## 2023-10-01 RX ADMIN — METOPROLOL TARTRATE 12.5 MG: 25 TABLET, FILM COATED ORAL at 21:04

## 2023-10-01 RX ADMIN — LAMOTRIGINE 200 MG: 100 TABLET ORAL at 20:47

## 2023-10-01 RX ADMIN — BENZONATATE 100 MG: 100 CAPSULE ORAL at 13:21

## 2023-10-01 RX ADMIN — GUAIFENESIN SYRUP AND DEXTROMETHORPHAN 5 ML: 100; 10 SYRUP ORAL at 13:23

## 2023-10-01 RX ADMIN — GABAPENTIN 300 MG: 300 CAPSULE ORAL at 20:49

## 2023-10-01 RX ADMIN — GABAPENTIN 300 MG: 300 CAPSULE ORAL at 08:21

## 2023-10-01 RX ADMIN — FLUTICASONE PROPIONATE 1 PUFF: 110 AEROSOL, METERED RESPIRATORY (INHALATION) at 04:15

## 2023-10-01 RX ADMIN — POVIDONE-IODINE: 100 OINTMENT TOPICAL at 06:30

## 2023-10-01 RX ADMIN — CLONAZEPAM 2 MG: 1 TABLET ORAL at 20:48

## 2023-10-01 RX ADMIN — HEPARIN SODIUM 5000 UNITS: 5000 INJECTION INTRAVENOUS; SUBCUTANEOUS at 08:21

## 2023-10-01 RX ADMIN — ASPIRIN 81 MG CHEWABLE TABLET 81 MG: 81 TABLET CHEWABLE at 08:18

## 2023-10-01 RX ADMIN — ROSUVASTATIN CALCIUM 10 MG: 10 TABLET, FILM COATED ORAL at 21:03

## 2023-10-01 RX ADMIN — SERTRALINE 100 MG: 100 TABLET, FILM COATED ORAL at 08:21

## 2023-10-01 RX ADMIN — POVIDONE-IODINE: 100 OINTMENT TOPICAL at 17:38

## 2023-10-01 RX ADMIN — ONDANSETRON HYDROCHLORIDE 4 MG: 4 TABLET, FILM COATED ORAL at 20:49

## 2023-10-01 RX ADMIN — BENZONATATE 100 MG: 100 CAPSULE ORAL at 08:20

## 2023-10-01 RX ADMIN — GUAIFENESIN SYRUP AND DEXTROMETHORPHAN 5 ML: 100; 10 SYRUP ORAL at 21:08

## 2023-10-01 ASSESSMENT — PAIN SCALES - GENERAL
PAINLEVEL_OUTOF10: 8
PAINLEVEL_OUTOF10: 5

## 2023-10-01 ASSESSMENT — PAIN DESCRIPTION - LOCATION: LOCATION: CHEST;STERNUM

## 2023-10-01 ASSESSMENT — PAIN DESCRIPTION - ORIENTATION: ORIENTATION: MID

## 2023-10-01 ASSESSMENT — PAIN DESCRIPTION - DESCRIPTORS: DESCRIPTORS: ACHING

## 2023-10-02 LAB
GLUCOSE BLD-MCNC: 121 MG/DL (ref 70–99)
GLUCOSE BLD-MCNC: 147 MG/DL (ref 70–99)
GLUCOSE BLD-MCNC: 159 MG/DL (ref 70–99)
GLUCOSE BLD-MCNC: 160 MG/DL (ref 70–99)
PERFORMED ON: ABNORMAL

## 2023-10-02 PROCEDURE — 94640 AIRWAY INHALATION TREATMENT: CPT

## 2023-10-02 PROCEDURE — 97110 THERAPEUTIC EXERCISES: CPT

## 2023-10-02 PROCEDURE — 6370000000 HC RX 637 (ALT 250 FOR IP): Performed by: INTERNAL MEDICINE

## 2023-10-02 PROCEDURE — 6370000000 HC RX 637 (ALT 250 FOR IP): Performed by: NURSE PRACTITIONER

## 2023-10-02 PROCEDURE — 97535 SELF CARE MNGMENT TRAINING: CPT

## 2023-10-02 PROCEDURE — 6360000002 HC RX W HCPCS: Performed by: PHYSICAL MEDICINE & REHABILITATION

## 2023-10-02 PROCEDURE — 97116 GAIT TRAINING THERAPY: CPT

## 2023-10-02 PROCEDURE — 97129 THER IVNTJ 1ST 15 MIN: CPT

## 2023-10-02 PROCEDURE — 1180000000 HC REHAB R&B

## 2023-10-02 PROCEDURE — 6370000000 HC RX 637 (ALT 250 FOR IP): Performed by: REGISTERED NURSE

## 2023-10-02 PROCEDURE — 97542 WHEELCHAIR MNGMENT TRAINING: CPT

## 2023-10-02 PROCEDURE — 6370000000 HC RX 637 (ALT 250 FOR IP): Performed by: PHYSICAL MEDICINE & REHABILITATION

## 2023-10-02 PROCEDURE — 94761 N-INVAS EAR/PLS OXIMETRY MLT: CPT

## 2023-10-02 PROCEDURE — 99233 SBSQ HOSP IP/OBS HIGH 50: CPT | Performed by: PHYSICAL MEDICINE & REHABILITATION

## 2023-10-02 PROCEDURE — 97530 THERAPEUTIC ACTIVITIES: CPT

## 2023-10-02 RX ORDER — CETIRIZINE HYDROCHLORIDE 10 MG/1
5 TABLET ORAL DAILY
Status: COMPLETED | OUTPATIENT
Start: 2023-10-02 | End: 2023-10-06

## 2023-10-02 RX ADMIN — METOPROLOL TARTRATE 12.5 MG: 25 TABLET, FILM COATED ORAL at 22:14

## 2023-10-02 RX ADMIN — QUETIAPINE FUMARATE 150 MG: 100 TABLET ORAL at 22:13

## 2023-10-02 RX ADMIN — PANTOPRAZOLE SODIUM 20 MG: 20 TABLET, DELAYED RELEASE ORAL at 05:15

## 2023-10-02 RX ADMIN — FLUTICASONE PROPIONATE 1 PUFF: 110 AEROSOL, METERED RESPIRATORY (INHALATION) at 15:53

## 2023-10-02 RX ADMIN — ROSUVASTATIN CALCIUM 10 MG: 10 TABLET, FILM COATED ORAL at 22:12

## 2023-10-02 RX ADMIN — FLUTICASONE PROPIONATE 1 PUFF: 110 AEROSOL, METERED RESPIRATORY (INHALATION) at 05:26

## 2023-10-02 RX ADMIN — BENZONATATE 100 MG: 100 CAPSULE ORAL at 14:06

## 2023-10-02 RX ADMIN — Medication 2 TABLET: at 09:10

## 2023-10-02 RX ADMIN — CETIRIZINE HYDROCHLORIDE 5 MG: 10 TABLET, FILM COATED ORAL at 11:34

## 2023-10-02 RX ADMIN — CLONAZEPAM 2 MG: 1 TABLET ORAL at 22:31

## 2023-10-02 RX ADMIN — ACETAMINOPHEN 650 MG: 325 TABLET ORAL at 23:03

## 2023-10-02 RX ADMIN — BENZONATATE 100 MG: 100 CAPSULE ORAL at 18:11

## 2023-10-02 RX ADMIN — SERTRALINE 100 MG: 100 TABLET, FILM COATED ORAL at 09:11

## 2023-10-02 RX ADMIN — ACETAMINOPHEN 650 MG: 325 TABLET ORAL at 15:36

## 2023-10-02 RX ADMIN — FUROSEMIDE 20 MG: 20 TABLET ORAL at 22:14

## 2023-10-02 RX ADMIN — GUAIFENESIN SYRUP AND DEXTROMETHORPHAN 5 ML: 100; 10 SYRUP ORAL at 22:31

## 2023-10-02 RX ADMIN — POVIDONE-IODINE: 100 OINTMENT TOPICAL at 05:16

## 2023-10-02 RX ADMIN — GUAIFENESIN SYRUP AND DEXTROMETHORPHAN 5 ML: 100; 10 SYRUP ORAL at 09:18

## 2023-10-02 RX ADMIN — GABAPENTIN 300 MG: 300 CAPSULE ORAL at 09:11

## 2023-10-02 RX ADMIN — ASPIRIN 81 MG CHEWABLE TABLET 81 MG: 81 TABLET CHEWABLE at 09:11

## 2023-10-02 RX ADMIN — Medication 2 TABLET: at 14:05

## 2023-10-02 RX ADMIN — BUPROPION HYDROCHLORIDE 75 MG: 75 TABLET, FILM COATED ORAL at 09:10

## 2023-10-02 RX ADMIN — HEPARIN SODIUM 5000 UNITS: 5000 INJECTION INTRAVENOUS; SUBCUTANEOUS at 22:15

## 2023-10-02 RX ADMIN — LAMOTRIGINE 200 MG: 100 TABLET ORAL at 22:13

## 2023-10-02 RX ADMIN — Medication 2 TABLET: at 22:14

## 2023-10-02 RX ADMIN — Medication 1 TABLET: at 09:11

## 2023-10-02 RX ADMIN — HEPARIN SODIUM 5000 UNITS: 5000 INJECTION INTRAVENOUS; SUBCUTANEOUS at 09:10

## 2023-10-02 RX ADMIN — BENZONATATE 100 MG: 100 CAPSULE ORAL at 09:10

## 2023-10-02 RX ADMIN — GABAPENTIN 300 MG: 300 CAPSULE ORAL at 22:15

## 2023-10-02 ASSESSMENT — PAIN DESCRIPTION - DESCRIPTORS: DESCRIPTORS: ACHING

## 2023-10-02 ASSESSMENT — PAIN DESCRIPTION - ORIENTATION: ORIENTATION: LEFT;RIGHT;MID

## 2023-10-02 ASSESSMENT — PAIN DESCRIPTION - LOCATION
LOCATION: HEAD
LOCATION: STERNUM

## 2023-10-02 ASSESSMENT — PAIN SCALES - GENERAL
PAINLEVEL_OUTOF10: 6
PAINLEVEL_OUTOF10: 7

## 2023-10-03 PROBLEM — F13.20 BENZODIAZEPINE DEPENDENCE (HCC): Status: ACTIVE | Noted: 2023-10-03

## 2023-10-03 LAB
GLUCOSE BLD-MCNC: 103 MG/DL (ref 70–99)
GLUCOSE BLD-MCNC: 111 MG/DL (ref 70–99)
GLUCOSE BLD-MCNC: 129 MG/DL (ref 70–99)
GLUCOSE BLD-MCNC: 131 MG/DL (ref 70–99)
PERFORMED ON: ABNORMAL

## 2023-10-03 PROCEDURE — 97530 THERAPEUTIC ACTIVITIES: CPT

## 2023-10-03 PROCEDURE — 94761 N-INVAS EAR/PLS OXIMETRY MLT: CPT

## 2023-10-03 PROCEDURE — 6370000000 HC RX 637 (ALT 250 FOR IP): Performed by: INTERNAL MEDICINE

## 2023-10-03 PROCEDURE — 6370000000 HC RX 637 (ALT 250 FOR IP): Performed by: PHYSICAL MEDICINE & REHABILITATION

## 2023-10-03 PROCEDURE — 97535 SELF CARE MNGMENT TRAINING: CPT

## 2023-10-03 PROCEDURE — 6360000002 HC RX W HCPCS: Performed by: PHYSICAL MEDICINE & REHABILITATION

## 2023-10-03 PROCEDURE — 1180000000 HC REHAB R&B

## 2023-10-03 PROCEDURE — 99232 SBSQ HOSP IP/OBS MODERATE 35: CPT | Performed by: PHYSICAL MEDICINE & REHABILITATION

## 2023-10-03 PROCEDURE — 97542 WHEELCHAIR MNGMENT TRAINING: CPT

## 2023-10-03 PROCEDURE — 97110 THERAPEUTIC EXERCISES: CPT

## 2023-10-03 PROCEDURE — 6370000000 HC RX 637 (ALT 250 FOR IP): Performed by: NURSE PRACTITIONER

## 2023-10-03 PROCEDURE — 94640 AIRWAY INHALATION TREATMENT: CPT

## 2023-10-03 PROCEDURE — 6370000000 HC RX 637 (ALT 250 FOR IP): Performed by: REGISTERED NURSE

## 2023-10-03 PROCEDURE — 97116 GAIT TRAINING THERAPY: CPT

## 2023-10-03 RX ORDER — CLONAZEPAM 1 MG/1
2 TABLET ORAL
Status: DISCONTINUED | OUTPATIENT
Start: 2023-10-03 | End: 2023-10-11 | Stop reason: HOSPADM

## 2023-10-03 RX ADMIN — ASPIRIN 81 MG CHEWABLE TABLET 81 MG: 81 TABLET CHEWABLE at 09:30

## 2023-10-03 RX ADMIN — BENZONATATE 100 MG: 100 CAPSULE ORAL at 17:35

## 2023-10-03 RX ADMIN — ACETAMINOPHEN 650 MG: 325 TABLET ORAL at 13:04

## 2023-10-03 RX ADMIN — LAMOTRIGINE 200 MG: 100 TABLET ORAL at 21:35

## 2023-10-03 RX ADMIN — HEPARIN SODIUM 5000 UNITS: 5000 INJECTION INTRAVENOUS; SUBCUTANEOUS at 21:36

## 2023-10-03 RX ADMIN — BENZONATATE 100 MG: 100 CAPSULE ORAL at 06:55

## 2023-10-03 RX ADMIN — CLONAZEPAM 2 MG: 1 TABLET ORAL at 21:36

## 2023-10-03 RX ADMIN — POTASSIUM CHLORIDE 20 MEQ: 1500 TABLET, EXTENDED RELEASE ORAL at 09:30

## 2023-10-03 RX ADMIN — FUROSEMIDE 20 MG: 20 TABLET ORAL at 21:36

## 2023-10-03 RX ADMIN — Medication 2 TABLET: at 13:04

## 2023-10-03 RX ADMIN — GABAPENTIN 300 MG: 300 CAPSULE ORAL at 09:30

## 2023-10-03 RX ADMIN — Medication 2 TABLET: at 09:30

## 2023-10-03 RX ADMIN — SERTRALINE 100 MG: 100 TABLET, FILM COATED ORAL at 09:30

## 2023-10-03 RX ADMIN — Medication 1 TABLET: at 09:30

## 2023-10-03 RX ADMIN — ROSUVASTATIN CALCIUM 10 MG: 10 TABLET, FILM COATED ORAL at 21:35

## 2023-10-03 RX ADMIN — POVIDONE-IODINE: 100 OINTMENT TOPICAL at 09:34

## 2023-10-03 RX ADMIN — ACETAMINOPHEN 650 MG: 325 TABLET ORAL at 21:36

## 2023-10-03 RX ADMIN — FLUTICASONE PROPIONATE 1 PUFF: 110 AEROSOL, METERED RESPIRATORY (INHALATION) at 16:01

## 2023-10-03 RX ADMIN — BENZONATATE 100 MG: 100 CAPSULE ORAL at 13:04

## 2023-10-03 RX ADMIN — CETIRIZINE HYDROCHLORIDE 5 MG: 10 TABLET, FILM COATED ORAL at 09:31

## 2023-10-03 RX ADMIN — QUETIAPINE FUMARATE 150 MG: 100 TABLET ORAL at 21:35

## 2023-10-03 RX ADMIN — PANTOPRAZOLE SODIUM 20 MG: 20 TABLET, DELAYED RELEASE ORAL at 06:55

## 2023-10-03 RX ADMIN — BUPROPION HYDROCHLORIDE 75 MG: 75 TABLET, FILM COATED ORAL at 09:30

## 2023-10-03 RX ADMIN — GABAPENTIN 300 MG: 300 CAPSULE ORAL at 21:36

## 2023-10-03 RX ADMIN — METOPROLOL TARTRATE 12.5 MG: 25 TABLET, FILM COATED ORAL at 21:36

## 2023-10-03 RX ADMIN — Medication 2 TABLET: at 21:35

## 2023-10-03 RX ADMIN — METOPROLOL TARTRATE 12.5 MG: 25 TABLET, FILM COATED ORAL at 09:10

## 2023-10-03 RX ADMIN — FLUTICASONE PROPIONATE 1 PUFF: 110 AEROSOL, METERED RESPIRATORY (INHALATION) at 04:07

## 2023-10-03 RX ADMIN — HEPARIN SODIUM 5000 UNITS: 5000 INJECTION INTRAVENOUS; SUBCUTANEOUS at 09:31

## 2023-10-03 ASSESSMENT — PAIN SCALES - GENERAL
PAINLEVEL_OUTOF10: 0
PAINLEVEL_OUTOF10: 4

## 2023-10-03 NOTE — FLOWSHEET NOTE
Patient assessment complete. C/o headache from a frequent cough that has been present for three months per pt. Denies any phlegm with the cough. PRN Tylenol given. Sternum incision HARVEY, no drainage noted. Bed locked and low. Call light within reach.  Electronically signed by Sophy Harrison RN on 10/2/2023 at 10:52 PM

## 2023-10-03 NOTE — FLOWSHEET NOTE
Patient assessment complete. Pt states the Robitussin last night really helped suppress the cough for her to be able to obtain good rest. Scheduled Zyrtec and Tessalon Pearls given. Pt given a sponge bath per her request this morning.

## 2023-10-04 LAB
ANION GAP SERPL CALCULATED.3IONS-SCNC: 11 MEQ/L (ref 9–15)
BASOPHILS # BLD: 0 K/UL (ref 0–0.2)
BASOPHILS NFR BLD: 0.5 %
BUN SERPL-MCNC: 29 MG/DL (ref 8–23)
CALCIUM SERPL-MCNC: 8.6 MG/DL (ref 8.5–9.9)
CHLORIDE SERPL-SCNC: 106 MEQ/L (ref 95–107)
CO2 SERPL-SCNC: 22 MEQ/L (ref 20–31)
CREAT SERPL-MCNC: 0.69 MG/DL (ref 0.5–0.9)
EOSINOPHIL # BLD: 0.2 K/UL (ref 0–0.7)
EOSINOPHIL NFR BLD: 4.6 %
ERYTHROCYTE [DISTWIDTH] IN BLOOD BY AUTOMATED COUNT: 18.5 % (ref 11.5–14.5)
GLUCOSE BLD-MCNC: 106 MG/DL (ref 70–99)
GLUCOSE BLD-MCNC: 107 MG/DL (ref 70–99)
GLUCOSE BLD-MCNC: 113 MG/DL (ref 70–99)
GLUCOSE BLD-MCNC: 168 MG/DL (ref 70–99)
GLUCOSE SERPL-MCNC: 149 MG/DL (ref 70–99)
HCT VFR BLD AUTO: 33.5 % (ref 37–47)
HGB BLD-MCNC: 10.3 G/DL (ref 12–16)
LYMPHOCYTES # BLD: 0.7 K/UL (ref 1–4.8)
LYMPHOCYTES NFR BLD: 15.8 %
MCH RBC QN AUTO: 31.3 PG (ref 27–31.3)
MCHC RBC AUTO-ENTMCNC: 30.7 % (ref 33–37)
MCV RBC AUTO: 101.8 FL (ref 79.4–94.8)
MONOCYTES # BLD: 0.4 K/UL (ref 0.2–0.8)
MONOCYTES NFR BLD: 9.1 %
NEUTROPHILS # BLD: 2.9 K/UL (ref 1.4–6.5)
NEUTS SEG NFR BLD: 69.5 %
PERFORMED ON: ABNORMAL
PLATELET # BLD AUTO: 155 K/UL (ref 130–400)
POTASSIUM SERPL-SCNC: 4 MEQ/L (ref 3.4–4.9)
RBC # BLD AUTO: 3.29 M/UL (ref 4.2–5.4)
SODIUM SERPL-SCNC: 139 MEQ/L (ref 135–144)
WBC # BLD AUTO: 4.2 K/UL (ref 4.8–10.8)

## 2023-10-04 PROCEDURE — 6370000000 HC RX 637 (ALT 250 FOR IP): Performed by: NURSE PRACTITIONER

## 2023-10-04 PROCEDURE — 6370000000 HC RX 637 (ALT 250 FOR IP): Performed by: REGISTERED NURSE

## 2023-10-04 PROCEDURE — 6360000002 HC RX W HCPCS: Performed by: PHYSICAL MEDICINE & REHABILITATION

## 2023-10-04 PROCEDURE — 97535 SELF CARE MNGMENT TRAINING: CPT

## 2023-10-04 PROCEDURE — 80048 BASIC METABOLIC PNL TOTAL CA: CPT

## 2023-10-04 PROCEDURE — 85025 COMPLETE CBC W/AUTO DIFF WBC: CPT

## 2023-10-04 PROCEDURE — 94640 AIRWAY INHALATION TREATMENT: CPT

## 2023-10-04 PROCEDURE — 97530 THERAPEUTIC ACTIVITIES: CPT

## 2023-10-04 PROCEDURE — 6370000000 HC RX 637 (ALT 250 FOR IP): Performed by: PHYSICAL MEDICINE & REHABILITATION

## 2023-10-04 PROCEDURE — 1180000000 HC REHAB R&B

## 2023-10-04 PROCEDURE — 6370000000 HC RX 637 (ALT 250 FOR IP): Performed by: INTERNAL MEDICINE

## 2023-10-04 PROCEDURE — 97110 THERAPEUTIC EXERCISES: CPT

## 2023-10-04 PROCEDURE — 36415 COLL VENOUS BLD VENIPUNCTURE: CPT

## 2023-10-04 PROCEDURE — 97116 GAIT TRAINING THERAPY: CPT

## 2023-10-04 PROCEDURE — 94761 N-INVAS EAR/PLS OXIMETRY MLT: CPT

## 2023-10-04 RX ADMIN — FUROSEMIDE 20 MG: 20 TABLET ORAL at 21:11

## 2023-10-04 RX ADMIN — GUAIFENESIN SYRUP AND DEXTROMETHORPHAN 5 ML: 100; 10 SYRUP ORAL at 21:20

## 2023-10-04 RX ADMIN — BENZONATATE 100 MG: 100 CAPSULE ORAL at 11:59

## 2023-10-04 RX ADMIN — CETIRIZINE HYDROCHLORIDE 5 MG: 10 TABLET, FILM COATED ORAL at 08:22

## 2023-10-04 RX ADMIN — ACETAMINOPHEN 650 MG: 325 TABLET ORAL at 21:19

## 2023-10-04 RX ADMIN — FLUTICASONE PROPIONATE 1 PUFF: 110 AEROSOL, METERED RESPIRATORY (INHALATION) at 16:04

## 2023-10-04 RX ADMIN — CLONAZEPAM 2 MG: 1 TABLET ORAL at 21:11

## 2023-10-04 RX ADMIN — POVIDONE-IODINE: 100 OINTMENT TOPICAL at 08:26

## 2023-10-04 RX ADMIN — GUAIFENESIN SYRUP AND DEXTROMETHORPHAN 5 ML: 100; 10 SYRUP ORAL at 12:03

## 2023-10-04 RX ADMIN — GABAPENTIN 300 MG: 300 CAPSULE ORAL at 21:11

## 2023-10-04 RX ADMIN — BENZONATATE 100 MG: 100 CAPSULE ORAL at 17:58

## 2023-10-04 RX ADMIN — PANTOPRAZOLE SODIUM 20 MG: 20 TABLET, DELAYED RELEASE ORAL at 05:25

## 2023-10-04 RX ADMIN — BUPROPION HYDROCHLORIDE 75 MG: 75 TABLET, FILM COATED ORAL at 08:22

## 2023-10-04 RX ADMIN — METOPROLOL TARTRATE 12.5 MG: 25 TABLET, FILM COATED ORAL at 21:12

## 2023-10-04 RX ADMIN — POVIDONE-IODINE: 100 OINTMENT TOPICAL at 18:02

## 2023-10-04 RX ADMIN — FLUTICASONE PROPIONATE 1 PUFF: 110 AEROSOL, METERED RESPIRATORY (INHALATION) at 04:31

## 2023-10-04 RX ADMIN — ASPIRIN 81 MG CHEWABLE TABLET 81 MG: 81 TABLET CHEWABLE at 08:22

## 2023-10-04 RX ADMIN — QUETIAPINE FUMARATE 150 MG: 100 TABLET ORAL at 21:11

## 2023-10-04 RX ADMIN — ROSUVASTATIN CALCIUM 10 MG: 10 TABLET, FILM COATED ORAL at 21:11

## 2023-10-04 RX ADMIN — BENZONATATE 100 MG: 100 CAPSULE ORAL at 08:22

## 2023-10-04 RX ADMIN — Medication 2 TABLET: at 21:11

## 2023-10-04 RX ADMIN — Medication 2 TABLET: at 08:22

## 2023-10-04 RX ADMIN — HEPARIN SODIUM 5000 UNITS: 5000 INJECTION INTRAVENOUS; SUBCUTANEOUS at 08:26

## 2023-10-04 RX ADMIN — ACETAMINOPHEN 650 MG: 325 TABLET ORAL at 18:01

## 2023-10-04 RX ADMIN — GABAPENTIN 300 MG: 300 CAPSULE ORAL at 08:22

## 2023-10-04 RX ADMIN — HEPARIN SODIUM 5000 UNITS: 5000 INJECTION INTRAVENOUS; SUBCUTANEOUS at 21:10

## 2023-10-04 RX ADMIN — Medication 2 TABLET: at 12:03

## 2023-10-04 RX ADMIN — LAMOTRIGINE 200 MG: 100 TABLET ORAL at 21:12

## 2023-10-04 RX ADMIN — SERTRALINE 100 MG: 100 TABLET, FILM COATED ORAL at 08:22

## 2023-10-04 RX ADMIN — Medication 1 TABLET: at 08:22

## 2023-10-04 ASSESSMENT — PAIN SCALES - GENERAL
PAINLEVEL_OUTOF10: 7
PAINLEVEL_OUTOF10: 6
PAINLEVEL_OUTOF10: 0

## 2023-10-04 ASSESSMENT — PAIN DESCRIPTION - LOCATION
LOCATION: STERNUM
LOCATION: CHEST;HEAD

## 2023-10-04 ASSESSMENT — PAIN DESCRIPTION - DESCRIPTORS
DESCRIPTORS: ACHING
DESCRIPTORS: ACHING

## 2023-10-04 ASSESSMENT — PAIN DESCRIPTION - ORIENTATION: ORIENTATION: MID

## 2023-10-05 LAB
BACTERIA URNS QL MICRO: ABNORMAL /HPF
BILIRUB UR QL STRIP: NEGATIVE
CLARITY UR: ABNORMAL
COLOR UR: YELLOW
EPI CELLS #/AREA URNS AUTO: ABNORMAL /HPF (ref 0–5)
GLUCOSE BLD-MCNC: 102 MG/DL (ref 70–99)
GLUCOSE BLD-MCNC: 108 MG/DL (ref 70–99)
GLUCOSE BLD-MCNC: 114 MG/DL (ref 70–99)
GLUCOSE BLD-MCNC: 99 MG/DL (ref 70–99)
GLUCOSE UR STRIP-MCNC: NEGATIVE MG/DL
HGB UR QL STRIP: ABNORMAL
HYALINE CASTS #/AREA URNS AUTO: ABNORMAL /HPF (ref 0–5)
KETONES UR STRIP-MCNC: NEGATIVE MG/DL
LEUKOCYTE ESTERASE UR QL STRIP: ABNORMAL
NITRITE UR QL STRIP: POSITIVE
PERFORMED ON: ABNORMAL
PERFORMED ON: NORMAL
PH UR STRIP: 5.5 [PH] (ref 5–9)
PROT UR STRIP-MCNC: NEGATIVE MG/DL
RBC #/AREA URNS AUTO: ABNORMAL /HPF (ref 0–5)
SP GR UR STRIP: 1.01 (ref 1–1.03)
URINE REFLEX TO CULTURE: YES
UROBILINOGEN UR STRIP-ACNC: 0.2 E.U./DL
WBC #/AREA URNS AUTO: >100 /HPF (ref 0–5)

## 2023-10-05 PROCEDURE — 97129 THER IVNTJ 1ST 15 MIN: CPT

## 2023-10-05 PROCEDURE — 6370000000 HC RX 637 (ALT 250 FOR IP): Performed by: NURSE PRACTITIONER

## 2023-10-05 PROCEDURE — 97535 SELF CARE MNGMENT TRAINING: CPT

## 2023-10-05 PROCEDURE — 97110 THERAPEUTIC EXERCISES: CPT

## 2023-10-05 PROCEDURE — 6360000002 HC RX W HCPCS: Performed by: PHYSICAL MEDICINE & REHABILITATION

## 2023-10-05 PROCEDURE — 81001 URINALYSIS AUTO W/SCOPE: CPT

## 2023-10-05 PROCEDURE — 87186 SC STD MICRODIL/AGAR DIL: CPT

## 2023-10-05 PROCEDURE — 87086 URINE CULTURE/COLONY COUNT: CPT

## 2023-10-05 PROCEDURE — 97116 GAIT TRAINING THERAPY: CPT

## 2023-10-05 PROCEDURE — 1180000000 HC REHAB R&B

## 2023-10-05 PROCEDURE — 6370000000 HC RX 637 (ALT 250 FOR IP): Performed by: PHYSICAL MEDICINE & REHABILITATION

## 2023-10-05 PROCEDURE — 6370000000 HC RX 637 (ALT 250 FOR IP): Performed by: REGISTERED NURSE

## 2023-10-05 PROCEDURE — 6370000000 HC RX 637 (ALT 250 FOR IP): Performed by: INTERNAL MEDICINE

## 2023-10-05 PROCEDURE — 87077 CULTURE AEROBIC IDENTIFY: CPT

## 2023-10-05 PROCEDURE — 97530 THERAPEUTIC ACTIVITIES: CPT

## 2023-10-05 PROCEDURE — 94640 AIRWAY INHALATION TREATMENT: CPT

## 2023-10-05 RX ADMIN — FLUTICASONE PROPIONATE 1 PUFF: 110 AEROSOL, METERED RESPIRATORY (INHALATION) at 04:26

## 2023-10-05 RX ADMIN — GABAPENTIN 300 MG: 300 CAPSULE ORAL at 08:49

## 2023-10-05 RX ADMIN — ONDANSETRON HYDROCHLORIDE 4 MG: 4 TABLET, FILM COATED ORAL at 21:28

## 2023-10-05 RX ADMIN — GUAIFENESIN SYRUP AND DEXTROMETHORPHAN 5 ML: 100; 10 SYRUP ORAL at 17:48

## 2023-10-05 RX ADMIN — POVIDONE-IODINE: 100 OINTMENT TOPICAL at 05:44

## 2023-10-05 RX ADMIN — LAMOTRIGINE 200 MG: 100 TABLET ORAL at 21:26

## 2023-10-05 RX ADMIN — BUPROPION HYDROCHLORIDE 75 MG: 75 TABLET, FILM COATED ORAL at 08:59

## 2023-10-05 RX ADMIN — GUAIFENESIN SYRUP AND DEXTROMETHORPHAN 5 ML: 100; 10 SYRUP ORAL at 12:41

## 2023-10-05 RX ADMIN — FUROSEMIDE 20 MG: 20 TABLET ORAL at 21:26

## 2023-10-05 RX ADMIN — CETIRIZINE HYDROCHLORIDE 5 MG: 10 TABLET, FILM COATED ORAL at 08:59

## 2023-10-05 RX ADMIN — Medication 2 TABLET: at 21:26

## 2023-10-05 RX ADMIN — QUETIAPINE FUMARATE 150 MG: 100 TABLET ORAL at 21:27

## 2023-10-05 RX ADMIN — GABAPENTIN 300 MG: 300 CAPSULE ORAL at 21:28

## 2023-10-05 RX ADMIN — ASPIRIN 81 MG CHEWABLE TABLET 81 MG: 81 TABLET CHEWABLE at 08:59

## 2023-10-05 RX ADMIN — Medication 2 TABLET: at 12:37

## 2023-10-05 RX ADMIN — Medication 1 TABLET: at 08:59

## 2023-10-05 RX ADMIN — SERTRALINE 100 MG: 100 TABLET, FILM COATED ORAL at 08:49

## 2023-10-05 RX ADMIN — HEPARIN SODIUM 5000 UNITS: 5000 INJECTION INTRAVENOUS; SUBCUTANEOUS at 08:48

## 2023-10-05 RX ADMIN — BENZONATATE 100 MG: 100 CAPSULE ORAL at 12:37

## 2023-10-05 RX ADMIN — BENZONATATE 100 MG: 100 CAPSULE ORAL at 17:48

## 2023-10-05 RX ADMIN — CLONAZEPAM 2 MG: 1 TABLET ORAL at 21:27

## 2023-10-05 RX ADMIN — HEPARIN SODIUM 5000 UNITS: 5000 INJECTION INTRAVENOUS; SUBCUTANEOUS at 21:28

## 2023-10-05 RX ADMIN — ACETAMINOPHEN 650 MG: 325 TABLET ORAL at 21:27

## 2023-10-05 RX ADMIN — ROSUVASTATIN CALCIUM 10 MG: 10 TABLET, FILM COATED ORAL at 21:28

## 2023-10-05 RX ADMIN — PANTOPRAZOLE SODIUM 20 MG: 20 TABLET, DELAYED RELEASE ORAL at 05:42

## 2023-10-05 RX ADMIN — GUAIFENESIN SYRUP AND DEXTROMETHORPHAN 5 ML: 100; 10 SYRUP ORAL at 21:26

## 2023-10-05 RX ADMIN — BENZONATATE 100 MG: 100 CAPSULE ORAL at 08:48

## 2023-10-05 RX ADMIN — METOPROLOL TARTRATE 12.5 MG: 25 TABLET, FILM COATED ORAL at 21:26

## 2023-10-05 RX ADMIN — FLUTICASONE PROPIONATE 1 PUFF: 110 AEROSOL, METERED RESPIRATORY (INHALATION) at 16:51

## 2023-10-05 RX ADMIN — Medication 2 TABLET: at 08:59

## 2023-10-05 RX ADMIN — GUAIFENESIN SYRUP AND DEXTROMETHORPHAN 5 ML: 100; 10 SYRUP ORAL at 09:09

## 2023-10-05 ASSESSMENT — PAIN DESCRIPTION - DESCRIPTORS: DESCRIPTORS: ACHING

## 2023-10-05 ASSESSMENT — PAIN DESCRIPTION - LOCATION: LOCATION: ABDOMEN;CHEST

## 2023-10-05 ASSESSMENT — PAIN DESCRIPTION - ORIENTATION: ORIENTATION: LEFT

## 2023-10-05 ASSESSMENT — PAIN SCALES - GENERAL
PAINLEVEL_OUTOF10: 8
PAINLEVEL_OUTOF10: 0

## 2023-10-05 NOTE — FLOWSHEET NOTE
Patient assessment complete. A&Ox4. Soft BP. C/o feeling dizzy, light headed, and seeing black dots. Patient talking and denies chest pain. Suleiman Arodn NP hospitalist notified of above. Electronically signed by Jamaica Ordonez RN on 10/5/2023 at 8:47 AM    Hospitalist states to continue to monitor, no new orders at this time. Electronically signed by Jamaica Ordonez RN on 10/5/2023 at 9:03 AM    Pt states she doesn't understand why cardiology hasn't been up to see her.  Electronically signed by Jamaica Ordonez RN on 10/5/2023 at 9:34 AM

## 2023-10-06 LAB
GLUCOSE BLD-MCNC: 107 MG/DL (ref 70–99)
GLUCOSE BLD-MCNC: 115 MG/DL (ref 70–99)
GLUCOSE BLD-MCNC: 119 MG/DL (ref 70–99)
GLUCOSE BLD-MCNC: 99 MG/DL (ref 70–99)
PERFORMED ON: ABNORMAL
PERFORMED ON: NORMAL

## 2023-10-06 PROCEDURE — 6370000000 HC RX 637 (ALT 250 FOR IP): Performed by: NURSE PRACTITIONER

## 2023-10-06 PROCEDURE — 1180000000 HC REHAB R&B

## 2023-10-06 PROCEDURE — 97530 THERAPEUTIC ACTIVITIES: CPT

## 2023-10-06 PROCEDURE — 97116 GAIT TRAINING THERAPY: CPT

## 2023-10-06 PROCEDURE — 6370000000 HC RX 637 (ALT 250 FOR IP): Performed by: INTERNAL MEDICINE

## 2023-10-06 PROCEDURE — 6370000000 HC RX 637 (ALT 250 FOR IP): Performed by: PHYSICAL MEDICINE & REHABILITATION

## 2023-10-06 PROCEDURE — 97535 SELF CARE MNGMENT TRAINING: CPT

## 2023-10-06 PROCEDURE — 97110 THERAPEUTIC EXERCISES: CPT

## 2023-10-06 PROCEDURE — 6370000000 HC RX 637 (ALT 250 FOR IP): Performed by: REGISTERED NURSE

## 2023-10-06 PROCEDURE — 94761 N-INVAS EAR/PLS OXIMETRY MLT: CPT

## 2023-10-06 PROCEDURE — 6360000002 HC RX W HCPCS: Performed by: PHYSICAL MEDICINE & REHABILITATION

## 2023-10-06 PROCEDURE — 94640 AIRWAY INHALATION TREATMENT: CPT

## 2023-10-06 RX ORDER — CIPROFLOXACIN 250 MG/1
250 TABLET, FILM COATED ORAL EVERY 12 HOURS SCHEDULED
Status: DISCONTINUED | OUTPATIENT
Start: 2023-10-06 | End: 2023-10-11

## 2023-10-06 RX ADMIN — FUROSEMIDE 20 MG: 20 TABLET ORAL at 22:46

## 2023-10-06 RX ADMIN — Medication 1 TABLET: at 09:06

## 2023-10-06 RX ADMIN — HEPARIN SODIUM 5000 UNITS: 5000 INJECTION INTRAVENOUS; SUBCUTANEOUS at 09:07

## 2023-10-06 RX ADMIN — BUPROPION HYDROCHLORIDE 75 MG: 75 TABLET, FILM COATED ORAL at 09:06

## 2023-10-06 RX ADMIN — HEPARIN SODIUM 5000 UNITS: 5000 INJECTION INTRAVENOUS; SUBCUTANEOUS at 22:39

## 2023-10-06 RX ADMIN — PANTOPRAZOLE SODIUM 20 MG: 20 TABLET, DELAYED RELEASE ORAL at 05:23

## 2023-10-06 RX ADMIN — Medication 2 TABLET: at 22:47

## 2023-10-06 RX ADMIN — ACETAMINOPHEN 650 MG: 325 TABLET ORAL at 17:34

## 2023-10-06 RX ADMIN — POVIDONE-IODINE: 100 OINTMENT TOPICAL at 17:35

## 2023-10-06 RX ADMIN — METOPROLOL TARTRATE 12.5 MG: 25 TABLET, FILM COATED ORAL at 09:06

## 2023-10-06 RX ADMIN — QUETIAPINE FUMARATE 150 MG: 100 TABLET ORAL at 22:47

## 2023-10-06 RX ADMIN — CLONAZEPAM 2 MG: 1 TABLET ORAL at 22:47

## 2023-10-06 RX ADMIN — ASPIRIN 81 MG CHEWABLE TABLET 81 MG: 81 TABLET CHEWABLE at 09:06

## 2023-10-06 RX ADMIN — BENZONATATE 100 MG: 100 CAPSULE ORAL at 17:34

## 2023-10-06 RX ADMIN — GABAPENTIN 300 MG: 300 CAPSULE ORAL at 09:06

## 2023-10-06 RX ADMIN — FLUTICASONE PROPIONATE 1 PUFF: 110 AEROSOL, METERED RESPIRATORY (INHALATION) at 16:32

## 2023-10-06 RX ADMIN — GUAIFENESIN SYRUP AND DEXTROMETHORPHAN 5 ML: 100; 10 SYRUP ORAL at 11:13

## 2023-10-06 RX ADMIN — GUAIFENESIN SYRUP AND DEXTROMETHORPHAN 5 ML: 100; 10 SYRUP ORAL at 22:45

## 2023-10-06 RX ADMIN — SERTRALINE 100 MG: 100 TABLET, FILM COATED ORAL at 09:06

## 2023-10-06 RX ADMIN — POVIDONE-IODINE: 100 OINTMENT TOPICAL at 05:25

## 2023-10-06 RX ADMIN — BENZONATATE 100 MG: 100 CAPSULE ORAL at 14:04

## 2023-10-06 RX ADMIN — LAMOTRIGINE 200 MG: 100 TABLET ORAL at 22:46

## 2023-10-06 RX ADMIN — Medication 2 TABLET: at 09:06

## 2023-10-06 RX ADMIN — BENZONATATE 100 MG: 100 CAPSULE ORAL at 09:06

## 2023-10-06 RX ADMIN — CIPROFLOXACIN 250 MG: 250 TABLET, FILM COATED ORAL at 22:45

## 2023-10-06 RX ADMIN — POTASSIUM CHLORIDE 20 MEQ: 1500 TABLET, EXTENDED RELEASE ORAL at 09:06

## 2023-10-06 RX ADMIN — FLUTICASONE PROPIONATE 1 PUFF: 110 AEROSOL, METERED RESPIRATORY (INHALATION) at 03:26

## 2023-10-06 RX ADMIN — CETIRIZINE HYDROCHLORIDE 5 MG: 10 TABLET, FILM COATED ORAL at 09:06

## 2023-10-06 RX ADMIN — GABAPENTIN 300 MG: 300 CAPSULE ORAL at 22:46

## 2023-10-06 RX ADMIN — ROSUVASTATIN CALCIUM 10 MG: 10 TABLET, FILM COATED ORAL at 22:45

## 2023-10-06 RX ADMIN — Medication 2 TABLET: at 14:04

## 2023-10-06 ASSESSMENT — PAIN DESCRIPTION - LOCATION: LOCATION: CHEST;ABDOMEN

## 2023-10-06 ASSESSMENT — PAIN SCALES - GENERAL: PAINLEVEL_OUTOF10: 5

## 2023-10-06 NOTE — FLOWSHEET NOTE
Patient alert, oriented and cooperative. Complains of pain to chest area, aggravated by persistent cough, pain medications effective. Denies any further needs or complaints at this time. Call light within reach.

## 2023-10-07 LAB
GLUCOSE BLD-MCNC: 238 MG/DL (ref 70–99)
GLUCOSE BLD-MCNC: 92 MG/DL (ref 70–99)
GLUCOSE BLD-MCNC: 99 MG/DL (ref 70–99)
PERFORMED ON: ABNORMAL
PERFORMED ON: NORMAL
PERFORMED ON: NORMAL

## 2023-10-07 PROCEDURE — 94640 AIRWAY INHALATION TREATMENT: CPT

## 2023-10-07 PROCEDURE — 6370000000 HC RX 637 (ALT 250 FOR IP): Performed by: PHYSICAL MEDICINE & REHABILITATION

## 2023-10-07 PROCEDURE — 6370000000 HC RX 637 (ALT 250 FOR IP): Performed by: REGISTERED NURSE

## 2023-10-07 PROCEDURE — 1180000000 HC REHAB R&B

## 2023-10-07 PROCEDURE — 6370000000 HC RX 637 (ALT 250 FOR IP): Performed by: INTERNAL MEDICINE

## 2023-10-07 PROCEDURE — 97116 GAIT TRAINING THERAPY: CPT

## 2023-10-07 PROCEDURE — 97110 THERAPEUTIC EXERCISES: CPT

## 2023-10-07 PROCEDURE — 94760 N-INVAS EAR/PLS OXIMETRY 1: CPT

## 2023-10-07 PROCEDURE — 6360000002 HC RX W HCPCS: Performed by: PHYSICAL MEDICINE & REHABILITATION

## 2023-10-07 PROCEDURE — 94761 N-INVAS EAR/PLS OXIMETRY MLT: CPT

## 2023-10-07 RX ADMIN — LAMOTRIGINE 200 MG: 100 TABLET ORAL at 23:32

## 2023-10-07 RX ADMIN — BENZONATATE 100 MG: 100 CAPSULE ORAL at 08:53

## 2023-10-07 RX ADMIN — BENZONATATE 100 MG: 100 CAPSULE ORAL at 13:16

## 2023-10-07 RX ADMIN — GABAPENTIN 300 MG: 300 CAPSULE ORAL at 23:32

## 2023-10-07 RX ADMIN — QUETIAPINE FUMARATE 150 MG: 100 TABLET ORAL at 23:34

## 2023-10-07 RX ADMIN — GUAIFENESIN SYRUP AND DEXTROMETHORPHAN 5 ML: 100; 10 SYRUP ORAL at 08:57

## 2023-10-07 RX ADMIN — SERTRALINE 100 MG: 100 TABLET, FILM COATED ORAL at 08:52

## 2023-10-07 RX ADMIN — GUAIFENESIN SYRUP AND DEXTROMETHORPHAN 5 ML: 100; 10 SYRUP ORAL at 18:04

## 2023-10-07 RX ADMIN — FUROSEMIDE 20 MG: 20 TABLET ORAL at 23:33

## 2023-10-07 RX ADMIN — CLONAZEPAM 2 MG: 1 TABLET ORAL at 23:33

## 2023-10-07 RX ADMIN — METOPROLOL TARTRATE 12.5 MG: 25 TABLET, FILM COATED ORAL at 23:33

## 2023-10-07 RX ADMIN — CIPROFLOXACIN 250 MG: 250 TABLET, FILM COATED ORAL at 23:33

## 2023-10-07 RX ADMIN — Medication 2 TABLET: at 13:16

## 2023-10-07 RX ADMIN — GUAIFENESIN SYRUP AND DEXTROMETHORPHAN 5 ML: 100; 10 SYRUP ORAL at 13:19

## 2023-10-07 RX ADMIN — BUPROPION HYDROCHLORIDE 75 MG: 75 TABLET, FILM COATED ORAL at 08:53

## 2023-10-07 RX ADMIN — ASPIRIN 81 MG CHEWABLE TABLET 81 MG: 81 TABLET CHEWABLE at 08:53

## 2023-10-07 RX ADMIN — Medication 1 TABLET: at 08:53

## 2023-10-07 RX ADMIN — ROSUVASTATIN CALCIUM 10 MG: 10 TABLET, FILM COATED ORAL at 23:32

## 2023-10-07 RX ADMIN — HEPARIN SODIUM 5000 UNITS: 5000 INJECTION INTRAVENOUS; SUBCUTANEOUS at 08:52

## 2023-10-07 RX ADMIN — CIPROFLOXACIN 250 MG: 250 TABLET, FILM COATED ORAL at 08:53

## 2023-10-07 RX ADMIN — Medication 2 TABLET: at 23:32

## 2023-10-07 RX ADMIN — POVIDONE-IODINE: 100 OINTMENT TOPICAL at 18:38

## 2023-10-07 RX ADMIN — Medication 2 TABLET: at 08:53

## 2023-10-07 RX ADMIN — HEPARIN SODIUM 5000 UNITS: 5000 INJECTION INTRAVENOUS; SUBCUTANEOUS at 23:37

## 2023-10-07 RX ADMIN — GABAPENTIN 300 MG: 300 CAPSULE ORAL at 08:53

## 2023-10-07 RX ADMIN — FLUTICASONE PROPIONATE 1 PUFF: 110 AEROSOL, METERED RESPIRATORY (INHALATION) at 16:02

## 2023-10-07 RX ADMIN — BENZONATATE 100 MG: 100 CAPSULE ORAL at 18:02

## 2023-10-07 RX ADMIN — FLUTICASONE PROPIONATE 1 PUFF: 110 AEROSOL, METERED RESPIRATORY (INHALATION) at 05:11

## 2023-10-07 RX ADMIN — PANTOPRAZOLE SODIUM 20 MG: 20 TABLET, DELAYED RELEASE ORAL at 06:09

## 2023-10-08 LAB
BACTERIA UR CULT: ABNORMAL
GLUCOSE BLD-MCNC: 104 MG/DL (ref 70–99)
GLUCOSE BLD-MCNC: 111 MG/DL (ref 70–99)
GLUCOSE BLD-MCNC: 128 MG/DL (ref 70–99)
GLUCOSE BLD-MCNC: 141 MG/DL (ref 70–99)
ORGANISM: ABNORMAL
ORGANISM: ABNORMAL
PERFORMED ON: ABNORMAL

## 2023-10-08 PROCEDURE — 94640 AIRWAY INHALATION TREATMENT: CPT

## 2023-10-08 PROCEDURE — 6360000002 HC RX W HCPCS: Performed by: PHYSICAL MEDICINE & REHABILITATION

## 2023-10-08 PROCEDURE — 6370000000 HC RX 637 (ALT 250 FOR IP): Performed by: PHYSICAL MEDICINE & REHABILITATION

## 2023-10-08 PROCEDURE — 6370000000 HC RX 637 (ALT 250 FOR IP): Performed by: INTERNAL MEDICINE

## 2023-10-08 PROCEDURE — 6370000000 HC RX 637 (ALT 250 FOR IP): Performed by: NURSE PRACTITIONER

## 2023-10-08 PROCEDURE — 94761 N-INVAS EAR/PLS OXIMETRY MLT: CPT

## 2023-10-08 PROCEDURE — 6370000000 HC RX 637 (ALT 250 FOR IP): Performed by: REGISTERED NURSE

## 2023-10-08 PROCEDURE — 94760 N-INVAS EAR/PLS OXIMETRY 1: CPT

## 2023-10-08 PROCEDURE — 1180000000 HC REHAB R&B

## 2023-10-08 RX ORDER — CETIRIZINE HYDROCHLORIDE 10 MG/1
5 TABLET ORAL DAILY
Status: DISCONTINUED | OUTPATIENT
Start: 2023-10-08 | End: 2023-10-11 | Stop reason: HOSPADM

## 2023-10-08 RX ADMIN — ASPIRIN 81 MG CHEWABLE TABLET 81 MG: 81 TABLET CHEWABLE at 08:40

## 2023-10-08 RX ADMIN — BENZONATATE 100 MG: 100 CAPSULE ORAL at 17:38

## 2023-10-08 RX ADMIN — BUPROPION HYDROCHLORIDE 75 MG: 75 TABLET, FILM COATED ORAL at 08:39

## 2023-10-08 RX ADMIN — Medication 2 TABLET: at 23:24

## 2023-10-08 RX ADMIN — GUAIFENESIN SYRUP AND DEXTROMETHORPHAN 5 ML: 100; 10 SYRUP ORAL at 17:38

## 2023-10-08 RX ADMIN — PANTOPRAZOLE SODIUM 20 MG: 20 TABLET, DELAYED RELEASE ORAL at 06:36

## 2023-10-08 RX ADMIN — ROSUVASTATIN CALCIUM 10 MG: 10 TABLET, FILM COATED ORAL at 23:22

## 2023-10-08 RX ADMIN — GABAPENTIN 300 MG: 300 CAPSULE ORAL at 23:23

## 2023-10-08 RX ADMIN — HEPARIN SODIUM 5000 UNITS: 5000 INJECTION INTRAVENOUS; SUBCUTANEOUS at 23:29

## 2023-10-08 RX ADMIN — GUAIFENESIN SYRUP AND DEXTROMETHORPHAN 5 ML: 100; 10 SYRUP ORAL at 06:39

## 2023-10-08 RX ADMIN — CETIRIZINE HYDROCHLORIDE 5 MG: 10 TABLET, FILM COATED ORAL at 12:30

## 2023-10-08 RX ADMIN — SERTRALINE 100 MG: 100 TABLET, FILM COATED ORAL at 08:40

## 2023-10-08 RX ADMIN — METOPROLOL TARTRATE 12.5 MG: 25 TABLET, FILM COATED ORAL at 23:25

## 2023-10-08 RX ADMIN — Medication 2 TABLET: at 12:30

## 2023-10-08 RX ADMIN — POVIDONE-IODINE: 100 OINTMENT TOPICAL at 17:42

## 2023-10-08 RX ADMIN — LAMOTRIGINE 200 MG: 100 TABLET ORAL at 23:24

## 2023-10-08 RX ADMIN — GABAPENTIN 300 MG: 300 CAPSULE ORAL at 08:39

## 2023-10-08 RX ADMIN — BENZONATATE 100 MG: 100 CAPSULE ORAL at 12:30

## 2023-10-08 RX ADMIN — BENZONATATE 100 MG: 100 CAPSULE ORAL at 06:36

## 2023-10-08 RX ADMIN — GUAIFENESIN SYRUP AND DEXTROMETHORPHAN 5 ML: 100; 10 SYRUP ORAL at 12:30

## 2023-10-08 RX ADMIN — CLONAZEPAM 2 MG: 1 TABLET ORAL at 23:24

## 2023-10-08 RX ADMIN — FUROSEMIDE 20 MG: 20 TABLET ORAL at 23:24

## 2023-10-08 RX ADMIN — CIPROFLOXACIN 250 MG: 250 TABLET, FILM COATED ORAL at 23:24

## 2023-10-08 RX ADMIN — QUETIAPINE FUMARATE 150 MG: 100 TABLET ORAL at 23:24

## 2023-10-08 RX ADMIN — HEPARIN SODIUM 5000 UNITS: 5000 INJECTION INTRAVENOUS; SUBCUTANEOUS at 08:39

## 2023-10-08 RX ADMIN — CIPROFLOXACIN 250 MG: 250 TABLET, FILM COATED ORAL at 08:40

## 2023-10-08 RX ADMIN — FLUTICASONE PROPIONATE 1 PUFF: 110 AEROSOL, METERED RESPIRATORY (INHALATION) at 04:22

## 2023-10-08 RX ADMIN — Medication 2 TABLET: at 08:40

## 2023-10-08 RX ADMIN — Medication 1 TABLET: at 08:40

## 2023-10-08 RX ADMIN — FLUTICASONE PROPIONATE 1 PUFF: 110 AEROSOL, METERED RESPIRATORY (INHALATION) at 15:41

## 2023-10-08 ASSESSMENT — PAIN SCALES - GENERAL: PAINLEVEL_OUTOF10: 0

## 2023-10-08 NOTE — FLOWSHEET NOTE
Patient assessment complete. Patient pasquale care done, hooked up to purwic per pt request, new brief and linens changed. Requesting PRN Robitussin, per pt her cough is improving.

## 2023-10-09 LAB
BACTERIA URNS QL MICRO: ABNORMAL /HPF
BILIRUB UR QL STRIP: NEGATIVE
CLARITY UR: ABNORMAL
COLOR UR: YELLOW
EPI CELLS #/AREA URNS AUTO: ABNORMAL /HPF (ref 0–5)
GLUCOSE BLD-MCNC: 128 MG/DL (ref 70–99)
GLUCOSE BLD-MCNC: 132 MG/DL (ref 70–99)
GLUCOSE BLD-MCNC: 148 MG/DL (ref 70–99)
GLUCOSE BLD-MCNC: 99 MG/DL (ref 70–99)
GLUCOSE UR STRIP-MCNC: NEGATIVE MG/DL
HGB UR QL STRIP: ABNORMAL
HYALINE CASTS #/AREA URNS AUTO: ABNORMAL /HPF (ref 0–5)
KETONES UR STRIP-MCNC: NEGATIVE MG/DL
LEUKOCYTE ESTERASE UR QL STRIP: ABNORMAL
NITRITE UR QL STRIP: NEGATIVE
PERFORMED ON: ABNORMAL
PERFORMED ON: NORMAL
PH UR STRIP: 5 [PH] (ref 5–9)
PROT UR STRIP-MCNC: NEGATIVE MG/DL
RBC #/AREA URNS AUTO: ABNORMAL /HPF (ref 0–5)
SARS-COV-2 RDRP RESP QL NAA+PROBE: NOT DETECTED
SP GR UR STRIP: 1.01 (ref 1–1.03)
URINE REFLEX TO CULTURE: YES
UROBILINOGEN UR STRIP-ACNC: 0.2 E.U./DL
WBC #/AREA URNS AUTO: >100 /HPF (ref 0–5)

## 2023-10-09 PROCEDURE — 6370000000 HC RX 637 (ALT 250 FOR IP): Performed by: NURSE PRACTITIONER

## 2023-10-09 PROCEDURE — 97110 THERAPEUTIC EXERCISES: CPT

## 2023-10-09 PROCEDURE — 87077 CULTURE AEROBIC IDENTIFY: CPT

## 2023-10-09 PROCEDURE — 97112 NEUROMUSCULAR REEDUCATION: CPT

## 2023-10-09 PROCEDURE — 97116 GAIT TRAINING THERAPY: CPT

## 2023-10-09 PROCEDURE — 6370000000 HC RX 637 (ALT 250 FOR IP): Performed by: PHYSICAL MEDICINE & REHABILITATION

## 2023-10-09 PROCEDURE — 94640 AIRWAY INHALATION TREATMENT: CPT

## 2023-10-09 PROCEDURE — 81001 URINALYSIS AUTO W/SCOPE: CPT

## 2023-10-09 PROCEDURE — 6370000000 HC RX 637 (ALT 250 FOR IP): Performed by: INTERNAL MEDICINE

## 2023-10-09 PROCEDURE — 87635 SARS-COV-2 COVID-19 AMP PRB: CPT

## 2023-10-09 PROCEDURE — 97535 SELF CARE MNGMENT TRAINING: CPT

## 2023-10-09 PROCEDURE — 97530 THERAPEUTIC ACTIVITIES: CPT

## 2023-10-09 PROCEDURE — 87086 URINE CULTURE/COLONY COUNT: CPT

## 2023-10-09 PROCEDURE — 1180000000 HC REHAB R&B

## 2023-10-09 PROCEDURE — 87186 SC STD MICRODIL/AGAR DIL: CPT

## 2023-10-09 PROCEDURE — 6370000000 HC RX 637 (ALT 250 FOR IP): Performed by: REGISTERED NURSE

## 2023-10-09 PROCEDURE — 6360000002 HC RX W HCPCS: Performed by: PHYSICAL MEDICINE & REHABILITATION

## 2023-10-09 RX ADMIN — ASPIRIN 81 MG CHEWABLE TABLET 81 MG: 81 TABLET CHEWABLE at 09:53

## 2023-10-09 RX ADMIN — QUETIAPINE FUMARATE 150 MG: 100 TABLET ORAL at 22:09

## 2023-10-09 RX ADMIN — Medication 2 TABLET: at 22:11

## 2023-10-09 RX ADMIN — CLONAZEPAM 2 MG: 1 TABLET ORAL at 22:08

## 2023-10-09 RX ADMIN — SERTRALINE 100 MG: 100 TABLET, FILM COATED ORAL at 09:51

## 2023-10-09 RX ADMIN — FLUTICASONE PROPIONATE 1 PUFF: 110 AEROSOL, METERED RESPIRATORY (INHALATION) at 17:07

## 2023-10-09 RX ADMIN — Medication 1 TABLET: at 09:52

## 2023-10-09 RX ADMIN — HEPARIN SODIUM 5000 UNITS: 5000 INJECTION INTRAVENOUS; SUBCUTANEOUS at 22:12

## 2023-10-09 RX ADMIN — ROSUVASTATIN CALCIUM 10 MG: 10 TABLET, FILM COATED ORAL at 22:11

## 2023-10-09 RX ADMIN — BENZONATATE 100 MG: 100 CAPSULE ORAL at 06:52

## 2023-10-09 RX ADMIN — HEPARIN SODIUM 5000 UNITS: 5000 INJECTION INTRAVENOUS; SUBCUTANEOUS at 09:54

## 2023-10-09 RX ADMIN — BENZONATATE 100 MG: 100 CAPSULE ORAL at 16:53

## 2023-10-09 RX ADMIN — GUAIFENESIN SYRUP AND DEXTROMETHORPHAN 5 ML: 100; 10 SYRUP ORAL at 16:53

## 2023-10-09 RX ADMIN — CIPROFLOXACIN 250 MG: 250 TABLET, FILM COATED ORAL at 22:10

## 2023-10-09 RX ADMIN — Medication 2 TABLET: at 16:53

## 2023-10-09 RX ADMIN — CETIRIZINE HYDROCHLORIDE 5 MG: 10 TABLET, FILM COATED ORAL at 09:52

## 2023-10-09 RX ADMIN — GABAPENTIN 300 MG: 300 CAPSULE ORAL at 22:09

## 2023-10-09 RX ADMIN — ACETAMINOPHEN 650 MG: 325 TABLET ORAL at 22:10

## 2023-10-09 RX ADMIN — GABAPENTIN 300 MG: 300 CAPSULE ORAL at 09:53

## 2023-10-09 RX ADMIN — METOPROLOL TARTRATE 12.5 MG: 25 TABLET, FILM COATED ORAL at 22:11

## 2023-10-09 RX ADMIN — POVIDONE-IODINE: 100 OINTMENT TOPICAL at 18:56

## 2023-10-09 RX ADMIN — CIPROFLOXACIN 250 MG: 250 TABLET, FILM COATED ORAL at 09:52

## 2023-10-09 RX ADMIN — Medication 2 TABLET: at 09:52

## 2023-10-09 RX ADMIN — FLUTICASONE PROPIONATE 1 PUFF: 110 AEROSOL, METERED RESPIRATORY (INHALATION) at 04:05

## 2023-10-09 RX ADMIN — PANTOPRAZOLE SODIUM 20 MG: 20 TABLET, DELAYED RELEASE ORAL at 06:52

## 2023-10-09 RX ADMIN — METOPROLOL TARTRATE 12.5 MG: 25 TABLET, FILM COATED ORAL at 09:53

## 2023-10-09 RX ADMIN — FUROSEMIDE 20 MG: 20 TABLET ORAL at 22:11

## 2023-10-09 RX ADMIN — LAMOTRIGINE 200 MG: 100 TABLET ORAL at 22:10

## 2023-10-09 RX ADMIN — POVIDONE-IODINE: 100 OINTMENT TOPICAL at 06:52

## 2023-10-09 RX ADMIN — BUPROPION HYDROCHLORIDE 75 MG: 75 TABLET, FILM COATED ORAL at 09:52

## 2023-10-09 ASSESSMENT — PAIN SCALES - GENERAL: PAINLEVEL_OUTOF10: 0

## 2023-10-09 NOTE — FLOWSHEET NOTE
Assessment and VS completed. Medication given. Pt In bed, awake, call light within reach, bed alarm on.

## 2023-10-09 NOTE — FLOWSHEET NOTE
Patient assessment complete. C/o dry cough, PRN Robitussin given. Bed locked and low. Call light within reach.

## 2023-10-10 LAB
GLUCOSE BLD-MCNC: 132 MG/DL (ref 70–99)
GLUCOSE BLD-MCNC: 138 MG/DL (ref 70–99)
GLUCOSE BLD-MCNC: 159 MG/DL (ref 70–99)
GLUCOSE BLD-MCNC: 96 MG/DL (ref 70–99)
PERFORMED ON: ABNORMAL
PERFORMED ON: NORMAL

## 2023-10-10 PROCEDURE — 97535 SELF CARE MNGMENT TRAINING: CPT

## 2023-10-10 PROCEDURE — 6370000000 HC RX 637 (ALT 250 FOR IP): Performed by: NURSE PRACTITIONER

## 2023-10-10 PROCEDURE — 97542 WHEELCHAIR MNGMENT TRAINING: CPT

## 2023-10-10 PROCEDURE — 6370000000 HC RX 637 (ALT 250 FOR IP): Performed by: REGISTERED NURSE

## 2023-10-10 PROCEDURE — 94640 AIRWAY INHALATION TREATMENT: CPT

## 2023-10-10 PROCEDURE — 97530 THERAPEUTIC ACTIVITIES: CPT

## 2023-10-10 PROCEDURE — 6370000000 HC RX 637 (ALT 250 FOR IP): Performed by: PHYSICAL MEDICINE & REHABILITATION

## 2023-10-10 PROCEDURE — 1180000000 HC REHAB R&B

## 2023-10-10 PROCEDURE — 97110 THERAPEUTIC EXERCISES: CPT

## 2023-10-10 PROCEDURE — 6360000002 HC RX W HCPCS: Performed by: PHYSICAL MEDICINE & REHABILITATION

## 2023-10-10 PROCEDURE — 6370000000 HC RX 637 (ALT 250 FOR IP): Performed by: INTERNAL MEDICINE

## 2023-10-10 PROCEDURE — 97116 GAIT TRAINING THERAPY: CPT

## 2023-10-10 RX ORDER — CETIRIZINE HYDROCHLORIDE 5 MG/1
5 TABLET ORAL DAILY
Qty: 30 TABLET | Refills: 1 | Status: SHIPPED | OUTPATIENT
Start: 2023-10-11

## 2023-10-10 RX ORDER — CIPROFLOXACIN 250 MG/1
250 TABLET, FILM COATED ORAL EVERY 12 HOURS SCHEDULED
Qty: 6 TABLET | Refills: 0 | Status: SHIPPED | OUTPATIENT
Start: 2023-10-10 | End: 2023-10-13

## 2023-10-10 RX ORDER — OXYCODONE HYDROCHLORIDE 5 MG/1
5 CAPSULE ORAL EVERY 6 HOURS PRN
Qty: 12 CAPSULE | Refills: 0 | Status: SHIPPED | OUTPATIENT
Start: 2023-10-10 | End: 2023-10-13

## 2023-10-10 RX ORDER — FLUTICASONE PROPIONATE 110 UG/1
1 AEROSOL, METERED RESPIRATORY (INHALATION)
Qty: 12 G | Refills: 3 | Status: SHIPPED | OUTPATIENT
Start: 2023-10-11

## 2023-10-10 RX ORDER — BUPROPION HYDROCHLORIDE 75 MG/1
75 TABLET ORAL DAILY
Qty: 60 TABLET | Refills: 3 | Status: SHIPPED | OUTPATIENT
Start: 2023-10-11

## 2023-10-10 RX ORDER — SERTRALINE HYDROCHLORIDE 100 MG/1
100 TABLET, FILM COATED ORAL DAILY
Qty: 30 TABLET | Refills: 3 | Status: SHIPPED | OUTPATIENT
Start: 2023-10-11

## 2023-10-10 RX ORDER — M-VIT,TX,IRON,MINS/CALC/FOLIC 27MG-0.4MG
1 TABLET ORAL DAILY
Qty: 30 TABLET | Refills: 0 | Status: SHIPPED | OUTPATIENT
Start: 2023-10-11

## 2023-10-10 RX ORDER — FLUCONAZOLE 100 MG/1
150 TABLET ORAL ONCE
Status: COMPLETED | OUTPATIENT
Start: 2023-10-10 | End: 2023-10-10

## 2023-10-10 RX ORDER — QUETIAPINE FUMARATE 150 MG/1
150 TABLET, FILM COATED ORAL NIGHTLY
Qty: 60 TABLET | Refills: 3 | Status: SHIPPED | OUTPATIENT
Start: 2023-10-10

## 2023-10-10 RX ADMIN — CIPROFLOXACIN 250 MG: 250 TABLET, FILM COATED ORAL at 09:18

## 2023-10-10 RX ADMIN — Medication 2 TABLET: at 09:19

## 2023-10-10 RX ADMIN — Medication 1 TABLET: at 09:19

## 2023-10-10 RX ADMIN — CLONAZEPAM 2 MG: 1 TABLET ORAL at 22:29

## 2023-10-10 RX ADMIN — BENZONATATE 100 MG: 100 CAPSULE ORAL at 17:19

## 2023-10-10 RX ADMIN — LAMOTRIGINE 200 MG: 100 TABLET ORAL at 22:28

## 2023-10-10 RX ADMIN — GUAIFENESIN SYRUP AND DEXTROMETHORPHAN 5 ML: 100; 10 SYRUP ORAL at 09:30

## 2023-10-10 RX ADMIN — POVIDONE-IODINE: 100 OINTMENT TOPICAL at 06:00

## 2023-10-10 RX ADMIN — FLUCONAZOLE 150 MG: 100 TABLET ORAL at 09:30

## 2023-10-10 RX ADMIN — GABAPENTIN 300 MG: 300 CAPSULE ORAL at 09:19

## 2023-10-10 RX ADMIN — BENZONATATE 100 MG: 100 CAPSULE ORAL at 09:18

## 2023-10-10 RX ADMIN — Medication 2 TABLET: at 14:47

## 2023-10-10 RX ADMIN — BUPROPION HYDROCHLORIDE 75 MG: 75 TABLET, FILM COATED ORAL at 09:18

## 2023-10-10 RX ADMIN — ACETAMINOPHEN 650 MG: 325 TABLET ORAL at 22:44

## 2023-10-10 RX ADMIN — PANTOPRAZOLE SODIUM 20 MG: 20 TABLET, DELAYED RELEASE ORAL at 06:55

## 2023-10-10 RX ADMIN — FLUTICASONE PROPIONATE 1 PUFF: 110 AEROSOL, METERED RESPIRATORY (INHALATION) at 04:58

## 2023-10-10 RX ADMIN — HEPARIN SODIUM 5000 UNITS: 5000 INJECTION INTRAVENOUS; SUBCUTANEOUS at 09:20

## 2023-10-10 RX ADMIN — SERTRALINE 100 MG: 100 TABLET, FILM COATED ORAL at 09:22

## 2023-10-10 RX ADMIN — BENZONATATE 100 MG: 100 CAPSULE ORAL at 13:00

## 2023-10-10 RX ADMIN — ROSUVASTATIN CALCIUM 10 MG: 10 TABLET, FILM COATED ORAL at 22:30

## 2023-10-10 RX ADMIN — HEPARIN SODIUM 5000 UNITS: 5000 INJECTION INTRAVENOUS; SUBCUTANEOUS at 22:38

## 2023-10-10 RX ADMIN — Medication 2 TABLET: at 22:28

## 2023-10-10 RX ADMIN — ASPIRIN 81 MG CHEWABLE TABLET 81 MG: 81 TABLET CHEWABLE at 09:18

## 2023-10-10 RX ADMIN — FUROSEMIDE 20 MG: 20 TABLET ORAL at 22:31

## 2023-10-10 RX ADMIN — GUAIFENESIN SYRUP AND DEXTROMETHORPHAN 5 ML: 100; 10 SYRUP ORAL at 14:47

## 2023-10-10 RX ADMIN — QUETIAPINE FUMARATE 150 MG: 100 TABLET ORAL at 22:29

## 2023-10-10 RX ADMIN — METOPROLOL TARTRATE 12.5 MG: 25 TABLET, FILM COATED ORAL at 22:30

## 2023-10-10 RX ADMIN — GABAPENTIN 300 MG: 300 CAPSULE ORAL at 22:29

## 2023-10-10 RX ADMIN — POVIDONE-IODINE: 100 OINTMENT TOPICAL at 17:19

## 2023-10-10 RX ADMIN — CETIRIZINE HYDROCHLORIDE 5 MG: 10 TABLET, FILM COATED ORAL at 09:19

## 2023-10-10 RX ADMIN — CIPROFLOXACIN 250 MG: 250 TABLET, FILM COATED ORAL at 22:30

## 2023-10-10 RX ADMIN — FLUTICASONE PROPIONATE 1 PUFF: 110 AEROSOL, METERED RESPIRATORY (INHALATION) at 16:20

## 2023-10-10 ASSESSMENT — PAIN SCALES - GENERAL
PAINLEVEL_OUTOF10: 0
PAINLEVEL_OUTOF10: 3

## 2023-10-10 NOTE — FLOWSHEET NOTE
Assessment and VS completed. Medication given. Pt denies pain at this time. Chest incision clean, dry, and intact. No drainage noted. Pt In bed, awake, call light within reach, bed alarm on.

## 2023-10-11 VITALS
OXYGEN SATURATION: 100 % | WEIGHT: 167 LBS | HEART RATE: 81 BPM | RESPIRATION RATE: 18 BRPM | DIASTOLIC BLOOD PRESSURE: 59 MMHG | TEMPERATURE: 98.6 F | BODY MASS INDEX: 25.31 KG/M2 | HEIGHT: 68 IN | SYSTOLIC BLOOD PRESSURE: 108 MMHG

## 2023-10-11 LAB
GLUCOSE BLD-MCNC: 103 MG/DL (ref 70–99)
PERFORMED ON: ABNORMAL

## 2023-10-11 PROCEDURE — 6370000000 HC RX 637 (ALT 250 FOR IP): Performed by: NURSE PRACTITIONER

## 2023-10-11 PROCEDURE — 6370000000 HC RX 637 (ALT 250 FOR IP): Performed by: PHYSICAL MEDICINE & REHABILITATION

## 2023-10-11 PROCEDURE — 97110 THERAPEUTIC EXERCISES: CPT

## 2023-10-11 PROCEDURE — 97535 SELF CARE MNGMENT TRAINING: CPT

## 2023-10-11 PROCEDURE — 97530 THERAPEUTIC ACTIVITIES: CPT

## 2023-10-11 PROCEDURE — 94761 N-INVAS EAR/PLS OXIMETRY MLT: CPT

## 2023-10-11 PROCEDURE — 94640 AIRWAY INHALATION TREATMENT: CPT

## 2023-10-11 PROCEDURE — 6360000002 HC RX W HCPCS: Performed by: PHYSICAL MEDICINE & REHABILITATION

## 2023-10-11 RX ORDER — BENZONATATE 100 MG/1
100 CAPSULE ORAL 3 TIMES DAILY
Qty: 21 CAPSULE | Refills: 0 | Status: SHIPPED | OUTPATIENT
Start: 2023-10-11 | End: 2023-10-18

## 2023-10-11 RX ORDER — NITROFURANTOIN 25; 75 MG/1; MG/1
100 CAPSULE ORAL EVERY 12 HOURS SCHEDULED
Qty: 14 CAPSULE | Refills: 0 | Status: SHIPPED | OUTPATIENT
Start: 2023-10-11 | End: 2023-10-18

## 2023-10-11 RX ORDER — NITROFURANTOIN 25; 75 MG/1; MG/1
100 CAPSULE ORAL EVERY 12 HOURS SCHEDULED
Status: DISCONTINUED | OUTPATIENT
Start: 2023-10-11 | End: 2023-10-11 | Stop reason: HOSPADM

## 2023-10-11 RX ADMIN — GUAIFENESIN SYRUP AND DEXTROMETHORPHAN 5 ML: 100; 10 SYRUP ORAL at 08:44

## 2023-10-11 RX ADMIN — BENZONATATE 100 MG: 100 CAPSULE ORAL at 08:43

## 2023-10-11 RX ADMIN — Medication 1 TABLET: at 08:43

## 2023-10-11 RX ADMIN — CIPROFLOXACIN 250 MG: 250 TABLET, FILM COATED ORAL at 08:43

## 2023-10-11 RX ADMIN — FLUTICASONE PROPIONATE 1 PUFF: 110 AEROSOL, METERED RESPIRATORY (INHALATION) at 04:25

## 2023-10-11 RX ADMIN — SERTRALINE 100 MG: 100 TABLET, FILM COATED ORAL at 08:43

## 2023-10-11 RX ADMIN — POVIDONE-IODINE: 100 OINTMENT TOPICAL at 06:10

## 2023-10-11 RX ADMIN — ACETAMINOPHEN 650 MG: 325 TABLET ORAL at 08:43

## 2023-10-11 RX ADMIN — CETIRIZINE HYDROCHLORIDE 5 MG: 10 TABLET, FILM COATED ORAL at 08:43

## 2023-10-11 RX ADMIN — HEPARIN SODIUM 5000 UNITS: 5000 INJECTION INTRAVENOUS; SUBCUTANEOUS at 08:43

## 2023-10-11 RX ADMIN — Medication 2 TABLET: at 08:43

## 2023-10-11 RX ADMIN — PANTOPRAZOLE SODIUM 20 MG: 20 TABLET, DELAYED RELEASE ORAL at 06:09

## 2023-10-11 RX ADMIN — BUPROPION HYDROCHLORIDE 75 MG: 75 TABLET, FILM COATED ORAL at 08:43

## 2023-10-11 RX ADMIN — ASPIRIN 81 MG CHEWABLE TABLET 81 MG: 81 TABLET CHEWABLE at 08:44

## 2023-10-11 RX ADMIN — GABAPENTIN 300 MG: 300 CAPSULE ORAL at 08:43

## 2023-10-11 ASSESSMENT — PAIN SCALES - GENERAL: PAINLEVEL_OUTOF10: 2

## 2023-10-11 ASSESSMENT — PAIN DESCRIPTION - LOCATION: LOCATION: CHEST

## 2023-10-11 NOTE — PLAN OF CARE
BSE and SLE completed
Nutrition Problem #1: Moderate malnutrition, In context of social or environmental circumstances  Intervention: Food and/or Nutrient Delivery: Continue Current Diet, Modify Oral Nutrition Supplement  Nutritional
Nutrition Problem #1: Moderate malnutrition, In context of social or environmental circumstances  Intervention: Food and/or Nutrient Delivery: Modify Current Diet, Continue Oral Nutrition Supplement  Nutritional
Patient progressing towards discharge    Problem: Discharge Planning  Goal: Discharge to home or other facility with appropriate resources  9/26/2023 1017 by Shreya Pineda RN  Outcome: Progressing  9/25/2023 2249 by Lexx Whiting RN  Outcome: Progressing  Flowsheets (Taken 9/25/2023 2100)  Discharge to home or other facility with appropriate resources:   Identify barriers to discharge with patient and caregiver   Arrange for needed discharge resources and transportation as appropriate   Identify discharge learning needs (meds, wound care, etc)   Refer to discharge planning if patient needs post-hospital services based on physician order or complex needs related to functional status, cognitive ability or social support system     Problem: Safety - Adult  Goal: Free from fall injury  9/26/2023 1017 by Shreya Pineda RN  Outcome: Progressing  9/25/2023 2249 by Lexx Whiting RN  Outcome: Progressing     Problem: ABCDS Injury Assessment  Goal: Absence of physical injury  9/26/2023 1017 by Shreya Pineda RN  Outcome: Progressing  9/25/2023 2249 by Lexx Whiting RN  Outcome: Progressing     Problem: Skin/Tissue Integrity  Goal: Absence of new skin breakdown  Description: 1. Monitor for areas of redness and/or skin breakdown  2. Assess vascular access sites hourly  3. Every 4-6 hours minimum:  Change oxygen saturation probe site  4. Every 4-6 hours:  If on nasal continuous positive airway pressure, respiratory therapy assess nares and determine need for appliance change or resting period.   9/26/2023 1017 by Sherya Pineda RN  Outcome: Progressing  9/25/2023 2249 by Lexx Whiting RN  Outcome: Progressing     Problem: Pain  Goal: Verbalizes/displays adequate comfort level or baseline comfort level  9/26/2023 1017 by Shreya Pineda RN  Outcome: Progressing  9/25/2023 2249 by Lexx Whiting RN  Outcome: Progressing  Flowsheets (Taken 9/25/2023 2030)  Verbalizes/displays adequate comfort level or baseline comfort
Problem: Discharge Planning  Goal: Discharge to home or other facility with appropriate resources  10/5/2023 0027 by Frankey Pass., RN  Outcome: Progressing  10/4/2023 1635 by Boris Desir RN  Outcome: Progressing  Flowsheets (Taken 10/4/2023 1635)  Discharge to home or other facility with appropriate resources: Identify barriers to discharge with patient and caregiver     Problem: Safety - Adult  Goal: Free from fall injury  10/5/2023 0027 by Frankey Pass., RN  Outcome: Progressing  10/4/2023 1635 by Boris Desir RN  Outcome: Progressing     Problem: ABCDS Injury Assessment  Goal: Absence of physical injury  10/5/2023 0027 by Frankey Pass., RN  Outcome: Progressing  10/4/2023 1635 by Boris Desir RN  Outcome: Progressing     Problem: Skin/Tissue Integrity  Goal: Absence of new skin breakdown  Description: 1. Monitor for areas of redness and/or skin breakdown  2. Assess vascular access sites hourly  3. Every 4-6 hours minimum:  Change oxygen saturation probe site  4. Every 4-6 hours:  If on nasal continuous positive airway pressure, respiratory therapy assess nares and determine need for appliance change or resting period.   10/5/2023 0027 by Frankey Pass., RN  Outcome: Progressing  10/4/2023 1635 by Boris Desir RN  Outcome: Progressing     Problem: Pain  Goal: Verbalizes/displays adequate comfort level or baseline comfort level  10/5/2023 0027 by Frankey Pass., RN  Outcome: Progressing  10/4/2023 1635 by Boris Desir RN  Outcome: Progressing     Problem: Nutrition Deficit:  Goal: Optimize nutritional status  10/5/2023 0027 by Frankey Pass., RN  Outcome: Progressing  10/4/2023 1635 by Boris Desir RN  Outcome: Progressing     Problem: Chronic Conditions and Co-morbidities  Goal: Patient's chronic conditions and co-morbidity symptoms are monitored and maintained or improved  10/5/2023 0027 by Frankey Pass., RN  Outcome: Progressing  10/4/2023 1635 by Diane Coreas
Problem: Discharge Planning  Goal: Discharge to home or other facility with appropriate resources  10/5/2023 0911 by Sophy Harrison RN  Outcome: Progressing  10/5/2023 0027 by Farzana Aparicio RN  Outcome: Progressing     Problem: Safety - Adult  Goal: Free from fall injury  10/5/2023 0911 by Sophy Harrison RN  Outcome: Progressing  10/5/2023 0027 by Farzana Aparicio RN  Outcome: Progressing     Problem: ABCDS Injury Assessment  Goal: Absence of physical injury  10/5/2023 0911 by Sophy Harrison RN  Outcome: Progressing  10/5/2023 0027 by Farzana Aparicio RN  Outcome: Progressing     Problem: Skin/Tissue Integrity  Goal: Absence of new skin breakdown  Description: 1. Monitor for areas of redness and/or skin breakdown  2. Assess vascular access sites hourly  3. Every 4-6 hours minimum:  Change oxygen saturation probe site  4. Every 4-6 hours:  If on nasal continuous positive airway pressure, respiratory therapy assess nares and determine need for appliance change or resting period.   10/5/2023 0911 by Sophy Harrison RN  Outcome: Progressing  10/5/2023 0027 by Farzana Aparicio RN  Outcome: Progressing     Problem: Pain  Goal: Verbalizes/displays adequate comfort level or baseline comfort level  10/5/2023 0911 by Sophy Harrison RN  Outcome: Progressing  10/5/2023 0027 by Farzana Aparicio RN  Outcome: Progressing     Problem: Nutrition Deficit:  Goal: Optimize nutritional status  10/5/2023 0911 by Sophy Harrison RN  Outcome: Progressing  10/5/2023 0027 by Farzana Aparicio RN  Outcome: Progressing     Problem: Chronic Conditions and Co-morbidities  Goal: Patient's chronic conditions and co-morbidity symptoms are monitored and maintained or improved  10/5/2023 0911 by Sophy Harrison RN  Outcome: Progressing  10/5/2023 0027 by Farzana Aparicio RN  Outcome: Progressing
Problem: Discharge Planning  Goal: Discharge to home or other facility with appropriate resources  10/5/2023 2223 by Osorio Salgado RN  Outcome: Progressing  10/5/2023 0911 by Yannick Odonnell RN  Outcome: Progressing  Flowsheets (Taken 10/5/2023 0911)  Discharge to home or other facility with appropriate resources: Identify barriers to discharge with patient and caregiver     Problem: Safety - Adult  Goal: Free from fall injury  10/5/2023 2223 by Osorio Salgado RN  Outcome: Progressing  10/5/2023 0911 by Yannick Odonnell RN  Outcome: Progressing     Problem: ABCDS Injury Assessment  Goal: Absence of physical injury  10/5/2023 2223 by Osorio Salgado RN  Outcome: Progressing  10/5/2023 0911 by Yannick Odonnell RN  Outcome: Progressing     Problem: Skin/Tissue Integrity  Goal: Absence of new skin breakdown  Description: 1. Monitor for areas of redness and/or skin breakdown  2. Assess vascular access sites hourly  3. Every 4-6 hours minimum:  Change oxygen saturation probe site  4. Every 4-6 hours:  If on nasal continuous positive airway pressure, respiratory therapy assess nares and determine need for appliance change or resting period.   10/5/2023 2223 by Osorio Salgado RN  Outcome: Progressing  10/5/2023 0911 by Yannick Odonnell RN  Outcome: Progressing     Problem: Pain  Goal: Verbalizes/displays adequate comfort level or baseline comfort level  10/5/2023 2223 by Osorio Salgado RN  Outcome: Progressing  10/5/2023 0911 by Yannick Odonnell RN  Outcome: Progressing     Problem: Nutrition Deficit:  Goal: Optimize nutritional status  10/5/2023 2223 by Osorio Salgado RN  Outcome: Progressing  10/5/2023 0911 by Yannick Odonnell RN  Outcome: Progressing     Problem: Chronic Conditions and Co-morbidities  Goal: Patient's chronic conditions and co-morbidity symptoms are monitored and maintained or improved  10/5/2023 2223 by Osorio Salgado RN  Outcome: Progressing  10/5/2023 0911 by Marya Coughlin
Problem: Discharge Planning  Goal: Discharge to home or other facility with appropriate resources  10/6/2023 2305 by Bogdan Cabrera RN  Outcome: Progressing  10/6/2023 1429 by Fang Bass RN  Outcome: Progressing     Problem: Safety - Adult  Goal: Free from fall injury  10/6/2023 2305 by Bogdan Cabrera RN  Outcome: Progressing  10/6/2023 1429 by Fang Bass RN  Outcome: Progressing     Problem: ABCDS Injury Assessment  Goal: Absence of physical injury  10/6/2023 2305 by Bogdan Cabrera RN  Outcome: Progressing  10/6/2023 1429 by Fang Bass RN  Outcome: Progressing     Problem: Skin/Tissue Integrity  Goal: Absence of new skin breakdown  Description: 1. Monitor for areas of redness and/or skin breakdown  2. Assess vascular access sites hourly  3. Every 4-6 hours minimum:  Change oxygen saturation probe site  4. Every 4-6 hours:  If on nasal continuous positive airway pressure, respiratory therapy assess nares and determine need for appliance change or resting period.   10/6/2023 2305 by Bogdan Cabrera RN  Outcome: Progressing  10/6/2023 1429 by Fang Bass RN  Outcome: Progressing     Problem: Pain  Goal: Verbalizes/displays adequate comfort level or baseline comfort level  10/6/2023 2305 by Bogdan Cabrera RN  Outcome: Progressing  10/6/2023 1429 by Fang Bass RN  Outcome: Progressing     Problem: Nutrition Deficit:  Goal: Optimize nutritional status  10/6/2023 2305 by Bogdan Cabrera RN  Outcome: Progressing  10/6/2023 1429 by Fang Bass RN  Outcome: Progressing     Problem: Chronic Conditions and Co-morbidities  Goal: Patient's chronic conditions and co-morbidity symptoms are monitored and maintained or improved  10/6/2023 2305 by Bogdan Cabrera RN  Outcome: Progressing  10/6/2023 1429 by Fang Bass RN  Outcome: Progressing
Problem: Discharge Planning  Goal: Discharge to home or other facility with appropriate resources  10/7/2023 1126 by Nixon Batista RN  Outcome: Progressing  10/6/2023 2305 by Willie Aldrich RN  Outcome: Progressing  Flowsheets (Taken 10/6/2023 2305)  Discharge to home or other facility with appropriate resources: Identify barriers to discharge with patient and caregiver     Problem: Safety - Adult  Goal: Free from fall injury  10/7/2023 1126 by Nixon Batista RN  Outcome: Progressing  10/6/2023 2305 by Willie Aldrich RN  Outcome: Progressing     Problem: ABCDS Injury Assessment  Goal: Absence of physical injury  10/7/2023 1126 by Nixon Batista RN  Outcome: Progressing  10/6/2023 2305 by Willie Aldrich RN  Outcome: Progressing     Problem: Skin/Tissue Integrity  Goal: Absence of new skin breakdown  Description: 1. Monitor for areas of redness and/or skin breakdown  2. Assess vascular access sites hourly  3. Every 4-6 hours minimum:  Change oxygen saturation probe site  4. Every 4-6 hours:  If on nasal continuous positive airway pressure, respiratory therapy assess nares and determine need for appliance change or resting period.   10/7/2023 1126 by Nixon Batista RN  Outcome: Progressing  10/6/2023 2305 by Willie Aldrich RN  Outcome: Progressing     Problem: Pain  Goal: Verbalizes/displays adequate comfort level or baseline comfort level  10/7/2023 1126 by Nixon Batista RN  Outcome: Progressing  10/6/2023 2305 by Willie Aldrich RN  Outcome: Progressing     Problem: Nutrition Deficit:  Goal: Optimize nutritional status  10/7/2023 1126 by Nixon Batista RN  Outcome: Progressing  10/6/2023 2305 by Willie Aldrich RN  Outcome: Progressing     Problem: Chronic Conditions and Co-morbidities  Goal: Patient's chronic conditions and co-morbidity symptoms are monitored and maintained or improved  10/7/2023 1126 by Nixon Batista RN  Outcome: Progressing  10/6/2023 2305 by Willie Aldrich RN  Outcome:
Problem: Discharge Planning  Goal: Discharge to home or other facility with appropriate resources  10/7/2023 2343 by Awilda Golden RN  Outcome: Progressing  10/7/2023 1126 by Yogi Santiago RN  Outcome: Progressing     Problem: Safety - Adult  Goal: Free from fall injury  10/7/2023 2343 by Awilda Golden RN  Outcome: Progressing  10/7/2023 1126 by Yogi Santiago RN  Outcome: Progressing     Problem: ABCDS Injury Assessment  Goal: Absence of physical injury  10/7/2023 2343 by Awilda Golden RN  Outcome: Progressing  10/7/2023 1126 by Yogi Santiago RN  Outcome: Progressing     Problem: Skin/Tissue Integrity  Goal: Absence of new skin breakdown  Description: 1. Monitor for areas of redness and/or skin breakdown  2. Assess vascular access sites hourly  3. Every 4-6 hours minimum:  Change oxygen saturation probe site  4. Every 4-6 hours:  If on nasal continuous positive airway pressure, respiratory therapy assess nares and determine need for appliance change or resting period.   10/7/2023 2343 by Awilda Golden RN  Outcome: Progressing  10/7/2023 1126 by Yogi Santigao RN  Outcome: Progressing     Problem: Pain  Goal: Verbalizes/displays adequate comfort level or baseline comfort level  10/7/2023 2343 by Awilda Golden RN  Outcome: Progressing  10/7/2023 1126 by Yogi Santiago RN  Outcome: Progressing     Problem: Nutrition Deficit:  Goal: Optimize nutritional status  10/7/2023 2343 by Awilda Golden RN  Outcome: Progressing  10/7/2023 1126 by Yogi Santiago RN  Outcome: Progressing     Problem: Chronic Conditions and Co-morbidities  Goal: Patient's chronic conditions and co-morbidity symptoms are monitored and maintained or improved  10/7/2023 2343 by Awilda Golden RN  Outcome: Progressing  10/7/2023 1126 by Yogi Santiago RN  Outcome: Progressing
Problem: Discharge Planning  Goal: Discharge to home or other facility with appropriate resources  10/8/2023 1159 by Leticia Isidro RN  Outcome: Progressing  Flowsheets (Taken 10/8/2023 0240 by David Roberson RN)  Discharge to home or other facility with appropriate resources: Identify barriers to discharge with patient and caregiver  10/7/2023 2343 by David Roberson RN  Outcome: Progressing     Problem: Safety - Adult  Goal: Free from fall injury  10/8/2023 1159 by Leticia Isidro RN  Outcome: Progressing  10/7/2023 2343 by David Roberson RN  Outcome: Progressing     Problem: ABCDS Injury Assessment  Goal: Absence of physical injury  10/8/2023 1159 by Leticia Isidro RN  Outcome: Progressing  10/7/2023 2343 by David Roberson RN  Outcome: Progressing     Problem: Skin/Tissue Integrity  Goal: Absence of new skin breakdown  Description: 1. Monitor for areas of redness and/or skin breakdown  2. Assess vascular access sites hourly  3. Every 4-6 hours minimum:  Change oxygen saturation probe site  4. Every 4-6 hours:  If on nasal continuous positive airway pressure, respiratory therapy assess nares and determine need for appliance change or resting period.   10/8/2023 1159 by Leticia Isidro RN  Outcome: Progressing  10/7/2023 2343 by David Roberson RN  Outcome: Progressing     Problem: Pain  Goal: Verbalizes/displays adequate comfort level or baseline comfort level  10/8/2023 1159 by Leticia Isidro RN  Outcome: Progressing  10/7/2023 2343 by David Roberson RN  Outcome: Progressing     Problem: Nutrition Deficit:  Goal: Optimize nutritional status  10/8/2023 1159 by Leticia Isidro RN  Outcome: Progressing  10/7/2023 2343 by David Roberson RN  Outcome: Progressing     Problem: Chronic Conditions and Co-morbidities  Goal: Patient's chronic conditions and co-morbidity symptoms are monitored and maintained or improved  10/8/2023 1159 by Leticia Isidro RN  Outcome: Progressing  Flowsheets (Taken 10/8/2023 0240
Problem: Discharge Planning  Goal: Discharge to home or other facility with appropriate resources  10/9/2023 0156 by Chiki Wisdom RN  Outcome: Progressing  10/8/2023 1159 by Pennie Rubinstein, RN  Outcome: Progressing  Flowsheets (Taken 10/8/2023 0240 by Chiki Wisdom RN)  Discharge to home or other facility with appropriate resources: Identify barriers to discharge with patient and caregiver     Problem: Safety - Adult  Goal: Free from fall injury  10/9/2023 0156 by Chiki Wisdom RN  Outcome: Progressing  10/8/2023 1159 by Pennie Rubinstein, RN  Outcome: Progressing     Problem: ABCDS Injury Assessment  Goal: Absence of physical injury  10/9/2023 0156 by Chiki Wisdom RN  Outcome: Progressing  10/8/2023 1159 by Pennie Rubinstein, RN  Outcome: Progressing     Problem: Skin/Tissue Integrity  Goal: Absence of new skin breakdown  Description: 1. Monitor for areas of redness and/or skin breakdown  2. Assess vascular access sites hourly  3. Every 4-6 hours minimum:  Change oxygen saturation probe site  4. Every 4-6 hours:  If on nasal continuous positive airway pressure, respiratory therapy assess nares and determine need for appliance change or resting period.   10/9/2023 0156 by Chiki Wisdom RN  Outcome: Progressing  10/8/2023 1159 by Pennie Rubinstein, RN  Outcome: Progressing     Problem: Pain  Goal: Verbalizes/displays adequate comfort level or baseline comfort level  10/9/2023 0156 by Chiki Wisdom RN  Outcome: Progressing  10/8/2023 1159 by Pennie Rubinstein, RN  Outcome: Progressing     Problem: Nutrition Deficit:  Goal: Optimize nutritional status  10/9/2023 0156 by Chiki Wisdom RN  Outcome: Progressing  10/8/2023 1159 by Pennie Rubinstein, RN  Outcome: Progressing     Problem: Chronic Conditions and Co-morbidities  Goal: Patient's chronic conditions and co-morbidity symptoms are monitored and maintained or improved  10/9/2023 0156 by Chiki Wisdom RN  Outcome: Progressing  10/8/2023 1159 by Pennie Rubinstein,
Problem: Discharge Planning  Goal: Discharge to home or other facility with appropriate resources  9/22/2023 1154 by Aretha Echols RN  Outcome: Progressing  9/22/2023 0150 by Donavon Tellez RN  Outcome: Progressing  Flowsheets (Taken 9/21/2023 2145)  Discharge to home or other facility with appropriate resources: Identify barriers to discharge with patient and caregiver     Problem: Safety - Adult  Goal: Free from fall injury  9/22/2023 1154 by Aretha Echols RN  Outcome: Progressing  9/22/2023 0150 by Donavon Tellez RN  Outcome: Progressing     Problem: ABCDS Injury Assessment  Goal: Absence of physical injury  9/22/2023 1154 by Aretha Echols RN  Outcome: Progressing  9/22/2023 0150 by Donavon Tellez RN  Outcome: Progressing     Problem: Skin/Tissue Integrity  Goal: Absence of new skin breakdown  Description: 1. Monitor for areas of redness and/or skin breakdown  2. Assess vascular access sites hourly  3. Every 4-6 hours minimum:  Change oxygen saturation probe site  4. Every 4-6 hours:  If on nasal continuous positive airway pressure, respiratory therapy assess nares and determine need for appliance change or resting period.   9/22/2023 1154 by Aretha Echols RN  Outcome: Progressing  9/22/2023 0150 by Donavon Tellez RN  Outcome: Progressing     Problem: Pain  Goal: Verbalizes/displays adequate comfort level or baseline comfort level  9/22/2023 1154 by Aretha Echols RN  Outcome: Progressing  9/22/2023 0150 by Donavon Tellez RN  Outcome: Progressing
Problem: Discharge Planning  Goal: Discharge to home or other facility with appropriate resources  9/25/2023 2249 by James Levine RN  Outcome: Progressing  Flowsheets (Taken 9/25/2023 2100)  Discharge to home or other facility with appropriate resources:   Identify barriers to discharge with patient and caregiver   Arrange for needed discharge resources and transportation as appropriate   Identify discharge learning needs (meds, wound care, etc)   Refer to discharge planning if patient needs post-hospital services based on physician order or complex needs related to functional status, cognitive ability or social support system  9/25/2023 1406 by Rolly Merlin, RN  Outcome: Progressing     Problem: Safety - Adult  Goal: Free from fall injury  9/25/2023 2249 by James Levine RN  Outcome: Progressing  9/25/2023 1406 by Rolly Merlin, RN  Outcome: Progressing     Problem: ABCDS Injury Assessment  Goal: Absence of physical injury  9/25/2023 2249 by James Levine RN  Outcome: Progressing  9/25/2023 1406 by Rolly Merlin, RN  Outcome: Progressing     Problem: Skin/Tissue Integrity  Goal: Absence of new skin breakdown  Description: 1. Monitor for areas of redness and/or skin breakdown  2. Assess vascular access sites hourly  3. Every 4-6 hours minimum:  Change oxygen saturation probe site  4. Every 4-6 hours:  If on nasal continuous positive airway pressure, respiratory therapy assess nares and determine need for appliance change or resting period.   9/25/2023 2249 by James Levine RN  Outcome: Progressing  9/25/2023 1406 by Rolly Merlin, RN  Outcome: Progressing     Problem: Pain  Goal: Verbalizes/displays adequate comfort level or baseline comfort level  9/25/2023 2249 by James Levine RN  Outcome: Progressing  Flowsheets (Taken 9/25/2023 2030)  Verbalizes/displays adequate comfort level or baseline comfort level:   Encourage patient to monitor pain and request assistance   Assess pain
Problem: Discharge Planning  Goal: Discharge to home or other facility with appropriate resources  9/27/2023 0001 by Daniella Eckert RN  Outcome: Progressing  9/26/2023 1017 by Casie Moran RN  Outcome: Progressing     Problem: Safety - Adult  Goal: Free from fall injury  9/27/2023 0001 by Daniella Eckert RN  Outcome: Progressing  9/26/2023 1017 by Casie Moran RN  Outcome: Progressing     Problem: ABCDS Injury Assessment  Goal: Absence of physical injury  9/27/2023 0001 by Daniella Eckert RN  Outcome: Progressing  9/26/2023 1017 by Casie Moran RN  Outcome: Progressing     Problem: Skin/Tissue Integrity  Goal: Absence of new skin breakdown  Description: 1. Monitor for areas of redness and/or skin breakdown  2. Assess vascular access sites hourly  3. Every 4-6 hours minimum:  Change oxygen saturation probe site  4. Every 4-6 hours:  If on nasal continuous positive airway pressure, respiratory therapy assess nares and determine need for appliance change or resting period.   9/27/2023 0001 by Daniella Eckert RN  Outcome: Progressing  9/26/2023 1017 by Casie Moran RN  Outcome: Progressing     Problem: Pain  Goal: Verbalizes/displays adequate comfort level or baseline comfort level  9/27/2023 0001 by Daniella Eckert RN  Outcome: Progressing  Flowsheets (Taken 9/26/2023 2112)  Verbalizes/displays adequate comfort level or baseline comfort level:   Assess pain using appropriate pain scale   Encourage patient to monitor pain and request assistance   Administer analgesics based on type and severity of pain and evaluate response   Implement non-pharmacological measures as appropriate and evaluate response   Consider cultural and social influences on pain and pain management   Notify Licensed Independent Practitioner if interventions unsuccessful or patient reports new pain  9/26/2023 1017 by Casie Moran RN  Outcome: Progressing     Problem: Nutrition Deficit:  Goal: Optimize nutritional status  9/27/2023
Problem: Discharge Planning  Goal: Discharge to home or other facility with appropriate resources  9/27/2023 1325 by Margarita Simmons RN  Outcome: Progressing  9/27/2023 0001 by Yvan Sánchez RN  Outcome: Progressing     Problem: Safety - Adult  Goal: Free from fall injury  9/27/2023 1325 by Margarita Simmons RN  Outcome: Progressing  9/27/2023 0001 by Yvan Sánchez RN  Outcome: Progressing     Problem: ABCDS Injury Assessment  Goal: Absence of physical injury  9/27/2023 1325 by Margarita Simmons RN  Outcome: Progressing  9/27/2023 0001 by Yvan Sánchez RN  Outcome: Progressing     Problem: Skin/Tissue Integrity  Goal: Absence of new skin breakdown  Description: 1. Monitor for areas of redness and/or skin breakdown  2. Assess vascular access sites hourly  3. Every 4-6 hours minimum:  Change oxygen saturation probe site  4. Every 4-6 hours:  If on nasal continuous positive airway pressure, respiratory therapy assess nares and determine need for appliance change or resting period.   9/27/2023 1325 by Margarita Simmons RN  Outcome: Progressing  9/27/2023 0001 by Yvan Sánchez RN  Outcome: Progressing     Problem: Pain  Goal: Verbalizes/displays adequate comfort level or baseline comfort level  9/27/2023 1325 by Margarita Simmons RN  Outcome: Progressing  9/27/2023 0001 by Yvan Sánchez RN  Outcome: Progressing  Flowsheets (Taken 9/26/2023 2112)  Verbalizes/displays adequate comfort level or baseline comfort level:   Assess pain using appropriate pain scale   Encourage patient to monitor pain and request assistance   Administer analgesics based on type and severity of pain and evaluate response   Implement non-pharmacological measures as appropriate and evaluate response   Consider cultural and social influences on pain and pain management   Notify Licensed Independent Practitioner if interventions unsuccessful or patient reports new pain     Problem: Nutrition Deficit:  Goal: Optimize nutritional
Problem: Discharge Planning  Goal: Discharge to home or other facility with appropriate resources  9/27/2023 2337 by Kj Hatch RN  Outcome: Progressing  9/27/2023 1325 by Gricelda Agudelo RN  Outcome: Progressing     Problem: Safety - Adult  Goal: Free from fall injury  9/27/2023 2337 by Kj Hatch RN  Outcome: Progressing  9/27/2023 1325 by Gricelda Agudelo RN  Outcome: Progressing     Problem: ABCDS Injury Assessment  Goal: Absence of physical injury  9/27/2023 2337 by Kj Hatch RN  Outcome: Progressing  9/27/2023 1325 by Gricelda Agudelo RN  Outcome: Progressing     Problem: Skin/Tissue Integrity  Goal: Absence of new skin breakdown  Description: 1. Monitor for areas of redness and/or skin breakdown  2. Assess vascular access sites hourly  3. Every 4-6 hours minimum:  Change oxygen saturation probe site  4. Every 4-6 hours:  If on nasal continuous positive airway pressure, respiratory therapy assess nares and determine need for appliance change or resting period.   9/27/2023 2337 by Kj Hatch RN  Outcome: Progressing  9/27/2023 1325 by Gricelda Agudelo RN  Outcome: Progressing     Problem: Pain  Goal: Verbalizes/displays adequate comfort level or baseline comfort level  9/27/2023 2337 by Kj Hatch RN  Outcome: Progressing  9/27/2023 1325 by Gricelda Agudelo RN  Outcome: Progressing     Problem: Nutrition Deficit:  Goal: Optimize nutritional status  9/27/2023 2337 by Kj Hatch RN  Outcome: Progressing  9/27/2023 1325 by Gricelda Agudelo RN  Outcome: Progressing     Problem: Chronic Conditions and Co-morbidities  Goal: Patient's chronic conditions and co-morbidity symptoms are monitored and maintained or improved  9/27/2023 2337 by Kj Hatch RN  Outcome: Progressing  9/27/2023 1325 by Gricelda Agudelo RN  Outcome: Progressing
Problem: Discharge Planning  Goal: Discharge to home or other facility with appropriate resources  9/28/2023 1055 by Shauna Worthington RN  Outcome: Progressing  9/27/2023 2337 by Ashia Fernandes RN  Outcome: Progressing     Problem: Safety - Adult  Goal: Free from fall injury  9/28/2023 1055 by Shauna Worthington RN  Outcome: Progressing  9/27/2023 2337 by Ashia Fernandes RN  Outcome: Progressing     Problem: ABCDS Injury Assessment  Goal: Absence of physical injury  9/28/2023 1055 by Shauna Worthington RN  Outcome: Progressing  9/27/2023 2337 by Ashia Fernandes RN  Outcome: Progressing     Problem: Skin/Tissue Integrity  Goal: Absence of new skin breakdown  Description: 1. Monitor for areas of redness and/or skin breakdown  2. Assess vascular access sites hourly  3. Every 4-6 hours minimum:  Change oxygen saturation probe site  4. Every 4-6 hours:  If on nasal continuous positive airway pressure, respiratory therapy assess nares and determine need for appliance change or resting period.   9/28/2023 1055 by Shauna Worthington RN  Outcome: Progressing  9/27/2023 2337 by Ashia Fernandes RN  Outcome: Progressing     Problem: Pain  Goal: Verbalizes/displays adequate comfort level or baseline comfort level  9/28/2023 1055 by Shauna Worthington RN  Outcome: Progressing  9/27/2023 2337 by Ashia Fernandes RN  Outcome: Progressing     Problem: Nutrition Deficit:  Goal: Optimize nutritional status  9/28/2023 1055 by Shauna Worthington RN  Outcome: Progressing  9/27/2023 2337 by Ashia Fernandes RN  Outcome: Progressing     Problem: Chronic Conditions and Co-morbidities  Goal: Patient's chronic conditions and co-morbidity symptoms are monitored and maintained or improved  9/28/2023 1055 by Shauna Worthington RN  Outcome: Progressing  9/27/2023 2337 by Ashia Fernandes RN  Outcome: Progressing
Problem: Discharge Planning  Goal: Discharge to home or other facility with appropriate resources  9/30/2023 0150 by Stephen Armenta RN  Outcome: Progressing  9/29/2023 1456 by Melisa Keene RN  Outcome: Progressing     Problem: Safety - Adult  Goal: Free from fall injury  9/30/2023 0150 by Stephen Armenta RN  Outcome: Progressing  9/29/2023 1456 by Melisa Keene RN  Outcome: Progressing     Problem: ABCDS Injury Assessment  Goal: Absence of physical injury  9/30/2023 0150 by Stephen Armenta RN  Outcome: Progressing  9/29/2023 1456 by Melisa Keene RN  Outcome: Progressing     Problem: Skin/Tissue Integrity  Goal: Absence of new skin breakdown  Description: 1. Monitor for areas of redness and/or skin breakdown  2. Assess vascular access sites hourly  3. Every 4-6 hours minimum:  Change oxygen saturation probe site  4. Every 4-6 hours:  If on nasal continuous positive airway pressure, respiratory therapy assess nares and determine need for appliance change or resting period.   9/30/2023 0150 by Stephen Armenta RN  Outcome: Progressing  9/29/2023 1456 by Melisa Keene RN  Outcome: Progressing     Problem: Pain  Goal: Verbalizes/displays adequate comfort level or baseline comfort level  9/30/2023 0150 by Stephen Armenta RN  Outcome: Progressing  9/29/2023 1456 by Melisa Keene RN  Outcome: Progressing     Problem: Nutrition Deficit:  Goal: Optimize nutritional status  9/30/2023 0150 by Stephen Armenta RN  Outcome: Progressing  9/29/2023 1456 by Melisa Keene RN  Outcome: Progressing  9/29/2023 1253 by Bhavesh Marx RD, LD  Outcome: Progressing  Flowsheets (Taken 9/23/2023 1512)  Nutrient intake appropriate for improving, restoring, or maintaining nutritional needs:   Monitor oral intake, labs, and treatment plans   Recommend appropriate diets, oral nutritional supplements, and vitamin/mineral supplements   Assess nutritional status and recommend course of action     Problem:
Problem: Discharge Planning  Goal: Discharge to home or other facility with appropriate resources  Outcome: Adequate for Discharge  Flowsheets (Taken 10/10/2023 2215 by Avinash Majano, RN)  Discharge to home or other facility with appropriate resources: Identify barriers to discharge with patient and caregiver     Problem: Safety - Adult  Goal: Free from fall injury  Outcome: Adequate for Discharge     Problem: ABCDS Injury Assessment  Goal: Absence of physical injury  Outcome: Adequate for Discharge     Problem: Skin/Tissue Integrity  Goal: Absence of new skin breakdown  Description: 1. Monitor for areas of redness and/or skin breakdown  2. Assess vascular access sites hourly  3. Every 4-6 hours minimum:  Change oxygen saturation probe site  4. Every 4-6 hours:  If on nasal continuous positive airway pressure, respiratory therapy assess nares and determine need for appliance change or resting period.   Outcome: Adequate for Discharge     Problem: Pain  Goal: Verbalizes/displays adequate comfort level or baseline comfort level  Outcome: Adequate for Discharge     Problem: Nutrition Deficit:  Goal: Optimize nutritional status  Outcome: Adequate for Discharge     Problem: Chronic Conditions and Co-morbidities  Goal: Patient's chronic conditions and co-morbidity symptoms are monitored and maintained or improved  Outcome: Adequate for Discharge  Flowsheets (Taken 10/10/2023 2215 by Avinash Majano, RN)  Care Plan - Patient's Chronic Conditions and Co-Morbidity Symptoms are Monitored and Maintained or Improved: Monitor and assess patient's chronic conditions and comorbid symptoms for stability, deterioration, or improvement
Problem: Discharge Planning  Goal: Discharge to home or other facility with appropriate resources  Outcome: Progressing     Problem: Safety - Adult  Goal: Free from fall injury  Outcome: Progressing     Problem: ABCDS Injury Assessment  Goal: Absence of physical injury  Outcome: Progressing     Problem: Skin/Tissue Integrity  Goal: Absence of new skin breakdown  Description: 1. Monitor for areas of redness and/or skin breakdown  2. Assess vascular access sites hourly  3. Every 4-6 hours minimum:  Change oxygen saturation probe site  4. Every 4-6 hours:  If on nasal continuous positive airway pressure, respiratory therapy assess nares and determine need for appliance change or resting period.   Outcome: Progressing     Problem: Pain  Goal: Verbalizes/displays adequate comfort level or baseline comfort level  Outcome: Progressing     Problem: Nutrition Deficit:  Goal: Optimize nutritional status  9/29/2023 1456 by Yogi Santiago RN  Outcome: Progressing  9/29/2023 1253 by Pete Garzon RD, LD  Outcome: Progressing  Flowsheets (Taken 9/23/2023 1512)  Nutrient intake appropriate for improving, restoring, or maintaining nutritional needs:   Monitor oral intake, labs, and treatment plans   Recommend appropriate diets, oral nutritional supplements, and vitamin/mineral supplements   Assess nutritional status and recommend course of action     Problem: Chronic Conditions and Co-morbidities  Goal: Patient's chronic conditions and co-morbidity symptoms are monitored and maintained or improved  Outcome: Progressing
Problem: Discharge Planning  Goal: Discharge to home or other facility with appropriate resources  Outcome: Progressing     Problem: Safety - Adult  Goal: Free from fall injury  Outcome: Progressing     Problem: ABCDS Injury Assessment  Goal: Absence of physical injury  Outcome: Progressing     Problem: Skin/Tissue Integrity  Goal: Absence of new skin breakdown  Description: 1. Monitor for areas of redness and/or skin breakdown  2. Assess vascular access sites hourly  3. Every 4-6 hours minimum:  Change oxygen saturation probe site  4. Every 4-6 hours:  If on nasal continuous positive airway pressure, respiratory therapy assess nares and determine need for appliance change or resting period.   Outcome: Progressing     Problem: Pain  Goal: Verbalizes/displays adequate comfort level or baseline comfort level  Outcome: Progressing     Problem: Nutrition Deficit:  Goal: Optimize nutritional status  Outcome: Progressing
Problem: Discharge Planning  Goal: Discharge to home or other facility with appropriate resources  Outcome: Progressing     Problem: Safety - Adult  Goal: Free from fall injury  Outcome: Progressing     Problem: ABCDS Injury Assessment  Goal: Absence of physical injury  Outcome: Progressing     Problem: Skin/Tissue Integrity  Goal: Absence of new skin breakdown  Description: 1. Monitor for areas of redness and/or skin breakdown  2. Assess vascular access sites hourly  3. Every 4-6 hours minimum:  Change oxygen saturation probe site  4. Every 4-6 hours:  If on nasal continuous positive airway pressure, respiratory therapy assess nares and determine need for appliance change or resting period.   Outcome: Progressing     Problem: Pain  Goal: Verbalizes/displays adequate comfort level or baseline comfort level  Outcome: Progressing     Problem: Nutrition Deficit:  Goal: Optimize nutritional status  Outcome: Progressing     Problem: Chronic Conditions and Co-morbidities  Goal: Patient's chronic conditions and co-morbidity symptoms are monitored and maintained or improved  Outcome: Progressing
Problem: Discharge Planning  Goal: Discharge to home or other facility with appropriate resources  Outcome: Progressing  Flowsheets (Taken 10/2/2023 2239)  Discharge to home or other facility with appropriate resources: Identify barriers to discharge with patient and caregiver     Problem: Safety - Adult  Goal: Free from fall injury  Outcome: Progressing     Problem: ABCDS Injury Assessment  Goal: Absence of physical injury  Outcome: Progressing     Problem: Skin/Tissue Integrity  Goal: Absence of new skin breakdown  Description: 1. Monitor for areas of redness and/or skin breakdown  2. Assess vascular access sites hourly  3. Every 4-6 hours minimum:  Change oxygen saturation probe site  4. Every 4-6 hours:  If on nasal continuous positive airway pressure, respiratory therapy assess nares and determine need for appliance change or resting period.   Outcome: Progressing     Problem: Pain  Goal: Verbalizes/displays adequate comfort level or baseline comfort level  Outcome: Progressing     Problem: Nutrition Deficit:  Goal: Optimize nutritional status  Outcome: Progressing     Problem: Chronic Conditions and Co-morbidities  Goal: Patient's chronic conditions and co-morbidity symptoms are monitored and maintained or improved  Outcome: Progressing  Flowsheets (Taken 10/2/2023 2239)  Care Plan - Patient's Chronic Conditions and Co-Morbidity Symptoms are Monitored and Maintained or Improved: Monitor and assess patient's chronic conditions and comorbid symptoms for stability, deterioration, or improvement
Problem: Discharge Planning  Goal: Discharge to home or other facility with appropriate resources  Outcome: Progressing  Flowsheets (Taken 10/9/2023 2130)  Discharge to home or other facility with appropriate resources: Identify barriers to discharge with patient and caregiver
Problem: Discharge Planning  Goal: Discharge to home or other facility with appropriate resources  Outcome: Progressing  Flowsheets (Taken 9/21/2023 2145)  Discharge to home or other facility with appropriate resources: Identify barriers to discharge with patient and caregiver
Javier Wagner RN  Outcome: Progressing  Flowsheets (Taken 10/9/2023 2130)  Care Plan - Patient's Chronic Conditions and Co-Morbidity Symptoms are Monitored and Maintained or Improved: Monitor and assess patient's chronic conditions and comorbid symptoms for stability, deterioration, or improvement

## 2023-10-11 NOTE — DISCHARGE SUMMARY
[facility] Course: The patient was admitted to the Rehabilitation Unit to address ADL and mobility deficits-as detailed above and below. The patient was enrolled in acute PT, OT program.  Weekly team meetings were held to assess functional progress toward their goals-and modify the therapy program.  The patient's medical, emotional, psychosocial and functional issues were addressed. The patient progressed in the rehab program and is now ready for discharge home. Refer to functional assessments summary report for detailed functional status. Refer to the medical problem list below to see the medical issues addressed. The social and DC complexities are detailed in the DC planning section below. Greater than 3 5 minutes was spent on coordinating patients discharge including follow-up care, medications and patient/family education. Extended time needed because of the potential use of controlled medications are high risk medications and a high risk population individual.  Patient and family were instructed to use lowest effective dose of these medications and slowly titrate off over the next 2 to 4 weeks. They are not to combine opiates with sedatives. I reviewed her [place] prescription monitoring service data sheets in hopes of eliminating polypharmacy and weaning to the lowest effective dose of pain medications and eliminating the concomitant use of benzodiazepines. I see   no medications of concern. I see   no habits of combining sedatives and narcotics. Subjective: The patient complains of severe acute on chronic progressive fatigue and  post-op chest pain partially relieved by rest, medications, PT,  OT,   SLP and rest and exacerbated by recent illness. 48-year-old female initially presented to [facility] on 8/29/2023 for dizziness nausea vomiting chest pain and elevated blood pressure.   She also complained of radiation down her left arm and being noncompliance

## 2023-10-11 NOTE — DISCHARGE INSTR - DIET
Good nutrition is important when healing from an illness, injury, or surgery. Follow any nutrition recommendations given to you during your hospital stay. If you were given an oral nutrition supplement while in the hospital, continue to take this supplement at home. You can take it with meals, in-between meals, and/or before bedtime. These supplements can be purchased at most local grocery stores, pharmacies, and chain [employer]-stores. If you have any questions about your diet or nutrition, call the hospital and ask for the dietitian.   Regular 5 carb choice

## 2023-10-11 NOTE — DISCHARGE INSTR - ACTIVITY
Activity as tolerated as directed by therapy.   Do not lift push or pull greater than 10 pounds for 4 more weeks

## 2023-10-12 LAB
BACTERIA UR CULT: ABNORMAL
BACTERIA UR CULT: ABNORMAL
ORGANISM: ABNORMAL

## 2023-10-18 ENCOUNTER — OFFICE VISIT (OUTPATIENT)
Dept: CARDIOLOGY CLINIC | Age: 73
End: 2023-10-18
Payer: MEDICARE

## 2023-10-18 VITALS
WEIGHT: 167 LBS | RESPIRATION RATE: 15 BRPM | BODY MASS INDEX: 25.31 KG/M2 | OXYGEN SATURATION: 97 % | HEART RATE: 76 BPM | HEIGHT: 68 IN | SYSTOLIC BLOOD PRESSURE: 122 MMHG | DIASTOLIC BLOOD PRESSURE: 64 MMHG

## 2023-10-18 DIAGNOSIS — I25.10 CORONARY ARTERY DISEASE DUE TO LIPID RICH PLAQUE: Primary | ICD-10-CM

## 2023-10-18 DIAGNOSIS — I25.83 CORONARY ARTERY DISEASE DUE TO LIPID RICH PLAQUE: Primary | ICD-10-CM

## 2023-10-18 PROCEDURE — G8484 FLU IMMUNIZE NO ADMIN: HCPCS | Performed by: INTERNAL MEDICINE

## 2023-10-18 PROCEDURE — 3017F COLORECTAL CA SCREEN DOC REV: CPT | Performed by: INTERNAL MEDICINE

## 2023-10-18 PROCEDURE — 1123F ACP DISCUSS/DSCN MKR DOCD: CPT | Performed by: INTERNAL MEDICINE

## 2023-10-18 PROCEDURE — 1036F TOBACCO NON-USER: CPT | Performed by: INTERNAL MEDICINE

## 2023-10-18 PROCEDURE — 1090F PRES/ABSN URINE INCON ASSESS: CPT | Performed by: INTERNAL MEDICINE

## 2023-10-18 PROCEDURE — 1111F DSCHRG MED/CURRENT MED MERGE: CPT | Performed by: INTERNAL MEDICINE

## 2023-10-18 PROCEDURE — G8419 CALC BMI OUT NRM PARAM NOF/U: HCPCS | Performed by: INTERNAL MEDICINE

## 2023-10-18 PROCEDURE — 3078F DIAST BP <80 MM HG: CPT | Performed by: INTERNAL MEDICINE

## 2023-10-18 PROCEDURE — G8400 PT W/DXA NO RESULTS DOC: HCPCS | Performed by: INTERNAL MEDICINE

## 2023-10-18 PROCEDURE — 3074F SYST BP LT 130 MM HG: CPT | Performed by: INTERNAL MEDICINE

## 2023-10-18 PROCEDURE — G8427 DOCREV CUR MEDS BY ELIG CLIN: HCPCS | Performed by: INTERNAL MEDICINE

## 2023-10-18 PROCEDURE — 99204 OFFICE O/P NEW MOD 45 MIN: CPT | Performed by: INTERNAL MEDICINE

## 2023-10-18 ASSESSMENT — ENCOUNTER SYMPTOMS
COUGH: 0
SHORTNESS OF BREATH: 0
VOMITING: 0
COLOR CHANGE: 0
RHINORRHEA: 0
CONSTIPATION: 0
EYE REDNESS: 0
DIARRHEA: 0
WHEEZING: 0
NAUSEA: 0
APNEA: 0
ABDOMINAL PAIN: 0
CHEST TIGHTNESS: 0

## 2023-10-18 NOTE — PROGRESS NOTES
Chief Complaint   Patient presents with    Follow[place]Up [place] [facility]     09/21-10/11/2023 (20 days) for CMP, HTN, and CAD. Patient presents for initial medical evaluation.  Patient is followed on a regular basis by Dr. Diann Crawford DO.     CAD status post four-vessel CABG September 11, 2023 at [facility]  Hypertension  Hyperlipidemia  Diabetes  Obesity  Ischemic cardiomyopathy EF 30 to 35%  TTE 8/31/2023 EF 30 to 35%  ============  10/18/2023  First clinic visit follow-up on recent hospitalization  Last month patient was admitted to the hospital for heart failure, patient was found with multivessel disease  Patient was transferred to [facility] for CABG where she underwent quadruple bypass  Patient denies chest pain or shortness of breath    Patient Active Problem List   Diagnosis    Hypertensive urgency    Chest tightness or pressure    Visual disturbance, subjective    Bipolar affective disorder (HCC)    Acute sore throat    Syncope and collapse    Head injury    Anxiety    Anemia of chronic disease    Chronic low back pain    Chronic neck pain    Dyslipidemia    Discitis of lumbar region    Essential hypertension    GERD (gastroesophageal reflux disease)    History of below-knee amputation of right lower extremity (HCC)    Hyperlipidemia    Insomnia    Left foot pain    Migraine headache    PTSD (post-traumatic stress disorder)    SOB (shortness of breath)    Cervical disc disorder with radiculopathy    Stenosis of carotid artery    Impaired mobility and activities of daily living dt neopulmonary debility status post CABG and gait instability dt ataxia    Orthostatic dizziness    Low BP    LFT elevation    Diarrhea    Pancytopenia (HCC)    Acute renal injury ([address])    Dysautonomia ([address])    Ataxia    Cardiomyopathy ([address])    Hyperglycemia due to type 2 diabetes mellitus ([address])    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

## 2023-12-31 ENCOUNTER — APPOINTMENT (OUTPATIENT)
Dept: GENERAL RADIOLOGY | Age: 73
End: 2023-12-31
Payer: MEDICARE

## 2023-12-31 ENCOUNTER — HOSPITAL ENCOUNTER (EMERGENCY)
Age: 73
Discharge: HOME | End: 2024-01-01
Payer: MEDICARE

## 2023-12-31 DIAGNOSIS — R11.2 NAUSEA AND VOMITING, UNSPECIFIED VOMITING TYPE: ICD-10-CM

## 2023-12-31 DIAGNOSIS — N30.01 ACUTE CYSTITIS WITH HEMATURIA: Primary | ICD-10-CM

## 2023-12-31 DIAGNOSIS — R07.89 ATYPICAL CHEST PAIN: ICD-10-CM

## 2023-12-31 LAB
ALBUMIN SERPL-MCNC: 4.4 G/DL (ref 3.5–4.6)
ALP SERPL-CCNC: 127 U/L (ref 40–130)
ALT SERPL-CCNC: 55 U/L (ref 0–33)
ANION GAP SERPL CALCULATED.3IONS-SCNC: 17 MEQ/L (ref 9–15)
AST SERPL-CCNC: 37 U/L (ref 0–35)
BASOPHILS # BLD: 0 K/UL (ref 0–0.2)
BASOPHILS NFR BLD: 0.6 %
BILIRUB SERPL-MCNC: 0.7 MG/DL (ref 0.2–0.7)
BUN SERPL-MCNC: 23 MG/DL (ref 8–23)
CALCIUM SERPL-MCNC: 9.2 MG/DL (ref 8.5–9.9)
CHLORIDE SERPL-SCNC: 99 MEQ/L (ref 95–107)
CHP ED QC CHECK: YES
CO2 SERPL-SCNC: 22 MEQ/L (ref 20–31)
CREAT SERPL-MCNC: 0.88 MG/DL (ref 0.5–0.9)
EOSINOPHIL # BLD: 0 K/UL (ref 0–0.7)
EOSINOPHIL NFR BLD: 0.4 %
ERYTHROCYTE [DISTWIDTH] IN BLOOD BY AUTOMATED COUNT: 13.6 % (ref 11.5–14.5)
GLOBULIN SER CALC-MCNC: 4.1 G/DL (ref 2.3–3.5)
GLUCOSE BLD-MCNC: 184 MG/DL
GLUCOSE BLD-MCNC: 184 MG/DL (ref 70–99)
GLUCOSE SERPL-MCNC: 190 MG/DL (ref 70–99)
HCT VFR BLD AUTO: 41.8 % (ref 37–47)
HGB BLD-MCNC: 13.4 G/DL (ref 12–16)
INFLUENZA A BY PCR: NEGATIVE
INFLUENZA B BY PCR: NEGATIVE
LACTATE BLDV-SCNC: 1.9 MMOL/L (ref 0.5–2.2)
LYMPHOCYTES # BLD: 0.9 K/UL (ref 1–4.8)
LYMPHOCYTES NFR BLD: 16.8 %
MAGNESIUM SERPL-MCNC: 2.1 MG/DL (ref 1.7–2.4)
MCH RBC QN AUTO: 31.7 PG (ref 27–31.3)
MCHC RBC AUTO-ENTMCNC: 32.1 % (ref 33–37)
MCV RBC AUTO: 98.8 FL (ref 79.4–94.8)
MONOCYTES # BLD: 0.4 K/UL (ref 0.2–0.8)
MONOCYTES NFR BLD: 7.4 %
NEUTROPHILS # BLD: 3.8 K/UL (ref 1.4–6.5)
NEUTS SEG NFR BLD: 74.6 %
PERFORMED ON: ABNORMAL
PLATELET # BLD AUTO: 126 K/UL (ref 130–400)
POTASSIUM SERPL-SCNC: 4.3 MEQ/L (ref 3.4–4.9)
PROT SERPL-MCNC: 8.5 G/DL (ref 6.3–8)
RBC # BLD AUTO: 4.23 M/UL (ref 4.2–5.4)
SARS-COV-2 RDRP RESP QL NAA+PROBE: NOT DETECTED
SODIUM SERPL-SCNC: 138 MEQ/L (ref 135–144)
TROPONIN, HIGH SENSITIVITY: 13 NG/L (ref 0–19)
TROPONIN, HIGH SENSITIVITY: 14 NG/L (ref 0–19)
WBC # BLD AUTO: 5.1 K/UL (ref 4.8–10.8)

## 2023-12-31 PROCEDURE — 87635 SARS-COV-2 COVID-19 AMP PRB: CPT

## 2023-12-31 PROCEDURE — 80053 COMPREHEN METABOLIC PANEL: CPT

## 2023-12-31 PROCEDURE — 96361 HYDRATE IV INFUSION ADD-ON: CPT

## 2023-12-31 PROCEDURE — 93005 ELECTROCARDIOGRAM TRACING: CPT | Performed by: EMERGENCY MEDICINE

## 2023-12-31 PROCEDURE — 99285 EMERGENCY DEPT VISIT HI MDM: CPT

## 2023-12-31 PROCEDURE — 83605 ASSAY OF LACTIC ACID: CPT

## 2023-12-31 PROCEDURE — 81001 URINALYSIS AUTO W/SCOPE: CPT

## 2023-12-31 PROCEDURE — 84145 PROCALCITONIN (PCT): CPT

## 2023-12-31 PROCEDURE — 87502 INFLUENZA DNA AMP PROBE: CPT

## 2023-12-31 PROCEDURE — 71045 X-RAY EXAM CHEST 1 VIEW: CPT

## 2023-12-31 PROCEDURE — 85025 COMPLETE CBC W/AUTO DIFF WBC: CPT

## 2023-12-31 PROCEDURE — 36415 COLL VENOUS BLD VENIPUNCTURE: CPT

## 2023-12-31 PROCEDURE — 6360000002 HC RX W HCPCS

## 2023-12-31 PROCEDURE — 2500000003 HC RX 250 WO HCPCS

## 2023-12-31 PROCEDURE — 96375 TX/PRO/DX INJ NEW DRUG ADDON: CPT

## 2023-12-31 PROCEDURE — A4216 STERILE WATER/SALINE, 10 ML: HCPCS

## 2023-12-31 PROCEDURE — 2580000003 HC RX 258

## 2023-12-31 PROCEDURE — 84484 ASSAY OF TROPONIN QUANT: CPT

## 2023-12-31 PROCEDURE — 83735 ASSAY OF MAGNESIUM: CPT

## 2023-12-31 RX ORDER — KETOROLAC TROMETHAMINE 15 MG/ML
15 INJECTION, SOLUTION INTRAMUSCULAR; INTRAVENOUS ONCE
Status: COMPLETED | OUTPATIENT
Start: 2023-12-31 | End: 2023-12-31

## 2023-12-31 RX ORDER — 0.9 % SODIUM CHLORIDE 0.9 %
1000 INTRAVENOUS SOLUTION INTRAVENOUS ONCE
Status: COMPLETED | OUTPATIENT
Start: 2023-12-31 | End: 2023-12-31

## 2023-12-31 RX ORDER — ONDANSETRON 2 MG/ML
4 INJECTION INTRAMUSCULAR; INTRAVENOUS ONCE
Status: COMPLETED | OUTPATIENT
Start: 2023-12-31 | End: 2023-12-31

## 2023-12-31 RX ADMIN — ONDANSETRON 4 MG: 2 INJECTION INTRAMUSCULAR; INTRAVENOUS at 22:11

## 2023-12-31 RX ADMIN — FAMOTIDINE 20 MG: 10 INJECTION, SOLUTION INTRAVENOUS at 22:10

## 2023-12-31 RX ADMIN — SODIUM CHLORIDE 1000 ML: 9 INJECTION, SOLUTION INTRAVENOUS at 22:08

## 2023-12-31 RX ADMIN — KETOROLAC TROMETHAMINE 15 MG: 15 INJECTION, SOLUTION INTRAMUSCULAR; INTRAVENOUS at 22:13

## 2023-12-31 ASSESSMENT — PAIN DESCRIPTION - PAIN TYPE: TYPE: ACUTE PAIN

## 2023-12-31 ASSESSMENT — PAIN SCALES - GENERAL: PAINLEVEL_OUTOF10: 7

## 2023-12-31 ASSESSMENT — PAIN DESCRIPTION - DESCRIPTORS: DESCRIPTORS: ACHING

## 2023-12-31 ASSESSMENT — PAIN - FUNCTIONAL ASSESSMENT: PAIN_FUNCTIONAL_ASSESSMENT: 0-10

## 2023-12-31 ASSESSMENT — PAIN DESCRIPTION - LOCATION: LOCATION: CHEST

## 2024-01-01 VITALS
TEMPERATURE: 98.5 F | DIASTOLIC BLOOD PRESSURE: 72 MMHG | SYSTOLIC BLOOD PRESSURE: 146 MMHG | OXYGEN SATURATION: 96 % | BODY MASS INDEX: 25.31 KG/M2 | RESPIRATION RATE: 18 BRPM | WEIGHT: 167 LBS | HEIGHT: 68 IN | HEART RATE: 77 BPM

## 2024-01-01 LAB
BACTERIA URNS QL MICRO: ABNORMAL /HPF
BILIRUB UR QL STRIP: ABNORMAL
CLARITY UR: ABNORMAL
COLOR UR: ABNORMAL
EKG ATRIAL RATE: 89 BPM
EKG P AXIS: 98 DEGREES
EKG P-R INTERVAL: 192 MS
EKG Q-T INTERVAL: 394 MS
EKG QRS DURATION: 78 MS
EKG QTC CALCULATION (BAZETT): 479 MS
EKG R AXIS: 9 DEGREES
EKG T AXIS: 22 DEGREES
EKG VENTRICULAR RATE: 89 BPM
EPI CELLS #/AREA URNS AUTO: ABNORMAL /HPF (ref 0–5)
GLUCOSE UR STRIP-MCNC: NEGATIVE MG/DL
HGB UR QL STRIP: ABNORMAL
HYALINE CASTS #/AREA URNS AUTO: ABNORMAL /HPF (ref 0–5)
KETONES UR STRIP-MCNC: NEGATIVE MG/DL
LEUKOCYTE ESTERASE UR QL STRIP: ABNORMAL
NITRITE UR QL STRIP: POSITIVE
PERFORMED ON: NORMAL
PH UR STRIP: 5 [PH] (ref 5–9)
POC CREATININE: 0.8 MG/DL (ref 0.6–1.2)
POC SAMPLE TYPE: NORMAL
PROCALCITONIN SERPL IA-MCNC: 0.05 NG/ML (ref 0–0.15)
PROT UR STRIP-MCNC: 30 MG/DL
RBC #/AREA URNS HPF: ABNORMAL /HPF (ref 0–2)
SP GR UR STRIP: 1.02 (ref 1–1.03)
URINE REFLEX TO CULTURE: YES
UROBILINOGEN UR STRIP-ACNC: 1 E.U./DL
WBC #/AREA URNS AUTO: >100 /HPF (ref 0–5)

## 2024-01-01 PROCEDURE — 87077 CULTURE AEROBIC IDENTIFY: CPT

## 2024-01-01 PROCEDURE — 6360000002 HC RX W HCPCS

## 2024-01-01 PROCEDURE — 96365 THER/PROPH/DIAG IV INF INIT: CPT

## 2024-01-01 PROCEDURE — 87186 SC STD MICRODIL/AGAR DIL: CPT

## 2024-01-01 PROCEDURE — 93010 ELECTROCARDIOGRAM REPORT: CPT | Performed by: INTERNAL MEDICINE

## 2024-01-01 PROCEDURE — 2580000003 HC RX 258

## 2024-01-01 PROCEDURE — 87086 URINE CULTURE/COLONY COUNT: CPT

## 2024-01-01 RX ORDER — PROMETHAZINE HYDROCHLORIDE 25 MG/1
25 SUPPOSITORY RECTAL EVERY 6 HOURS PRN
Qty: 7 SUPPOSITORY | Refills: 0 | Status: SHIPPED | OUTPATIENT
Start: 2024-01-01

## 2024-01-01 RX ORDER — ONDANSETRON 4 MG/1
4 TABLET, ORALLY DISINTEGRATING ORAL 3 TIMES DAILY PRN
Qty: 21 TABLET | Refills: 0 | Status: SHIPPED | OUTPATIENT
Start: 2024-01-01

## 2024-01-01 RX ORDER — AMOXICILLIN AND CLAVULANATE POTASSIUM 875; 125 MG/1; MG/1
1 TABLET, FILM COATED ORAL 2 TIMES DAILY
Qty: 20 TABLET | Refills: 0 | Status: SHIPPED | OUTPATIENT
Start: 2024-01-01 | End: 2024-01-03 | Stop reason: ALTCHOICE

## 2024-01-01 RX ADMIN — CEFTRIAXONE SODIUM 1000 MG: 1 INJECTION, POWDER, FOR SOLUTION INTRAMUSCULAR; INTRAVENOUS at 00:23

## 2024-01-01 NOTE — ED NOTES
Discharge instructions reviewed with pt.   Pt verbalized understanding with no questions or concerns.  Resps even, non labored.  Skin p/w/d.  IV removed.   No acute distress noted.  VSS  GCS 15  Pt verbalized understanding to  prescriptions from pharmacy located on discharge papers.

## 2024-01-01 NOTE — ED PROVIDER NOTES
[facility] ED  EMERGENCY DEPARTMENT ENCOUNTER      Pt Name: Ashley Summers  MRN: [id number]  Birthdate 1950  Date of evaluation: 12/31/2023  Provider: ROOPA Michael  9:55 PM EST    CHIEF COMPLAINT       Chief Complaint   Patient presents with    Emesis    Chest Pain    Urinary Frequency         HISTORY OF PRESENT ILLNESS   (Location/Symptom, Timing/Onset, Context/Setting, Quality, Duration, Modifying Factors, Severity)  Note limiting factors.   Ashley Summers is a 73 y.o. female whom per chart review has a PMHx of hypertension, anxiety, type II DM, migraines, CAD s/p CABG, TIA, PTSD, bipolar affective disorder, osteomyelitis, CHF, GERD, cardiomyopathy presents to ED for evaluation of generalized illness.  Patient reports that 4 days ago she noted dysuria, urinary frequency/urgency.  Reports that she had leftover Keflex from previous urinary tract infection and states that she took 4 doses of the medication and states that over the last 2 days she has had persistent nausea, vomiting and states that she has been unable to tolerate any additional antibiotics or p.o. intake.  Patient also reports that she did have diarrhea but states that that has since resolved.  Patient is also complaining of \"soreness\" to her midsternal area.  Patient reports that she has had soreness to her sternum since her CABG on 09/11, but states that it is worse secondary to the vomiting.  Patient verbalizes no additional complaints.  Denies ill contacts, exposures.  Patient states that she attempted to take OTC Azo and reports no improvement in symptoms because she was unable to tolerate the medication.  Patient denies shortness of breath, fever, chills.    HPI    Nursing Notes were reviewed.    REVIEW OF SYSTEMS    (2-9 systems for level 4, 10 or more for level 5)     Review of Systems   Cardiovascular:  Positive for chest pain.   Gastrointestinal:  Positive for diarrhea, nausea and vomiting.   Genitourinary:  Positive for

## 2024-01-01 NOTE — ED TRIAGE NOTES
Patient arrived to ER by private vehicle with friend.  Patient c/o UTI, vomiting and post op chest pain.  Patient states had quadruple bypass surgery on 9/11 and has been vomiting, more then 15 episodes.  Patient c/o pain in sternum, soreness, more since repeated vomiting episodes.

## 2024-01-01 NOTE — DISCHARGE INSTRUCTIONS
Take medications as directed.  Ensure that you are drinking plenty of fluids.    Follow-up with PCP.    Return to ED if any new, or worsening symptoms

## 2024-01-02 LAB
BACTERIA UR CULT: ABNORMAL
BACTERIA UR CULT: ABNORMAL
ORGANISM: ABNORMAL

## 2024-01-03 RX ORDER — CEPHALEXIN 500 MG/1
500 CAPSULE ORAL 3 TIMES DAILY
Qty: 30 CAPSULE | Refills: 0 | Status: SHIPPED | OUTPATIENT
Start: 2024-01-03 | End: 2024-01-13

## 2024-01-03 NOTE — ED NOTES
[facility]   Emergency Department Culture Follow-Up       Ashley Summers (CSN: [id number]) was seen and evaluated at [facility] Emergency Department on 12/31 by provider Caridad Carbone.    CULTURE RESULT TYPE: A urine culture was positive and is growing Klebsiella Pneumonia. Resistant to Macrobid and ampicillin.       Treatment Course:    The patient was treated and discharged with Augmentin.      Recommendation:    It is recommended to discontinue Augmentin and start cephalexin 500 mg TID for 10 days.    This recommendation was reviewed with and agreed by ED provider Caridad Carbone.    Follow-Up:    An attempt was made to contact the patient and notified of the therapy change. A generic voicemail was left. The new prescription was electronically sent to [facility]. phone number: [phone].     Thank you,    Estella Pabon, PharmD 1/3/2024 5:13 PM  Clinical Pharmacy Specialist, Emergency Medicine  [phone]

## 2024-02-27 ENCOUNTER — HOSPITAL ENCOUNTER (EMERGENCY)
Age: 74
Discharge: HOME | End: 2024-02-27
Attending: STUDENT IN AN ORGANIZED HEALTH CARE EDUCATION/TRAINING PROGRAM
Payer: MEDICARE

## 2024-02-27 ENCOUNTER — APPOINTMENT (OUTPATIENT)
Dept: GENERAL RADIOLOGY | Age: 74
End: 2024-02-27
Attending: STUDENT IN AN ORGANIZED HEALTH CARE EDUCATION/TRAINING PROGRAM
Payer: MEDICARE

## 2024-02-27 VITALS
RESPIRATION RATE: 19 BRPM | HEIGHT: 68 IN | WEIGHT: 163 LBS | OXYGEN SATURATION: 98 % | BODY MASS INDEX: 24.71 KG/M2 | SYSTOLIC BLOOD PRESSURE: 147 MMHG | HEART RATE: 72 BPM | TEMPERATURE: 99 F | DIASTOLIC BLOOD PRESSURE: 75 MMHG

## 2024-02-27 DIAGNOSIS — R07.9 CHEST PAIN, UNSPECIFIED TYPE: Primary | ICD-10-CM

## 2024-02-27 DIAGNOSIS — N39.0 URINARY TRACT INFECTION WITHOUT HEMATURIA, SITE UNSPECIFIED: ICD-10-CM

## 2024-02-27 DIAGNOSIS — Z20.822 CLOSE EXPOSURE TO COVID-19 VIRUS: ICD-10-CM

## 2024-02-27 LAB
ALBUMIN SERPL-MCNC: 4.3 G/DL (ref 3.5–4.6)
ALP SERPL-CCNC: 94 U/L (ref 40–130)
ALT SERPL-CCNC: 17 U/L (ref 0–33)
ANION GAP SERPL CALCULATED.3IONS-SCNC: 13 MEQ/L (ref 9–15)
AST SERPL-CCNC: 21 U/L (ref 0–35)
BACTERIA URNS QL MICRO: ABNORMAL /HPF
BASOPHILS # BLD: 0 K/UL (ref 0–0.2)
BASOPHILS NFR BLD: 0.5 %
BILIRUB SERPL-MCNC: 0.5 MG/DL (ref 0.2–0.7)
BILIRUB UR QL STRIP: ABNORMAL
BUN SERPL-MCNC: 12 MG/DL (ref 8–23)
CALCIUM SERPL-MCNC: 9 MG/DL (ref 8.5–9.9)
CHLORIDE SERPL-SCNC: 99 MEQ/L (ref 95–107)
CLARITY UR: CLEAR
CO2 SERPL-SCNC: 24 MEQ/L (ref 20–31)
COLOR UR: ABNORMAL
CREAT SERPL-MCNC: 0.56 MG/DL (ref 0.5–0.9)
EOSINOPHIL # BLD: 0 K/UL (ref 0–0.7)
EOSINOPHIL NFR BLD: 0.2 %
EPI CELLS #/AREA URNS AUTO: ABNORMAL /HPF (ref 0–5)
ERYTHROCYTE [DISTWIDTH] IN BLOOD BY AUTOMATED COUNT: 15.3 % (ref 11.5–14.5)
GLOBULIN SER CALC-MCNC: 3.4 G/DL (ref 2.3–3.5)
GLUCOSE SERPL-MCNC: 143 MG/DL (ref 70–99)
GLUCOSE UR STRIP-MCNC: NEGATIVE MG/DL
HCT VFR BLD AUTO: 35.1 % (ref 37–47)
HGB BLD-MCNC: 11.4 G/DL (ref 12–16)
HGB UR QL STRIP: NEGATIVE
HYALINE CASTS #/AREA URNS AUTO: ABNORMAL /HPF (ref 0–5)
INFLUENZA A BY PCR: NEGATIVE
INFLUENZA B BY PCR: NEGATIVE
KETONES UR STRIP-MCNC: NEGATIVE MG/DL
LACTATE BLDV-SCNC: 1.5 MMOL/L (ref 0.5–2.2)
LEUKOCYTE ESTERASE UR QL STRIP: ABNORMAL
LYMPHOCYTES # BLD: 0.7 K/UL (ref 1–4.8)
LYMPHOCYTES NFR BLD: 18 %
MCH RBC QN AUTO: 32.4 PG (ref 27–31.3)
MCHC RBC AUTO-ENTMCNC: 32.5 % (ref 33–37)
MCV RBC AUTO: 99.7 FL (ref 79.4–94.8)
MONOCYTES # BLD: 0.3 K/UL (ref 0.2–0.8)
MONOCYTES NFR BLD: 7.5 %
NEUTROPHILS # BLD: 3 K/UL (ref 1.4–6.5)
NEUTS SEG NFR BLD: 73.8 %
NITRITE UR QL STRIP: POSITIVE
PH UR STRIP: 5.5 [PH] (ref 5–9)
PLATELET # BLD AUTO: 143 K/UL (ref 130–400)
POTASSIUM SERPL-SCNC: 4.3 MEQ/L (ref 3.4–4.9)
PROT SERPL-MCNC: 7.7 G/DL (ref 6.3–8)
PROT UR STRIP-MCNC: 30 MG/DL
RBC # BLD AUTO: 3.52 M/UL (ref 4.2–5.4)
RBC #/AREA URNS AUTO: ABNORMAL /HPF (ref 0–5)
SARS-COV-2 RDRP RESP QL NAA+PROBE: NOT DETECTED
SODIUM SERPL-SCNC: 136 MEQ/L (ref 135–144)
SP GR UR STRIP: 1.01 (ref 1–1.03)
TROPONIN, HIGH SENSITIVITY: 13 NG/L (ref 0–19)
TROPONIN, HIGH SENSITIVITY: 13 NG/L (ref 0–19)
URINE REFLEX TO CULTURE: YES
UROBILINOGEN UR STRIP-ACNC: 1 E.U./DL
WBC # BLD AUTO: 4.1 K/UL (ref 4.8–10.8)
WBC #/AREA URNS AUTO: ABNORMAL /HPF (ref 0–5)

## 2024-02-27 PROCEDURE — 71045 X-RAY EXAM CHEST 1 VIEW: CPT

## 2024-02-27 PROCEDURE — 87635 SARS-COV-2 COVID-19 AMP PRB: CPT

## 2024-02-27 PROCEDURE — 99285 EMERGENCY DEPT VISIT HI MDM: CPT

## 2024-02-27 PROCEDURE — 6370000000 HC RX 637 (ALT 250 FOR IP): Performed by: STUDENT IN AN ORGANIZED HEALTH CARE EDUCATION/TRAINING PROGRAM

## 2024-02-27 PROCEDURE — 85025 COMPLETE CBC W/AUTO DIFF WBC: CPT

## 2024-02-27 PROCEDURE — 87186 SC STD MICRODIL/AGAR DIL: CPT

## 2024-02-27 PROCEDURE — 87502 INFLUENZA DNA AMP PROBE: CPT

## 2024-02-27 PROCEDURE — 36415 COLL VENOUS BLD VENIPUNCTURE: CPT

## 2024-02-27 PROCEDURE — 87086 URINE CULTURE/COLONY COUNT: CPT

## 2024-02-27 PROCEDURE — 84484 ASSAY OF TROPONIN QUANT: CPT

## 2024-02-27 PROCEDURE — 87077 CULTURE AEROBIC IDENTIFY: CPT

## 2024-02-27 PROCEDURE — 83605 ASSAY OF LACTIC ACID: CPT

## 2024-02-27 PROCEDURE — 80053 COMPREHEN METABOLIC PANEL: CPT

## 2024-02-27 PROCEDURE — 81001 URINALYSIS AUTO W/SCOPE: CPT

## 2024-02-27 PROCEDURE — 93005 ELECTROCARDIOGRAM TRACING: CPT | Performed by: STUDENT IN AN ORGANIZED HEALTH CARE EDUCATION/TRAINING PROGRAM

## 2024-02-27 RX ORDER — CEPHALEXIN 500 MG/1
500 CAPSULE ORAL ONCE
Status: COMPLETED | OUTPATIENT
Start: 2024-02-27 | End: 2024-02-27

## 2024-02-27 RX ORDER — CEPHALEXIN 500 MG/1
500 CAPSULE ORAL 2 TIMES DAILY
Qty: 14 CAPSULE | Refills: 0 | Status: SHIPPED | OUTPATIENT
Start: 2024-02-27 | End: 2024-03-05

## 2024-02-27 RX ORDER — FLUCONAZOLE 150 MG/1
150 TABLET ORAL ONCE
Qty: 1 TABLET | Refills: 0 | Status: SHIPPED | OUTPATIENT
Start: 2024-02-27 | End: 2024-02-27

## 2024-02-27 RX ADMIN — CEPHALEXIN 500 MG: 500 CAPSULE ORAL at 19:12

## 2024-02-27 ASSESSMENT — PAIN DESCRIPTION - LOCATION: LOCATION: CHEST

## 2024-02-27 ASSESSMENT — PAIN SCALES - GENERAL: PAINLEVEL_OUTOF10: 8

## 2024-02-27 ASSESSMENT — PAIN - FUNCTIONAL ASSESSMENT: PAIN_FUNCTIONAL_ASSESSMENT: 0-10

## 2024-02-27 ASSESSMENT — LIFESTYLE VARIABLES
HOW OFTEN DO YOU HAVE A DRINK CONTAINING ALCOHOL: NEVER
HOW MANY STANDARD DRINKS CONTAINING ALCOHOL DO YOU HAVE ON A TYPICAL DAY: PATIENT DOES NOT DRINK

## 2024-02-27 NOTE — ED PROVIDER NOTES
MD Juan       REVIEW OF SYSTEMS    (2-9 systems for level 4, 10 ormore for level 5)      Review of Systems   All other systems reviewed and are negative.      PHYSICAL EXAM   (up to 7 for level 4, 8 or more for level 5)      INITIAL VITALS:   BP (!) 151/80   Pulse 71   Temp 99 °F (37.2 °C) (Oral)   Resp 17   Ht 1.727 m (5' 8\")   Wt 73.9 kg (163 lb)   SpO2 98%   BMI 24.78 kg/m²     Physical Exam  Constitutional:       General: She is not in acute distress.     Appearance: She is well-developed. She is not ill-appearing, toxic-appearing or diaphoretic.   HENT:      Head: Normocephalic and atraumatic.      Right Ear: External ear normal.      Left Ear: External ear normal.   Eyes:      General:         Right eye: No discharge.         Left eye: No discharge.      Conjunctiva/sclera: Conjunctivae normal.   Cardiovascular:      Rate and Rhythm: Normal rate and regular rhythm.      Pulses: Normal pulses.      Heart sounds: Normal heart sounds. No murmur heard.     No friction rub. No gallop.   Pulmonary:      Effort: Pulmonary effort is normal.      Breath sounds: Normal breath sounds. No wheezing or rales.   Abdominal:      Palpations: Abdomen is soft.      Tenderness: There is no abdominal tenderness. There is no guarding or rebound.   Musculoskeletal:         General: Normal range of motion.      Cervical back: Normal range of motion and neck supple.      Right lower leg: No edema.      Left lower leg: No edema.   Skin:     General: Skin is warm and dry.   Neurological:      General: No focal deficit present.      Mental Status: She is alert and oriented to person, place, and time.         DIFFERENTIAL  DIAGNOSIS     PLAN (LABS / IMAGING / EKG):  Orders Placed This Encounter   Procedures    COVID-19, Rapid    Rapid Influenza A/B Antigens    Culture, Urine    XR CHEST PORTABLE    CBC with Auto Differential    CMP    Troponin    Urinalysis with Reflex to Culture    Lactic Acid    Microscopic Urinalysis

## 2024-02-27 NOTE — ED TRIAGE NOTES
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

## 2024-02-27 NOTE — DISCHARGE INSTRUCTIONS
Take your medication as indicated and prescribed.  If you are given an antibiotic then, make sure you get the prescription filled and take the antibiotics until finished.  Drink plenty of water while taking the antibiotics.  Avoid drinking alcohol or drinks that have caffeine in it while taking antibiotics.   If you were given the medication pyridium (or take over the counter Azo) - do not wear any contacts for the next week since this medication will turn your tears an orange color and will stain the contacts.      For pain use acetaminophen (Tylenol) or ibuprofen (Motrin / Advil), unless prescribed medications that have acetaminophen or ibuprofen (or similar medications) in it.  You can take over the counter acetaminophen tablets (1 - 2 tablets of the 500-mg strength every 6 hours) or ibuprofen tablets (2 tablets every 4 hours).    PLEASE RETURN TO THE EMERGENCY DEPARTMENT IMMEDIATELY for worsening symptoms, inability to urinate, worsening of blood in your urine, or if you develop any concerning symptoms such as: high fever not relieved by acetaminophen (Tylenol) and/or ibuprofen (Motrin / Advil), chills, shortness of breath, chest pain, feeling of your heart fluttering or racing, persistent nausea and/or vomiting, vomiting up blood, blood in your stool, loss of consciousness, numbness, weakness or tingling in the arms or legs or change in color of the extremities, changes in mental status, persistent headache, blurry vision, loss of bladder / bowel.    Take your medication as indicated.  For pain use ibuprofen (Motrin / Advil) or acetaminophen (Tylenol), unless prescribed medications that have acetaminophen in it.  You can take over the counter acetaminophen tablets (1 - 2 tablets of the 500-mg strength every 6 hours) or ibuprofen tablets (2 tablets every 4 hours).    If you have not had a stress test in over a year your primary care physician may order this test as further work-up for your chest pain.  If you

## 2024-02-28 LAB
EKG ATRIAL RATE: 66 BPM
EKG P AXIS: 62 DEGREES
EKG P-R INTERVAL: 168 MS
EKG Q-T INTERVAL: 458 MS
EKG QRS DURATION: 102 MS
EKG QTC CALCULATION (BAZETT): 480 MS
EKG R AXIS: -12 DEGREES
EKG T AXIS: 13 DEGREES
EKG VENTRICULAR RATE: 66 BPM

## 2024-03-01 LAB
BACTERIA UR CULT: ABNORMAL
BACTERIA UR CULT: ABNORMAL
ORGANISM: ABNORMAL

## 2024-03-01 RX ORDER — GRANULES FOR ORAL 3 G/1
3 POWDER ORAL ONCE
Qty: 1 EACH | Refills: 0 | Status: SHIPPED | OUTPATIENT
Start: 2024-03-01 | End: 2024-03-01

## 2024-03-01 NOTE — ED NOTES
[facility]   Emergency Department Culture Follow-Up       Ashley Summers (CSN: [id number]) was seen and evaluated at [facility] Emergency Department on 02/27/2024 by provider Dr. Shields.    CULTURE RESULT TYPE: A urine culture was positive and is growing E. faecium. Sensitivities below     Enterococcus faecium     BACTERIAL SUSCEPTIBILITY PANEL BY ESTEVAN     ampicillin >=32 mcg/mL Resistant     ciprofloxacin >=8 mcg/mL Resistant     levofloxacin >=8 mcg/mL Resistant     nitrofurantoin 32 mcg/mL Sensitive     tetracycline >=16 mcg/mL Resistant     vancomycin <=0.5 mcg/mL Sensitive      Treatment Course:    The patient was treated and discharged with Keflex.    Recommendation:    It is recommended to discontinue Keflex and start fosfomycin 3 g p.o. as a single dose.    This recommendation was reviewed with and agreed by ED provider Ni ARROYO.    Follow-Up:    The patient was contacted and notified of the therapy change. The new prescription was electronically sent to [facility] phone number: [phone].  Call placed to [facility] pharmacy to make sure they have this medication in stock. Discussion with [facility] pharmacy staff to obtain medication coverage on patient behalf.    Thank you,    Francia Pedroza, PharmD, [facility].  PGY1 Pharmacy Resident  3/1/2024 3:50 PM

## 2024-03-06 ENCOUNTER — TELEPHONE (OUTPATIENT)
Dept: CARDIOLOGY CLINIC | Age: 74
End: 2024-03-06

## 2024-03-06 NOTE — TELEPHONE ENCOUNTER
Appointment canceled for Ashley Summers ([id number])   Visit Type: OFFICE VISIT   Date        Time      Length    Provider                  Department   4/19/2024    2:00 PM  15 mins.  Dr[facility]      Reason for Cancellation: Other      Patient Comments: Will be out of state with my family for approx 3 months. Will contact to reschedule.     Appointment Scheduled  (Newest Message First)  View All Conversations on this Encounter  [facility] Front [facility]38 minutes ago (3:24 PM)     ME  Appointment canceled for Ashley Summers ([id number])  Visit Type: OFFICE VISIT  Date        Time      Length    Provider                  Department  4/19/2024    2:00 PM  15 mins.  Dr. Rohit Ross MD     [facility] CARDIOLOGY     Reason for Cancellation: Other     Patient Comments: Will be out of state with my family for approx 3 months. Will contact to reschedule.       Dorothy, Batch Job User  Ashley Summers4 months ago     Mychart, Processor  Ashley Summers4 months ago     PM  Appointment Information:      Visit Type: Office Visit          Date: 4/19/2024                  Dept: [facility]                  Provider: Rohit Ross                  Time: 2:00 PM                  Length: 15 min     Appt Status: Scheduled        Appt Instructions:      Please complete digital registration via the IndexTank       This IndexTank message has not been read.

## 2024-03-06 NOTE — TELEPHONE ENCOUNTER
Reason for Cancellation: Other     Patient Comments: Will be out of state with my family for approx 3 months. Will contact to reschedule.

## 2024-08-09 ENCOUNTER — HOSPITAL ENCOUNTER (OUTPATIENT)
Dept: CARDIAC REHAB | Age: 74
Setting detail: THERAPIES SERIES
Discharge: HOME | End: 2024-08-09
Payer: MEDICARE

## 2024-08-09 VITALS — RESPIRATION RATE: 18 BRPM | BODY MASS INDEX: 24.71 KG/M2 | OXYGEN SATURATION: 98 % | HEIGHT: 68 IN | WEIGHT: 163 LBS

## 2024-08-09 PROCEDURE — 93798 PHYS/QHP OP CAR RHAB W/ECG: CPT

## 2024-08-09 ASSESSMENT — PATIENT HEALTH QUESTIONNAIRE - PHQ9
9. THOUGHTS THAT YOU WOULD BE BETTER OFF DEAD, OR OF HURTING YOURSELF: NOT AT ALL
SUM OF ALL RESPONSES TO PHQ QUESTIONS 1-9: 3
3. TROUBLE FALLING OR STAYING ASLEEP: SEVERAL DAYS
7. TROUBLE CONCENTRATING ON THINGS, SUCH AS READING THE NEWSPAPER OR WATCHING TELEVISION: SEVERAL DAYS
SUM OF ALL RESPONSES TO PHQ9 QUESTIONS 1 & 2: 0
5. POOR APPETITE OR OVEREATING: NOT AT ALL
1. LITTLE INTEREST OR PLEASURE IN DOING THINGS: NOT AT ALL
SUM OF ALL RESPONSES TO PHQ QUESTIONS 1-9: 3
4. FEELING TIRED OR HAVING LITTLE ENERGY: SEVERAL DAYS
2. FEELING DOWN, DEPRESSED OR HOPELESS: NOT AT ALL
10. IF YOU CHECKED OFF ANY PROBLEMS, HOW DIFFICULT HAVE THESE PROBLEMS MADE IT FOR YOU TO DO YOUR WORK, TAKE CARE OF THINGS AT HOME, OR GET ALONG WITH OTHER PEOPLE: NOT DIFFICULT AT ALL
6. FEELING BAD ABOUT YOURSELF - OR THAT YOU ARE A FAILURE OR HAVE LET YOURSELF OR YOUR FAMILY DOWN: NOT AT ALL
SUM OF ALL RESPONSES TO PHQ QUESTIONS 1-9: 3
8. MOVING OR SPEAKING SO SLOWLY THAT OTHER PEOPLE COULD HAVE NOTICED. OR THE OPPOSITE, BEING SO FIGETY OR RESTLESS THAT YOU HAVE BEEN MOVING AROUND A LOT MORE THAN USUAL: NOT AT ALL
SUM OF ALL RESPONSES TO PHQ QUESTIONS 1-9: 3

## 2024-08-09 ASSESSMENT — EXERCISE STRESS TEST
PEAK_HR: 72
PEAK_RPE: 1
PEAK_BP: 122/60
PEAK_BP: 122/60
PEAK_RPD: 12

## 2024-08-09 ASSESSMENT — EJECTION FRACTION: EF_VALUE: 35

## 2024-08-09 ASSESSMENT — NEW YORK HEART ASSOCIATION (NYHA) CLASSIFICATION: NYHA FUNCTIONAL CLASS: NO NYHA CLASS OR UNABLE TO DETERMINE

## 2024-08-09 NOTE — CARDIO/PULMONARY
Low: Positive risk factor for coronary heart disease   Non HDL Cholesterol  <130 mg/dL 74   Comment: <130 mg/dL, Optimal   130-159 mg/dL, Near optimal/above optimal   160-189 mg/dL, Borderline high   190-219 mg/dL, High  >219 mg/dL, Very high  Secondary prevention optimal non HDL Cholesterol levels are recommended to be <100 mg/dL   Fasting Time  hrs un   VLDL Cholesterol  <30 mg/dL 25   TC:HDL Ratio  <5.10 2.85   LDL Cholesterol  <100 mg/dL 49   Comment: <100 mg/dL, Optimal   100-129 mg/dL, Near optimal/above optimal   130-159 mg/dL, Borderline high   160-189 mg/dL, High  >189 mg/dL, Very high  Secondary prevention optimal LDL Cholesterol levels are recommended to be < 70 mg/dL   LDL:HDL Ratio  <2.54 1.23   Comment: Reference:  1. National Cholesterol Education Program ATP III Guideline At-A-Glance Quick Desk Reference: National Heart, Lung, and Blood [place]. National Institutes of Health. 2001: NIH Publication No. .  2. An International Atherosclerosis Society position paper: global recommendations for the management of dyslipidemia: executive summary, Atherosclerosis. 2014: 232(2):410-413.   Resulting Agency [facility]     Specimen Collected: 05/29/24 14:34    Performed by: [facility] Last Resulted: 05/29/24 15:01   Received From: [facility]  Result Received: 07/25/24 13:22      HEMOGLOBIN A1C  Order: [id number]  Component  Ref Range & Units 5/29/24 1434   Hemoglobin A1C  4.3 - 5.6 % 6.2 High    Comment: American Diabetes Association guidelines indicate that patients with HgbA1c in the range 5.7-6.4% are at increased risk for development of diabetes, and intervention by lifestyle modification may be beneficial. HgbA1c greater or equal to 6.5% is considered diagnostic of diabetes.   Estimated Average Glucose  mg/dL 131   Comment: eAG: (Estimated average glucose) is a calculated value from HgbA1c and is representative of the average blood glucose level in the last 2-3 month period.

## 2024-08-14 ENCOUNTER — HOSPITAL ENCOUNTER (OUTPATIENT)
Dept: CARDIAC REHAB | Age: 74
Setting detail: THERAPIES SERIES
Discharge: HOME | End: 2024-08-14
Payer: MEDICARE

## 2024-08-14 ENCOUNTER — TELEPHONE (OUTPATIENT)
Dept: CARDIOLOGY CLINIC | Age: 74
End: 2024-08-14

## 2024-08-14 PROCEDURE — 93798 PHYS/QHP OP CAR RHAB W/ECG: CPT

## 2024-08-14 NOTE — CARDIO/PULMONARY
Patient arrives to cardiac rehab for session. Covid screening complete. Patient denies any complaints. Patient denies changes to PMH and medication. Patient tolerates exercise without complaint.     Patient progressed well. She was able to walk 4 laps around gym, and increase time on nustep.    All equipment used in the care for this patient has been cleaned.  Electronically signed by Cha Ward RN on 8/14/2024 at 3:28 PM

## 2024-08-14 NOTE — TELEPHONE ENCOUNTER
Appointment Request From: Ashley Summers      With Provider: Dr. Rohit Ross MD [[facility] Cardiology]      Preferred Date Range: 8/19/2024 - 10/4/2024      Preferred Times: Monday Afternoon, Wednesday Afternoon, Friday Afternoon      Reason for visit: Request an Appointment      Comments:   Overdue checkup I’m in Cardiac Therapy M-W-F. 8/29 & 9/13 I’m nit able     Appointment Request  (Newest Message First)  Ashley Summers  P [facility] Cardiology Front Desk12 hours ago (10:59 PM)     ME  Appointment Request From: Ashley Summers     With Provider: Dr. Rohit Ross MD [[facility]]     Preferred Date Range: 8/19/2024 - 10/4/2024     Preferred Times: Monday Afternoon, Wednesday Afternoon, Friday Afternoon     Reason for visit: Request an Appointment     Comments:  Overdue checkup I’m in Cardiac Therapy M-W-F. 8/29 & 9/13 I’m nit able

## 2024-08-16 ENCOUNTER — HOSPITAL ENCOUNTER (OUTPATIENT)
Dept: CARDIAC REHAB | Age: 74
Setting detail: THERAPIES SERIES
Discharge: HOME | End: 2024-08-16
Payer: MEDICARE

## 2024-08-16 PROCEDURE — 93798 PHYS/QHP OP CAR RHAB W/ECG: CPT

## 2024-08-16 NOTE — CARDIO/PULMONARY
Patient arrives to cardiac rehab for session. Covid screening complete. Patient denies any complaints. Patient denies changes to PMH and medication. Patient tolerates exercise without complaint.     All equipment used in the care for this patient has been cleaned.  Electronically signed by Cha Ward RN on 8/16/2024 at 2:44 PM

## 2024-08-19 ENCOUNTER — APPOINTMENT (OUTPATIENT)
Dept: CARDIAC REHAB | Age: 74
End: 2024-08-19
Payer: MEDICARE

## 2024-08-21 ENCOUNTER — HOSPITAL ENCOUNTER (OUTPATIENT)
Dept: CARDIAC REHAB | Age: 74
Setting detail: THERAPIES SERIES
Discharge: HOME | End: 2024-08-21
Payer: MEDICARE

## 2024-08-23 ENCOUNTER — HOSPITAL ENCOUNTER (OUTPATIENT)
Dept: CARDIAC REHAB | Age: 74
Setting detail: THERAPIES SERIES
Discharge: HOME | End: 2024-08-23
Payer: MEDICARE

## 2024-08-23 PROCEDURE — G0422 INTENS CARDIAC REHAB W/EXERC: HCPCS

## 2024-08-23 NOTE — CARDIO/PULMONARY
Patient arrives to cardiac rehab for session. Covid screening complete. Patient denies any complaints. Patient denies changes to PMH and medication. Patient tolerates exercise without complaint.     Patient declines Pritikin education.    All equipment used in the care for this patient has been cleaned.  Electronically signed by Cha Ward RN on 8/23/2024 at 2:45 PM

## 2024-08-26 ENCOUNTER — APPOINTMENT (OUTPATIENT)
Dept: CARDIAC REHAB | Age: 74
End: 2024-08-26
Payer: MEDICARE

## 2024-08-28 ENCOUNTER — HOSPITAL ENCOUNTER (OUTPATIENT)
Dept: CARDIAC REHAB | Age: 74
Setting detail: THERAPIES SERIES
Discharge: HOME | End: 2024-08-28
Payer: MEDICARE

## 2024-08-28 PROCEDURE — G0422 INTENS CARDIAC REHAB W/EXERC: HCPCS

## 2024-08-28 NOTE — CARDIO/PULMONARY
Patient arrives to cardiac rehab for session. Covid screening complete. Patient denies any complaints. Patient denies changes to PMH and medication. Patient tolerates exercise without complaint.     Patient introduced to arm ergometer, tolerated well.     Weekly Education; Discussed angina (the discomfort or pain you feel when your heart does not get enough oxygen), what to do for angina (nitro, go to ER/call 911), Risk factors we control (smoking, HTN, blood sugars for diabetics, weight loss, hyperlipidemia, stress, physical activity), tests (stress test, EKG, ECHO, cardiac cath), medications (beta blockers, calcium channel blockers, nitro), treatments (angioplasty/stent, CABG, EECP). Booklet given to patient.     Patient declines Pritikin education.    All equipment used in the care for this patient has been cleaned.  Electronically signed by Cha Ward RN on 8/28/2024 at 2:10 PM

## 2024-08-29 ASSESSMENT — EJECTION FRACTION: EF_VALUE: 35

## 2024-08-29 ASSESSMENT — EXERCISE STRESS TEST
PEAK_BP: 130/64
PEAK_RPD: 12

## 2024-08-29 NOTE — CARDIO/PULMONARY
lamoTRIgine (LAMICTAL) 200 MG tablet Take 1 tablet by mouth daily      pantoprazole (PROTONIX) 20 MG tablet Take 1 tablet by mouth      rosuvastatin (CRESTOR) 10 MG tablet Take 1 tablet by mouth nightly      lactobacillus acidophilus (FLORANEX) Take 2 tablets by mouth in the morning and 2 tablets at noon and 2 tablets before bedtime. 120 tablet 5    alendronate (FOSAMAX) 70 MG tablet Take 1 tablet by mouth once a week      clonazePAM (KLONOPIN) 2 MG tablet Take 1 tablet by mouth 2 times daily as needed.       No current facility-administered medications for this encounter.           Labs:   LIPID PANEL BASIC  Order: [id number]  Component  Ref Range & Units 5/29/24 1434   Cholesterol, Total  <200 mg/dL 114   Comment: <200 mg/dL, Desirable   200-239 mg/dL, Borderline high  >239 mg/dL, High   Triglyceride  <150 mg/dL 124   Comment: <150 mg/dL, Normal   150-199 mg/dL, Borderline high   200-499 mg/dL, High  >499 mg/dL, Very high   HDL Cholesterol  >39 mg/dL 40   Comment:  40-59 mg/dL, Acceptable  >59 mg/dL, High: Negative risk factor for coronary heart disease  <40 mg/dL, Low: Positive risk factor for coronary heart disease   Non HDL Cholesterol  <130 mg/dL 74   Comment: <130 mg/dL, Optimal   130-159 mg/dL, Near optimal/above optimal   160-189 mg/dL, Borderline high   190-219 mg/dL, High  >219 mg/dL, Very high  Secondary prevention optimal non HDL Cholesterol levels are recommended to be <100 mg/dL   Fasting Time  hrs un   VLDL Cholesterol  <30 mg/dL 25   TC:HDL Ratio  <5.10 2.85   LDL Cholesterol  <100 mg/dL 49   Comment: <100 mg/dL, Optimal   100-129 mg/dL, Near optimal/above optimal   130-159 mg/dL, Borderline high   160-189 mg/dL, High  >189 mg/dL, Very high  Secondary prevention optimal LDL Cholesterol levels are recommended to be < 70 mg/dL   LDL:HDL Ratio  <2.54 1.23   Comment: Reference:  1. National Cholesterol Education Program ATP III Guideline At-A-Glance Quick Desk Reference: National Heart, Lung, and Blood    Ejection Fraction & Date: 55 +/- 5% on 12/19/2024 via ECHO     [facility] Activity Status Index: 3.08         [facility]: 26         PHQ9: 3         Falls Risk: high         Cardiac Knowledge Pre- Test: 10         Rate Your Plate: 71  Ji Philip  8/29/2024

## 2024-08-30 ENCOUNTER — HOSPITAL ENCOUNTER (OUTPATIENT)
Dept: CARDIAC REHAB | Age: 74
Setting detail: THERAPIES SERIES
End: 2024-08-30
Payer: MEDICARE

## 2024-09-04 ENCOUNTER — HOSPITAL ENCOUNTER (OUTPATIENT)
Dept: CARDIAC REHAB | Age: 74
Setting detail: THERAPIES SERIES
End: 2024-09-04
Payer: MEDICARE

## 2024-09-06 ENCOUNTER — HOSPITAL ENCOUNTER (OUTPATIENT)
Dept: CARDIAC REHAB | Age: 74
Setting detail: THERAPIES SERIES
Discharge: HOME | End: 2024-09-06
Payer: MEDICARE

## 2024-09-06 PROCEDURE — G0422 INTENS CARDIAC REHAB W/EXERC: HCPCS

## 2024-09-06 PROCEDURE — G0423 INTENS CARDIAC REHAB NO EXER: HCPCS

## 2024-09-06 NOTE — CARDIO/PULMONARY
Patient arrives to cardiac rehab for session. Covid screening complete. Patient denies any complaints. Patient denies changes to PMH and medication. Patient tolerates exercise without complaint.     Weekly Education; Discussed effects of smoking on cardiovascular health (harmful to the heart and blood vessels, doubles risk of stroke), second hand smoke, PAD, risk factors for PAD. Discussed AHA recommendation for alcohol (moderation is key), examples of \"one drink\" serving sizes and how alcohol affects our health. Handout offered.     Patient had :1 with THOMPSON Solano.     Patient declined [employer] education.    All equipment used in the care for this patient has been cleaned.  Electronically signed by Cha Ward RN on 9/6/2024 at 1:26 PM      
scheduled to participate in Cardiac Rehab group nutrition classes.          PATIENT GOALS:    Weight Goals:  Short Term Weight Goal: 150 lbs  Long Term Weight Goal: 140 lbs    Nutrition Goals:   Continue to enjoy heart healthy meals and nutritious snacks  Daily Recommendations:   Continue to receive home delivered meals fron the [facility]    Saturated Fat: no more than 14 g/day  Sodium: no more than 1800 mg/day  Fruit: 4 cups / day  Vegetables: 5 cups/day    Other:  read and compare food labels      Patient readiness for change: action - ready to set action plan and implement       Questions addressed. Follow-up plans discussed. Ashley Summers verbalized understanding.      Electronically signed by Xiomara Ventura RD on 9/6/2024 at 6:57 PM

## 2024-09-09 ENCOUNTER — APPOINTMENT (OUTPATIENT)
Dept: CARDIAC REHAB | Age: 74
End: 2024-09-09
Payer: MEDICARE

## 2024-09-10 ENCOUNTER — APPOINTMENT (OUTPATIENT)
Dept: CT IMAGING | Age: 74
End: 2024-09-10
Payer: MEDICARE

## 2024-09-10 ENCOUNTER — HOSPITAL ENCOUNTER (INPATIENT)
Age: 74
LOS: 3 days | Discharge: HOME HEALTH | End: 2024-09-13
Attending: INTERNAL MEDICINE | Admitting: INTERNAL MEDICINE
Payer: MEDICARE

## 2024-09-10 ENCOUNTER — APPOINTMENT (OUTPATIENT)
Dept: GENERAL RADIOLOGY | Age: 74
End: 2024-09-10
Payer: MEDICARE

## 2024-09-10 DIAGNOSIS — R53.1 GENERAL WEAKNESS: ICD-10-CM

## 2024-09-10 DIAGNOSIS — A41.51 SEPSIS DUE TO ESCHERICHIA COLI, UNSPECIFIED WHETHER ACUTE ORGAN DYSFUNCTION PRESENT (HCC): ICD-10-CM
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
## 2024-09-10 DIAGNOSIS — A41.9 SEPTICEMIA (HCC): Primary | ICD-10-CM

## 2024-09-10 DIAGNOSIS — N30.01 ACUTE CYSTITIS WITH HEMATURIA: ICD-10-CM
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
## 2024-09-10 LAB
ALBUMIN SERPL-MCNC: 2.8 G/DL (ref 3.5–4.6)
ALP SERPL-CCNC: 195 U/L (ref 40–130)
ALT SERPL-CCNC: 12 U/L (ref 0–33)
ANION GAP SERPL CALCULATED.3IONS-SCNC: 14 MEQ/L (ref 9–15)
ANISOCYTOSIS BLD QL SMEAR: ABNORMAL
AST SERPL-CCNC: 20 U/L (ref 0–35)
B PARAP IS1001 DNA NPH QL NAA+NON-PROBE: NOT DETECTED
B PERT.PT PRMT NPH QL NAA+NON-PROBE: NOT DETECTED
BACTERIA URNS QL MICRO: ABNORMAL /HPF
BASOPHILS # BLD: 0 K/UL (ref 0–0.2)
BASOPHILS NFR BLD: 0.3 %
BILIRUB SERPL-MCNC: 0.5 MG/DL (ref 0.2–0.7)
BILIRUB UR QL STRIP: NEGATIVE
BNP BLD-MCNC: NORMAL PG/ML
BUN SERPL-MCNC: 28 MG/DL (ref 8–23)
BURR CELLS: ABNORMAL
C PNEUM DNA NPH QL NAA+NON-PROBE: NOT DETECTED
CALCIUM SERPL-MCNC: 7.7 MG/DL (ref 8.5–9.9)
CHLORIDE SERPL-SCNC: 100 MEQ/L (ref 95–107)
CLARITY UR: ABNORMAL
CO2 SERPL-SCNC: 18 MEQ/L (ref 20–31)
COLOR UR: YELLOW
CREAT SERPL-MCNC: 1.23 MG/DL (ref 0.5–0.9)
D DIMER PPP FEU-MCNC: 3.81 MG/L FEU (ref 0–0.5)
DOHLE BOD BLD QL SMEAR: ABNORMAL
EOSINOPHIL # BLD: 0 K/UL (ref 0–0.7)
EOSINOPHIL NFR BLD: 0.1 %
EPI CELLS #/AREA URNS AUTO: ABNORMAL /HPF (ref 0–5)
ERYTHROCYTE [DISTWIDTH] IN BLOOD BY AUTOMATED COUNT: 14.3 % (ref 11.5–14.5)
FLUAV RNA NPH QL NAA+NON-PROBE: NOT DETECTED
FLUBV RNA NPH QL NAA+NON-PROBE: NOT DETECTED
GLOBULIN SER CALC-MCNC: 2.7 G/DL (ref 2.3–3.5)
GLUCOSE SERPL-MCNC: 162 MG/DL (ref 70–99)
GLUCOSE UR STRIP-MCNC: NEGATIVE MG/DL
HADV DNA NPH QL NAA+NON-PROBE: NOT DETECTED
HCOV 229E RNA NPH QL NAA+NON-PROBE: NOT DETECTED
HCOV HKU1 RNA NPH QL NAA+NON-PROBE: NOT DETECTED
HCOV NL63 RNA NPH QL NAA+NON-PROBE: NOT DETECTED
HCOV OC43 RNA NPH QL NAA+NON-PROBE: NOT DETECTED
HCT VFR BLD AUTO: 29.2 % (ref 37–47)
HGB BLD-MCNC: 9.5 G/DL (ref 12–16)
HGB UR QL STRIP: ABNORMAL
HMPV RNA NPH QL NAA+NON-PROBE: NOT DETECTED
HPIV1 RNA NPH QL NAA+NON-PROBE: NOT DETECTED
HPIV2 RNA NPH QL NAA+NON-PROBE: NOT DETECTED
HPIV3 RNA NPH QL NAA+NON-PROBE: NOT DETECTED
HPIV4 RNA NPH QL NAA+NON-PROBE: NOT DETECTED
HYALINE CASTS #/AREA URNS AUTO: ABNORMAL /HPF (ref 0–5)
KETONES UR STRIP-MCNC: NEGATIVE MG/DL
LACTIC ACID, SEPSIS: 1.4 MMOL/L (ref 0.5–1.9)
LACTIC ACID, SEPSIS: 3 MMOL/L (ref 0.5–1.9)
LEUKOCYTE ESTERASE UR QL STRIP: ABNORMAL
LYMPHOCYTES # BLD: 0.3 K/UL (ref 1–4.8)
LYMPHOCYTES NFR BLD: 4 %
M PNEUMO DNA NPH QL NAA+NON-PROBE: NOT DETECTED
MACROCYTES BLD QL SMEAR: ABNORMAL
MAGNESIUM SERPL-MCNC: 1.4 MG/DL (ref 1.7–2.4)
MCH RBC QN AUTO: 32.6 PG (ref 27–31.3)
MCHC RBC AUTO-ENTMCNC: 32.5 % (ref 33–37)
MCV RBC AUTO: 100.3 FL (ref 79.4–94.8)
MONOCYTES # BLD: 0.1 K/UL (ref 0.2–0.8)
MONOCYTES NFR BLD: 1.9 %
MYELOCYTES NFR BLD MANUAL: 2 %
NEUTROPHILS # BLD: 6.4 K/UL (ref 1.4–6.5)
NEUTS BAND NFR BLD MANUAL: 3 % (ref 5–11)
NEUTS SEG NFR BLD: 89 %
NEUTS VAC BLD QL SMEAR: ABNORMAL
NITRITE UR QL STRIP: NEGATIVE
OVALOCYTES BLD QL SMEAR: ABNORMAL
PATH INTERP BLD-IMP: YES
PH UR STRIP: 5.5 [PH] (ref 5–9)
PLATELET # BLD AUTO: 58 K/UL (ref 130–400)
PLATELET BLD QL SMEAR: ABNORMAL
POIKILOCYTOSIS BLD QL SMEAR: ABNORMAL
POLYCHROMASIA BLD QL SMEAR: ABNORMAL
POTASSIUM SERPL-SCNC: 3.8 MEQ/L (ref 3.4–4.9)
PROCALCITONIN SERPL IA-MCNC: 46.08 NG/ML (ref 0–0.15)
PROT SERPL-MCNC: 5.5 G/DL (ref 6.3–8)
PROT UR STRIP-MCNC: 30 MG/DL
RBC # BLD AUTO: 2.91 M/UL (ref 4.2–5.4)
RBC #/AREA URNS AUTO: ABNORMAL /HPF (ref 0–5)
RSV RNA NPH QL NAA+NON-PROBE: NOT DETECTED
RV+EV RNA NPH QL NAA+NON-PROBE: NOT DETECTED
SARS-COV-2 RDRP RESP QL NAA+PROBE: NOT DETECTED
SARS-COV-2 RNA NPH QL NAA+NON-PROBE: NOT DETECTED
SLIDE REVIEW: ABNORMAL
SMUDGE CELLS BLD QL SMEAR: 5.8
SODIUM SERPL-SCNC: 132 MEQ/L (ref 135–144)
SP GR UR STRIP: 1.02 (ref 1–1.03)
TOXIC GRANULATION: ABNORMAL
TROPONIN, HIGH SENSITIVITY: 101 NG/L (ref 0–19)
TROPONIN, HIGH SENSITIVITY: 82 NG/L (ref 0–19)
TROPONIN, HIGH SENSITIVITY: 99 NG/L (ref 0–19)
URINE REFLEX TO CULTURE: YES
UROBILINOGEN UR STRIP-ACNC: 0.2 E.U./DL
VARIANT LYMPHS NFR BLD: 1 %
WBC # BLD AUTO: 6.8 K/UL (ref 4.8–10.8)
WBC #/AREA URNS AUTO: >100 /HPF (ref 0–5)

## 2024-09-10 PROCEDURE — 87040 BLOOD CULTURE FOR BACTERIA: CPT

## 2024-09-10 PROCEDURE — 6360000004 HC RX CONTRAST MEDICATION: Performed by: PHYSICIAN ASSISTANT

## 2024-09-10 PROCEDURE — 0202U NFCT DS 22 TRGT SARS-COV-2: CPT

## 2024-09-10 PROCEDURE — 84145 PROCALCITONIN (PCT): CPT

## 2024-09-10 PROCEDURE — 87186 SC STD MICRODIL/AGAR DIL: CPT

## 2024-09-10 PROCEDURE — 2580000003 HC RX 258: Performed by: PHYSICIAN ASSISTANT

## 2024-09-10 PROCEDURE — 84484 ASSAY OF TROPONIN QUANT: CPT

## 2024-09-10 PROCEDURE — 83605 ASSAY OF LACTIC ACID: CPT

## 2024-09-10 PROCEDURE — 85379 FIBRIN DEGRADATION QUANT: CPT

## 2024-09-10 PROCEDURE — 72131 CT LUMBAR SPINE W/O DYE: CPT

## 2024-09-10 PROCEDURE — 6370000000 HC RX 637 (ALT 250 FOR IP): Performed by: PHYSICIAN ASSISTANT

## 2024-09-10 PROCEDURE — 2500000003 HC RX 250 WO HCPCS: Performed by: PHYSICIAN ASSISTANT

## 2024-09-10 PROCEDURE — 71045 X-RAY EXAM CHEST 1 VIEW: CPT

## 2024-09-10 PROCEDURE — 87635 SARS-COV-2 COVID-19 AMP PRB: CPT

## 2024-09-10 PROCEDURE — 2060000000 HC ICU INTERMEDIATE R&B

## 2024-09-10 PROCEDURE — 80053 COMPREHEN METABOLIC PANEL: CPT

## 2024-09-10 PROCEDURE — 85025 COMPLETE CBC W/AUTO DIFF WBC: CPT

## 2024-09-10 PROCEDURE — 83880 ASSAY OF NATRIURETIC PEPTIDE: CPT

## 2024-09-10 PROCEDURE — 36415 COLL VENOUS BLD VENIPUNCTURE: CPT

## 2024-09-10 PROCEDURE — 96365 THER/PROPH/DIAG IV INF INIT: CPT

## 2024-09-10 PROCEDURE — 87086 URINE CULTURE/COLONY COUNT: CPT

## 2024-09-10 PROCEDURE — 87077 CULTURE AEROBIC IDENTIFY: CPT

## 2024-09-10 PROCEDURE — 83735 ASSAY OF MAGNESIUM: CPT

## 2024-09-10 PROCEDURE — 96375 TX/PRO/DX INJ NEW DRUG ADDON: CPT

## 2024-09-10 PROCEDURE — 81001 URINALYSIS AUTO W/SCOPE: CPT

## 2024-09-10 PROCEDURE — 74177 CT ABD & PELVIS W/CONTRAST: CPT

## 2024-09-10 PROCEDURE — 71275 CT ANGIOGRAPHY CHEST: CPT

## 2024-09-10 PROCEDURE — 6360000002 HC RX W HCPCS: Performed by: PHYSICIAN ASSISTANT

## 2024-09-10 PROCEDURE — 99285 EMERGENCY DEPT VISIT HI MDM: CPT

## 2024-09-10 PROCEDURE — 93005 ELECTROCARDIOGRAM TRACING: CPT | Performed by: PHYSICIAN ASSISTANT

## 2024-09-10 RX ORDER — ENOXAPARIN SODIUM 100 MG/ML
40 INJECTION SUBCUTANEOUS DAILY
Status: DISCONTINUED | OUTPATIENT
Start: 2024-09-11 | End: 2024-09-10

## 2024-09-10 RX ORDER — ACETAMINOPHEN 500 MG
1000 TABLET ORAL ONCE
Status: COMPLETED | OUTPATIENT
Start: 2024-09-10 | End: 2024-09-10

## 2024-09-10 RX ORDER — POLYETHYLENE GLYCOL 3350 17 G/17G
17 POWDER, FOR SOLUTION ORAL DAILY PRN
Status: DISCONTINUED | OUTPATIENT
Start: 2024-09-10 | End: 2024-09-13 | Stop reason: HOSPADM

## 2024-09-10 RX ORDER — ONDANSETRON 2 MG/ML
4 INJECTION INTRAMUSCULAR; INTRAVENOUS ONCE
Status: COMPLETED | OUTPATIENT
Start: 2024-09-10 | End: 2024-09-10

## 2024-09-10 RX ORDER — MAGNESIUM SULFATE IN WATER 40 MG/ML
2000 INJECTION, SOLUTION INTRAVENOUS ONCE
Status: COMPLETED | OUTPATIENT
Start: 2024-09-11 | End: 2024-09-11

## 2024-09-10 RX ORDER — POTASSIUM CHLORIDE 1500 MG/1
40 TABLET, EXTENDED RELEASE ORAL PRN
Status: DISCONTINUED | OUTPATIENT
Start: 2024-09-10 | End: 2024-09-13 | Stop reason: HOSPADM

## 2024-09-10 RX ORDER — SODIUM CHLORIDE 0.9 % (FLUSH) 0.9 %
5-40 SYRINGE (ML) INJECTION EVERY 12 HOURS SCHEDULED
Status: DISCONTINUED | OUTPATIENT
Start: 2024-09-10 | End: 2024-09-13 | Stop reason: HOSPADM

## 2024-09-10 RX ORDER — INSULIN LISPRO 100 [IU]/ML
0-4 INJECTION, SOLUTION INTRAVENOUS; SUBCUTANEOUS NIGHTLY
Status: DISCONTINUED | OUTPATIENT
Start: 2024-09-11 | End: 2024-09-11

## 2024-09-10 RX ORDER — MAGNESIUM SULFATE IN WATER 40 MG/ML
2000 INJECTION, SOLUTION INTRAVENOUS PRN
Status: DISCONTINUED | OUTPATIENT
Start: 2024-09-10 | End: 2024-09-13 | Stop reason: HOSPADM

## 2024-09-10 RX ORDER — LANOLIN ALCOHOL/MO/W.PET/CERES
3 CREAM (GRAM) TOPICAL NIGHTLY PRN
Status: DISCONTINUED | OUTPATIENT
Start: 2024-09-10 | End: 2024-09-13 | Stop reason: HOSPADM

## 2024-09-10 RX ORDER — DEXTROSE MONOHYDRATE 100 MG/ML
INJECTION, SOLUTION INTRAVENOUS CONTINUOUS PRN
Status: DISCONTINUED | OUTPATIENT
Start: 2024-09-10 | End: 2024-09-13 | Stop reason: HOSPADM

## 2024-09-10 RX ORDER — 0.9 % SODIUM CHLORIDE 0.9 %
500 INTRAVENOUS SOLUTION INTRAVENOUS ONCE
Status: COMPLETED | OUTPATIENT
Start: 2024-09-10 | End: 2024-09-10

## 2024-09-10 RX ORDER — ACETAMINOPHEN 650 MG/1
650 SUPPOSITORY RECTAL EVERY 6 HOURS PRN
Status: DISCONTINUED | OUTPATIENT
Start: 2024-09-10 | End: 2024-09-13 | Stop reason: HOSPADM

## 2024-09-10 RX ORDER — INSULIN LISPRO 100 [IU]/ML
0-8 INJECTION, SOLUTION INTRAVENOUS; SUBCUTANEOUS
Status: DISCONTINUED | OUTPATIENT
Start: 2024-09-11 | End: 2024-09-13 | Stop reason: HOSPADM

## 2024-09-10 RX ORDER — SODIUM CHLORIDE 9 MG/ML
INJECTION, SOLUTION INTRAVENOUS PRN
Status: DISCONTINUED | OUTPATIENT
Start: 2024-09-10 | End: 2024-09-13 | Stop reason: HOSPADM

## 2024-09-10 RX ORDER — IOPAMIDOL 755 MG/ML
75 INJECTION, SOLUTION INTRAVASCULAR
Status: COMPLETED | OUTPATIENT
Start: 2024-09-10 | End: 2024-09-10

## 2024-09-10 RX ORDER — METHYLPREDNISOLONE SODIUM SUCCINATE 125 MG/2ML
125 INJECTION, POWDER, LYOPHILIZED, FOR SOLUTION INTRAMUSCULAR; INTRAVENOUS ONCE
Status: COMPLETED | OUTPATIENT
Start: 2024-09-10 | End: 2024-09-10

## 2024-09-10 RX ORDER — SODIUM CHLORIDE 9 MG/ML
INJECTION, SOLUTION INTRAVENOUS CONTINUOUS
Status: DISCONTINUED | OUTPATIENT
Start: 2024-09-11 | End: 2024-09-11

## 2024-09-10 RX ORDER — CLINDAMYCIN PHOSPHATE 900 MG/50ML
900 INJECTION, SOLUTION INTRAVENOUS ONCE
Status: DISCONTINUED | OUTPATIENT
Start: 2024-09-10 | End: 2024-09-10

## 2024-09-10 RX ORDER — SODIUM CHLORIDE 0.9 % (FLUSH) 0.9 %
5-40 SYRINGE (ML) INJECTION PRN
Status: DISCONTINUED | OUTPATIENT
Start: 2024-09-10 | End: 2024-09-13 | Stop reason: HOSPADM

## 2024-09-10 RX ORDER — ACETAMINOPHEN 325 MG/1
650 TABLET ORAL EVERY 6 HOURS PRN
Status: DISCONTINUED | OUTPATIENT
Start: 2024-09-10 | End: 2024-09-13 | Stop reason: HOSPADM

## 2024-09-10 RX ORDER — DIPHENHYDRAMINE HYDROCHLORIDE 50 MG/ML
25 INJECTION INTRAMUSCULAR; INTRAVENOUS ONCE
Status: COMPLETED | OUTPATIENT
Start: 2024-09-10 | End: 2024-09-10

## 2024-09-10 RX ORDER — GLUCAGON 1 MG/ML
1 KIT INJECTION PRN
Status: DISCONTINUED | OUTPATIENT
Start: 2024-09-10 | End: 2024-09-13 | Stop reason: HOSPADM

## 2024-09-10 RX ORDER — POTASSIUM CHLORIDE 7.45 MG/ML
10 INJECTION INTRAVENOUS PRN
Status: DISCONTINUED | OUTPATIENT
Start: 2024-09-10 | End: 2024-09-13 | Stop reason: HOSPADM

## 2024-09-10 RX ADMIN — PIPERACILLIN AND TAZOBACTAM 4500 MG: 4; .5 INJECTION, POWDER, LYOPHILIZED, FOR SOLUTION INTRAVENOUS at 18:26

## 2024-09-10 RX ADMIN — SODIUM CHLORIDE 500 ML: 9 INJECTION, SOLUTION INTRAVENOUS at 18:19

## 2024-09-10 RX ADMIN — ONDANSETRON 4 MG: 2 INJECTION INTRAMUSCULAR; INTRAVENOUS at 17:04

## 2024-09-10 RX ADMIN — DIPHENHYDRAMINE HYDROCHLORIDE 25 MG: 50 INJECTION INTRAMUSCULAR; INTRAVENOUS at 18:19

## 2024-09-10 RX ADMIN — VANCOMYCIN HYDROCHLORIDE 1750 MG: 1 INJECTION, POWDER, LYOPHILIZED, FOR SOLUTION INTRAVENOUS at 19:55

## 2024-09-10 RX ADMIN — METHYLPREDNISOLONE SODIUM SUCCINATE 125 MG: 125 INJECTION INTRAMUSCULAR; INTRAVENOUS at 18:19

## 2024-09-10 RX ADMIN — FAMOTIDINE 20 MG: 10 INJECTION, SOLUTION INTRAVENOUS at 18:19

## 2024-09-10 RX ADMIN — IOPAMIDOL 75 ML: 755 INJECTION, SOLUTION INTRAVENOUS at 19:39

## 2024-09-10 RX ADMIN — ACETAMINOPHEN 1000 MG: 500 TABLET ORAL at 17:04

## 2024-09-10 ASSESSMENT — ENCOUNTER SYMPTOMS
SHORTNESS OF BREATH: 1
DIARRHEA: 0
NAUSEA: 0
ABDOMINAL PAIN: 0
VOMITING: 1
SINUS PAIN: 0
CONSTIPATION: 0

## 2024-09-10 ASSESSMENT — LIFESTYLE VARIABLES
HOW MANY STANDARD DRINKS CONTAINING ALCOHOL DO YOU HAVE ON A TYPICAL DAY: PATIENT DOES NOT DRINK
HOW MANY STANDARD DRINKS CONTAINING ALCOHOL DO YOU HAVE ON A TYPICAL DAY: PATIENT DOES NOT DRINK
HOW OFTEN DO YOU HAVE A DRINK CONTAINING ALCOHOL: NEVER
HOW OFTEN DO YOU HAVE A DRINK CONTAINING ALCOHOL: NEVER

## 2024-09-10 ASSESSMENT — PAIN - FUNCTIONAL ASSESSMENT: PAIN_FUNCTIONAL_ASSESSMENT: NONE - DENIES PAIN

## 2024-09-10 ASSESSMENT — PAIN SCALES - GENERAL
PAINLEVEL_OUTOF10: 0
PAINLEVEL_OUTOF10: 0

## 2024-09-11 ENCOUNTER — APPOINTMENT (OUTPATIENT)
Dept: CARDIAC REHAB | Age: 74
End: 2024-09-11
Payer: MEDICARE

## 2024-09-11 PROBLEM — A41.50 SEPSIS DUE TO GRAM-NEGATIVE UTI (HCC): Status: ACTIVE | Noted: 2024-09-11

## 2024-09-11 PROBLEM — N39.0 SEPSIS DUE TO GRAM-NEGATIVE UTI (HCC): Status: ACTIVE | Noted: 2024-09-11

## 2024-09-11 LAB
A BAUMANNII DNA BLD POS QL NAA+NON-PROBE: NOT DETECTED
ALBUMIN SERPL-MCNC: 3.1 G/DL (ref 3.5–4.6)
ALP SERPL-CCNC: 135 U/L (ref 40–130)
ALT SERPL-CCNC: 12 U/L (ref 0–33)
ANION GAP SERPL CALCULATED.3IONS-SCNC: 13 MEQ/L (ref 9–15)
AST SERPL-CCNC: 18 U/L (ref 0–35)
BACTERIA BLD CULT ORG #2: ABNORMAL
BASOPHILS # BLD: 0 K/UL (ref 0–0.2)
BASOPHILS NFR BLD: 0.5 %
BILIRUB SERPL-MCNC: 0.3 MG/DL (ref 0.2–0.7)
BLACTX-M ISLT/SPM QL: NOT DETECTED
BLAIMP ISLT/SPM QL: NOT DETECTED
BLAKPC ISLT/SPM QL: NOT DETECTED
BLAVIM ISLT/SPM QL: NOT DETECTED
BUN SERPL-MCNC: 27 MG/DL (ref 8–23)
BURR CELLS: ABNORMAL
C ALBICANS DNA BLD POS QL NAA+NON-PROBE: NOT DETECTED
C AURIS DNA BLD POS QL NAA+PROBE: NOT DETECTED
C GLABRATA DNA BLD POS QL NAA+NON-PROBE: NOT DETECTED
C KRUSEI DNA BLD POS QL NAA+NON-PROBE: NOT DETECTED
C PARAP DNA BLD POS QL NAA+NON-PROBE: NOT DETECTED
C TROPICLS DNA BLD POS QL NAA+NON-PROBE: NOT DETECTED
CALCIUM SERPL-MCNC: 8 MG/DL (ref 8.5–9.9)
CARBAPENEM RESISTANCE NDM GENE BY PCR: NOT DETECTED
CARBAPENEM RESISTANCE OXA-48 GENE BY PCR: NOT DETECTED
CHLORIDE SERPL-SCNC: 103 MEQ/L (ref 95–107)
CO2 SERPL-SCNC: 17 MEQ/L (ref 20–31)
COLISTIN RES MCR-1 ISLT/SPM QL: NOT DETECTED
CREAT SERPL-MCNC: 1.05 MG/DL (ref 0.5–0.9)
CRYPTOCOCCUS NEOFORMANS/GATTII BY PCR: NOT DETECTED
E CLOAC COMP DNA BLD POS NAA+NON-PROBE: NOT DETECTED
E COLI DNA BLD POS QL NAA+NON-PROBE: NOT DETECTED
E FAECALIS DNA BLD POS QL NAA+PROBE: NOT DETECTED
E FAECIUM DNA BLD POS QL NAA+PROBE: NOT DETECTED
ENTEROBACT DNA BLD POS QL NAA+NON-PROBE: DETECTED
ENTEROCOC DNA BLD POS QL NAA+NON-PROBE: DETECTED
EOSINOPHIL # BLD: 0 K/UL (ref 0–0.7)
EOSINOPHIL NFR BLD: 0 %
ERYTHROCYTE [DISTWIDTH] IN BLOOD BY AUTOMATED COUNT: 14.4 % (ref 11.5–14.5)
GLOBULIN SER CALC-MCNC: 3.2 G/DL (ref 2.3–3.5)
GLUCOSE BLD-MCNC: 127 MG/DL (ref 70–99)
GLUCOSE BLD-MCNC: 269 MG/DL (ref 70–99)
GLUCOSE BLD-MCNC: 421 MG/DL (ref 70–99)
GLUCOSE BLD-MCNC: 456 MG/DL (ref 70–99)
GLUCOSE SERPL-MCNC: 290 MG/DL (ref 70–99)
GN BLD CULTURE PNL BLD POS NAA+PROBE: NOT DETECTED
GP B STREP DNA BLD POS QL NAA+NON-PROBE: NOT DETECTED
HCT VFR BLD AUTO: 34.2 % (ref 37–47)
HGB BLD-MCNC: 11.4 G/DL (ref 12–16)
K OXYTOCA DNA BLD POS QL NAA+NON-PROBE: NOT DETECTED
K PNEUMON DNA SPEC QL NAA+PROBE: NOT DETECTED
K. AEROGENES DNA SPEC QL NAA+PROBE: NOT DETECTED
L MONOCYTOG DNA BLD POS QL NAA+NON-PROBE: NOT DETECTED
LYMPHOCYTES # BLD: 0.7 K/UL (ref 1–4.8)
LYMPHOCYTES NFR BLD: 6 %
MAGNESIUM SERPL-MCNC: 2.3 MG/DL (ref 1.7–2.4)
MCH RBC QN AUTO: 32.4 PG (ref 27–31.3)
MCHC RBC AUTO-ENTMCNC: 33.3 % (ref 33–37)
MCV RBC AUTO: 97.2 FL (ref 79.4–94.8)
MONOCYTES # BLD: 0.1 K/UL (ref 0.2–0.8)
MONOCYTES NFR BLD: 0.9 %
N MEN DNA BLD POS QL NAA+NON-PROBE: NOT DETECTED
NEUTROPHILS # BLD: 10.5 K/UL (ref 1.4–6.5)
NEUTS BAND NFR BLD MANUAL: 9 % (ref 5–11)
NEUTS SEG NFR BLD: 85 %
NEUTS VAC BLD QL SMEAR: ABNORMAL
OVALOCYTES BLD QL SMEAR: ABNORMAL
P AERUGINOSA DNA BLD POS NAA+NON-PROBE: NOT DETECTED
PATH INTERP BLD-IMP: NORMAL
PERFORMED ON: ABNORMAL
PLATELET # BLD AUTO: 56 K/UL (ref 130–400)
PLATELET BLD QL SMEAR: ABNORMAL
POIKILOCYTOSIS BLD QL SMEAR: ABNORMAL
POTASSIUM SERPL-SCNC: 4.9 MEQ/L (ref 3.4–4.9)
PROT SERPL-MCNC: 6.3 G/DL (ref 6.3–8)
PROTEUS SP DNA BLD POS QL NAA+NON-PROBE: NOT DETECTED
RBC # BLD AUTO: 3.52 M/UL (ref 4.2–5.4)
S AUREUS DNA BLD POS QL NAA+NON-PROBE: NOT DETECTED
S AUREUS+CONS DNA BLD POS NAA+NON-PROBE: NOT DETECTED
S EPIDERMIDIS DNA BLD POS QL NAA+PROBE: NOT DETECTED
S LUGDUNENSIS DNA BLD POS QL NAA+PROBE: NOT DETECTED
S MALTOPH DNA BLD POS QL NAA+PROBE: NOT DETECTED
S MARCESCENS DNA BLD POS NAA+NON-PROBE: NOT DETECTED
S PNEUM DNA BLD POS QL NAA+NON-PROBE: NOT DETECTED
S PYO DNA BLD POS QL NAA+NON-PROBE: NOT DETECTED
SALMONELLA DNA BLD POS QL NAA+PROBE: NOT DETECTED
SODIUM SERPL-SCNC: 133 MEQ/L (ref 135–144)
STREPTOCOCCUS DNA BLD POS NAA+NON-PROBE: NOT DETECTED
WBC # BLD AUTO: 11.2 K/UL (ref 4.8–10.8)

## 2024-09-11 PROCEDURE — 6370000000 HC RX 637 (ALT 250 FOR IP): Performed by: INTERNAL MEDICINE

## 2024-09-11 PROCEDURE — 87040 BLOOD CULTURE FOR BACTERIA: CPT

## 2024-09-11 PROCEDURE — 83036 HEMOGLOBIN GLYCOSYLATED A1C: CPT

## 2024-09-11 PROCEDURE — 99222 1ST HOSP IP/OBS MODERATE 55: CPT | Performed by: INTERNAL MEDICINE

## 2024-09-11 PROCEDURE — 2060000000 HC ICU INTERMEDIATE R&B

## 2024-09-11 PROCEDURE — 2580000003 HC RX 258: Performed by: INTERNAL MEDICINE

## 2024-09-11 PROCEDURE — 80053 COMPREHEN METABOLIC PANEL: CPT

## 2024-09-11 PROCEDURE — 97162 PT EVAL MOD COMPLEX 30 MIN: CPT

## 2024-09-11 PROCEDURE — 83735 ASSAY OF MAGNESIUM: CPT

## 2024-09-11 PROCEDURE — 85025 COMPLETE CBC W/AUTO DIFF WBC: CPT

## 2024-09-11 PROCEDURE — 87077 CULTURE AEROBIC IDENTIFY: CPT

## 2024-09-11 PROCEDURE — 87150 DNA/RNA AMPLIFIED PROBE: CPT

## 2024-09-11 PROCEDURE — 97166 OT EVAL MOD COMPLEX 45 MIN: CPT

## 2024-09-11 PROCEDURE — 6360000002 HC RX W HCPCS: Performed by: INTERNAL MEDICINE

## 2024-09-11 PROCEDURE — 36415 COLL VENOUS BLD VENIPUNCTURE: CPT

## 2024-09-11 RX ORDER — CHLORZOXAZONE 500 MG/1
500 TABLET ORAL 4 TIMES DAILY PRN
COMMUNITY

## 2024-09-11 RX ORDER — INSULIN LISPRO 100 [IU]/ML
0-4 INJECTION, SOLUTION INTRAVENOUS; SUBCUTANEOUS NIGHTLY
Status: DISCONTINUED | OUTPATIENT
Start: 2024-09-11 | End: 2024-09-13 | Stop reason: HOSPADM

## 2024-09-11 RX ORDER — INSULIN LISPRO 100 [IU]/ML
0.08 INJECTION, SOLUTION INTRAVENOUS; SUBCUTANEOUS
Status: DISCONTINUED | OUTPATIENT
Start: 2024-09-11 | End: 2024-09-12

## 2024-09-11 RX ORDER — INSULIN GLARGINE 100 [IU]/ML
0.15 INJECTION, SOLUTION SUBCUTANEOUS NIGHTLY
Status: DISCONTINUED | OUTPATIENT
Start: 2024-09-11 | End: 2024-09-13 | Stop reason: HOSPADM

## 2024-09-11 RX ORDER — SODIUM CHLORIDE, SODIUM LACTATE, POTASSIUM CHLORIDE, CALCIUM CHLORIDE 600; 310; 30; 20 MG/100ML; MG/100ML; MG/100ML; MG/100ML
INJECTION, SOLUTION INTRAVENOUS CONTINUOUS
Status: DISPENSED | OUTPATIENT
Start: 2024-09-11 | End: 2024-09-11

## 2024-09-11 RX ORDER — LAMOTRIGINE 200 MG/1
200 TABLET ORAL DAILY
Status: DISCONTINUED | OUTPATIENT
Start: 2024-09-11 | End: 2024-09-13 | Stop reason: HOSPADM

## 2024-09-11 RX ORDER — INSULIN LISPRO 100 [IU]/ML
15 INJECTION, SOLUTION INTRAVENOUS; SUBCUTANEOUS ONCE
Status: DISCONTINUED | OUTPATIENT
Start: 2024-09-11 | End: 2024-09-11

## 2024-09-11 RX ORDER — ERGOCALCIFEROL 1.25 MG/1
50000 CAPSULE, LIQUID FILLED ORAL WEEKLY
COMMUNITY
Start: 2024-07-30

## 2024-09-11 RX ORDER — QUETIAPINE FUMARATE 50 MG/1
150 TABLET, FILM COATED ORAL NIGHTLY
Status: DISCONTINUED | OUTPATIENT
Start: 2024-09-11 | End: 2024-09-13 | Stop reason: HOSPADM

## 2024-09-11 RX ORDER — TROSPIUM CHLORIDE 20 MG/1
20 TABLET, FILM COATED ORAL 2 TIMES DAILY
COMMUNITY
Start: 2024-08-28

## 2024-09-11 RX ORDER — BUPROPION HYDROCHLORIDE 75 MG/1
75 TABLET ORAL DAILY
Status: DISCONTINUED | OUTPATIENT
Start: 2024-09-11 | End: 2024-09-13 | Stop reason: HOSPADM

## 2024-09-11 RX ORDER — GABAPENTIN 300 MG/1
300 CAPSULE ORAL 2 TIMES DAILY
Status: DISCONTINUED | OUTPATIENT
Start: 2024-09-11 | End: 2024-09-13 | Stop reason: HOSPADM

## 2024-09-11 RX ORDER — INSULIN LISPRO 100 [IU]/ML
7 INJECTION, SOLUTION INTRAVENOUS; SUBCUTANEOUS ONCE
Status: COMPLETED | OUTPATIENT
Start: 2024-09-11 | End: 2024-09-11

## 2024-09-11 RX ORDER — INSULIN LISPRO 100 [IU]/ML
0-8 INJECTION, SOLUTION INTRAVENOUS; SUBCUTANEOUS
Status: DISCONTINUED | OUTPATIENT
Start: 2024-09-11 | End: 2024-09-11

## 2024-09-11 RX ORDER — FLUCONAZOLE 100 MG/1
150 TABLET ORAL ONCE
Status: COMPLETED | OUTPATIENT
Start: 2024-09-11 | End: 2024-09-11

## 2024-09-11 RX ORDER — ASPIRIN 81 MG/1
81 TABLET, CHEWABLE ORAL DAILY
Status: DISCONTINUED | OUTPATIENT
Start: 2024-09-11 | End: 2024-09-13 | Stop reason: HOSPADM

## 2024-09-11 RX ADMIN — SODIUM CHLORIDE: 9 INJECTION, SOLUTION INTRAVENOUS at 01:26

## 2024-09-11 RX ADMIN — LAMOTRIGINE 200 MG: 200 TABLET ORAL at 10:47

## 2024-09-11 RX ADMIN — INSULIN GLARGINE 12 UNITS: 100 INJECTION, SOLUTION SUBCUTANEOUS at 23:18

## 2024-09-11 RX ADMIN — SODIUM CHLORIDE, POTASSIUM CHLORIDE, SODIUM LACTATE AND CALCIUM CHLORIDE: 600; 310; 30; 20 INJECTION, SOLUTION INTRAVENOUS at 10:52

## 2024-09-11 RX ADMIN — SODIUM CHLORIDE: 9 INJECTION, SOLUTION INTRAVENOUS at 20:44

## 2024-09-11 RX ADMIN — GABAPENTIN 300 MG: 300 CAPSULE ORAL at 20:27

## 2024-09-11 RX ADMIN — QUETIAPINE FUMARATE 150 MG: 50 TABLET ORAL at 20:27

## 2024-09-11 RX ADMIN — INSULIN LISPRO 4 UNITS: 100 INJECTION, SOLUTION INTRAVENOUS; SUBCUTANEOUS at 10:45

## 2024-09-11 RX ADMIN — FLUCONAZOLE 150 MG: 100 TABLET ORAL at 14:35

## 2024-09-11 RX ADMIN — ASPIRIN 81 MG 81 MG: 81 TABLET ORAL at 10:47

## 2024-09-11 RX ADMIN — CEFTRIAXONE SODIUM 1000 MG: 1 INJECTION, POWDER, FOR SOLUTION INTRAMUSCULAR; INTRAVENOUS at 01:28

## 2024-09-11 RX ADMIN — ACETAMINOPHEN 325MG 650 MG: 325 TABLET ORAL at 07:03

## 2024-09-11 RX ADMIN — INSULIN LISPRO 7 UNITS: 100 INJECTION, SOLUTION INTRAVENOUS; SUBCUTANEOUS at 14:35

## 2024-09-11 RX ADMIN — MAGNESIUM SULFATE HEPTAHYDRATE 2000 MG: 40 INJECTION, SOLUTION INTRAVENOUS at 01:58

## 2024-09-11 RX ADMIN — SODIUM CHLORIDE, PRESERVATIVE FREE 10 ML: 5 INJECTION INTRAVENOUS at 10:48

## 2024-09-11 RX ADMIN — INSULIN LISPRO 7 UNITS: 100 INJECTION, SOLUTION INTRAVENOUS; SUBCUTANEOUS at 17:42

## 2024-09-11 RX ADMIN — INSULIN LISPRO 8 UNITS: 100 INJECTION, SOLUTION INTRAVENOUS; SUBCUTANEOUS at 13:43

## 2024-09-11 RX ADMIN — BUPROPION HYDROCHLORIDE 75 MG: 75 TABLET, FILM COATED ORAL at 10:46

## 2024-09-11 ASSESSMENT — PAIN SCALES - GENERAL
PAINLEVEL_OUTOF10: 0
PAINLEVEL_OUTOF10: 0

## 2024-09-12 LAB
ALBUMIN SERPL-MCNC: 2.8 G/DL (ref 3.5–4.6)
ALP SERPL-CCNC: 123 U/L (ref 40–130)
ALT SERPL-CCNC: 10 U/L (ref 0–33)
ANION GAP SERPL CALCULATED.3IONS-SCNC: 8 MEQ/L (ref 9–15)
AST SERPL-CCNC: 13 U/L (ref 0–35)
BACTERIA BLD CULT: NORMAL
BASOPHILS # BLD: 0 K/UL (ref 0–0.2)
BASOPHILS NFR BLD: 0.3 %
BILIRUB SERPL-MCNC: <0.2 MG/DL (ref 0.2–0.7)
BUN SERPL-MCNC: 29 MG/DL (ref 8–23)
CALCIUM SERPL-MCNC: 8.4 MG/DL (ref 8.5–9.9)
CHLORIDE SERPL-SCNC: 110 MEQ/L (ref 95–107)
CO2 SERPL-SCNC: 22 MEQ/L (ref 20–31)
CREAT SERPL-MCNC: 1.02 MG/DL (ref 0.5–0.9)
CULTURE, BLOOD ID SENSITIVITY: ABNORMAL
CULTURE, BLOOD ID SENSITIVITY: ABNORMAL
EKG ATRIAL RATE: 101 BPM
EKG P AXIS: 30 DEGREES
EKG P-R INTERVAL: 184 MS
EKG Q-T INTERVAL: 350 MS
EKG QRS DURATION: 102 MS
EKG QTC CALCULATION (BAZETT): 453 MS
EKG R AXIS: -10 DEGREES
EKG T AXIS: 15 DEGREES
EKG VENTRICULAR RATE: 101 BPM
EOSINOPHIL # BLD: 0 K/UL (ref 0–0.7)
EOSINOPHIL NFR BLD: 0 %
ERYTHROCYTE [DISTWIDTH] IN BLOOD BY AUTOMATED COUNT: 14.6 % (ref 11.5–14.5)
ESTIMATED AVERAGE GLUCOSE: 120 MG/DL
GLOBULIN SER CALC-MCNC: 3.2 G/DL (ref 2.3–3.5)
GLUCOSE BLD-MCNC: 104 MG/DL (ref 70–99)
GLUCOSE BLD-MCNC: 123 MG/DL (ref 70–99)
GLUCOSE BLD-MCNC: 147 MG/DL (ref 70–99)
GLUCOSE BLD-MCNC: 207 MG/DL (ref 70–99)
GLUCOSE SERPL-MCNC: 129 MG/DL (ref 70–99)
HBA1C MFR BLD: 5.8 % (ref 4–6)
HCT VFR BLD AUTO: 31.3 % (ref 37–47)
HGB BLD-MCNC: 10.2 G/DL (ref 12–16)
LYMPHOCYTES # BLD: 0.7 K/UL (ref 1–4.8)
LYMPHOCYTES NFR BLD: 6 %
MCH RBC QN AUTO: 31.9 PG (ref 27–31.3)
MCHC RBC AUTO-ENTMCNC: 32.6 % (ref 33–37)
MCV RBC AUTO: 97.8 FL (ref 79.4–94.8)
MONOCYTES # BLD: 0.4 K/UL (ref 0.2–0.8)
MONOCYTES NFR BLD: 3.8 %
NEUTROPHILS # BLD: 10.2 K/UL (ref 1.4–6.5)
NEUTS BAND NFR BLD MANUAL: 3 % (ref 5–11)
NEUTS SEG NFR BLD: 88 %
ORGANISM: ABNORMAL
PERFORMED ON: ABNORMAL
PLATELET # BLD AUTO: 73 K/UL (ref 130–400)
PLATELET BLD QL SMEAR: ABNORMAL
POTASSIUM SERPL-SCNC: 5.1 MEQ/L (ref 3.4–4.9)
PROT SERPL-MCNC: 6 G/DL (ref 6.3–8)
RBC # BLD AUTO: 3.2 M/UL (ref 4.2–5.4)
RBC MORPH BLD: NORMAL
SODIUM SERPL-SCNC: 140 MEQ/L (ref 135–144)
WBC # BLD AUTO: 11.2 K/UL (ref 4.8–10.8)

## 2024-09-12 PROCEDURE — 6370000000 HC RX 637 (ALT 250 FOR IP): Performed by: INTERNAL MEDICINE

## 2024-09-12 PROCEDURE — 36415 COLL VENOUS BLD VENIPUNCTURE: CPT

## 2024-09-12 PROCEDURE — 2060000000 HC ICU INTERMEDIATE R&B

## 2024-09-12 PROCEDURE — 93010 ELECTROCARDIOGRAM REPORT: CPT | Performed by: INTERNAL MEDICINE

## 2024-09-12 PROCEDURE — 2580000003 HC RX 258: Performed by: INTERNAL MEDICINE

## 2024-09-12 PROCEDURE — 85025 COMPLETE CBC W/AUTO DIFF WBC: CPT

## 2024-09-12 PROCEDURE — 80053 COMPREHEN METABOLIC PANEL: CPT

## 2024-09-12 PROCEDURE — 97110 THERAPEUTIC EXERCISES: CPT

## 2024-09-12 PROCEDURE — 2700000000 HC OXYGEN THERAPY PER DAY

## 2024-09-12 PROCEDURE — 6360000002 HC RX W HCPCS: Performed by: INTERNAL MEDICINE

## 2024-09-12 PROCEDURE — 99232 SBSQ HOSP IP/OBS MODERATE 35: CPT | Performed by: INTERNAL MEDICINE

## 2024-09-12 PROCEDURE — 97535 SELF CARE MNGMENT TRAINING: CPT

## 2024-09-12 RX ADMIN — INSULIN GLARGINE 12 UNITS: 100 INJECTION, SOLUTION SUBCUTANEOUS at 21:26

## 2024-09-12 RX ADMIN — CEFTRIAXONE SODIUM 1000 MG: 1 INJECTION, POWDER, FOR SOLUTION INTRAMUSCULAR; INTRAVENOUS at 00:07

## 2024-09-12 RX ADMIN — SODIUM ZIRCONIUM CYCLOSILICATE 10 G: 10 POWDER, FOR SUSPENSION ORAL at 13:17

## 2024-09-12 RX ADMIN — SODIUM CHLORIDE, PRESERVATIVE FREE 10 ML: 5 INJECTION INTRAVENOUS at 21:14

## 2024-09-12 RX ADMIN — INSULIN LISPRO 7 UNITS: 100 INJECTION, SOLUTION INTRAVENOUS; SUBCUTANEOUS at 08:50

## 2024-09-12 RX ADMIN — QUETIAPINE FUMARATE 150 MG: 50 TABLET ORAL at 21:13

## 2024-09-12 RX ADMIN — GABAPENTIN 300 MG: 300 CAPSULE ORAL at 21:13

## 2024-09-12 RX ADMIN — SODIUM CHLORIDE, PRESERVATIVE FREE 10 ML: 5 INJECTION INTRAVENOUS at 08:52

## 2024-09-12 RX ADMIN — INSULIN LISPRO 2 UNITS: 100 INJECTION, SOLUTION INTRAVENOUS; SUBCUTANEOUS at 17:36

## 2024-09-12 RX ADMIN — LAMOTRIGINE 200 MG: 200 TABLET ORAL at 21:12

## 2024-09-12 RX ADMIN — ASPIRIN 81 MG 81 MG: 81 TABLET ORAL at 08:50

## 2024-09-12 RX ADMIN — SODIUM CHLORIDE: 9 INJECTION, SOLUTION INTRAVENOUS at 05:51

## 2024-09-12 RX ADMIN — BUPROPION HYDROCHLORIDE 75 MG: 75 TABLET, FILM COATED ORAL at 08:50

## 2024-09-12 RX ADMIN — ACETAMINOPHEN 325MG 650 MG: 325 TABLET ORAL at 17:36

## 2024-09-12 ASSESSMENT — PAIN SCALES - GENERAL
PAINLEVEL_OUTOF10: 0
PAINLEVEL_OUTOF10: 4
PAINLEVEL_OUTOF10: 0

## 2024-09-12 ASSESSMENT — PAIN DESCRIPTION - LOCATION: LOCATION: HEAD

## 2024-09-12 ASSESSMENT — PAIN DESCRIPTION - ORIENTATION: ORIENTATION: ANTERIOR

## 2024-09-12 ASSESSMENT — PAIN DESCRIPTION - DESCRIPTORS: DESCRIPTORS: ACHING

## 2024-09-12 ASSESSMENT — PAIN SCALES - WONG BAKER: WONGBAKER_NUMERICALRESPONSE: NO HURT

## 2024-09-13 ENCOUNTER — APPOINTMENT (OUTPATIENT)
Dept: CARDIAC REHAB | Age: 74
End: 2024-09-13
Payer: MEDICARE

## 2024-09-13 VITALS
HEART RATE: 88 BPM | HEIGHT: 68 IN | SYSTOLIC BLOOD PRESSURE: 145 MMHG | OXYGEN SATURATION: 99 % | BODY MASS INDEX: 28.04 KG/M2 | TEMPERATURE: 98.8 F | WEIGHT: 185 LBS | DIASTOLIC BLOOD PRESSURE: 77 MMHG | RESPIRATION RATE: 24 BRPM

## 2024-09-13 PROBLEM — A41.9 SEPTICEMIA (HCC): Status: ACTIVE | Noted: 2024-09-13

## 2024-09-13 LAB
ALBUMIN SERPL-MCNC: 2.9 G/DL (ref 3.5–4.6)
ALP SERPL-CCNC: 108 U/L (ref 40–130)
ALT SERPL-CCNC: 10 U/L (ref 0–33)
ANION GAP SERPL CALCULATED.3IONS-SCNC: 11 MEQ/L (ref 9–15)
ANISOCYTOSIS BLD QL SMEAR: ABNORMAL
AST SERPL-CCNC: 12 U/L (ref 0–35)
BACTERIA UR CULT: ABNORMAL
BACTERIA UR CULT: ABNORMAL
BASOPHILS # BLD: 0 K/UL (ref 0–0.2)
BASOPHILS NFR BLD: 0.1 %
BILIRUB SERPL-MCNC: 0.3 MG/DL (ref 0.2–0.7)
BUN SERPL-MCNC: 22 MG/DL (ref 8–23)
CALCIUM SERPL-MCNC: 8.4 MG/DL (ref 8.5–9.9)
CHLORIDE SERPL-SCNC: 108 MEQ/L (ref 95–107)
CO2 SERPL-SCNC: 23 MEQ/L (ref 20–31)
CREAT SERPL-MCNC: 0.95 MG/DL (ref 0.5–0.9)
EOSINOPHIL # BLD: 0 K/UL (ref 0–0.7)
EOSINOPHIL NFR BLD: 0.4 %
ERYTHROCYTE [DISTWIDTH] IN BLOOD BY AUTOMATED COUNT: 14.7 % (ref 11.5–14.5)
GLOBULIN SER CALC-MCNC: 3.6 G/DL (ref 2.3–3.5)
GLUCOSE BLD-MCNC: 112 MG/DL (ref 70–99)
GLUCOSE BLD-MCNC: 120 MG/DL (ref 70–99)
GLUCOSE SERPL-MCNC: 99 MG/DL (ref 70–99)
HCT VFR BLD AUTO: 33.8 % (ref 37–47)
HGB BLD-MCNC: 11 G/DL (ref 12–16)
LYMPHOCYTES # BLD: 0.7 K/UL (ref 1–4.8)
LYMPHOCYTES NFR BLD: 9 %
MACROCYTES BLD QL SMEAR: ABNORMAL
MCH RBC QN AUTO: 31.9 PG (ref 27–31.3)
MCHC RBC AUTO-ENTMCNC: 32.5 % (ref 33–37)
MCV RBC AUTO: 98 FL (ref 79.4–94.8)
MONOCYTES # BLD: 0.7 K/UL (ref 0.2–0.8)
MONOCYTES NFR BLD: 9.5 %
NEUTROPHILS # BLD: 6 K/UL (ref 1.4–6.5)
NEUTS BAND NFR BLD MANUAL: 1 % (ref 5–11)
NEUTS SEG NFR BLD: 80 %
ORGANISM: ABNORMAL
OVALOCYTES BLD QL SMEAR: ABNORMAL
PERFORMED ON: ABNORMAL
PERFORMED ON: ABNORMAL
PLATELET # BLD AUTO: 87 K/UL (ref 130–400)
PLATELET BLD QL SMEAR: ABNORMAL
POIKILOCYTOSIS BLD QL SMEAR: ABNORMAL
POTASSIUM SERPL-SCNC: 4 MEQ/L (ref 3.4–4.9)
PROT SERPL-MCNC: 6.5 G/DL (ref 6.3–8)
RBC # BLD AUTO: 3.45 M/UL (ref 4.2–5.4)
SLIDE REVIEW: ABNORMAL
SMUDGE CELLS BLD QL SMEAR: 7.6
SODIUM SERPL-SCNC: 142 MEQ/L (ref 135–144)
VARIANT LYMPHS NFR BLD: 1 %
WBC # BLD AUTO: 7.4 K/UL (ref 4.8–10.8)

## 2024-09-13 PROCEDURE — 2580000003 HC RX 258: Performed by: INTERNAL MEDICINE

## 2024-09-13 PROCEDURE — 36415 COLL VENOUS BLD VENIPUNCTURE: CPT

## 2024-09-13 PROCEDURE — 6360000002 HC RX W HCPCS: Performed by: INTERNAL MEDICINE

## 2024-09-13 PROCEDURE — 97535 SELF CARE MNGMENT TRAINING: CPT

## 2024-09-13 PROCEDURE — 6370000000 HC RX 637 (ALT 250 FOR IP)

## 2024-09-13 PROCEDURE — 85025 COMPLETE CBC W/AUTO DIFF WBC: CPT

## 2024-09-13 PROCEDURE — 97110 THERAPEUTIC EXERCISES: CPT

## 2024-09-13 PROCEDURE — 99232 SBSQ HOSP IP/OBS MODERATE 35: CPT | Performed by: INTERNAL MEDICINE

## 2024-09-13 PROCEDURE — APPSS60 APP SPLIT SHARED TIME 46-60 MINUTES

## 2024-09-13 PROCEDURE — 97116 GAIT TRAINING THERAPY: CPT

## 2024-09-13 PROCEDURE — 6370000000 HC RX 637 (ALT 250 FOR IP): Performed by: INTERNAL MEDICINE

## 2024-09-13 PROCEDURE — 99222 1ST HOSP IP/OBS MODERATE 55: CPT | Performed by: INTERNAL MEDICINE

## 2024-09-13 PROCEDURE — 2700000000 HC OXYGEN THERAPY PER DAY

## 2024-09-13 PROCEDURE — 80053 COMPREHEN METABOLIC PANEL: CPT

## 2024-09-13 RX ORDER — METOPROLOL TARTRATE 25 MG/1
12.5 TABLET, FILM COATED ORAL 2 TIMES DAILY
Status: DISCONTINUED | OUTPATIENT
Start: 2024-09-13 | End: 2024-09-13 | Stop reason: HOSPADM

## 2024-09-13 RX ORDER — CEPHALEXIN 500 MG/1
500 CAPSULE ORAL 3 TIMES DAILY
Qty: 21 CAPSULE | Refills: 0 | Status: SHIPPED | OUTPATIENT
Start: 2024-09-13 | End: 2024-09-20

## 2024-09-13 RX ORDER — SODIUM CHLORIDE, SODIUM LACTATE, POTASSIUM CHLORIDE, AND CALCIUM CHLORIDE .6; .31; .03; .02 G/100ML; G/100ML; G/100ML; G/100ML
500 INJECTION, SOLUTION INTRAVENOUS ONCE
Status: COMPLETED | OUTPATIENT
Start: 2024-09-13 | End: 2024-09-13

## 2024-09-13 RX ORDER — METOPROLOL TARTRATE 25 MG/1
12.5 TABLET, FILM COATED ORAL 2 TIMES DAILY
Qty: 60 TABLET | Refills: 3 | Status: SHIPPED | OUTPATIENT
Start: 2024-09-13

## 2024-09-13 RX ADMIN — SODIUM CHLORIDE, POTASSIUM CHLORIDE, SODIUM LACTATE AND CALCIUM CHLORIDE 500 ML: 600; 310; 30; 20 INJECTION, SOLUTION INTRAVENOUS at 10:53

## 2024-09-13 RX ADMIN — BUPROPION HYDROCHLORIDE 75 MG: 75 TABLET, FILM COATED ORAL at 09:47

## 2024-09-13 RX ADMIN — ACETAMINOPHEN 325MG 650 MG: 325 TABLET ORAL at 05:24

## 2024-09-13 RX ADMIN — ASPIRIN 81 MG 81 MG: 81 TABLET ORAL at 09:47

## 2024-09-13 RX ADMIN — CEFTRIAXONE SODIUM 1000 MG: 1 INJECTION, POWDER, FOR SOLUTION INTRAMUSCULAR; INTRAVENOUS at 00:21

## 2024-09-13 RX ADMIN — METOPROLOL TARTRATE 12.5 MG: 25 TABLET, FILM COATED ORAL at 10:49

## 2024-09-13 RX ADMIN — SODIUM CHLORIDE, PRESERVATIVE FREE 10 ML: 5 INJECTION INTRAVENOUS at 09:50

## 2024-09-13 ASSESSMENT — ENCOUNTER SYMPTOMS
SHORTNESS OF BREATH: 0
CHEST TIGHTNESS: 0

## 2024-09-13 ASSESSMENT — PAIN SCALES - GENERAL
PAINLEVEL_OUTOF10: 0
PAINLEVEL_OUTOF10: 0

## 2024-09-16 ENCOUNTER — APPOINTMENT (OUTPATIENT)
Dept: CARDIAC REHAB | Age: 74
End: 2024-09-16
Payer: MEDICARE

## 2024-09-16 LAB — BACTERIA BLD CULT: NORMAL

## 2024-09-18 ENCOUNTER — APPOINTMENT (OUTPATIENT)
Dept: CARDIAC REHAB | Age: 74
End: 2024-09-18
Payer: MEDICARE

## 2024-09-20 ENCOUNTER — APPOINTMENT (OUTPATIENT)
Dept: CARDIAC REHAB | Age: 74
End: 2024-09-20
Payer: MEDICARE

## 2024-09-23 ENCOUNTER — HOSPITAL ENCOUNTER (OUTPATIENT)
Dept: CARDIAC REHAB | Age: 74
Setting detail: THERAPIES SERIES
Discharge: HOME | End: 2024-09-23
Payer: MEDICARE

## 2024-09-23 PROCEDURE — G0422 INTENS CARDIAC REHAB W/EXERC: HCPCS

## 2024-09-24 ENCOUNTER — TELEPHONE (OUTPATIENT)
Dept: INFECTIOUS DISEASES | Age: 74
End: 2024-09-24

## 2024-09-24 DIAGNOSIS — N39.498 OTHER URINARY INCONTINENCE: ICD-10-CM

## 2024-09-24 DIAGNOSIS — Z87.440 HISTORY OF RECURRENT UTIS: Primary | ICD-10-CM

## 2024-09-24 RX ORDER — CEPHALEXIN 500 MG/1
500 CAPSULE ORAL 3 TIMES DAILY
Qty: 21 CAPSULE | Refills: 0 | Status: SHIPPED | OUTPATIENT
Start: 2024-09-24 | End: 2024-10-01

## 2024-09-25 ENCOUNTER — HOSPITAL ENCOUNTER (OUTPATIENT)
Dept: CARDIAC REHAB | Age: 74
Setting detail: THERAPIES SERIES
Discharge: HOME | End: 2024-09-25
Payer: MEDICARE

## 2024-09-25 PROCEDURE — G0422 INTENS CARDIAC REHAB W/EXERC: HCPCS

## 2024-09-27 ENCOUNTER — HOSPITAL ENCOUNTER (OUTPATIENT)
Dept: CARDIAC REHAB | Age: 74
Setting detail: THERAPIES SERIES
Discharge: HOME | End: 2024-09-27
Payer: MEDICARE

## 2024-09-27 DIAGNOSIS — Z87.440 HISTORY OF RECURRENT UTIS: ICD-10-CM

## 2024-09-27 DIAGNOSIS — N39.498 OTHER URINARY INCONTINENCE: ICD-10-CM

## 2024-09-27 LAB
BACTERIA URNS QL MICRO: NEGATIVE /HPF
BILIRUB UR QL STRIP: NEGATIVE
CLARITY UR: CLEAR
COLOR UR: YELLOW
CRYSTALS URNS MICRO: ABNORMAL /HPF
EPI CELLS #/AREA URNS AUTO: ABNORMAL /HPF (ref 0–5)
GLUCOSE UR STRIP-MCNC: NEGATIVE MG/DL
HGB UR QL STRIP: NEGATIVE
HYALINE CASTS #/AREA URNS AUTO: ABNORMAL /HPF (ref 0–5)
HYALINE CASTS #/AREA URNS LPF: ABNORMAL /LPF (ref 0–5)
KETONES UR STRIP-MCNC: NEGATIVE MG/DL
LEUKOCYTE ESTERASE UR QL STRIP: ABNORMAL
NITRITE UR QL STRIP: NEGATIVE
PH UR STRIP: 5.5 [PH] (ref 5–9)
PROT UR STRIP-MCNC: NEGATIVE MG/DL
RBC #/AREA URNS AUTO: ABNORMAL /HPF (ref 0–5)
RENAL EPI CELLS #/AREA URNS HPF: ABNORMAL /HPF
SP GR UR STRIP: 1.02 (ref 1–1.03)
URINE REFLEX TO CULTURE: YES
UROBILINOGEN UR STRIP-ACNC: 0.2 E.U./DL
WBC #/AREA URNS AUTO: ABNORMAL /HPF (ref 0–5)

## 2024-09-27 PROCEDURE — G0422 INTENS CARDIAC REHAB W/EXERC: HCPCS

## 2024-09-30 ENCOUNTER — HOSPITAL ENCOUNTER (OUTPATIENT)
Dept: CARDIAC REHAB | Age: 74
Setting detail: THERAPIES SERIES
Discharge: HOME | End: 2024-09-30
Payer: MEDICARE

## 2024-09-30 LAB
BACTERIA UR CULT: ABNORMAL
ORGANISM: ABNORMAL
ORGANISM: ABNORMAL

## 2024-09-30 PROCEDURE — G0422 INTENS CARDIAC REHAB W/EXERC: HCPCS

## 2024-09-30 NOTE — CARDIO/PULMONARY
Patient arrives to cardiac rehab for session. Covid screening complete. Patient denies any complaints. Patient denies changes to PMH and medication. Patient tolerates exercise without complaint.     Weekly Education; Discussed HTN (high blood pressure), what BP numbers mean, lifestyle changes to lower your blood pressure (lose weight, follow DASH diet, reduce sodium, be more active, limit alcohol), role of medication (diuretics, beta blockers, ace / arbs, calcium channel blockers) how they work and what you need to know about them. Handout offered.     Patient declined Portal [employer] education.    All equipment used in the care for this patient has been cleaned.  Electronically signed by Cha Ward RN on 9/30/2024 at 1:47 PM

## 2024-10-01 ENCOUNTER — TELEPHONE (OUTPATIENT)
Dept: INFECTIOUS DISEASES | Age: 74
End: 2024-10-01

## 2024-10-01 NOTE — TELEPHONE ENCOUNTER
Patient called earlier today to inquire of UA & Cx results. States she finished 2 courses of po Keflex. She found the UA Cx was positive. She still has discomfort. Will update ID Physician for further orders.     Per Dr Dover, patient is to be seen. Will assist with an appt.

## 2024-10-02 ENCOUNTER — OFFICE VISIT (OUTPATIENT)
Dept: INFECTIOUS DISEASES | Age: 74
End: 2024-10-02
Payer: MEDICARE

## 2024-10-02 ENCOUNTER — HOSPITAL ENCOUNTER (OUTPATIENT)
Dept: CARDIAC REHAB | Age: 74
Setting detail: THERAPIES SERIES
Discharge: HOME | End: 2024-10-02
Payer: MEDICARE

## 2024-10-02 VITALS
HEART RATE: 78 BPM | TEMPERATURE: 97.2 F | RESPIRATION RATE: 16 BRPM | DIASTOLIC BLOOD PRESSURE: 74 MMHG | HEIGHT: 68 IN | SYSTOLIC BLOOD PRESSURE: 127 MMHG | OXYGEN SATURATION: 97 % | BODY MASS INDEX: 28.13 KG/M2

## 2024-10-02 DIAGNOSIS — N10 ACUTE PYELONEPHRITIS DUE TO BACTERIA: Primary | ICD-10-CM

## 2024-10-02 DIAGNOSIS — B37.31 VAGINAL CANDIDIASIS: ICD-10-CM

## 2024-10-02 DIAGNOSIS — B96.89 ACUTE PYELONEPHRITIS DUE TO BACTERIA: Primary | ICD-10-CM

## 2024-10-02 PROCEDURE — 1111F DSCHRG MED/CURRENT MED MERGE: CPT | Performed by: INTERNAL MEDICINE

## 2024-10-02 PROCEDURE — 3078F DIAST BP <80 MM HG: CPT | Performed by: INTERNAL MEDICINE

## 2024-10-02 PROCEDURE — G8427 DOCREV CUR MEDS BY ELIG CLIN: HCPCS | Performed by: INTERNAL MEDICINE

## 2024-10-02 PROCEDURE — G0422 INTENS CARDIAC REHAB W/EXERC: HCPCS

## 2024-10-02 PROCEDURE — G8484 FLU IMMUNIZE NO ADMIN: HCPCS | Performed by: INTERNAL MEDICINE

## 2024-10-02 PROCEDURE — 3074F SYST BP LT 130 MM HG: CPT | Performed by: INTERNAL MEDICINE

## 2024-10-02 PROCEDURE — 3017F COLORECTAL CA SCREEN DOC REV: CPT | Performed by: INTERNAL MEDICINE

## 2024-10-02 PROCEDURE — 1123F ACP DISCUSS/DSCN MKR DOCD: CPT | Performed by: INTERNAL MEDICINE

## 2024-10-02 PROCEDURE — G8399 PT W/DXA RESULTS DOCUMENT: HCPCS | Performed by: INTERNAL MEDICINE

## 2024-10-02 PROCEDURE — G8419 CALC BMI OUT NRM PARAM NOF/U: HCPCS | Performed by: INTERNAL MEDICINE

## 2024-10-02 PROCEDURE — 1090F PRES/ABSN URINE INCON ASSESS: CPT | Performed by: INTERNAL MEDICINE

## 2024-10-02 PROCEDURE — 99213 OFFICE O/P EST LOW 20 MIN: CPT | Performed by: INTERNAL MEDICINE

## 2024-10-02 PROCEDURE — 1036F TOBACCO NON-USER: CPT | Performed by: INTERNAL MEDICINE

## 2024-10-02 RX ORDER — FLUCONAZOLE 150 MG/1
150 TABLET ORAL DAILY
Qty: 2 TABLET | Refills: 0 | Status: SHIPPED | OUTPATIENT
Start: 2024-10-02 | End: 2024-10-04

## 2024-10-02 RX ORDER — CIPROFLOXACIN 500 MG/1
500 TABLET, FILM COATED ORAL 2 TIMES DAILY
Qty: 20 TABLET | Refills: 0 | Status: SHIPPED | OUTPATIENT
Start: 2024-10-02 | End: 2024-10-12

## 2024-10-02 ASSESSMENT — PATIENT HEALTH QUESTIONNAIRE - PHQ9
SUM OF ALL RESPONSES TO PHQ QUESTIONS 1-9: 0
SUM OF ALL RESPONSES TO PHQ9 QUESTIONS 1 & 2: 0
1. LITTLE INTEREST OR PLEASURE IN DOING THINGS: NOT AT ALL
2. FEELING DOWN, DEPRESSED OR HOPELESS: NOT AT ALL
SUM OF ALL RESPONSES TO PHQ QUESTIONS 1-9: 0

## 2024-10-02 NOTE — PROGRESS NOTES
normocephalic and atraumatic  Eyes: anicteric sclerae  ENT: oropharynx clear and moist with normal mucous membranes. No oral thrush  Heart normal S1-S2 no murmur  Lungs: normal respiratory effort, clear lungs  Abdomen: soft, no tenderness.  Positive left CVA tenderness  No leg edema.  Positive right BKA  No erythema, no tenderness    Urine culture collected on September 27 with positive Pseudomonas and E. coli  EKG done in the hospital on September 10 with QT of 350      An electronic signature was used to authenticate this note.    --Nicolle Dover MD

## 2024-10-02 NOTE — CARDIO/PULMONARY
Patient arrives to cardiac rehab for session. Covid screening complete. Patient denies any complaints. Patient denies changes to PMH and medication. Patient tolerates exercise without complaint.     Patient had an appointment with Dr Dover today. She was started on Cipro BID for 10 days due to a recurrent UTI infection.     Patient declines Pritikin education.     All equipment used in the care for this patient has been cleaned.  Electronically signed by Cha Ward RN on 10/2/2024 at 3:59 PM

## 2024-10-04 ENCOUNTER — OFFICE VISIT (OUTPATIENT)
Dept: CARDIOLOGY CLINIC | Age: 74
End: 2024-10-04
Payer: MEDICARE

## 2024-10-04 ENCOUNTER — HOSPITAL ENCOUNTER (OUTPATIENT)
Dept: CARDIAC REHAB | Age: 74
Setting detail: THERAPIES SERIES
Discharge: HOME | End: 2024-10-04
Payer: MEDICARE

## 2024-10-04 VITALS — DIASTOLIC BLOOD PRESSURE: 70 MMHG | OXYGEN SATURATION: 98 % | SYSTOLIC BLOOD PRESSURE: 130 MMHG | HEART RATE: 76 BPM

## 2024-10-04 DIAGNOSIS — I16.0 HYPERTENSIVE URGENCY: ICD-10-CM

## 2024-10-04 DIAGNOSIS — I42.9 CARDIOMYOPATHY, UNSPECIFIED TYPE (HCC): ICD-10-CM

## 2024-10-04 DIAGNOSIS — I10 ESSENTIAL HYPERTENSION: Primary | ICD-10-CM

## 2024-10-04 PROCEDURE — G8427 DOCREV CUR MEDS BY ELIG CLIN: HCPCS | Performed by: INTERNAL MEDICINE

## 2024-10-04 PROCEDURE — 3075F SYST BP GE 130 - 139MM HG: CPT | Performed by: INTERNAL MEDICINE

## 2024-10-04 PROCEDURE — 3078F DIAST BP <80 MM HG: CPT | Performed by: INTERNAL MEDICINE

## 2024-10-04 PROCEDURE — G0422 INTENS CARDIAC REHAB W/EXERC: HCPCS

## 2024-10-04 PROCEDURE — G8484 FLU IMMUNIZE NO ADMIN: HCPCS | Performed by: INTERNAL MEDICINE

## 2024-10-04 PROCEDURE — 1090F PRES/ABSN URINE INCON ASSESS: CPT | Performed by: INTERNAL MEDICINE

## 2024-10-04 PROCEDURE — 3017F COLORECTAL CA SCREEN DOC REV: CPT | Performed by: INTERNAL MEDICINE

## 2024-10-04 PROCEDURE — G8399 PT W/DXA RESULTS DOCUMENT: HCPCS | Performed by: INTERNAL MEDICINE

## 2024-10-04 PROCEDURE — 1111F DSCHRG MED/CURRENT MED MERGE: CPT | Performed by: INTERNAL MEDICINE

## 2024-10-04 PROCEDURE — 99214 OFFICE O/P EST MOD 30 MIN: CPT | Performed by: INTERNAL MEDICINE

## 2024-10-04 PROCEDURE — G8419 CALC BMI OUT NRM PARAM NOF/U: HCPCS | Performed by: INTERNAL MEDICINE

## 2024-10-04 PROCEDURE — 1036F TOBACCO NON-USER: CPT | Performed by: INTERNAL MEDICINE

## 2024-10-04 PROCEDURE — 1123F ACP DISCUSS/DSCN MKR DOCD: CPT | Performed by: INTERNAL MEDICINE

## 2024-10-04 RX ORDER — TOPIRAMATE 25 MG/1
25 TABLET, FILM COATED ORAL 2 TIMES DAILY
Qty: 60 TABLET | Refills: 1 | COMMUNITY
Start: 2024-10-04 | End: 2024-10-04

## 2024-10-04 RX ORDER — FUROSEMIDE 20 MG
20 TABLET ORAL DAILY
Qty: 60 TABLET | Refills: 5 | Status: SHIPPED | OUTPATIENT
Start: 2024-10-04 | End: 2024-10-04

## 2024-10-04 RX ORDER — FLUTICASONE PROPIONATE 50 MCG
50 SPRAY, SUSPENSION (ML) NASAL PRN
Qty: 16 G | COMMUNITY
Start: 2024-10-04 | End: 2024-10-04

## 2024-10-04 RX ORDER — FUROSEMIDE 20 MG
20 TABLET ORAL DAILY
Qty: 90 TABLET | Refills: 3 | Status: SHIPPED | OUTPATIENT
Start: 2024-10-04

## 2024-10-04 RX ORDER — METOPROLOL SUCCINATE 50 MG/1
50 TABLET, EXTENDED RELEASE ORAL DAILY
Qty: 30 TABLET | Refills: 3 | COMMUNITY
Start: 2024-10-04 | End: 2024-10-04

## 2024-10-04 ASSESSMENT — ENCOUNTER SYMPTOMS
VOMITING: 0
ABDOMINAL PAIN: 0
COLOR CHANGE: 0
APNEA: 0
WHEEZING: 0
COUGH: 0
SHORTNESS OF BREATH: 0
NAUSEA: 0
RHINORRHEA: 0
CHEST TIGHTNESS: 0
CONSTIPATION: 0
EYE REDNESS: 0
DIARRHEA: 0

## 2024-10-04 NOTE — TELEPHONE ENCOUNTER
Requesting medication refill. Please approve or deny this request.    Rx requested:  Requested Prescriptions     Pending Prescriptions Disp Refills    furosemide (LASIX) 20 MG tablet [Pharmacy Med Name: FUROSEMIDE 20MG TABLETS] 90 tablet      Sig: TAKE 1 TABLET BY MOUTH DAILY         Last Office Visit:   10/4/2024      Next Visit Date:  Future Appointments   Date Time Provider Department Center   10/7/2024  1:00 PM SCHEDULE, [facility]   10/9/2024  1:00 PM SCHEDULE, [facility]   10/11/2024  1:00 PM SCHEDULE, [facility]   10/14/2024  1:00 PM SCHEDULE, [facility]   10/14/2024  1:00 PM Nicolle Dover MD [facility]   10/16/2024  1:00 PM SCHEDULE, [facility]   10/18/2024  1:00 PM SCHEDULE, [facility]   10/21/2024  1:00 PM SCHEDULE, [facility]   10/23/2024  1:00 PM SCHEDULE, [facility]   10/25/2024  1:00 PM SCHEDULE, [facility]   10/28/2024  1:00 PM SCHEDULE, [facility]   10/30/2024  1:00 PM SCHEDULE, [facility]   11/1/2024  1:00 PM SCHEDULE, [facility]   11/4/2024  1:00 PM SCHEDULE, [facility]   11/6/2024  1:00 PM SCHEDULE, [facility]   11/8/2024  1:00 PM SCHEDULE, [facility]   11/11/2024  1:00 PM SCHEDULE, [facility]   11/13/2024  1:00 PM SCHEDULE, [facility]   11/15/2024  1:00 PM SCHEDULE, [facility]

## 2024-10-04 NOTE — CARDIO/PULMONARY
Patient arrives to cardiac rehab for session. Covid screening complete. Patient denies any complaints. Patient denies changes to PMH and medication. Patient tolerates exercise without complaint, including independent weight training. Patient unable to attend ICR education due to appointment with Dr. Ross. Patient encouraged to attend ICR education for remainder of rehab sessions. Electronically signed by Ji Philip on 10/4/2024 at 1:46 PM

## 2024-10-04 NOTE — PROGRESS NOTES
Received from [facility]    Exercise Vital Sign     Days of Exercise per Week: 7 days     Minutes of Exercise per Session: 40 min   Stress: No Stress Concern Present (1/16/2024)    Received from [facility]    [employer] [place] [employer] - Occupational Stress Questionnaire     Feeling of Stress : Only a little   Social Connections: Moderately Isolated (1/16/2024)    Received from [facility]    Social Connection and Isolation Panel [NHANES]     Frequency of Communication with Friends and Family: More than three times a week     Frequency of Social Gatherings with Friends and Family: Once a week     Attends Taoist Services: Never     Active Member of Clubs or Organizations: Yes     Attends Club or Organization Meetings: More than 4 times per year     Marital Status:        Family History   Problem Relation Age of Onset    Cancer Mother         breast    Heart Attack Father        Current Outpatient Medications   Medication Sig Dispense Refill    topiramate (TOPAMAX) 25 MG tablet Take 1 tablet by mouth 2 times daily 60 tablet 1    fluticasone (FLONASE) 50 MCG/ACT nasal spray 50 sprays by Each Nostril route as needed 16 g     metoprolol succinate (TOPROL XL) 50 MG extended release tablet Take 1 tablet by mouth daily 30 tablet 3    ciprofloxacin (CIPRO) 500 MG tablet Take 1 tablet by mouth 2 times daily for 10 days 20 tablet 0    fluconazole (DIFLUCAN) 150 MG tablet Take 1 tablet by mouth daily for 2 days 2 tablet 0    metoprolol tartrate (LOPRESSOR) 25 MG tablet Take 0.5 tablets by mouth 2 times daily 60 tablet 3    chlorzoxazone (PARAFON FORTE) 500 MG tablet Take 1 tablet by mouth 4 times daily as needed for Muscle spasms      vitamin D (ERGOCALCIFEROL) 1.25 MG (36534 UT) CAPS capsule Take 1 capsule by mouth Once a week at 5 PM      trospium (SANCTURA) 20 MG tablet Take 1 tablet by mouth 2 times daily      ondansetron

## 2024-10-09 ENCOUNTER — HOSPITAL ENCOUNTER (OUTPATIENT)
Dept: CARDIAC REHAB | Age: 74
Setting detail: THERAPIES SERIES
Discharge: HOME | End: 2024-10-09
Payer: MEDICARE

## 2024-10-09 PROCEDURE — G0422 INTENS CARDIAC REHAB W/EXERC: HCPCS

## 2024-10-11 ENCOUNTER — HOSPITAL ENCOUNTER (OUTPATIENT)
Dept: CARDIAC REHAB | Age: 74
Setting detail: THERAPIES SERIES
End: 2024-10-11
Payer: MEDICARE

## 2024-10-11 NOTE — CARDIO/PULMONARY
Patient arrives to cardiac rehab for session. Covid screening complete. Patient denies any complaints. Patient denies changes to PMH and medication. Patient tolerates exercise.    Patient updated Cardiac Rehab staff regarding her appointment on Friday  with Dr Ross and  her med changes. Patient states she took 40mg Lasix Sat/Sun but did not notice a significant change in output. Patient states she is drinking 12-12oz bottles of water daily.  Patient noted to have EF of 30-35%. Patient continues to report shortness of breath with exertion. Lungs Clear. SaO2 97% RA, no leg/pedal edema noted. Message sent to Dr Ross for clarification of how much water patient should be drinking.    Patient to weigh herself daily.    Patient declined Pritikin Education.     All equipment used in the care of this patient has been cleaned.   Electronically signed by Kathi Cummings RN on 10/9/2024 at 2:09 PM   
Cholesterol  <30 mg/dL 25   TC:HDL Ratio  <5.10 2.85   LDL Cholesterol  <100 mg/dL 49   Comment: <100 mg/dL, Optimal   100-129 mg/dL, Near optimal/above optimal   130-159 mg/dL, Borderline high   160-189 mg/dL, High  >189 mg/dL, Very high  Secondary prevention optimal LDL Cholesterol levels are recommended to be < 70 mg/dL   LDL:HDL Ratio  <2.54 1.23   Comment: Reference:  1. National Cholesterol Education Program ATP III Guideline At-A-Glance Quick Desk Reference: National Heart, Lung, and Blood [place]. National Institutes of Health. 2001: NIH Publication No. .  2. An International Atherosclerosis Society position paper: global recommendations for the management of dyslipidemia: executive summary, Atherosclerosis. 2014: 232(2):410-413.   Resulting Agency [facility]                Specimen Collected: 05/29/24 14:34     Performed by: [facility] Last Resulted: 05/29/24 15:01   Received From: [facility]  Result Received: 07/25/24 13:22        HEMOGLOBIN A1C  Order: [id number]  Component  Ref Range & Units 5/29/24 1434   Hemoglobin A1C  4.3 - 5.6 % 6.2 High    Comment: American Diabetes Association guidelines indicate that patients with HgbA1c in the range 5.7-6.4% are at increased risk for development of diabetes, and intervention by lifestyle modification may be beneficial. HgbA1c greater or equal to 6.5% is considered diagnostic of diabetes.   Estimated Average Glucose  mg/dL 131   Comment: eAG: (Estimated average glucose) is a calculated value from HgbA1c and is representative of the average blood glucose level in the last 2-3 month period.      Glucose  74 - 99 mg/dL 87 6/12/2024   Comment: The American Diabetes Association (ADA) provides guidance for cutoff values for fasting glucose and random glucose. The ADA defines fasting as no caloric intake for at least 8 hours. Fasting plasma glucose results between 100 to 125 mg/dL indicate increased risk for diabetes (prediabetes).  Fasting

## 2024-10-14 ENCOUNTER — HOSPITAL ENCOUNTER (OUTPATIENT)
Dept: CARDIAC REHAB | Age: 74
Setting detail: THERAPIES SERIES
Discharge: HOME | End: 2024-10-14
Payer: MEDICARE

## 2024-10-14 PROCEDURE — G0422 INTENS CARDIAC REHAB W/EXERC: HCPCS

## 2024-10-14 NOTE — CARDIO/PULMONARY
Patient arrives to cardiac rehab for session. Covid screening complete. Patient denies any complaints. Patient denies changes to PMH and medication. Patient tolerates exercise without complaint.     Weekly Education: Discussed blood thinners, (anticoagulants and antiplatelets), dietary factors, how they affect the body, and safety precautions. Handout offered.      Patient declined Pritikin Education.    All equipment used in the care of this patient has been cleaned.   Electronically signed by Kathi Cummings RN on 10/14/2024 at 3:10 PM

## 2024-10-16 ENCOUNTER — HOSPITAL ENCOUNTER (OUTPATIENT)
Dept: CARDIAC REHAB | Age: 74
Setting detail: THERAPIES SERIES
Discharge: HOME | End: 2024-10-16
Payer: MEDICARE

## 2024-10-16 ENCOUNTER — TELEPHONE (OUTPATIENT)
Dept: CARDIOLOGY CLINIC | Age: 74
End: 2024-10-16

## 2024-10-16 VITALS — WEIGHT: 181.8 LBS | BODY MASS INDEX: 27.55 KG/M2 | HEIGHT: 68 IN

## 2024-10-16 PROCEDURE — G0423 INTENS CARDIAC REHAB NO EXER: HCPCS

## 2024-10-16 PROCEDURE — G0422 INTENS CARDIAC REHAB W/EXERC: HCPCS

## 2024-10-16 RX ORDER — FUROSEMIDE 20 MG
20 TABLET ORAL 2 TIMES DAILY
Qty: 180 TABLET | Refills: 3 | Status: SHIPPED | OUTPATIENT
Start: 2024-10-16

## 2024-10-16 NOTE — TELEPHONE ENCOUNTER
Per Dr. Ross, patient should be restricted to 2L fluid/day.   Once patient has the ordered Echo done, make appointment to follow up in the clinic.  Find out if patient taking lasix 20 daily, if she is and still feeling bloated, she can increase to 20mg BID.    Called patient to notify her of Dr. Ross's message. No answer, voicemail left for patient to call back to the office.    ----- Message from ALEXEY PETERS RN sent at 10/16/2024  1:48 PM EDT -----  Regarding: fluid retention  Dr. Ross    We sent a communication last week regarding concerns with patient's fluid intake. Patient reported she was drinking 12-12oz bottles of water a day. Since then, Patient has decreased her intake to 6-12oz bottles of water and 1 bottle of gatorade a day. Patient noted to have about a 5 pound weight gain over the past week from 177lbs to 181.8 pounds. Patient reports feeling bloated in her abdomen. Can you Please advise if patient should be on a specified fluid restriction or if any further intervention is needed?    Thank You  Kathi BLACKBURN  Cardiac Rehab.

## 2024-10-16 NOTE — TELEPHONE ENCOUNTER
Spoke to Kathi in Cardiac Rehab. Patient is currently at cardiac rehab now. Patient states that she did start the Lasix 20mg daily and would like to incrase to 20mg BID. New prescription sent to [facility] with new frequency. Patient will schedule echo and will call the office to schedule follow up once her echo is scheduled.

## 2024-10-16 NOTE — CARDIO/PULMONARY
[facility]  Cardiac Rehabilitation Department    Ashley WALDEN.:  1950    MRN:  [id number]    Date: 10/16/2024      Session Length:  60 min       EXERCISE WORKSHOP:  Heart Disease & Risk Reduction (Managing Heart Disease Part 2)                      The purpose of this lesson is to provide a high-level overview of the heart, heart disease, and how the Pritikin lifestyle positively impacts risk factors.  At the conclusion of this workshop, ICR patients will understand the tests, procedures, medications and lifestyle changes to improve cardiovascular condition. Additionally, they will understand how Pritikin’s three pillars impact the risk factors, the progression, and the management of heart disease.    Readiness to change:    ( ) Pre-contemplative   ( ) Contemplative - ambivalent about change    (x) Action - ready to set action plan and implement   ( ) Maintenance - has made change and is trying, and or practicing different alternative behaviors     Additional Notes:  Patient participated in workshop.     Ashley was in the Workshop with Cha BLACKBURN for 60 minutes.  The content was presented via Powerpoint, lecture, and patient participation based format.  Motivational interviewing was utilized when needed, to promote change.  Patient voiced understanding.    Electronically signed by Cha Ward RN on 10/16/2024 at 3:01 PM

## 2024-10-16 NOTE — CARDIO/PULMONARY
Patient arrives to cardiac rehab for session. Covid screening complete. Patient denies any complaints. Patient denies changes to PMH and medication. Patient tolerates exercise without complaint.     Patient noted to have about 5 pound weight gain over the past week from 177 to 181.8 pounds. Dr Ross messaged regarding her weight gain, feeling bloated in her abdomen, and if patient should have a fluid restriction or if any further intervention is needed.     Update: Per Dr Ross office patient to increase lasix to 20mg BID and restrict fluid intake to 2L per day. Patient updated on POC and verbalized understanding. Patient aware once echo is scheduled to schedule follow up with Dr Ross office.     Patient attended PritiMimetas Workshop \"Managing Heart Disease-Part 2.\"    All equipment used in the care of this patient has been cleaned.   Electronically signed by Kathi Cummings RN on 10/16/2024 at 2:10 PM

## 2024-10-18 ENCOUNTER — HOSPITAL ENCOUNTER (OUTPATIENT)
Dept: CARDIAC REHAB | Age: 74
Setting detail: THERAPIES SERIES
Discharge: HOME | End: 2024-10-18
Payer: MEDICARE

## 2024-10-18 PROCEDURE — G0422 INTENS CARDIAC REHAB W/EXERC: HCPCS

## 2024-10-18 NOTE — TELEPHONE ENCOUNTER
Requesting medication refill. Please approve or deny this request.    Rx requested:  Requested Prescriptions     Pending Prescriptions Disp Refills    sacubitril-valsartan (ENTRESTO) 24-26 MG per tablet 180 tablet 3     Sig: Take 1 tablet by mouth 2 times daily         Last Office Visit:   10/4/2024      Next Visit Date:  Future Appointments   Date Time Provider Department Center   10/18/2024  1:00 PM SCHEDULE, [facility]   10/21/2024  1:00 PM SCHEDULE, [facility]   10/23/2024  1:00 PM SCHEDULE, [facility]   10/25/2024  1:00 PM SCHEDULE, [facility]   10/28/2024  1:00 PM SCHEDULE, [facility]   10/30/2024  1:00 PM SCHEDULE, [facility]   11/1/2024  1:00 PM SCHEDULE, [facility]   11/4/2024  1:00 PM SCHEDULE, [facility]   11/6/2024  1:00 PM SCHEDULE, [facility]   11/8/2024  1:00 PM SCHEDULE, [facility]   11/11/2024  1:00 PM SCHEDULE, [facility]   11/13/2024  1:00 PM SCHEDULE, [facility]   11/14/2024  2:30 PM [facility] ECHO 1 [facility]   11/15/2024  1:00 PM SCHEDULE, [facility]   11/15/2024  2:45 PM Ross, Rohit, MD [facility]               Please approve or deny.

## 2024-10-18 NOTE — CARDIO/PULMONARY
Patient arrives to cardiac rehab for session. Covid screening complete. Patient denies any complaints. Patient denies changes to PMH and medication. Patient tolerates exercise without complaint.     Patient declines Pritikin education.    All equipment used in the care for this patient has been cleaned.  Electronically signed by Cha Ward RN on 10/18/2024 at 3:12 PM

## 2024-10-21 ENCOUNTER — HOSPITAL ENCOUNTER (OUTPATIENT)
Dept: CARDIAC REHAB | Age: 74
Setting detail: THERAPIES SERIES
Discharge: HOME | End: 2024-10-21
Payer: MEDICARE

## 2024-10-21 PROCEDURE — G0422 INTENS CARDIAC REHAB W/EXERC: HCPCS

## 2024-10-21 NOTE — CARDIO/PULMONARY
Patient arrives to cardiac rehab for session. Covid screening complete. Patient denies any complaints. Patient denies changes to PMH and medication. Patient tolerates exercise without complaint.     Patient given appointment information  for Echo scheduled on 11/14 and follow up with Dr Ross on 11/15.    Weekly Education: Discussed Fall Awareness, Risk Factors, Prevention, and What to do if a fall occurs. Handout offered.       Patient declined Pritikin Education.     All equipment used in the care of this patient has been cleaned.   Electronically signed by Kathi Cummings RN on 10/21/2024 at 2:06 PM

## 2024-10-23 ENCOUNTER — TELEPHONE (OUTPATIENT)
Dept: CARDIOLOGY CLINIC | Age: 74
End: 2024-10-23

## 2024-10-23 ENCOUNTER — APPOINTMENT (OUTPATIENT)
Dept: GENERAL RADIOLOGY | Age: 74
End: 2024-10-23
Payer: MEDICARE

## 2024-10-23 ENCOUNTER — HOSPITAL ENCOUNTER (OUTPATIENT)
Dept: CARDIAC REHAB | Age: 74
Setting detail: THERAPIES SERIES
Discharge: HOME | End: 2024-10-23
Payer: MEDICARE

## 2024-10-23 ENCOUNTER — HOSPITAL ENCOUNTER (EMERGENCY)
Age: 74
Discharge: HOME | End: 2024-10-23
Attending: EMERGENCY MEDICINE
Payer: MEDICARE

## 2024-10-23 VITALS
WEIGHT: 180 LBS | DIASTOLIC BLOOD PRESSURE: 61 MMHG | BODY MASS INDEX: 27.28 KG/M2 | OXYGEN SATURATION: 94 % | HEIGHT: 68 IN | TEMPERATURE: 97.5 F | RESPIRATION RATE: 17 BRPM | HEART RATE: 82 BPM | SYSTOLIC BLOOD PRESSURE: 133 MMHG

## 2024-10-23 DIAGNOSIS — R60.9 FLUID RETENTION: Primary | ICD-10-CM

## 2024-10-23 LAB
ALBUMIN SERPL-MCNC: 4 G/DL (ref 3.5–4.6)
ALP SERPL-CCNC: 93 U/L (ref 40–130)
ALT SERPL-CCNC: 12 U/L (ref 0–33)
ANION GAP SERPL CALCULATED.3IONS-SCNC: 10 MEQ/L (ref 9–15)
AST SERPL-CCNC: 18 U/L (ref 0–35)
BACTERIA URNS QL MICRO: NEGATIVE /HPF
BASOPHILS # BLD: 0 K/UL (ref 0–0.2)
BASOPHILS NFR BLD: 0.5 %
BILIRUB SERPL-MCNC: 0.3 MG/DL (ref 0.2–0.7)
BILIRUB UR QL STRIP: ABNORMAL
BNP BLD-MCNC: 685 PG/ML
BUN SERPL-MCNC: 32 MG/DL (ref 8–23)
CALCIUM SERPL-MCNC: 8.8 MG/DL (ref 8.5–9.9)
CHLORIDE SERPL-SCNC: 102 MEQ/L (ref 95–107)
CLARITY UR: CLEAR
CO2 SERPL-SCNC: 25 MEQ/L (ref 20–31)
COLOR UR: ABNORMAL
CREAT SERPL-MCNC: 0.84 MG/DL (ref 0.5–0.9)
EOSINOPHIL # BLD: 0 K/UL (ref 0–0.7)
EOSINOPHIL NFR BLD: 0.9 %
EPI CELLS #/AREA URNS AUTO: ABNORMAL /HPF (ref 0–5)
ERYTHROCYTE [DISTWIDTH] IN BLOOD BY AUTOMATED COUNT: 14.1 % (ref 11.5–14.5)
GLOBULIN SER CALC-MCNC: 3.4 G/DL (ref 2.3–3.5)
GLUCOSE SERPL-MCNC: 111 MG/DL (ref 70–99)
GLUCOSE UR STRIP-MCNC: NEGATIVE MG/DL
HCT VFR BLD AUTO: 30.9 % (ref 37–47)
HGB BLD-MCNC: 10.1 G/DL (ref 12–16)
HGB UR QL STRIP: NEGATIVE
HYALINE CASTS #/AREA URNS AUTO: ABNORMAL /HPF (ref 0–5)
KETONES UR STRIP-MCNC: NEGATIVE MG/DL
LEUKOCYTE ESTERASE UR QL STRIP: ABNORMAL
LYMPHOCYTES # BLD: 1 K/UL (ref 1–4.8)
LYMPHOCYTES NFR BLD: 22.9 %
MCH RBC QN AUTO: 33.7 PG (ref 27–31.3)
MCHC RBC AUTO-ENTMCNC: 32.7 % (ref 33–37)
MCV RBC AUTO: 103 FL (ref 79.4–94.8)
MONOCYTES # BLD: 0.3 K/UL (ref 0.2–0.8)
MONOCYTES NFR BLD: 7.8 %
NEUTROPHILS # BLD: 2.9 K/UL (ref 1.4–6.5)
NEUTS SEG NFR BLD: 67.7 %
NITRITE UR QL STRIP: POSITIVE
PH UR STRIP: 5.5 [PH] (ref 5–9)
PLATELET # BLD AUTO: 154 K/UL (ref 130–400)
POTASSIUM SERPL-SCNC: 4.6 MEQ/L (ref 3.4–4.9)
PROT SERPL-MCNC: 7.4 G/DL (ref 6.3–8)
PROT UR STRIP-MCNC: NEGATIVE MG/DL
RBC # BLD AUTO: 3 M/UL (ref 4.2–5.4)
RBC #/AREA URNS AUTO: ABNORMAL /HPF (ref 0–5)
SODIUM SERPL-SCNC: 137 MEQ/L (ref 135–144)
SP GR UR STRIP: 1.01 (ref 1–1.03)
URINE REFLEX TO CULTURE: ABNORMAL
UROBILINOGEN UR STRIP-ACNC: 1 E.U./DL
WBC # BLD AUTO: 4.2 K/UL (ref 4.8–10.8)
WBC #/AREA URNS AUTO: ABNORMAL /HPF (ref 0–5)

## 2024-10-23 PROCEDURE — 85025 COMPLETE CBC W/AUTO DIFF WBC: CPT

## 2024-10-23 PROCEDURE — 36415 COLL VENOUS BLD VENIPUNCTURE: CPT

## 2024-10-23 PROCEDURE — 6360000002 HC RX W HCPCS: Performed by: EMERGENCY MEDICINE

## 2024-10-23 PROCEDURE — 93005 ELECTROCARDIOGRAM TRACING: CPT | Performed by: EMERGENCY MEDICINE

## 2024-10-23 PROCEDURE — 83880 ASSAY OF NATRIURETIC PEPTIDE: CPT

## 2024-10-23 PROCEDURE — 71045 X-RAY EXAM CHEST 1 VIEW: CPT

## 2024-10-23 PROCEDURE — 80053 COMPREHEN METABOLIC PANEL: CPT

## 2024-10-23 PROCEDURE — 81001 URINALYSIS AUTO W/SCOPE: CPT

## 2024-10-23 PROCEDURE — 99285 EMERGENCY DEPT VISIT HI MDM: CPT

## 2024-10-23 PROCEDURE — 96374 THER/PROPH/DIAG INJ IV PUSH: CPT

## 2024-10-23 RX ORDER — FUROSEMIDE 10 MG/ML
80 INJECTION INTRAMUSCULAR; INTRAVENOUS ONCE
Status: COMPLETED | OUTPATIENT
Start: 2024-10-23 | End: 2024-10-23

## 2024-10-23 RX ORDER — SULFAMETHOXAZOLE AND TRIMETHOPRIM 800; 160 MG/1; MG/1
1 TABLET ORAL 2 TIMES DAILY
Qty: 14 TABLET | Refills: 0 | Status: SHIPPED | OUTPATIENT
Start: 2024-10-23 | End: 2024-10-30

## 2024-10-23 RX ORDER — FLUCONAZOLE 150 MG/1
150 TABLET ORAL ONCE
Status: DISCONTINUED | OUTPATIENT
Start: 2024-10-23 | End: 2024-10-23 | Stop reason: HOSPADM

## 2024-10-23 RX ORDER — FLUCONAZOLE 150 MG/1
150 TABLET ORAL ONCE
Qty: 1 TABLET | Refills: 0 | Status: SHIPPED | OUTPATIENT
Start: 2024-10-23 | End: 2024-10-23

## 2024-10-23 RX ORDER — SULFAMETHOXAZOLE AND TRIMETHOPRIM 800; 160 MG/1; MG/1
1 TABLET ORAL ONCE
Status: DISCONTINUED | OUTPATIENT
Start: 2024-10-23 | End: 2024-10-23 | Stop reason: HOSPADM

## 2024-10-23 RX ADMIN — FUROSEMIDE 80 MG: 10 INJECTION, SOLUTION INTRAMUSCULAR; INTRAVENOUS at 15:48

## 2024-10-23 ASSESSMENT — PAIN DESCRIPTION - LOCATION: LOCATION: BACK

## 2024-10-23 ASSESSMENT — ENCOUNTER SYMPTOMS
CHEST TIGHTNESS: 0
NAUSEA: 0
SORE THROAT: 0
EYE PAIN: 0
SHORTNESS OF BREATH: 1
ABDOMINAL PAIN: 0
VOMITING: 0

## 2024-10-23 ASSESSMENT — PAIN - FUNCTIONAL ASSESSMENT: PAIN_FUNCTIONAL_ASSESSMENT: 0-10

## 2024-10-23 ASSESSMENT — PAIN DESCRIPTION - ORIENTATION: ORIENTATION: LOWER

## 2024-10-23 ASSESSMENT — PAIN SCALES - GENERAL: PAINLEVEL_OUTOF10: 3

## 2024-10-23 NOTE — TELEPHONE ENCOUNTER
Ji from Cardiac Rehab calling to let Dr Ross and Lilian know that had a 4.5 lb weight gain and has symptoms still from 10/16/24.    States that pt is taking lasix as prescribed.

## 2024-10-23 NOTE — TELEPHONE ENCOUNTER
Called Ji back regarding message about patient having weight gain. Per Ji, patient is following 2L/day fluid restriction and taking her medications as directed. Patient is not experiencing any shortness of breath or swelling in her lower extremities. Patient states that she feels bloated in her abdomen area.     Explained that Dr. Ross is out of the office until next Wednesday. Ji will call patient's PCP to see if he can see her sooner than Wednesday and will call back if patient needs to be scheduled for a sooner appointment with Dr. Ross.

## 2024-10-23 NOTE — ED PROVIDER NOTES
[facility] ED  EMERGENCY DEPARTMENT ENCOUNTER      Pt Name: Ashley Summers  MRN: [id number]  Birthdate 1950  Date of evaluation: 10/23/2024  Provider: Chloe Meyer DO    CHIEF COMPLAINT       Chief Complaint   Patient presents with    Shortness of Breath     Was told to come down from cardiac rehab for increased sob, 5 lb weight gain in 1 week also co UTI like symptoms          HISTORY OF PRESENT ILLNESS   (Location/Symptom, Timing/Onset, Context/Setting, Quality, Duration, Modifying Factors, Severity)  Note limiting factors.   Ashley Summers is a 74 y.o. female who presents to the emergency department .  Patient came down from cardiac rehab because she was feeling short of breath.  She had a 5 pound weight gain in the last week despite taking Lasix 20 mg twice a day.  Patient states that she has been taking 20 mg twice a day for the last week.  No peripheral edema but patient states that when she puts on water weight is usually in her abdomen.  She feels like she is not urinating very much and she does have some dysuria.  Concerned about UTI because she has had some severe urinary tract infections that required hospitalization.  Patient has been seen by infectious disease in the past.    HPI    Nursing Notes were reviewed.    REVIEW OF SYSTEMS    (2-9 systems for level 4, 10 or more for level 5)     Review of Systems   Constitutional:  Negative for activity change, appetite change and fatigue.   HENT:  Negative for congestion and sore throat.    Eyes:  Negative for pain and visual disturbance.   Respiratory:  Positive for shortness of breath. Negative for chest tightness.    Cardiovascular:  Negative for chest pain.   Gastrointestinal:  Negative for abdominal pain, nausea and vomiting.   Endocrine: Negative for polydipsia.   Genitourinary:  Negative for flank pain and urgency.   Musculoskeletal:  Negative for gait problem and neck stiffness.   Skin:  Negative for rash.   Neurological:  Negative for

## 2024-10-23 NOTE — CARDIO/PULMONARY
Patient agreeable to be taken to ER via wheelchair as advised by her physicians due to increasing back pain, urinary symptoms, increased abdominal swelling, & increased weight gain despite outpatient treatment.

## 2024-10-24 LAB
EKG ATRIAL RATE: 74 BPM
EKG P AXIS: 70 DEGREES
EKG P-R INTERVAL: 160 MS
EKG Q-T INTERVAL: 408 MS
EKG QRS DURATION: 96 MS
EKG QTC CALCULATION (BAZETT): 452 MS
EKG R AXIS: 5 DEGREES
EKG T AXIS: 15 DEGREES
EKG VENTRICULAR RATE: 74 BPM

## 2024-10-24 PROCEDURE — 93010 ELECTROCARDIOGRAM REPORT: CPT | Performed by: INTERNAL MEDICINE

## 2024-10-25 ENCOUNTER — HOSPITAL ENCOUNTER (OUTPATIENT)
Dept: CARDIAC REHAB | Age: 74
Setting detail: THERAPIES SERIES
Discharge: HOME | End: 2024-10-25
Payer: MEDICARE

## 2024-10-25 PROCEDURE — G0422 INTENS CARDIAC REHAB W/EXERC: HCPCS

## 2024-10-25 NOTE — CARDIO/PULMONARY
Patient arrives to cardiac rehab for session. Covid screening complete. Patient denies any complaints. Patient denies changes to PMH and medication. Patient tolerates exercise without complaint.     Patient declined Pritikin Education.    Patient still endorses some shortness of breath with walking, patient is down 2 pounds from her last weight in CR. Patient started on Bactrim DS by ER on Weds and is planning on having a friend  her diflucan tonight. Awaiting urine culture and sensitivity. Patient states she was instructed by ER to take 40mg Lasix in the AM and 20mg Lasix in the PM for 4 days before returning to her regular dose. Patient states weds night after she left the ER her blood pressure was in the 90's and she was concerned to sleep so she drank water throughout the night and by morning her BP was better. Patient aware to let CR staff know if she has any changes in her symptoms.     All equipment used in the care of this patient has been cleaned.   Electronically signed by Kathi Cummings RN on 10/25/2024 at 2:40 PM

## 2024-10-28 ENCOUNTER — APPOINTMENT (OUTPATIENT)
Dept: CT IMAGING | Age: 74
End: 2024-10-28
Payer: MEDICARE

## 2024-10-28 ENCOUNTER — HOSPITAL ENCOUNTER (OUTPATIENT)
Dept: CARDIAC REHAB | Age: 74
Setting detail: THERAPIES SERIES
End: 2024-10-28
Payer: MEDICARE

## 2024-10-28 ENCOUNTER — APPOINTMENT (OUTPATIENT)
Dept: GENERAL RADIOLOGY | Age: 74
End: 2024-10-28
Payer: MEDICARE

## 2024-10-28 ENCOUNTER — HOSPITAL ENCOUNTER (OUTPATIENT)
Age: 74
Setting detail: OBSERVATION
Discharge: HOME | End: 2024-10-29
Attending: EMERGENCY MEDICINE | Admitting: STUDENT IN AN ORGANIZED HEALTH CARE EDUCATION/TRAINING PROGRAM
Payer: MEDICARE

## 2024-10-28 DIAGNOSIS — R09.02 HYPOXIA: Primary | ICD-10-CM

## 2024-10-28 DIAGNOSIS — R42 LIGHTHEADEDNESS: ICD-10-CM

## 2024-10-28 DIAGNOSIS — I42.9 CARDIOMYOPATHY, UNSPECIFIED TYPE (HCC): ICD-10-CM

## 2024-10-28 DIAGNOSIS — R06.00 DYSPNEA, UNSPECIFIED TYPE: ICD-10-CM

## 2024-10-28 PROBLEM — R06.09 DYSPNEA ON EXERTION: Status: ACTIVE | Noted: 2024-10-28

## 2024-10-28 LAB
ALBUMIN SERPL-MCNC: 3.7 G/DL (ref 3.5–4.6)
ALP SERPL-CCNC: 96 U/L (ref 40–130)
ALT SERPL-CCNC: 15 U/L (ref 0–33)
ANION GAP SERPL CALCULATED.3IONS-SCNC: 16 MEQ/L (ref 9–15)
AST SERPL-CCNC: 30 U/L (ref 0–35)
BACTERIA URNS QL MICRO: NEGATIVE /HPF
BASE EXCESS ARTERIAL: 3 (ref -3–3)
BASOPHILS # BLD: 0 K/UL (ref 0–0.2)
BASOPHILS NFR BLD: 0.4 %
BILIRUB SERPL-MCNC: 0.5 MG/DL (ref 0.2–0.7)
BILIRUB UR QL STRIP: NEGATIVE
BNP BLD-MCNC: 530 PG/ML
BUN SERPL-MCNC: 32 MG/DL (ref 8–23)
CALCIUM IONIZED: 1.13 MMOL/L (ref 1.12–1.32)
CALCIUM SERPL-MCNC: 8.6 MG/DL (ref 8.5–9.9)
CHLORIDE SERPL-SCNC: 93 MEQ/L (ref 95–107)
CLARITY UR: CLEAR
CO2 SERPL-SCNC: 24 MEQ/L (ref 20–31)
COLOR UR: ABNORMAL
CREAT SERPL-MCNC: 1.22 MG/DL (ref 0.5–0.9)
EOSINOPHIL # BLD: 0.1 K/UL (ref 0–0.7)
EOSINOPHIL NFR BLD: 0.9 %
EPI CELLS #/AREA URNS AUTO: ABNORMAL /HPF (ref 0–5)
ERYTHROCYTE [DISTWIDTH] IN BLOOD BY AUTOMATED COUNT: 13.7 % (ref 11.5–14.5)
GLOBULIN SER CALC-MCNC: 3.6 G/DL (ref 2.3–3.5)
GLUCOSE BLD-MCNC: 228 MG/DL (ref 70–99)
GLUCOSE BLD-MCNC: 425 MG/DL (ref 70–99)
GLUCOSE BLD-MCNC: 571 MG/DL (ref 70–99)
GLUCOSE SERPL-MCNC: 174 MG/DL (ref 70–99)
GLUCOSE UR STRIP-MCNC: NEGATIVE MG/DL
HCO3 ARTERIAL: 27.2 MMOL/L (ref 21–29)
HCT VFR BLD AUTO: 35.1 % (ref 37–47)
HCT VFR BLD AUTO: 36 % (ref 36–48)
HGB BLD CALC-MCNC: 12.3 GM/DL (ref 12–16)
HGB BLD-MCNC: 11.1 G/DL (ref 12–16)
HGB UR QL STRIP: NEGATIVE
HYALINE CASTS #/AREA URNS AUTO: ABNORMAL /HPF (ref 0–5)
INR PPP: 1.1
KETONES UR STRIP-MCNC: NEGATIVE MG/DL
LACTATE: 2.52 MMOL/L (ref 0.4–2)
LEUKOCYTE ESTERASE UR QL STRIP: ABNORMAL
LYMPHOCYTES # BLD: 1.7 K/UL (ref 1–4.8)
LYMPHOCYTES NFR BLD: 32 %
MCH RBC QN AUTO: 32.4 PG (ref 27–31.3)
MCHC RBC AUTO-ENTMCNC: 31.6 % (ref 33–37)
MCV RBC AUTO: 102.3 FL (ref 79.4–94.8)
MONOCYTES # BLD: 0.4 K/UL (ref 0.2–0.8)
MONOCYTES NFR BLD: 6.9 %
NEUTROPHILS # BLD: 3.2 K/UL (ref 1.4–6.5)
NEUTS SEG NFR BLD: 59.4 %
NITRITE UR QL STRIP: POSITIVE
O2 SAT, ARTERIAL: 99 % (ref 93–100)
PCO2 ARTERIAL: 41 MM HG (ref 35–45)
PERFORMED ON: ABNORMAL
PH ARTERIAL: 7.43 (ref 7.35–7.45)
PH UR STRIP: 5.5 [PH] (ref 5–9)
PLATELET # BLD AUTO: 153 K/UL (ref 130–400)
PO2 ARTERIAL: 117 MM HG (ref 75–108)
POC CHLORIDE: 99 MEQ/L (ref 99–110)
POC CREATININE: 1.1 MG/DL (ref 0.6–1.2)
POC FIO2: 2
POC SAMPLE TYPE: ABNORMAL
POTASSIUM SERPL-SCNC: 3.8 MEQ/L (ref 3.5–5.1)
POTASSIUM SERPL-SCNC: 4.2 MEQ/L (ref 3.4–4.9)
PROT SERPL-MCNC: 7.3 G/DL (ref 6.3–8)
PROT UR STRIP-MCNC: NEGATIVE MG/DL
PROTHROMBIN TIME: 14.3 SEC (ref 12.3–14.9)
RBC # BLD AUTO: 3.43 M/UL (ref 4.2–5.4)
RBC #/AREA URNS AUTO: ABNORMAL /HPF (ref 0–5)
SODIUM BLD-SCNC: 137 MEQ/L (ref 136–145)
SODIUM SERPL-SCNC: 133 MEQ/L (ref 135–144)
SP GR UR STRIP: 1.01 (ref 1–1.03)
TCO2 ARTERIAL: 28 MMOL/L (ref 21–32)
TROPONIN, HIGH SENSITIVITY: 14 NG/L (ref 0–19)
TROPONIN, HIGH SENSITIVITY: 14 NG/L (ref 0–19)
URINE REFLEX TO CULTURE: ABNORMAL
UROBILINOGEN UR STRIP-ACNC: 1 E.U./DL
WBC # BLD AUTO: 5.4 K/UL (ref 4.8–10.8)
WBC #/AREA URNS AUTO: ABNORMAL /HPF (ref 0–5)

## 2024-10-28 PROCEDURE — 83880 ASSAY OF NATRIURETIC PEPTIDE: CPT

## 2024-10-28 PROCEDURE — 71045 X-RAY EXAM CHEST 1 VIEW: CPT

## 2024-10-28 PROCEDURE — 6360000002 HC RX W HCPCS: Performed by: STUDENT IN AN ORGANIZED HEALTH CARE EDUCATION/TRAINING PROGRAM

## 2024-10-28 PROCEDURE — 6360000004 HC RX CONTRAST MEDICATION: Performed by: EMERGENCY MEDICINE

## 2024-10-28 PROCEDURE — 85025 COMPLETE CBC W/AUTO DIFF WBC: CPT

## 2024-10-28 PROCEDURE — 71275 CT ANGIOGRAPHY CHEST: CPT

## 2024-10-28 PROCEDURE — 82565 ASSAY OF CREATININE: CPT

## 2024-10-28 PROCEDURE — 6360000002 HC RX W HCPCS: Performed by: EMERGENCY MEDICINE

## 2024-10-28 PROCEDURE — 6370000000 HC RX 637 (ALT 250 FOR IP): Performed by: INTERNAL MEDICINE

## 2024-10-28 PROCEDURE — 83605 ASSAY OF LACTIC ACID: CPT

## 2024-10-28 PROCEDURE — 85610 PROTHROMBIN TIME: CPT

## 2024-10-28 PROCEDURE — 36600 WITHDRAWAL OF ARTERIAL BLOOD: CPT

## 2024-10-28 PROCEDURE — 81001 URINALYSIS AUTO W/SCOPE: CPT

## 2024-10-28 PROCEDURE — 99285 EMERGENCY DEPT VISIT HI MDM: CPT

## 2024-10-28 PROCEDURE — 85014 HEMATOCRIT: CPT

## 2024-10-28 PROCEDURE — 84295 ASSAY OF SERUM SODIUM: CPT

## 2024-10-28 PROCEDURE — 82330 ASSAY OF CALCIUM: CPT

## 2024-10-28 PROCEDURE — 96361 HYDRATE IV INFUSION ADD-ON: CPT

## 2024-10-28 PROCEDURE — 6370000000 HC RX 637 (ALT 250 FOR IP): Performed by: STUDENT IN AN ORGANIZED HEALTH CARE EDUCATION/TRAINING PROGRAM

## 2024-10-28 PROCEDURE — G0378 HOSPITAL OBSERVATION PER HR: HCPCS

## 2024-10-28 PROCEDURE — 84484 ASSAY OF TROPONIN QUANT: CPT

## 2024-10-28 PROCEDURE — 96372 THER/PROPH/DIAG INJ SC/IM: CPT

## 2024-10-28 PROCEDURE — 80053 COMPREHEN METABOLIC PANEL: CPT

## 2024-10-28 PROCEDURE — 2580000003 HC RX 258: Performed by: EMERGENCY MEDICINE

## 2024-10-28 PROCEDURE — 82803 BLOOD GASES ANY COMBINATION: CPT

## 2024-10-28 PROCEDURE — 82435 ASSAY OF BLOOD CHLORIDE: CPT

## 2024-10-28 PROCEDURE — 2580000003 HC RX 258: Performed by: STUDENT IN AN ORGANIZED HEALTH CARE EDUCATION/TRAINING PROGRAM

## 2024-10-28 PROCEDURE — 36415 COLL VENOUS BLD VENIPUNCTURE: CPT

## 2024-10-28 PROCEDURE — 93005 ELECTROCARDIOGRAM TRACING: CPT | Performed by: EMERGENCY MEDICINE

## 2024-10-28 PROCEDURE — 96374 THER/PROPH/DIAG INJ IV PUSH: CPT

## 2024-10-28 PROCEDURE — 96375 TX/PRO/DX INJ NEW DRUG ADDON: CPT

## 2024-10-28 PROCEDURE — 2500000003 HC RX 250 WO HCPCS: Performed by: EMERGENCY MEDICINE

## 2024-10-28 PROCEDURE — 84132 ASSAY OF SERUM POTASSIUM: CPT

## 2024-10-28 RX ORDER — SODIUM CHLORIDE 0.9 % (FLUSH) 0.9 %
5-40 SYRINGE (ML) INJECTION PRN
Status: DISCONTINUED | OUTPATIENT
Start: 2024-10-28 | End: 2024-10-29 | Stop reason: HOSPADM

## 2024-10-28 RX ORDER — GLUCAGON 1 MG/ML
1 KIT INJECTION PRN
Status: DISCONTINUED | OUTPATIENT
Start: 2024-10-28 | End: 2024-10-29 | Stop reason: HOSPADM

## 2024-10-28 RX ORDER — DEXTROSE MONOHYDRATE 100 MG/ML
INJECTION, SOLUTION INTRAVENOUS CONTINUOUS PRN
Status: DISCONTINUED | OUTPATIENT
Start: 2024-10-28 | End: 2024-10-29 | Stop reason: HOSPADM

## 2024-10-28 RX ORDER — 0.9 % SODIUM CHLORIDE 0.9 %
500 INTRAVENOUS SOLUTION INTRAVENOUS ONCE
Status: COMPLETED | OUTPATIENT
Start: 2024-10-28 | End: 2024-10-28

## 2024-10-28 RX ORDER — ACETAMINOPHEN 650 MG/1
650 SUPPOSITORY RECTAL EVERY 6 HOURS PRN
Status: DISCONTINUED | OUTPATIENT
Start: 2024-10-28 | End: 2024-10-29 | Stop reason: HOSPADM

## 2024-10-28 RX ORDER — DIPHENHYDRAMINE HYDROCHLORIDE 50 MG/ML
12.5 INJECTION INTRAMUSCULAR; INTRAVENOUS ONCE
Status: COMPLETED | OUTPATIENT
Start: 2024-10-28 | End: 2024-10-28

## 2024-10-28 RX ORDER — ENOXAPARIN SODIUM 100 MG/ML
40 INJECTION SUBCUTANEOUS DAILY
Status: DISCONTINUED | OUTPATIENT
Start: 2024-10-28 | End: 2024-10-29 | Stop reason: HOSPADM

## 2024-10-28 RX ORDER — INSULIN LISPRO 100 [IU]/ML
12 INJECTION, SOLUTION INTRAVENOUS; SUBCUTANEOUS ONCE
Status: COMPLETED | OUTPATIENT
Start: 2024-10-28 | End: 2024-10-28

## 2024-10-28 RX ORDER — MAGNESIUM SULFATE IN WATER 40 MG/ML
2000 INJECTION, SOLUTION INTRAVENOUS PRN
Status: DISCONTINUED | OUTPATIENT
Start: 2024-10-28 | End: 2024-10-29 | Stop reason: HOSPADM

## 2024-10-28 RX ORDER — POLYETHYLENE GLYCOL 3350 17 G/17G
17 POWDER, FOR SOLUTION ORAL DAILY PRN
Status: DISCONTINUED | OUTPATIENT
Start: 2024-10-28 | End: 2024-10-29 | Stop reason: HOSPADM

## 2024-10-28 RX ORDER — SODIUM CHLORIDE 9 MG/ML
INJECTION, SOLUTION INTRAVENOUS PRN
Status: DISCONTINUED | OUTPATIENT
Start: 2024-10-28 | End: 2024-10-29 | Stop reason: HOSPADM

## 2024-10-28 RX ORDER — INSULIN LISPRO 100 [IU]/ML
0-8 INJECTION, SOLUTION INTRAVENOUS; SUBCUTANEOUS
Status: DISCONTINUED | OUTPATIENT
Start: 2024-10-28 | End: 2024-10-29 | Stop reason: HOSPADM

## 2024-10-28 RX ORDER — IOPAMIDOL 755 MG/ML
75 INJECTION, SOLUTION INTRAVASCULAR
Status: COMPLETED | OUTPATIENT
Start: 2024-10-28 | End: 2024-10-28

## 2024-10-28 RX ORDER — CLONAZEPAM 1 MG/1
2 TABLET ORAL 2 TIMES DAILY PRN
Status: DISCONTINUED | OUTPATIENT
Start: 2024-10-28 | End: 2024-10-29 | Stop reason: HOSPADM

## 2024-10-28 RX ORDER — ACETAMINOPHEN 325 MG/1
650 TABLET ORAL EVERY 6 HOURS PRN
Status: DISCONTINUED | OUTPATIENT
Start: 2024-10-28 | End: 2024-10-29 | Stop reason: HOSPADM

## 2024-10-28 RX ORDER — SODIUM CHLORIDE 0.9 % (FLUSH) 0.9 %
5-40 SYRINGE (ML) INJECTION EVERY 12 HOURS SCHEDULED
Status: DISCONTINUED | OUTPATIENT
Start: 2024-10-28 | End: 2024-10-29 | Stop reason: HOSPADM

## 2024-10-28 RX ORDER — CIPROFLOXACIN 500 MG/1
500 TABLET, FILM COATED ORAL EVERY 12 HOURS SCHEDULED
Status: DISCONTINUED | OUTPATIENT
Start: 2024-10-28 | End: 2024-10-29 | Stop reason: HOSPADM

## 2024-10-28 RX ADMIN — SODIUM CHLORIDE, PRESERVATIVE FREE 10 ML: 5 INJECTION INTRAVENOUS at 21:02

## 2024-10-28 RX ADMIN — DIPHENHYDRAMINE HYDROCHLORIDE 12.5 MG: 50 INJECTION INTRAMUSCULAR; INTRAVENOUS at 14:03

## 2024-10-28 RX ADMIN — IOPAMIDOL 75 ML: 755 INJECTION, SOLUTION INTRAVENOUS at 15:06

## 2024-10-28 RX ADMIN — METHYLPREDNISOLONE SODIUM SUCCINATE 125 MG: 125 INJECTION INTRAMUSCULAR; INTRAVENOUS at 14:04

## 2024-10-28 RX ADMIN — ENOXAPARIN SODIUM 40 MG: 100 INJECTION SUBCUTANEOUS at 20:57

## 2024-10-28 RX ADMIN — INSULIN LISPRO 12 UNITS: 100 INJECTION, SOLUTION INTRAVENOUS; SUBCUTANEOUS at 22:42

## 2024-10-28 RX ADMIN — CIPROFLOXACIN 500 MG: 500 TABLET, FILM COATED ORAL at 20:57

## 2024-10-28 RX ADMIN — SODIUM CHLORIDE 500 ML: 9 INJECTION, SOLUTION INTRAVENOUS at 17:49

## 2024-10-28 RX ADMIN — INSULIN LISPRO 8 UNITS: 100 INJECTION, SOLUTION INTRAVENOUS; SUBCUTANEOUS at 20:57

## 2024-10-28 RX ADMIN — FAMOTIDINE 20 MG: 10 INJECTION, SOLUTION INTRAVENOUS at 14:01

## 2024-10-28 ASSESSMENT — PAIN DESCRIPTION - ORIENTATION: ORIENTATION: MID

## 2024-10-28 ASSESSMENT — ENCOUNTER SYMPTOMS
NAUSEA: 0
ABDOMINAL PAIN: 0
EYE PAIN: 0
SORE THROAT: 0
VOMITING: 0
CHEST TIGHTNESS: 0
SHORTNESS OF BREATH: 1

## 2024-10-28 ASSESSMENT — LIFESTYLE VARIABLES
HOW MANY STANDARD DRINKS CONTAINING ALCOHOL DO YOU HAVE ON A TYPICAL DAY: PATIENT DOES NOT DRINK
HOW OFTEN DO YOU HAVE A DRINK CONTAINING ALCOHOL: NEVER

## 2024-10-28 ASSESSMENT — PAIN SCALES - GENERAL
PAINLEVEL_OUTOF10: 0
PAINLEVEL_OUTOF10: 10

## 2024-10-28 ASSESSMENT — PAIN DESCRIPTION - LOCATION: LOCATION: CHEST

## 2024-10-28 ASSESSMENT — PAIN DESCRIPTION - DESCRIPTORS: DESCRIPTORS: DISCOMFORT

## 2024-10-28 ASSESSMENT — PAIN - FUNCTIONAL ASSESSMENT: PAIN_FUNCTIONAL_ASSESSMENT: 0-10

## 2024-10-28 NOTE — H&P
cardiac-related  -Will hold Entresto  -Trops negative, pro-  -TTE ordered  -Cardiology consult     NAYELY - multifactorial etiology in setting of recently starting jardiance, diarrhea, and recent lasix dose increase  -hold Entresto, lasix for now  -s/p 500 cc IVF bolus in ED  -Trend daily    UTI  -reportedly 2 more days of tx left. Based on 9/27 urine cx growing pan-susceptible Pseudomonas and E. Coli, will tx with cipro for remainder of course. Deferring Bactrim given NAYELY       Chronic Illnesses    CAD  -Resume home meds once med rec complete    T2DM  -SSI    HLD  -Resume home meds once med rec complete    Insomnia  -Home clonazepam     HTN  -Resume home meds once med rec complete    HFrEF  -Resume home meds once med rec complete      Bipolar disorder  -Resume home meds once med rec complete            VTE Prophylaxis: low molecular weight heparin -  start         SIGNATURE: Martin Stockton MD  DATE: October 28, 2024  TIME: 7:31 PM

## 2024-10-28 NOTE — ED TRIAGE NOTES
States chest pain started after taking entresto last night, states the pain is mid sternal and rates the pain 10/10, patient was given 324 asa PTA and given 1 duneb. Pt is a/ox4.

## 2024-10-29 ENCOUNTER — APPOINTMENT (OUTPATIENT)
Age: 74
End: 2024-10-29
Attending: STUDENT IN AN ORGANIZED HEALTH CARE EDUCATION/TRAINING PROGRAM
Payer: MEDICARE

## 2024-10-29 VITALS
HEIGHT: 68 IN | WEIGHT: 171.6 LBS | RESPIRATION RATE: 19 BRPM | TEMPERATURE: 99 F | DIASTOLIC BLOOD PRESSURE: 56 MMHG | HEART RATE: 72 BPM | OXYGEN SATURATION: 93 % | BODY MASS INDEX: 26.01 KG/M2 | SYSTOLIC BLOOD PRESSURE: 99 MMHG

## 2024-10-29 LAB
ANION GAP SERPL CALCULATED.3IONS-SCNC: 11 MEQ/L (ref 9–15)
ANION GAP SERPL CALCULATED.3IONS-SCNC: 13 MEQ/L (ref 9–15)
BASOPHILS # BLD: 0 K/UL (ref 0–0.2)
BASOPHILS NFR BLD: 0.3 %
BUN SERPL-MCNC: 32 MG/DL (ref 8–23)
BUN SERPL-MCNC: 32 MG/DL (ref 8–23)
CALCIUM SERPL-MCNC: 8.9 MG/DL (ref 8.5–9.9)
CALCIUM SERPL-MCNC: 8.9 MG/DL (ref 8.5–9.9)
CHLORIDE SERPL-SCNC: 98 MEQ/L (ref 95–107)
CHLORIDE SERPL-SCNC: 99 MEQ/L (ref 95–107)
CO2 SERPL-SCNC: 27 MEQ/L (ref 20–31)
CO2 SERPL-SCNC: 27 MEQ/L (ref 20–31)
CREAT SERPL-MCNC: 1.14 MG/DL (ref 0.5–0.9)
CREAT SERPL-MCNC: 1.15 MG/DL (ref 0.5–0.9)
ECHO AO ROOT DIAM: 3 CM
ECHO AO ROOT INDEX: 1.57 CM/M2
ECHO AR MAX VEL PISA: 3.2 M/S
ECHO AV AREA PEAK VELOCITY: 2 CM2
ECHO AV AREA VTI: 2 CM2
ECHO AV AREA/BSA PEAK VELOCITY: 1 CM2/M2
ECHO AV AREA/BSA VTI: 1 CM2/M2
ECHO AV CUSP MM: 1.8 CM
ECHO AV MEAN GRADIENT: 3 MMHG
ECHO AV MEAN VELOCITY: 0.8 M/S
ECHO AV PEAK GRADIENT: 5 MMHG
ECHO AV PEAK VELOCITY: 1.2 M/S
ECHO AV REGURGITANT PHT: 628.1 MILLISECOND
ECHO AV VELOCITY RATIO: 0.58
ECHO AV VTI: 29.2 CM
ECHO BSA: 1.93 M2
ECHO LA DIAMETER INDEX: 2.25 CM/M2
ECHO LA DIAMETER: 4.3 CM
ECHO LA TO AORTIC ROOT RATIO: 1.43
ECHO LA VOL A-L A2C: 68 ML (ref 22–52)
ECHO LA VOL A-L A4C: 57 ML (ref 22–52)
ECHO LA VOL MOD A2C: 63 ML (ref 22–52)
ECHO LA VOL MOD A4C: 53 ML (ref 22–52)
ECHO LA VOLUME AREA LENGTH: 63 ML
ECHO LA VOLUME INDEX A-L A2C: 36 ML/M2 (ref 16–34)
ECHO LA VOLUME INDEX A-L A4C: 30 ML/M2 (ref 16–34)
ECHO LA VOLUME INDEX AREA LENGTH: 33 ML/M2 (ref 16–34)
ECHO LA VOLUME INDEX MOD A2C: 33 ML/M2 (ref 16–34)
ECHO LA VOLUME INDEX MOD A4C: 28 ML/M2 (ref 16–34)
ECHO LV E' LATERAL VELOCITY: 18.5 CM/S
ECHO LV E' SEPTAL VELOCITY: 5.6 CM/S
ECHO LV EDV A2C: 57 ML
ECHO LV EDV A4C: 82 ML
ECHO LV EDV BP: 68 ML (ref 56–104)
ECHO LV EDV INDEX A4C: 43 ML/M2
ECHO LV EDV INDEX BP: 36 ML/M2
ECHO LV EDV NDEX A2C: 30 ML/M2
ECHO LV EJECTION FRACTION A2C: 47 %
ECHO LV EJECTION FRACTION A4C: 58 %
ECHO LV EJECTION FRACTION BIPLANE: 53 % (ref 55–100)
ECHO LV ESV A2C: 30 ML
ECHO LV ESV A4C: 35 ML
ECHO LV ESV BP: 32 ML (ref 19–49)
ECHO LV ESV INDEX A2C: 16 ML/M2
ECHO LV ESV INDEX A4C: 18 ML/M2
ECHO LV ESV INDEX BP: 17 ML/M2
ECHO LV FRACTIONAL SHORTENING: 36 % (ref 28–44)
ECHO LV INTERNAL DIMENSION DIASTOLE INDEX: 2.2 CM/M2
ECHO LV INTERNAL DIMENSION DIASTOLIC: 4.2 CM (ref 3.9–5.3)
ECHO LV INTERNAL DIMENSION SYSTOLIC INDEX: 1.41 CM/M2
ECHO LV INTERNAL DIMENSION SYSTOLIC: 2.7 CM
ECHO LV IVSD: 1 CM (ref 0.6–0.9)
ECHO LV IVSS: 1.4 CM
ECHO LV MASS 2D: 137.2 G (ref 67–162)
ECHO LV MASS INDEX 2D: 71.9 G/M2 (ref 43–95)
ECHO LV POSTERIOR WALL DIASTOLIC: 1 CM (ref 0.6–0.9)
ECHO LV POSTERIOR WALL SYSTOLIC: 1.1 CM
ECHO LV RELATIVE WALL THICKNESS RATIO: 0.48
ECHO LVOT AREA: 3.1 CM2
ECHO LVOT AV VTI INDEX: 0.64
ECHO LVOT DIAM: 2 CM
ECHO LVOT MEAN GRADIENT: 1 MMHG
ECHO LVOT PEAK GRADIENT: 2 MMHG
ECHO LVOT PEAK VELOCITY: 0.7 M/S
ECHO LVOT STROKE VOLUME INDEX: 30.7 ML/M2
ECHO LVOT SV: 58.7 ML
ECHO LVOT VTI: 18.7 CM
ECHO MV A VELOCITY: 0.58 M/S
ECHO MV E DECELERATION TIME (DT): 402.2 MS
ECHO MV E VELOCITY: 0.61 M/S
ECHO MV E/A RATIO: 1.05
ECHO MV E/E' LATERAL: 3.3
ECHO MV E/E' RATIO (AVERAGED): 7.1
ECHO MV E/E' SEPTAL: 10.89
ECHO MV EROA PISA: 0.1 CM2
ECHO MV REGURGITANT ALIASING (NYQUIST) VELOCITY: 36 CM/S
ECHO MV REGURGITANT RADIUS PISA: 0.57 CM
ECHO MV REGURGITANT VELOCITY PISA: 6 M/S
ECHO MV REGURGITANT VOLUME PISA: 24.79 ML
ECHO MV REGURGITANT VTIA: 202.5 CM
ECHO PV MAX VELOCITY: 0.7 M/S
ECHO PV PEAK GRADIENT: 2 MMHG
ECHO RV INTERNAL DIMENSION: 3.6 CM
ECHO RV TAPSE: 1.5 CM (ref 1.7–?)
ECHO RVOT PEAK GRADIENT: 2 MMHG
ECHO RVOT PEAK VELOCITY: 0.7 M/S
ECHO TV REGURGITANT MAX VELOCITY: 2.34 M/S
ECHO TV REGURGITANT PEAK GRADIENT: 22 MMHG
EOSINOPHIL # BLD: 0 K/UL (ref 0–0.7)
EOSINOPHIL NFR BLD: 0.1 %
ERYTHROCYTE [DISTWIDTH] IN BLOOD BY AUTOMATED COUNT: 13.5 % (ref 11.5–14.5)
GLUCOSE BLD-MCNC: 101 MG/DL (ref 70–99)
GLUCOSE BLD-MCNC: 122 MG/DL (ref 70–99)
GLUCOSE BLD-MCNC: 235 MG/DL (ref 70–99)
GLUCOSE BLD-MCNC: 323 MG/DL (ref 70–99)
GLUCOSE BLD-MCNC: 507 MG/DL (ref 70–99)
GLUCOSE BLD-MCNC: 89 MG/DL (ref 70–99)
GLUCOSE SERPL-MCNC: 70 MG/DL (ref 70–99)
GLUCOSE SERPL-MCNC: 72 MG/DL (ref 70–99)
HCT VFR BLD AUTO: 33.3 % (ref 37–47)
HGB BLD-MCNC: 10.7 G/DL (ref 12–16)
LYMPHOCYTES # BLD: 0.8 K/UL (ref 1–4.8)
LYMPHOCYTES NFR BLD: 12.3 %
MCH RBC QN AUTO: 32.2 PG (ref 27–31.3)
MCHC RBC AUTO-ENTMCNC: 32.1 % (ref 33–37)
MCV RBC AUTO: 100.3 FL (ref 79.4–94.8)
MONOCYTES # BLD: 0.5 K/UL (ref 0.2–0.8)
MONOCYTES NFR BLD: 8.1 %
NEUTROPHILS # BLD: 5.3 K/UL (ref 1.4–6.5)
NEUTS SEG NFR BLD: 78.8 %
PERFORMED ON: ABNORMAL
PERFORMED ON: NORMAL
PLATELET # BLD AUTO: 151 K/UL (ref 130–400)
POTASSIUM SERPL-SCNC: 3.9 MEQ/L (ref 3.4–4.9)
POTASSIUM SERPL-SCNC: 4 MEQ/L (ref 3.4–4.9)
RBC # BLD AUTO: 3.32 M/UL (ref 4.2–5.4)
SODIUM SERPL-SCNC: 137 MEQ/L (ref 135–144)
SODIUM SERPL-SCNC: 138 MEQ/L (ref 135–144)
WBC # BLD AUTO: 6.7 K/UL (ref 4.8–10.8)

## 2024-10-29 PROCEDURE — 2700000000 HC OXYGEN THERAPY PER DAY

## 2024-10-29 PROCEDURE — 96372 THER/PROPH/DIAG INJ SC/IM: CPT

## 2024-10-29 PROCEDURE — 6370000000 HC RX 637 (ALT 250 FOR IP): Performed by: INTERNAL MEDICINE

## 2024-10-29 PROCEDURE — 6360000002 HC RX W HCPCS: Performed by: STUDENT IN AN ORGANIZED HEALTH CARE EDUCATION/TRAINING PROGRAM

## 2024-10-29 PROCEDURE — 93306 TTE W/DOPPLER COMPLETE: CPT

## 2024-10-29 PROCEDURE — G0378 HOSPITAL OBSERVATION PER HR: HCPCS

## 2024-10-29 PROCEDURE — 36415 COLL VENOUS BLD VENIPUNCTURE: CPT

## 2024-10-29 PROCEDURE — 85025 COMPLETE CBC W/AUTO DIFF WBC: CPT

## 2024-10-29 PROCEDURE — 2580000003 HC RX 258: Performed by: STUDENT IN AN ORGANIZED HEALTH CARE EDUCATION/TRAINING PROGRAM

## 2024-10-29 PROCEDURE — 80048 BASIC METABOLIC PNL TOTAL CA: CPT

## 2024-10-29 PROCEDURE — 93306 TTE W/DOPPLER COMPLETE: CPT | Performed by: INTERNAL MEDICINE

## 2024-10-29 PROCEDURE — 6370000000 HC RX 637 (ALT 250 FOR IP): Performed by: STUDENT IN AN ORGANIZED HEALTH CARE EDUCATION/TRAINING PROGRAM

## 2024-10-29 RX ORDER — METOPROLOL SUCCINATE 25 MG/1
25 TABLET, EXTENDED RELEASE ORAL DAILY
Status: DISCONTINUED | OUTPATIENT
Start: 2024-10-29 | End: 2024-10-29 | Stop reason: HOSPADM

## 2024-10-29 RX ORDER — HYDRALAZINE HYDROCHLORIDE 10 MG/1
10 TABLET, FILM COATED ORAL EVERY 12 HOURS SCHEDULED
Status: DISCONTINUED | OUTPATIENT
Start: 2024-10-29 | End: 2024-10-29 | Stop reason: HOSPADM

## 2024-10-29 RX ORDER — SPIRONOLACTONE 25 MG/1
25 TABLET ORAL DAILY
Qty: 30 TABLET | Refills: 3 | Status: SHIPPED | OUTPATIENT
Start: 2024-10-30

## 2024-10-29 RX ORDER — METOPROLOL SUCCINATE 25 MG/1
25 TABLET, EXTENDED RELEASE ORAL DAILY
Qty: 30 TABLET | Refills: 3 | Status: SHIPPED | OUTPATIENT
Start: 2024-10-30

## 2024-10-29 RX ORDER — ISOSORBIDE MONONITRATE 30 MG/1
30 TABLET, EXTENDED RELEASE ORAL DAILY
Status: DISCONTINUED | OUTPATIENT
Start: 2024-10-29 | End: 2024-10-29 | Stop reason: HOSPADM

## 2024-10-29 RX ORDER — LISINOPRIL 5 MG/1
5 TABLET ORAL DAILY
Status: DISCONTINUED | OUTPATIENT
Start: 2024-10-29 | End: 2024-10-29

## 2024-10-29 RX ORDER — INSULIN LISPRO 100 [IU]/ML
12 INJECTION, SOLUTION INTRAVENOUS; SUBCUTANEOUS ONCE
Status: COMPLETED | OUTPATIENT
Start: 2024-10-29 | End: 2024-10-29

## 2024-10-29 RX ORDER — FUROSEMIDE 40 MG/1
40 TABLET ORAL DAILY
Status: DISCONTINUED | OUTPATIENT
Start: 2024-10-29 | End: 2024-10-29 | Stop reason: HOSPADM

## 2024-10-29 RX ORDER — SPIRONOLACTONE 25 MG/1
25 TABLET ORAL DAILY
Status: DISCONTINUED | OUTPATIENT
Start: 2024-10-29 | End: 2024-10-29 | Stop reason: HOSPADM

## 2024-10-29 RX ORDER — HYDRALAZINE HYDROCHLORIDE 10 MG/1
10 TABLET, FILM COATED ORAL EVERY 12 HOURS SCHEDULED
Qty: 90 TABLET | Refills: 3 | Status: SHIPPED | OUTPATIENT
Start: 2024-10-29

## 2024-10-29 RX ORDER — ISOSORBIDE MONONITRATE 30 MG/1
30 TABLET, EXTENDED RELEASE ORAL DAILY
Qty: 30 TABLET | Refills: 3 | Status: SHIPPED | OUTPATIENT
Start: 2024-10-30

## 2024-10-29 RX ORDER — FUROSEMIDE 20 MG/1
40 TABLET ORAL DAILY
Qty: 180 TABLET | Refills: 0 | Status: SHIPPED | OUTPATIENT
Start: 2024-10-29

## 2024-10-29 RX ORDER — ROSUVASTATIN CALCIUM 5 MG/1
5 TABLET, COATED ORAL NIGHTLY
Status: DISCONTINUED | OUTPATIENT
Start: 2024-10-29 | End: 2024-10-29 | Stop reason: HOSPADM

## 2024-10-29 RX ORDER — ASPIRIN 81 MG/1
81 TABLET ORAL DAILY
Status: DISCONTINUED | OUTPATIENT
Start: 2024-10-29 | End: 2024-10-29 | Stop reason: HOSPADM

## 2024-10-29 RX ADMIN — HYDRALAZINE HYDROCHLORIDE 10 MG: 10 TABLET ORAL at 09:21

## 2024-10-29 RX ADMIN — ENOXAPARIN SODIUM 40 MG: 100 INJECTION SUBCUTANEOUS at 09:22

## 2024-10-29 RX ADMIN — FUROSEMIDE 40 MG: 40 TABLET ORAL at 12:31

## 2024-10-29 RX ADMIN — METOPROLOL SUCCINATE 25 MG: 25 TABLET, EXTENDED RELEASE ORAL at 09:21

## 2024-10-29 RX ADMIN — INSULIN LISPRO 12 UNITS: 100 INJECTION, SOLUTION INTRAVENOUS; SUBCUTANEOUS at 00:32

## 2024-10-29 RX ADMIN — EMPAGLIFLOZIN 10 MG: 10 TABLET, FILM COATED ORAL at 09:21

## 2024-10-29 RX ADMIN — ISOSORBIDE MONONITRATE 30 MG: 30 TABLET, EXTENDED RELEASE ORAL at 09:21

## 2024-10-29 RX ADMIN — SPIRONOLACTONE 25 MG: 25 TABLET ORAL at 09:21

## 2024-10-29 RX ADMIN — ASPIRIN 81 MG: 81 TABLET, COATED ORAL at 09:21

## 2024-10-29 RX ADMIN — SODIUM CHLORIDE, PRESERVATIVE FREE 10 ML: 5 INJECTION INTRAVENOUS at 09:22

## 2024-10-29 RX ADMIN — CIPROFLOXACIN 500 MG: 500 TABLET, FILM COATED ORAL at 09:21

## 2024-10-29 ASSESSMENT — ENCOUNTER SYMPTOMS
WHEEZING: 0
STRIDOR: 0
EYES NEGATIVE: 1
COUGH: 0
GASTROINTESTINAL NEGATIVE: 1
RESPIRATORY NEGATIVE: 1
BLOOD IN STOOL: 0
NAUSEA: 0
SHORTNESS OF BREATH: 0
CHEST TIGHTNESS: 0

## 2024-10-29 ASSESSMENT — PAIN SCALES - GENERAL: PAINLEVEL_OUTOF10: 0

## 2024-10-29 NOTE — ACP (ADVANCE CARE PLANNING)
Advance Care Planning     Advance Care Planning Activator (Inpatient)  Conversation Note      Date of ACP Conversation: 10/28/2024     Conversation Conducted with: Patient with Decision Making Capacity    ACP Activator: Chantale Reardon, RN        Health Care Decision Maker:     Current Designated Health Care Decision Maker:     Primary Decision Maker: Nury Bailey - Child - [phone]    Secondary Decision Maker: Theodora Mcgraw - Friend - [phone]

## 2024-10-29 NOTE — PLAN OF CARE
Problem: Chronic Conditions and Co-morbidities  Goal: Patient's chronic conditions and co-morbidity symptoms are monitored and maintained or improved  10/29/2024 1050 by Xin Powell RN  Outcome: Progressing  10/28/2024 2305 by Dalia Dowell RN  Outcome: Progressing     Problem: Discharge Planning  Goal: Discharge to home or other facility with appropriate resources  10/29/2024 1050 by Xin Powell RN  Outcome: Progressing  10/28/2024 2305 by Dalia Dowell RN  Outcome: Progressing     Problem: Pain  Goal: Verbalizes/displays adequate comfort level or baseline comfort level  10/29/2024 1050 by Xin Powell RN  Outcome: Progressing  10/28/2024 2305 by Dalia Dowell RN  Outcome: Progressing     Problem: Safety - Adult  Goal: Free from fall injury  10/28/2024 2305 by Dalia Dowell RN  Outcome: Progressing

## 2024-10-29 NOTE — DISCHARGE SUMMARY
D 1.25 MG (14382 UT) Caps capsule  Commonly known as: ERGOCALCIFEROL            STOP taking these medications      metoprolol tartrate 25 MG tablet  Commonly known as: LOPRESSOR     sacubitril-valsartan 24-26 MG per tablet  Commonly known as: ENTRESTO     sulfamethoxazole-trimethoprim 800-160 MG per tablet  Commonly known as: Bactrim DS               Where to Get Your Medications        These medications were sent to [id number] - [place], [place] - [address] - P [phone] - F [phone]  [address] [place] [address]      Phone: [phone]   furosemide 20 MG tablet  metoprolol succinate 25 MG extended release tablet  spironolactone 25 MG tablet         Disposition:   If discharged to Home, Any [facility] needs that were indicated and/or required as been addressed and set up by Social Work.     Condition at discharge: good     Activity: activity as tolerated    Total time taken for discharging this patient: 40 minutes. Greater than 70% of time was spent focused exclusively on this patient. Time was taken to review chart, discuss plans with consultants, reconciling medications, discussing plan answering questions with patient.     Signed:  Martin Stockton MD  10/29/2024, 3:25 PM  ----------------------------------------------------------------------------------------------------------------------    Ashley Summers

## 2024-10-29 NOTE — CARDIO/PULMONARY
[facility] Cardiac Rehab ITP Note      Patient Name: Ashley Summers  MRN: [id number]                  : 1950      ITP Note: re-assessment        Patient completed  sessions of cardiac rehab.     Past Medical History:   Diagnosis Date    Anxiety     Bipolar affective disorder (HCC)     Chronic systolic congestive heart failure (HCC) 9/3/2023    Coronary artery disease involving native coronary artery of native heart with angina pectoris (HCC) 2023    DM (diabetes mellitus) (HCC)     Epidural abscess 2021    Head injury     Hypertension     Migraine     Migraine headache 2014    Osteomyelitis 2012    PTSD (post-traumatic stress disorder)     TIA (transient ischemic attack)     x 3          No current facility-administered medications for this encounter.     No current outpatient medications on file.     Facility-Administered Medications Ordered in Other Encounters   Medication Dose Route Frequency Provider Last Rate Last Admin    spironolactone (ALDACTONE) tablet 25 mg  25 mg Oral Daily Jackson Batista MD   25 mg at 10/29/24 0921    metoprolol succinate (TOPROL XL) extended release tablet 25 mg  25 mg Oral Daily Jackson Batista MD   25 mg at 10/29/24 0921    empagliflozin (JARDIANCE) tablet 10 mg  10 mg Oral Daily Jackson Batista MD   10 mg at 10/29/24 0921    rosuvastatin (CRESTOR) tablet 5 mg  5 mg Oral Nightly Jackson Batista MD        aspirin EC tablet 81 mg  81 mg Oral Daily Jackson Batista MD   81 mg at 10/29/24 0921    hydrALAZINE (APRESOLINE) tablet 10 mg  10 mg Oral 2 times per day Jackson Batista MD   10 mg at 10/29/24 0921    isosorbide mononitrate (IMDUR) extended release tablet 30 mg  30 mg Oral Daily Jackson Batista MD   30 mg at 10/29/24 0921    furosemide (LASIX) tablet 40 mg  40 mg Oral Daily Martin Stockton MD   40 mg at 10/29/24 1231    sodium chloride flush 0.9 % injection 5-40 mL  5-40 mL IntraVENous 2 times per day Martin Stockton MD   10 mL at 10/29/24

## 2024-10-29 NOTE — PLAN OF CARE
Problem: Chronic Conditions and Co-morbidities  Goal: Patient's chronic conditions and co-morbidity symptoms are monitored and maintained or improved  10/29/2024 1737 by Xin Powell RN  Outcome: Adequate for Discharge  10/29/2024 1050 by Xin Powell RN  Outcome: Progressing     Problem: Discharge Planning  Goal: Discharge to home or other facility with appropriate resources  10/29/2024 1737 by Xin Powell RN  Outcome: Adequate for Discharge  10/29/2024 1050 by Xin Powell RN  Outcome: Progressing     Problem: Pain  Goal: Verbalizes/displays adequate comfort level or baseline comfort level  10/29/2024 1737 by Xin Powell RN  Outcome: Adequate for Discharge  10/29/2024 1050 by Xin Powell RN  Outcome: Progressing     Problem: Safety - Adult  Goal: Free from fall injury  Outcome: Adequate for Discharge

## 2024-10-29 NOTE — PROGRESS NOTES
10/29/24 1153   Resting (Room Air)   SpO2 94   HR 68   During Walk (Room Air)   SpO2 97   HR 88   Walk/Assistance Device Ambulation  (Prosthetic leg)   Rate of Dyspnea 0   After Walk   SpO2 95   HR 67   Rate of Dyspnea 0   Does the Patient Qualify for Home O2 No   Does the Patient Need Portable Oxygen Tanks No

## 2024-10-29 NOTE — CARE COORDINATION
Case Management Assessment  Initial Evaluation    Date/Time of Evaluation: 10/28/2024 8:23 PM  Assessment Completed by: Chantale Reardon RN    If patient is discharged prior to next notation, then this note serves as note for discharge by case management.    Patient Name: Ashley Summers                   YOB: 1950  Diagnosis: Lightheadedness [R42]  Dyspnea on exertion [R06.09]  Hypoxia [R09.02]  Dyspnea, unspecified type [R06.00]  Cardiomyopathy, unspecified type (HCC) [I42.9]                   Date / Time: 10/28/2024 11:47 AM    Patient Admission Status: Observation   Readmission Risk (Low < 19, Mod (19-27), High > 27): Readmission Risk Score: 16.7    Current PCP: Javier Nguyen, DO  PCP verified by CM? (P) Yes    Chart Reviewed: Yes      History Provided by: (P) Patient  Patient Orientation: (P) Alert and Oriented, Person, Place, Situation, Self    Patient Cognition: (P) Alert    Hospitalization in the last 30 days (Readmission):  No    If yes, Readmission Assessment in CM Navigator will be completed.    Advance Directives:      Code Status: Full Code   Patient's Primary Decision Maker is: (P) Named in Scanned ACP Document (daughter Laurel, friend Theodora.)    Primary Decision Maker: LucienTheodora - Friend - [phone]    Discharge Planning:    Patient lives with: (P) Alone Type of Home: (P) Apartment  Primary Care Giver: (P) Self  Patient Support Systems include: (P) Children, Friends/Neighbors   Current Financial resources: (P) Medicare  Current community resources: (P) Other (Comment) (meals on wheels.)  Current services prior to admission: (P) Meals On Wheels, Durable Medical Equipment            Current DME: (P) Shower Chair, Wheelchair, Other (Comment), Bedside Commode (rollator)            Type of Home Care services:  (P) None    ADLS  Prior functional level: (P) Independent in ADLs/IADLs  Current functional level: (P) Independent in ADLs/IADLs    PT AM-PAC:   /24  OT AM-PAC:   /24    Family

## 2024-10-29 NOTE — CONSULTS
Inpatient consult to Cardiology  Consult performed by: Jackson Batista MD  Consult ordered by: Martin Stockton MD          Patient Name: Ashley Summers  Admit Date: 10/28/2024 11:47 AM  MR #: [id number]  : 1950    Attending Physician: Martin Stockton MD  Reason for consult: CP SOB    History of Presenting Illness:      Ashley Summers is a 74 y.o. female on hospital day 0 with a history of .   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  CP was pressure heavy feeling radiating to neck and left shoulder. Ths occurred after taking Entresto.  She again took Entresto yesterday AM with same symptoms of CP.  She felt SOB as well. She denies any swelling of her mouth/tongue.   Her sats were down to 87% in ER.  HS Troponin 14>14.  ECG SR 74 no injury.     She has noted more STRICKLAND even at Cardiac Rehab lately. She denies LE Edema.   She is lying flat now and comfortable with no CP nor SOB.     24 CABG x4  LVEF 30-35%.  But subsequent [facility] Echo 23 EF 5%  History:      EKG:  Past Medical History:   Diagnosis Date    Anxiety     Bipolar affective disorder (HCC)     Chronic systolic congestive heart failure (HCC) 9/3/2023    Coronary artery disease involving native coronary artery of native heart with angina pectoris (HCC) 2023    DM (diabetes mellitus) (HCC)     Epidural abscess 2021    Head injury     Hypertension     Migraine     Migraine headache 2014    Osteomyelitis 2012    PTSD (post-traumatic stress disorder)     TIA (transient ischemic attack)     x 3     Past Surgical History:   Procedure Laterality Date    APPENDECTOMY      CARDIAC PROCEDURE Bilateral 2023    Left heart cath / coronary angiography w grafts ROOM 191 performed by Rohit Ross MD at [facility]    CHOLECYSTECTOMY      CT BIOPSY PERCUTANEOUS SUPERFICIAL BONE  9/10/2021

## 2024-10-30 ENCOUNTER — HOSPITAL ENCOUNTER (OUTPATIENT)
Dept: CARDIAC REHAB | Age: 74
Setting detail: THERAPIES SERIES
Discharge: HOME | End: 2024-10-30
Payer: MEDICARE

## 2024-10-30 LAB
EKG ATRIAL RATE: 74 BPM
EKG P AXIS: 87 DEGREES
EKG P-R INTERVAL: 162 MS
EKG Q-T INTERVAL: 428 MS
EKG QRS DURATION: 104 MS
EKG QTC CALCULATION (BAZETT): 475 MS
EKG R AXIS: 6 DEGREES
EKG T AXIS: 23 DEGREES
EKG VENTRICULAR RATE: 74 BPM

## 2024-10-30 PROCEDURE — 93010 ELECTROCARDIOGRAM REPORT: CPT | Performed by: INTERNAL MEDICINE

## 2024-11-01 ENCOUNTER — HOSPITAL ENCOUNTER (OUTPATIENT)
Dept: CARDIAC REHAB | Age: 74
Setting detail: THERAPIES SERIES
End: 2024-11-01
Payer: MEDICARE

## 2024-11-04 ENCOUNTER — APPOINTMENT (OUTPATIENT)
Dept: CARDIAC REHAB | Age: 74
End: 2024-11-04
Payer: MEDICARE

## 2024-11-06 ENCOUNTER — APPOINTMENT (OUTPATIENT)
Dept: CARDIAC REHAB | Age: 74
End: 2024-11-06
Payer: MEDICARE

## 2024-11-11 ENCOUNTER — APPOINTMENT (OUTPATIENT)
Dept: CARDIAC REHAB | Age: 74
End: 2024-11-11
Payer: MEDICARE

## 2024-11-13 ENCOUNTER — HOSPITAL ENCOUNTER (OUTPATIENT)
Dept: CARDIAC REHAB | Age: 74
Setting detail: THERAPIES SERIES
Discharge: HOME | End: 2024-11-13
Payer: MEDICARE

## 2024-11-13 PROCEDURE — G0422 INTENS CARDIAC REHAB W/EXERC: HCPCS

## 2024-11-13 NOTE — CARDIO/PULMONARY
Patient arrives to cardiac rehab for session. Covid screening complete. Patient denies any complaints. Patient denies changes to PMH and medication. Patient tolerates exercise without complaint.     Patient returns after being out due to illness.     10/28/24 Weekly Education: Discussed what occurs to our bodies with stress and ways to cope. Handout offered.      11/4/24 Weekly Education: Discussed the Dash Diet, daily salt intake, different food groups and the daily serving amounts for each group. Handout offered.       Weekly Education: Discussed how to read nutrition labels, ingredients to avoid or limit, and key words on labels that indicate sodium, fat, and sugar content. Handout offered.      Patient declined Pritikin Education.    All equipment used in the care of this patient has been cleaned.   Electronically signed by Kathi Cummings RN on 11/13/2024 at 2:54 PM

## 2024-11-15 ENCOUNTER — HOSPITAL ENCOUNTER (OUTPATIENT)
Dept: CARDIAC REHAB | Age: 74
Setting detail: THERAPIES SERIES
Discharge: HOME | End: 2024-11-15
Payer: MEDICARE

## 2024-11-15 ENCOUNTER — OFFICE VISIT (OUTPATIENT)
Dept: CARDIOLOGY CLINIC | Age: 74
End: 2024-11-15

## 2024-11-15 VITALS
HEART RATE: 69 BPM | SYSTOLIC BLOOD PRESSURE: 130 MMHG | DIASTOLIC BLOOD PRESSURE: 62 MMHG | RESPIRATION RATE: 16 BRPM | WEIGHT: 171 LBS | OXYGEN SATURATION: 94 % | BODY MASS INDEX: 26.01 KG/M2

## 2024-11-15 DIAGNOSIS — I10 ESSENTIAL HYPERTENSION: Primary | ICD-10-CM

## 2024-11-15 PROCEDURE — G0423 INTENS CARDIAC REHAB NO EXER: HCPCS

## 2024-11-15 PROCEDURE — G0422 INTENS CARDIAC REHAB W/EXERC: HCPCS

## 2024-11-15 ASSESSMENT — ENCOUNTER SYMPTOMS
SHORTNESS OF BREATH: 0
APNEA: 0
CHEST TIGHTNESS: 0
ABDOMINAL PAIN: 0
RHINORRHEA: 0
WHEEZING: 0
EYE REDNESS: 0
COLOR CHANGE: 0
CONSTIPATION: 0
COUGH: 0
NAUSEA: 0
VOMITING: 0
DIARRHEA: 0

## 2024-11-15 NOTE — CARDIO/PULMONARY
Patient arrives to cardiac rehab for session. Covid screening complete. Patient denies any complaints. Patient denies changes to PMH and medication. Patient tolerates exercise without complaint.     Patient has a 245 pm Dr Ross appointment.     Patient attended [employer]  \"Adding Flavor, Sodium Free\".     All equipment used in the care for this patient has been cleaned.  Electronically signed by Cha Ward RN on 11/15/2024 at 1:39 PM

## 2024-11-15 NOTE — PROGRESS NOTES
Chief Complaint   Patient presents with    Follow-up     ECHO       Patient presents for initial medical evaluation. Patient is followed on a regular basis by Javier Barragan DO.     CAD status post four-vessel CABG September 11, 2023 at [facility]  Ischemic cardiomyopathy with recovered EF from 30 to 35% to 50 to 55%  Hypertension  Hyperlipidemia  Diabetes  Obesity  Aortic aortic regurgitation mild  Moderate mitral regurgitation  Mild tricuspid regurgitation    Cardiac studies:  Ischemic cardiomyopathy EF 30 to 35%  TTE 8/31/2023 EF 30 to 35%  TTE 10/29/2024 EF 50 to 55%  ============  11/15/2024  Follow-up on SVT  Patient denies chest pain or shortness of breath  Patient underwent echocardiogram which now shows recovered function from 30 to 35% to 50 to 55%  Patient participating in cardiac rehab  No cardiac limitations with this activity    10/4/2024  Follow-up on recent hospitalization  Last month patient was admitted to the hospital for urosepsis  At that time cardiology followed patient up due to SVT with aberrancy  Patient did well cardiac wise  Patient reports feeling fine  Patient reports that when she walks around the gym she feels short of breath and her heart rate goes up, when she checked the heart rate was 91      10/18/2023  First clinic visit follow-up on recent hospitalization  Last month patient was admitted to the hospital for heart failure, patient was found with multivessel disease  Patient was transferred to [facility] for CABG where she underwent quadruple bypass  Patient denies chest pain or shortness of breath    Patient Active Problem List   Diagnosis    Hypertensive urgency    Chest tightness or pressure    Visual disturbance, subjective    Bipolar affective disorder (HCC)    Acute sore throat    Syncope and collapse    Head injury    Anxiety    Anemia of chronic disease    Chronic low back pain    Chronic neck pain    Dyslipidemia    Discitis of lumbar region    Essential hypertension    GERD

## 2024-11-15 NOTE — CARDIO/PULMONARY
Ashley WALDEN.:  1950  Acct Number: [id number]  MRN:  [id number]                [facility] COOKING SCHOOL WORKSHOP             Date: 11/15/2024        Session # 1   Today’s class covered:      (x) Adding Flavor     () Fast Breakfasts     () Salads and Dressings     () Soups and Sauces     () Simple Sides     () Appetizers and Snacks     () Delicious Desserts     () Plant Based Proteins     () Fast Evening Meals     () Weekend Breakfasts     () Efficiency Cooking     () One Pot Meals     Patients were shown how to choose, prep, and cook; substitutions and other options were given.  Samples were offered.  Recipes were given and questions answered.      The patient above was in the Cooking School Workshop for 50 minutes.      Electronically signed by Xiomara Ventura RD on 11/15/2024 at 4:19 PM

## 2024-11-18 ENCOUNTER — HOSPITAL ENCOUNTER (OUTPATIENT)
Dept: CARDIAC REHAB | Age: 74
Setting detail: THERAPIES SERIES
Discharge: HOME | End: 2024-11-18
Payer: MEDICARE

## 2024-11-18 PROCEDURE — G0422 INTENS CARDIAC REHAB W/EXERC: HCPCS

## 2024-11-18 NOTE — CARDIO/PULMONARY
Patient arrives to cardiac rehab for session. Covid screening complete. Patient denies any complaints. Patient denies changes to PMH and medication. Patient tolerates exercise without complaint.     Weekly Education, Discussed obstructive sleep apnea , symptoms of PALBO, dangers of PABLO, testing for PABLO, and treatment. Handout provided to patient.     Patient declines Pritikin education.     All equipment used in the care for this patient has been cleaned.  Electronically signed by Cha Ward RN on 11/18/2024 at 2:47 PM

## 2024-11-20 ENCOUNTER — HOSPITAL ENCOUNTER (OUTPATIENT)
Dept: CARDIAC REHAB | Age: 74
Setting detail: THERAPIES SERIES
Discharge: HOME | End: 2024-11-20
Payer: MEDICARE

## 2024-11-20 PROCEDURE — G0422 INTENS CARDIAC REHAB W/EXERC: HCPCS

## 2024-11-20 PROCEDURE — G0423 INTENS CARDIAC REHAB NO EXER: HCPCS

## 2024-11-20 NOTE — PROGRESS NOTES
[facility]  Cardiac Rehabilitation Department    Ashley WALDEN.:  1950    MRN:  [id number]    Date: 2024      Session Length:  35 min       EXERCISE WORKSHOP:  Exercise Basics: Building your action plan                       The purpose of today's class is to promote a comprehensive and effective weekly exercise routine in order to improve ICR patients’ overall level of fitness.   At the conclusion of this workshop, ICR patients will understand the benefits of incorporating physical activity and regular exercise into their weekly routines. Patients will understand the FITT (Frequency, Intensity, Time, and Type) principle and how this principle impacts their fitness levels. In addition, safety concerns and other considerations for exercise and cardiac rehab will be addressed by the presenter.    Readiness to change:    ( ) Pre-contemplative   ( ) Contemplative - ambivalent about change    (x) Action - ready to set action plan and implement   ( ) Maintenance - has made change and is trying, and or practicing different alternative behaviors     Additional Notes:  Participated in discussion     Ashley was in the Workshop with the Exercise Physiologist for 35 minutes.  The content was presented via Powerpoint, lecture, and patient participation based format.  Motivational interviewing was utilized when needed, to promote change.  Patient voiced understanding.    Electronically signed by Guido Klein on 2024 at 2:30 PM

## 2024-11-20 NOTE — CARDIO/PULMONARY
Patient arrives to cardiac rehab for session. Covid screening complete. Patient denies any complaints. Patient denies changes to PMH and medication. Patient tolerates exercise without complaint.     Patient attended [employer] Workshop \"Exercise Basics-Building Your Action Plan.\"    All equipment used in the care of this patient has been cleaned.   Electronically signed by Kathi Cummings RN on 11/20/2024 at 1:49 PM

## 2024-11-22 ENCOUNTER — HOSPITAL ENCOUNTER (OUTPATIENT)
Dept: CARDIAC REHAB | Age: 74
Setting detail: THERAPIES SERIES
Discharge: HOME | End: 2024-11-22
Payer: MEDICARE

## 2024-11-22 NOTE — CARDIO/PULMONARY
[facility] Cardiac Rehab ITP Note      Patient Name: Ashley Summers  MRN: [id number]                  : 1950      ITP Note: re-assessment    Patient completed  sessions of cardiac rehab.   Past Medical History:   Diagnosis Date    Anxiety     Bipolar affective disorder (HCC)     Chronic systolic congestive heart failure (HCC) 9/3/2023    Coronary artery disease involving native coronary artery of native heart with angina pectoris (HCC) 2023    DM (diabetes mellitus) ([facility])     Epidural abscess 2021    Head injury     Hypertension     Migraine     Migraine headache 2014    Osteomyelitis 2012    PTSD (post-traumatic stress disorder)     TIA (transient ischemic attack)     x 3          Current Outpatient Medications   Medication Sig Dispense Refill    metoprolol succinate (TOPROL XL) 25 MG extended release tablet Take 1 tablet by mouth daily 30 tablet 3    furosemide (LASIX) 20 MG tablet Take 2 tablets by mouth daily 180 tablet 0    empagliflozin (JARDIANCE) 10 MG tablet Take 1 tablet by mouth daily 90 tablet 1    topiramate (TOPAMAX) 25 MG tablet Take 1 tablet by mouth 2 times daily 60 tablet 1    fluticasone (FLONASE) 50 MCG/ACT nasal spray 50 sprays by Each Nostril route as needed 16 g     chlorzoxazone (PARAFON FORTE) 500 MG tablet Take 1 tablet by mouth 4 times daily as needed for Muscle spasms      vitamin D (ERGOCALCIFEROL) 1.25 MG (11666 UT) CAPS capsule Take 1 capsule by mouth Once a week at 5 PM      trospium (SANCTURA) 20 MG tablet Take 1 tablet by mouth 2 times daily      ondansetron (ZOFRAN-ODT) 4 MG disintegrating tablet Take 1 tablet by mouth 3 times daily as needed for Nausea or Vomiting 21 tablet 0    buPROPion (WELLBUTRIN) 75 MG tablet Take 1 tablet by mouth daily 60 tablet 3    sertraline (ZOLOFT) 100 MG tablet Take 1 tablet by mouth daily 30 tablet 3    cetirizine (ZYRTEC) 5 MG tablet Take 1 tablet by mouth daily 30 tablet 1    QUEtiapine 150 MG TABS

## 2024-11-25 ENCOUNTER — HOSPITAL ENCOUNTER (OUTPATIENT)
Dept: CARDIAC REHAB | Age: 74
Setting detail: THERAPIES SERIES
Discharge: HOME | End: 2024-11-25
Payer: MEDICARE

## 2024-11-25 PROCEDURE — G0422 INTENS CARDIAC REHAB W/EXERC: HCPCS

## 2024-11-25 NOTE — CARDIO/PULMONARY
Patient arrives to cardiac rehab for session. Covid screening complete. Patient denies any complaints. Patient denies changes to PMH and medication. Patient tolerates exercise without complaint.     Weekly Education: Discussed healthy eating options and stress management during the holiday season.  Handout offered.    Patient declined Pritikin Education.    All equipment used in the care of this patient has been cleaned.   Electronically signed by Kathi Cummings RN on 11/25/2024 at 1:35 PM

## 2024-11-27 ENCOUNTER — HOSPITAL ENCOUNTER (OUTPATIENT)
Dept: CARDIAC REHAB | Age: 74
Setting detail: THERAPIES SERIES
Discharge: HOME | End: 2024-11-27
Payer: MEDICARE

## 2024-11-27 PROCEDURE — G0422 INTENS CARDIAC REHAB W/EXERC: HCPCS

## 2024-11-27 NOTE — CARDIO/PULMONARY
Patient arrives to cardiac rehab for session. Covid screening complete. Patient denies any complaints. Patient denies changes to PMH and medication. Patient tolerates exercise without complaint.     Patient declines Pritikin education.    All equipment used in the care for this patient has been cleaned.  Electronically signed by Cha Ward RN on 11/27/2024 at 10:28 AM

## 2024-11-29 ENCOUNTER — APPOINTMENT (OUTPATIENT)
Dept: CARDIAC REHAB | Age: 74
End: 2024-11-29
Payer: MEDICARE

## 2024-12-13 ENCOUNTER — HOSPITAL ENCOUNTER (OUTPATIENT)
Dept: CARDIAC REHAB | Age: 74
Setting detail: THERAPIES SERIES
Discharge: HOME | End: 2024-12-13

## 2024-12-13 NOTE — CARDIO/PULMONARY
[facility] Cardiac Rehab ITP Note      Patient Name: Ashley Summers  MRN: [id number]                  : 1950      ITP Note: re-assessment        Patient completed 25/36 sessions of cardiac rehab.   Past Medical History:   Diagnosis Date    Anxiety     Bipolar affective disorder (HCC)     Chronic systolic congestive heart failure (HCC) 9/3/2023    Coronary artery disease involving native coronary artery of native heart with angina pectoris (HCC) 2023    DM (diabetes mellitus) ([facility])     Epidural abscess 2021    Head injury     Hypertension     Migraine     Migraine headache 2014    Osteomyelitis 2012    PTSD (post-traumatic stress disorder)     TIA (transient ischemic attack)     x 3          Current Outpatient Medications   Medication Sig Dispense Refill    metoprolol succinate (TOPROL XL) 25 MG extended release tablet Take 1 tablet by mouth daily 30 tablet 3    furosemide (LASIX) 20 MG tablet Take 2 tablets by mouth daily 180 tablet 0    empagliflozin (JARDIANCE) 10 MG tablet Take 1 tablet by mouth daily 90 tablet 1    topiramate (TOPAMAX) 25 MG tablet Take 1 tablet by mouth 2 times daily 60 tablet 1    fluticasone (FLONASE) 50 MCG/ACT nasal spray 50 sprays by Each Nostril route as needed 16 g     chlorzoxazone (PARAFON FORTE) 500 MG tablet Take 1 tablet by mouth 4 times daily as needed for Muscle spasms      vitamin D (ERGOCALCIFEROL) 1.25 MG (97664 UT) CAPS capsule Take 1 capsule by mouth Once a week at 5 PM      trospium (SANCTURA) 20 MG tablet Take 1 tablet by mouth 2 times daily      ondansetron (ZOFRAN-ODT) 4 MG disintegrating tablet Take 1 tablet by mouth 3 times daily as needed for Nausea or Vomiting 21 tablet 0    buPROPion (WELLBUTRIN) 75 MG tablet Take 1 tablet by mouth daily 60 tablet 3    sertraline (ZOLOFT) 100 MG tablet Take 1 tablet by mouth daily 30 tablet 3    cetirizine (ZYRTEC) 5 MG tablet Take 1 tablet by mouth daily 30 tablet 1    QUEtiapine 150 MG TABS

## 2024-12-23 ENCOUNTER — HOSPITAL ENCOUNTER (OUTPATIENT)
Dept: CARDIAC REHAB | Age: 74
Setting detail: THERAPIES SERIES
Discharge: HOME | End: 2024-12-23
Payer: MEDICARE

## 2024-12-23 PROCEDURE — G0422 INTENS CARDIAC REHAB W/EXERC: HCPCS

## 2024-12-23 PROCEDURE — G0423 INTENS CARDIAC REHAB NO EXER: HCPCS

## 2024-12-23 NOTE — CARDIO/PULMONARY
Patient arrives to cardiac rehab for session. Covid screening complete. Patient denies any complaints. Patient denies changes to PMH and medication. Patient tolerates exercise without complaint.     Patient educated on AHA recommendations for healthy eating and drinking during the holidays, how to create healthy traditions, decrease stress, and maintain physical activity/exercise levels during the holidays.     Patient declined Pritikin education. Patient had 1:1 with VINNY Workman to discuss medication, prepackaged meals and sodium intake, caregiver assistance, and setting limitations with physical activity at home to reduce sciatic nerve pain.     All equipment used in the care for this patient has been cleaned.  Electronically signed by Cha Ward RN on 12/23/2024 at 12:45 PM

## 2024-12-23 NOTE — CARDIO/PULMONARY
Ashley DOCKERY:  1950    Acct Number: [id number]   MRN:  [id number]        [facility] 1:1 Health Coaching Session             Date:            Today’s session covered:    Receptiveness to education/goals:  (x ) Agreeable ( ) No Interest   ( ) Refused    Evaluation of education:  ( ) Indicates understanding   ( ) Needs reinforcement  () Unsuccessful     Readiness to change:    ( ) Pre-contemplative   ( x) Contemplative - ambivalent about change    ( ) Action - ready to set action plan and implement   ( ) Maintenance - has made change and is trying, and or practicing different alternative behaviors     GOALS:  Discuss medication scheduling, prepackaged low sodium meals, and setting up caregiver assistance  2.  Decreasing physical strain at home to decrease sciatic nerve pain    Ashley was coached for 35 minutes. Motivational Interviewing was used to help promote change. Patient voiced understanding.      Electronically signed by Ji Philip on 2024 at 1:13 PM

## 2025-03-07 ENCOUNTER — OFFICE VISIT (OUTPATIENT)
Dept: CARDIOLOGY CLINIC | Age: 75
End: 2025-03-07

## 2025-03-07 VITALS
BODY MASS INDEX: 26.01 KG/M2 | DIASTOLIC BLOOD PRESSURE: 78 MMHG | SYSTOLIC BLOOD PRESSURE: 120 MMHG | OXYGEN SATURATION: 98 % | WEIGHT: 171 LBS | HEART RATE: 60 BPM | RESPIRATION RATE: 16 BRPM

## 2025-03-07 DIAGNOSIS — O26.50 MATERNAL HYPOTENSION SYNDROME, ANTEPARTUM: Primary | ICD-10-CM

## 2025-03-07 RX ORDER — METOPROLOL SUCCINATE 25 MG/1
25 TABLET, EXTENDED RELEASE ORAL DAILY
Qty: 90 TABLET | Refills: 5 | Status: SHIPPED | OUTPATIENT
Start: 2025-03-07

## 2025-03-07 RX ORDER — ASPIRIN 81 MG/1
81 TABLET, CHEWABLE ORAL DAILY
Qty: 90 TABLET | Refills: 5 | Status: SHIPPED | OUTPATIENT
Start: 2025-03-07

## 2025-03-07 RX ORDER — FUROSEMIDE 20 MG/1
40 TABLET ORAL DAILY
Qty: 180 TABLET | Refills: 5 | Status: SHIPPED | OUTPATIENT
Start: 2025-03-07

## 2025-03-07 ASSESSMENT — ENCOUNTER SYMPTOMS
DIARRHEA: 0
VOMITING: 0
WHEEZING: 0
SHORTNESS OF BREATH: 0
CONSTIPATION: 0
NAUSEA: 0
EYE REDNESS: 0
COLOR CHANGE: 0
ABDOMINAL PAIN: 0
RHINORRHEA: 0
APNEA: 0
CHEST TIGHTNESS: 0
COUGH: 0

## 2025-03-07 NOTE — PROGRESS NOTES
baseline.   Psychiatric:         Mood and Affect: Mood normal.        EKG: normal sinus rhythm    No orders of the defined types were placed in this encounter.    The ASCVD Risk score (Good ELAINE, et al., 2019) failed to calculate for the following reasons:    Cannot find a previous HDL lab    Cannot find a previous total cholesterol lab     ASSESSMENT:     Diagnosis Orders   1. Maternal hypotension syndrome, antepartum          PLAN:   CAD status post quadruple bypass at [facility] on 9/2023  Currently patient doing well denies chest pain or shortness of breath  Continue aspirin  Patient not on statins due to cramping pain  Continue metoprolol 12.5 mg daily  Refer patient to cardiac rehab    Ischemic cardiomyopathy with recover function from EF 30 to 35% to 50 to 55%  Continue metoprolol 12.5 mg daily  For now okay to stay away from Entresto  Continue Jardiance    Return to clinic in 3MO    Recommended patient to eat diets that emphasize high intakes of vegetables, fruits, nuts, whole grains, lean vegetable or animal protein, and fish. As per 2019 ACC/AHA guideline on primary prevention of CVD     Recommended patient to engage in at least 150 minutes per week of accumulated moderate-intensity physical activity or 75 minutes per week of vigorous-intensity physical activity as per 2019 ACC/AHA guideline on primary prevention of CVD           This note was transcribed using voice recognition software.  Every effort was made to ensure accuracy; however, inadvertent computerized transcription errors may be present.

## (undated) DEVICE — SPONGE GZ W4XL4IN COT 12 PLY TYP VII WVN C FLD DSGN

## (undated) DEVICE — LABEL MED MINI W/ MARKER

## (undated) DEVICE — Device: Brand: DENVER SPLINT

## (undated) DEVICE — SPLINT 1524050 5PK PAIR DOYLE II AIRWAY: Brand: DOYLE II ™

## (undated) DEVICE — CODMAN® SURGICAL PATTIES 1/2" X 3" (1.27CM X 7.62CM): Brand: CODMAN®

## (undated) DEVICE — Z CONVERTED USE 2271043 CONTAINER SPEC COLL 4OZ SCR ON LID PEEL PCH

## (undated) DEVICE — GAUZE,SPONGE,2"X2",8PLY,STERILE,LF,2'S: Brand: MEDLINE

## (undated) DEVICE — GAUZE,SPONGE,4"X4",16PLY,XRAY,STRL,LF: Brand: MEDLINE

## (undated) DEVICE — PACK PROCEDURE SURG SURG CARDIAC CATH

## (undated) DEVICE — YANKAUER,BULB TIP,W/O VENT,RIGID,STERILE: Brand: MEDLINE

## (undated) DEVICE — DRAPE SURG BRACH STRL

## (undated) DEVICE — GLIDESHEATH SLENDER STAINLESS STEEL KIT: Brand: GLIDESHEATH SLENDER

## (undated) DEVICE — CATHETER COR DIAG PIGTAILS PIG 145 CRV 5FR 110CM 6 SIDE H

## (undated) DEVICE — GUIDEWIRE VASC L260CM DIA0.035IN TIP L3MM STD EXCHG PTFE J

## (undated) DEVICE — STANDARD HYPODERMIC NEEDLE,POLYPROPYLENE HUB: Brand: MONOJECT

## (undated) DEVICE — Device

## (undated) DEVICE — GOWN,AURORA,NONREINFORCED,LARGE: Brand: MEDLINE

## (undated) DEVICE — SHEET,DRAPE,53X77,STERILE: Brand: MEDLINE

## (undated) DEVICE — COVER,MAYO STAND,STERILE: Brand: MEDLINE

## (undated) DEVICE — SYRINGE MED 10ML TRNSLUC BRL PLUNG BLK MRK POLYPR CTRL

## (undated) DEVICE — BANDAGE HEMOSTATIC IMPREG KAOLIN SFT RL GZ QUIKCLOT RADIAL

## (undated) DEVICE — RADIFOCUS OPTITORQUE ANGIOGRAPHIC CATHETER: Brand: OPTITORQUE

## (undated) DEVICE — KIT ANGIO W/ AT P65 PREM HND CTRL FOR CNTRST DEL ANGIOTOUCH

## (undated) DEVICE — TUBING, SUCTION, 1/4" X 10', STRAIGHT: Brand: MEDLINE

## (undated) DEVICE — GLOVE ORANGE PI 7 1/2   MSG9075